# Patient Record
Sex: MALE | Race: WHITE | NOT HISPANIC OR LATINO | Employment: UNEMPLOYED | URBAN - METROPOLITAN AREA
[De-identification: names, ages, dates, MRNs, and addresses within clinical notes are randomized per-mention and may not be internally consistent; named-entity substitution may affect disease eponyms.]

---

## 2020-09-25 ENCOUNTER — HOSPITAL ENCOUNTER (OUTPATIENT)
Dept: RADIOLOGY | Facility: HOSPITAL | Age: 60
Discharge: HOME | End: 2020-09-25
Attending: FAMILY MEDICINE

## 2020-09-25 ENCOUNTER — RECORDS - HEALTHEAST (OUTPATIENT)
Dept: LAB | Facility: CLINIC | Age: 60
End: 2020-09-25

## 2020-09-25 ENCOUNTER — RECORDS - HEALTHEAST (OUTPATIENT)
Dept: ADMINISTRATIVE | Facility: OTHER | Age: 60
End: 2020-09-25

## 2020-09-25 DIAGNOSIS — M54.50 LOW BACK PAIN: ICD-10-CM

## 2020-09-25 LAB — PSA SERPL-MCNC: <0.1 NG/ML (ref 0–3.5)

## 2020-10-09 ENCOUNTER — HOSPITAL ENCOUNTER (OUTPATIENT)
Dept: PHYSICAL MEDICINE AND REHAB | Facility: CLINIC | Age: 60
Discharge: HOME | End: 2020-10-09
Attending: EMERGENCY MEDICINE

## 2020-10-09 DIAGNOSIS — M79.18 MYOFASCIAL PAIN: ICD-10-CM

## 2020-10-09 DIAGNOSIS — G47.9 SLEEP DIFFICULTIES: ICD-10-CM

## 2020-10-09 DIAGNOSIS — M43.16 SPONDYLOLISTHESIS OF LUMBAR REGION: ICD-10-CM

## 2020-10-09 DIAGNOSIS — C61 PROSTATE CANCER (H): ICD-10-CM

## 2020-10-09 DIAGNOSIS — M54.50 LUMBAR SPINE PAIN: ICD-10-CM

## 2020-10-19 ENCOUNTER — HOSPITAL ENCOUNTER (OUTPATIENT)
Dept: MRI IMAGING | Facility: CLINIC | Age: 60
Discharge: HOME | End: 2020-10-19
Attending: PHYSICAL MEDICINE & REHABILITATION

## 2020-10-19 DIAGNOSIS — M43.16 SPONDYLOLISTHESIS OF LUMBAR REGION: ICD-10-CM

## 2020-10-19 DIAGNOSIS — C61 PROSTATE CANCER (H): ICD-10-CM

## 2020-10-19 DIAGNOSIS — M54.50 LUMBAR SPINE PAIN: ICD-10-CM

## 2020-10-20 ENCOUNTER — COMMUNICATION - HEALTHEAST (OUTPATIENT)
Dept: PHYSICAL MEDICINE AND REHAB | Facility: CLINIC | Age: 60
End: 2020-10-20

## 2020-10-26 ENCOUNTER — OFFICE VISIT - HEALTHEAST (OUTPATIENT)
Dept: PHYSICAL THERAPY | Facility: REHABILITATION | Age: 60
End: 2020-10-26

## 2020-10-26 DIAGNOSIS — M54.50 ACUTE MIDLINE LOW BACK PAIN WITHOUT SCIATICA: ICD-10-CM

## 2020-10-29 ENCOUNTER — COMMUNICATION - HEALTHEAST (OUTPATIENT)
Dept: PHYSICAL MEDICINE AND REHAB | Facility: CLINIC | Age: 60
End: 2020-10-29

## 2020-10-29 DIAGNOSIS — M54.50 LUMBAR SPINE PAIN: ICD-10-CM

## 2020-11-03 ASSESSMENT — MIFFLIN-ST. JEOR: SCORE: 1684.89

## 2020-11-04 ENCOUNTER — SURGERY - HEALTHEAST (OUTPATIENT)
Dept: GASTROENTEROLOGY | Facility: CLINIC | Age: 60
End: 2020-11-04

## 2020-11-04 ASSESSMENT — MIFFLIN-ST. JEOR: SCORE: 1678.09

## 2020-11-05 ENCOUNTER — COMMUNICATION - HEALTHEAST (OUTPATIENT)
Dept: SCHEDULING | Facility: CLINIC | Age: 60
End: 2020-11-05

## 2020-11-05 ASSESSMENT — MIFFLIN-ST. JEOR: SCORE: 1592.81

## 2020-11-06 ASSESSMENT — MIFFLIN-ST. JEOR: SCORE: 1595.08

## 2020-11-07 ASSESSMENT — MIFFLIN-ST. JEOR: SCORE: 1595.08

## 2020-11-08 ASSESSMENT — MIFFLIN-ST. JEOR: SCORE: 1592.36

## 2020-11-17 ENCOUNTER — AMBULATORY - HEALTHEAST (OUTPATIENT)
Dept: INTERVENTIONAL RADIOLOGY/VASCULAR | Facility: HOSPITAL | Age: 60
End: 2020-11-17

## 2020-11-17 ENCOUNTER — RECORDS - HEALTHEAST (OUTPATIENT)
Dept: ADMINISTRATIVE | Facility: OTHER | Age: 60
End: 2020-11-17

## 2020-11-17 DIAGNOSIS — Z11.59 ENCOUNTER FOR SCREENING FOR OTHER VIRAL DISEASES: ICD-10-CM

## 2020-11-18 ENCOUNTER — RECORDS - HEALTHEAST (OUTPATIENT)
Dept: ADMINISTRATIVE | Facility: OTHER | Age: 60
End: 2020-11-18

## 2020-11-21 ENCOUNTER — AMBULATORY - HEALTHEAST (OUTPATIENT)
Dept: FAMILY MEDICINE | Facility: CLINIC | Age: 60
End: 2020-11-21

## 2020-11-21 DIAGNOSIS — Z11.59 ENCOUNTER FOR SCREENING FOR OTHER VIRAL DISEASES: ICD-10-CM

## 2020-11-23 ENCOUNTER — COMMUNICATION - HEALTHEAST (OUTPATIENT)
Dept: SCHEDULING | Facility: CLINIC | Age: 60
End: 2020-11-23

## 2020-11-24 ENCOUNTER — HOSPITAL ENCOUNTER (OUTPATIENT)
Dept: INTERVENTIONAL RADIOLOGY/VASCULAR | Facility: HOSPITAL | Age: 60
Discharge: HOME | End: 2020-11-24
Attending: INTERNAL MEDICINE | Admitting: RADIOLOGY

## 2020-11-24 DIAGNOSIS — C19 COLORECTAL CANCER (H): ICD-10-CM

## 2020-11-24 ASSESSMENT — MIFFLIN-ST. JEOR: SCORE: 1591.9

## 2020-11-27 ENCOUNTER — COMMUNICATION - HEALTHEAST (OUTPATIENT)
Dept: INTERVENTIONAL RADIOLOGY/VASCULAR | Facility: HOSPITAL | Age: 60
End: 2020-11-27

## 2020-11-27 ENCOUNTER — HOME INFUSION (PRE-WILLOW HOME INFUSION) (OUTPATIENT)
Dept: PHARMACY | Facility: CLINIC | Age: 60
End: 2020-11-27

## 2020-11-30 ENCOUNTER — HOME INFUSION (PRE-WILLOW HOME INFUSION) (OUTPATIENT)
Dept: PHARMACY | Facility: CLINIC | Age: 60
End: 2020-11-30

## 2020-12-01 NOTE — PROGRESS NOTES
This is a recent snapshot of the patient's [facility] medical record.  For current drug dose and complete information and questions, call [phone] or In [facility] pool, fv home infusion ([id number])  CSN Number:  [id number]     stated

## 2020-12-01 NOTE — PROGRESS NOTES
This is a recent snapshot of the patient's [facility] medical record.  For current drug dose and complete information and questions, call [phone] or In [facility] pool, fv home infusion ([id number])  CSN Number:  [id number]

## 2020-12-06 ENCOUNTER — COMMUNICATION - HEALTHEAST (OUTPATIENT)
Dept: PHYSICAL MEDICINE AND REHAB | Facility: CLINIC | Age: 60
End: 2020-12-06

## 2020-12-06 DIAGNOSIS — M54.50 LUMBAR SPINE PAIN: ICD-10-CM

## 2020-12-21 ENCOUNTER — TRANSFERRED RECORDS (OUTPATIENT)
Dept: HEALTH INFORMATION MANAGEMENT | Facility: CLINIC | Age: 60
End: 2020-12-21

## 2020-12-21 ENCOUNTER — AMBULATORY - HEALTHEAST (OUTPATIENT)
Dept: INFUSION THERAPY | Facility: HOSPITAL | Age: 60
End: 2020-12-21

## 2020-12-21 LAB
ALT SERPL-CCNC: 61 U/L (ref 0–45)
AST SERPL-CCNC: 36 U/L (ref 0–40)
CREAT SERPL-MCNC: 1.59 MG/DL (ref 0.7–1.3)
GFR SERPL CREATININE-BSD FRML MDRD: 45 ML/MIN/1.73M2
GLUCOSE SERPL-MCNC: 108 MG/DL (ref 70–125)
POTASSIUM SERPL-SCNC: 3.8 MMOL/L (ref 3.5–5)

## 2020-12-22 ENCOUNTER — HOSPITAL ENCOUNTER (INPATIENT)
Facility: CLINIC | Age: 60
LOS: 2 days | Discharge: HOME | End: 2020-12-24
Attending: INTERNAL MEDICINE | Admitting: INTERNAL MEDICINE
Payer: COMMERCIAL

## 2020-12-22 ENCOUNTER — APPOINTMENT (OUTPATIENT)
Dept: CARDIOLOGY | Facility: CLINIC | Age: 60
End: 2020-12-22
Attending: INTERNAL MEDICINE
Payer: COMMERCIAL

## 2020-12-22 DIAGNOSIS — I26.99 ACUTE PULMONARY EMBOLISM WITHOUT ACUTE COR PULMONALE, UNSPECIFIED PULMONARY EMBOLISM TYPE (H): Primary | ICD-10-CM

## 2020-12-22 LAB
ABO + RH BLD: NORMAL
ABO + RH BLD: NORMAL
ANION GAP SERPL CALCULATED.3IONS-SCNC: 6 MMOL/L (ref 3–14)
APTT PPP: 232 SEC (ref 22–37)
BLD GP AB SCN SERPL QL: NORMAL
BLD PROD TYP BPU: NORMAL
BLD PROD TYP BPU: NORMAL
BLD UNIT ID BPU: 0
BLOOD BANK CMNT PATIENT-IMP: NORMAL
BLOOD PRODUCT CODE: NORMAL
BPU ID: NORMAL
BUN SERPL-MCNC: 35 MG/DL (ref 7–30)
CALCIUM SERPL-MCNC: 8.3 MG/DL (ref 8.5–10.1)
CHLORIDE SERPL-SCNC: 110 MMOL/L (ref 94–109)
CO2 SERPL-SCNC: 21 MMOL/L (ref 20–32)
CREAT SERPL-MCNC: 1.42 MG/DL (ref 0.66–1.25)
ERYTHROCYTE [DISTWIDTH] IN BLOOD BY AUTOMATED COUNT: 20.6 % (ref 10–15)
FOLATE SERPL-MCNC: >100 NG/ML
GFR SERPL CREATININE-BSD FRML MDRD: 53 ML/MIN/{1.73_M2}
GLUCOSE SERPL-MCNC: 82 MG/DL (ref 70–99)
HCT VFR BLD AUTO: 22.8 % (ref 40–53)
HGB BLD-MCNC: 6.9 G/DL (ref 13.3–17.7)
HGB BLD-MCNC: 7.1 G/DL (ref 13.3–17.7)
MCH RBC QN AUTO: 25.4 PG (ref 26.5–33)
MCHC RBC AUTO-ENTMCNC: 31.1 G/DL (ref 31.5–36.5)
MCV RBC AUTO: 82 FL (ref 78–100)
NUM BPU REQUESTED: 1
PLATELET # BLD AUTO: 282 10E9/L (ref 150–450)
POTASSIUM SERPL-SCNC: 3.6 MMOL/L (ref 3.4–5.3)
RBC # BLD AUTO: 2.79 10E12/L (ref 4.4–5.9)
RETICS # AUTO: 12.6 10E9/L (ref 25–95)
RETICS/RBC NFR AUTO: 0.5 % (ref 0.5–2)
SODIUM SERPL-SCNC: 137 MMOL/L (ref 133–144)
SPECIMEN EXP DATE BLD: NORMAL
TRANSFUSION STATUS PATIENT QL: NORMAL
TRANSFUSION STATUS PATIENT QL: NORMAL
UFH PPP CHRO-ACNC: 0.23 IU/ML
UFH PPP CHRO-ACNC: 0.3 IU/ML
UFH PPP CHRO-ACNC: 0.36 IU/ML
VIT B12 SERPL-MCNC: 1342 PG/ML (ref 193–986)
WBC # BLD AUTO: 5.5 10E9/L (ref 4–11)

## 2020-12-22 PROCEDURE — 85018 HEMOGLOBIN: CPT | Performed by: INTERNAL MEDICINE

## 2020-12-22 PROCEDURE — 82607 VITAMIN B-12: CPT | Performed by: INTERNAL MEDICINE

## 2020-12-22 PROCEDURE — 99223 1ST HOSP IP/OBS HIGH 75: CPT | Mod: AI | Performed by: INTERNAL MEDICINE

## 2020-12-22 PROCEDURE — 250N000013 HC RX MED GY IP 250 OP 250 PS 637: Performed by: INTERNAL MEDICINE

## 2020-12-22 PROCEDURE — 86901 BLOOD TYPING SEROLOGIC RH(D): CPT | Performed by: INTERNAL MEDICINE

## 2020-12-22 PROCEDURE — 86900 BLOOD TYPING SEROLOGIC ABO: CPT | Performed by: INTERNAL MEDICINE

## 2020-12-22 PROCEDURE — 82746 ASSAY OF FOLIC ACID SERUM: CPT | Performed by: INTERNAL MEDICINE

## 2020-12-22 PROCEDURE — 85730 THROMBOPLASTIN TIME PARTIAL: CPT | Performed by: INTERNAL MEDICINE

## 2020-12-22 PROCEDURE — P9016 RBC LEUKOCYTES REDUCED: HCPCS | Performed by: INTERNAL MEDICINE

## 2020-12-22 PROCEDURE — 93306 TTE W/DOPPLER COMPLETE: CPT

## 2020-12-22 PROCEDURE — 80048 BASIC METABOLIC PNL TOTAL CA: CPT | Performed by: INTERNAL MEDICINE

## 2020-12-22 PROCEDURE — 36415 COLL VENOUS BLD VENIPUNCTURE: CPT | Performed by: INTERNAL MEDICINE

## 2020-12-22 PROCEDURE — 93306 TTE W/DOPPLER COMPLETE: CPT | Mod: 26 | Performed by: INTERNAL MEDICINE

## 2020-12-22 PROCEDURE — 85027 COMPLETE CBC AUTOMATED: CPT | Performed by: INTERNAL MEDICINE

## 2020-12-22 PROCEDURE — 250N000011 HC RX IP 250 OP 636: Performed by: INTERNAL MEDICINE

## 2020-12-22 PROCEDURE — 85520 HEPARIN ASSAY: CPT | Performed by: INTERNAL MEDICINE

## 2020-12-22 PROCEDURE — 86923 COMPATIBILITY TEST ELECTRIC: CPT | Performed by: INTERNAL MEDICINE

## 2020-12-22 PROCEDURE — 85045 AUTOMATED RETICULOCYTE COUNT: CPT | Performed by: INTERNAL MEDICINE

## 2020-12-22 PROCEDURE — 258N000003 HC RX IP 258 OP 636: Performed by: INTERNAL MEDICINE

## 2020-12-22 PROCEDURE — 86850 RBC ANTIBODY SCREEN: CPT | Performed by: INTERNAL MEDICINE

## 2020-12-22 PROCEDURE — 120N000001 HC R&B MED SURG/OB

## 2020-12-22 RX ORDER — OXYCODONE HYDROCHLORIDE 5 MG/1
5 TABLET ORAL 3 TIMES DAILY
COMMUNITY
End: 2021-10-28

## 2020-12-22 RX ORDER — GABAPENTIN 100 MG/1
100 CAPSULE ORAL AT BEDTIME
Status: DISCONTINUED | OUTPATIENT
Start: 2020-12-22 | End: 2020-12-22

## 2020-12-22 RX ORDER — CHLORPROMAZINE HYDROCHLORIDE 25 MG/1
25 TABLET, FILM COATED ORAL 3 TIMES DAILY PRN
Status: DISCONTINUED | OUTPATIENT
Start: 2020-12-22 | End: 2020-12-22

## 2020-12-22 RX ORDER — ACETAMINOPHEN 500 MG
500-1000 TABLET ORAL EVERY 6 HOURS PRN
COMMUNITY

## 2020-12-22 RX ORDER — ONDANSETRON 4 MG/1
4 TABLET, ORALLY DISINTEGRATING ORAL EVERY 6 HOURS PRN
Status: DISCONTINUED | OUTPATIENT
Start: 2020-12-22 | End: 2020-12-24 | Stop reason: HOSPADM

## 2020-12-22 RX ORDER — OXYCODONE HYDROCHLORIDE 5 MG/1
5 TABLET ORAL EVERY 4 HOURS PRN
Status: DISCONTINUED | OUTPATIENT
Start: 2020-12-22 | End: 2020-12-24 | Stop reason: HOSPADM

## 2020-12-22 RX ORDER — PROCHLORPERAZINE MALEATE 5 MG
10 TABLET ORAL EVERY 6 HOURS PRN
Status: DISCONTINUED | OUTPATIENT
Start: 2020-12-22 | End: 2020-12-24 | Stop reason: HOSPADM

## 2020-12-22 RX ORDER — METHYLPREDNISOLONE SODIUM SUCCINATE 125 MG/2ML
125 INJECTION, POWDER, LYOPHILIZED, FOR SOLUTION INTRAMUSCULAR; INTRAVENOUS
Status: DISCONTINUED | OUTPATIENT
Start: 2020-12-22 | End: 2020-12-24 | Stop reason: HOSPADM

## 2020-12-22 RX ORDER — TAMSULOSIN HYDROCHLORIDE 0.4 MG/1
0.4 CAPSULE ORAL DAILY
Status: DISCONTINUED | OUTPATIENT
Start: 2020-12-22 | End: 2020-12-22

## 2020-12-22 RX ORDER — NALOXONE HYDROCHLORIDE 0.4 MG/ML
0.4 INJECTION, SOLUTION INTRAMUSCULAR; INTRAVENOUS; SUBCUTANEOUS
Status: DISCONTINUED | OUTPATIENT
Start: 2020-12-22 | End: 2020-12-24 | Stop reason: HOSPADM

## 2020-12-22 RX ORDER — NALOXONE HYDROCHLORIDE 0.4 MG/ML
0.2 INJECTION, SOLUTION INTRAMUSCULAR; INTRAVENOUS; SUBCUTANEOUS
Status: DISCONTINUED | OUTPATIENT
Start: 2020-12-22 | End: 2020-12-24 | Stop reason: HOSPADM

## 2020-12-22 RX ORDER — TAMSULOSIN HYDROCHLORIDE 0.4 MG/1
0.4 CAPSULE ORAL DAILY
Status: DISCONTINUED | OUTPATIENT
Start: 2020-12-22 | End: 2020-12-24 | Stop reason: HOSPADM

## 2020-12-22 RX ORDER — GABAPENTIN 100 MG/1
100 CAPSULE ORAL AT BEDTIME
COMMUNITY
End: 2021-10-28

## 2020-12-22 RX ORDER — LORATADINE 10 MG/1
10 TABLET ORAL DAILY
Status: DISCONTINUED | OUTPATIENT
Start: 2020-12-22 | End: 2020-12-24 | Stop reason: HOSPADM

## 2020-12-22 RX ORDER — ACETAMINOPHEN 325 MG/1
650 TABLET ORAL EVERY 6 HOURS PRN
Status: DISCONTINUED | OUTPATIENT
Start: 2020-12-22 | End: 2020-12-22

## 2020-12-22 RX ORDER — HEPARIN SODIUM (PORCINE) LOCK FLUSH IV SOLN 100 UNIT/ML 100 UNIT/ML
5 SOLUTION INTRAVENOUS
Status: DISCONTINUED | OUTPATIENT
Start: 2020-12-22 | End: 2020-12-24 | Stop reason: HOSPADM

## 2020-12-22 RX ORDER — OXYBUTYNIN CHLORIDE 5 MG/1
5 TABLET ORAL 3 TIMES DAILY
Status: DISCONTINUED | OUTPATIENT
Start: 2020-12-22 | End: 2020-12-24 | Stop reason: HOSPADM

## 2020-12-22 RX ORDER — PROCHLORPERAZINE 25 MG
25 SUPPOSITORY, RECTAL RECTAL EVERY 12 HOURS PRN
Status: DISCONTINUED | OUTPATIENT
Start: 2020-12-22 | End: 2020-12-24 | Stop reason: HOSPADM

## 2020-12-22 RX ORDER — ONDANSETRON 2 MG/ML
4 INJECTION INTRAMUSCULAR; INTRAVENOUS EVERY 6 HOURS PRN
Status: DISCONTINUED | OUTPATIENT
Start: 2020-12-22 | End: 2020-12-24 | Stop reason: HOSPADM

## 2020-12-22 RX ORDER — FENTANYL 25 UG/1
25 PATCH TRANSDERMAL
Status: DISCONTINUED | OUTPATIENT
Start: 2020-12-22 | End: 2020-12-22

## 2020-12-22 RX ORDER — OXYCODONE HYDROCHLORIDE 5 MG/1
5 TABLET ORAL EVERY 4 HOURS PRN
COMMUNITY

## 2020-12-22 RX ORDER — FENTANYL 25 UG/1
25 PATCH TRANSDERMAL
Status: DISCONTINUED | OUTPATIENT
Start: 2020-12-22 | End: 2020-12-24 | Stop reason: HOSPADM

## 2020-12-22 RX ORDER — FENTANYL 25 UG/1
1 PATCH TRANSDERMAL
COMMUNITY

## 2020-12-22 RX ORDER — OXYBUTYNIN CHLORIDE 5 MG/1
5 TABLET ORAL 3 TIMES DAILY
COMMUNITY

## 2020-12-22 RX ORDER — ACETAMINOPHEN 500 MG
500-1000 TABLET ORAL 3 TIMES DAILY
COMMUNITY
End: 2021-10-28

## 2020-12-22 RX ORDER — DIPHENHYDRAMINE HYDROCHLORIDE 50 MG/ML
50 INJECTION INTRAMUSCULAR; INTRAVENOUS
Status: DISCONTINUED | OUTPATIENT
Start: 2020-12-22 | End: 2020-12-24 | Stop reason: HOSPADM

## 2020-12-22 RX ORDER — METOCLOPRAMIDE 10 MG/1
10 TABLET ORAL EVERY 6 HOURS PRN
Status: DISCONTINUED | OUTPATIENT
Start: 2020-12-22 | End: 2020-12-22

## 2020-12-22 RX ORDER — PROCHLORPERAZINE MALEATE 10 MG
10 TABLET ORAL 2 TIMES DAILY
COMMUNITY
End: 2021-10-28

## 2020-12-22 RX ORDER — CHLORPROMAZINE HYDROCHLORIDE 25 MG/1
25 TABLET, FILM COATED ORAL 3 TIMES DAILY PRN
Status: DISCONTINUED | OUTPATIENT
Start: 2020-12-22 | End: 2020-12-24 | Stop reason: HOSPADM

## 2020-12-22 RX ORDER — GABAPENTIN 300 MG/1
300 CAPSULE ORAL 3 TIMES DAILY
Status: DISCONTINUED | OUTPATIENT
Start: 2020-12-22 | End: 2020-12-24 | Stop reason: HOSPADM

## 2020-12-22 RX ORDER — ACETAMINOPHEN 325 MG/1
975 TABLET ORAL EVERY 6 HOURS PRN
Status: DISCONTINUED | OUTPATIENT
Start: 2020-12-22 | End: 2020-12-24 | Stop reason: HOSPADM

## 2020-12-22 RX ORDER — LIDOCAINE 40 MG/G
CREAM TOPICAL
Status: DISCONTINUED | OUTPATIENT
Start: 2020-12-22 | End: 2020-12-24 | Stop reason: HOSPADM

## 2020-12-22 RX ORDER — TAMSULOSIN HYDROCHLORIDE 0.4 MG/1
0.4 CAPSULE ORAL DAILY
COMMUNITY

## 2020-12-22 RX ORDER — HEPARIN SODIUM,PORCINE 10 UNIT/ML
5-10 VIAL (ML) INTRAVENOUS EVERY 24 HOURS
Status: DISCONTINUED | OUTPATIENT
Start: 2020-12-22 | End: 2020-12-24 | Stop reason: HOSPADM

## 2020-12-22 RX ORDER — HEPARIN SODIUM,PORCINE 10 UNIT/ML
5-10 VIAL (ML) INTRAVENOUS
Status: DISCONTINUED | OUTPATIENT
Start: 2020-12-22 | End: 2020-12-24 | Stop reason: HOSPADM

## 2020-12-22 RX ORDER — HEPARIN SODIUM 10000 [USP'U]/100ML
0-5000 INJECTION, SOLUTION INTRAVENOUS CONTINUOUS
Status: DISCONTINUED | OUTPATIENT
Start: 2020-12-22 | End: 2020-12-24 | Stop reason: HOSPADM

## 2020-12-22 RX ADMIN — HEPARIN SODIUM 1400 UNITS/HR: 10000 INJECTION, SOLUTION INTRAVENOUS at 14:46

## 2020-12-22 RX ADMIN — LORATADINE 10 MG: 10 TABLET ORAL at 08:53

## 2020-12-22 RX ADMIN — GABAPENTIN 300 MG: 300 CAPSULE ORAL at 19:42

## 2020-12-22 RX ADMIN — OXYBUTYNIN CHLORIDE 5 MG: 5 TABLET ORAL at 08:53

## 2020-12-22 RX ADMIN — CHLORPROMAZINE HYDROCHLORIDE 25 MG: 25 TABLET, SUGAR COATED ORAL at 19:26

## 2020-12-22 RX ADMIN — TAMSULOSIN HYDROCHLORIDE 0.4 MG: 0.4 CAPSULE ORAL at 08:53

## 2020-12-22 RX ADMIN — ACETAMINOPHEN 975 MG: 325 TABLET, FILM COATED ORAL at 18:47

## 2020-12-22 RX ADMIN — PROCHLORPERAZINE MALEATE 10 MG: 5 TABLET ORAL at 13:54

## 2020-12-22 RX ADMIN — FENTANYL TRANSDERMAL 1 PATCH: 25 PATCH, EXTENDED RELEASE TRANSDERMAL at 11:07

## 2020-12-22 RX ADMIN — OXYCODONE HYDROCHLORIDE 5 MG: 5 TABLET ORAL at 13:54

## 2020-12-22 RX ADMIN — OXYBUTYNIN CHLORIDE 5 MG: 5 TABLET ORAL at 19:41

## 2020-12-22 RX ADMIN — OMEPRAZOLE 40 MG: 20 CAPSULE, DELAYED RELEASE ORAL at 08:53

## 2020-12-22 RX ADMIN — OXYCODONE HYDROCHLORIDE 5 MG: 5 TABLET ORAL at 08:58

## 2020-12-22 RX ADMIN — GABAPENTIN 100 MG: 100 CAPSULE ORAL at 04:18

## 2020-12-22 RX ADMIN — OMEPRAZOLE 40 MG: 20 CAPSULE, DELAYED RELEASE ORAL at 15:23

## 2020-12-22 RX ADMIN — ACETAMINOPHEN 975 MG: 325 TABLET, FILM COATED ORAL at 04:18

## 2020-12-22 RX ADMIN — HEPARIN SODIUM 1250 UNITS/HR: 10000 INJECTION, SOLUTION INTRAVENOUS at 05:13

## 2020-12-22 RX ADMIN — OXYBUTYNIN CHLORIDE 5 MG: 5 TABLET ORAL at 13:54

## 2020-12-22 RX ADMIN — PROCHLORPERAZINE MALEATE 10 MG: 5 TABLET ORAL at 04:18

## 2020-12-22 ASSESSMENT — ACTIVITIES OF DAILY LIVING (ADL)
DIFFICULTY_COMMUNICATING: NO
DRESSING/BATHING_DIFFICULTY: NO
ADLS_ACUITY_SCORE: 19
WEAR_GLASSES_OR_BLIND: YES
ADLS_ACUITY_SCORE: 19
FALL_HISTORY_WITHIN_LAST_SIX_MONTHS: NO
DOING_ERRANDS_INDEPENDENTLY_DIFFICULTY: NO
PATIENT_/_FAMILY_COMMUNICATION_STYLE: SPOKEN LANGUAGE (ENGLISH OR BILINGUAL)
DIFFICULTY_EATING/SWALLOWING: NO
ADLS_ACUITY_SCORE: 19
HEARING_DIFFICULTY_OR_DEAF: NO
CONCENTRATING,_REMEMBERING_OR_MAKING_DECISIONS_DIFFICULTY: NO
WALKING_OR_CLIMBING_STAIRS_DIFFICULTY: NO
TOILETING_ISSUES: NO

## 2020-12-22 ASSESSMENT — MIFFLIN-ST. JEOR: SCORE: 1532.94

## 2020-12-22 NOTE — PLAN OF CARE
PT: Orders received. Per chart, just admitted this date due to DVT/PE and anemia. Will allow for continued medical management this date in order to maximize the patient's ability to participate in a meaningful PT evaluation.

## 2020-12-22 NOTE — H&P
[facility], [place] History and Physical    Rich Wolff MRN# [id number]   Age: 60 year old YOB: 1960     Date of Admission:  12/22/2020    Primary oncologist: [place] [facility] Dr. Kern          Assessment and Plan:   Assessment:   Rich Wolff is a 60-year-old man with a recent diagnosis of colon cancer for which he underwent ileostomy at [place] in early November.  He had gone to his primary oncologist today due to concern for left calf swelling and discomfort.  He was diagnosed with DVT and enoxaparin was prescribed.  When he got to the pharmacy, he was told that this would not be covered by his insurance, for which reason he went to [facility] emergency department.  They found that he had had a pulmonary embolism but that also his hemoglobin was low at 6.5.  The ED provider transfused him a couple of units of blood and started him on a heparin drip.  I have been asked to admit him here because of no room at [facility].    On my evaluation, Mr. Wolff is alert and coherent.  Heart rate 111, blood pressure 101/69, temperature 99.3, respirations 16 and unlabored in room air.    Past medical history is also notable for prostate cancer for which he underwent prostatectomy in 2015.  The patient is not clear on what colon cancer chemotherapy he is on at this time, but he completed his second course this past Wednesday.    Diagnoses:  1.  DVT pulmonary embolism associated with cancer.  2.  Anemia, probably multifactorial.  The patient's hemoglobin on arrival here is 7.1 with a reticulocyte percent of 0.5.  I think this goes along with bone marrow suppression due to chemotherapy.  It will be difficult to figure out whether he has iron deficiency and or vitamin deficiencies as he just was transfused.  3.  Generalized weakness.  Patient describes difficulty ambulating due to the chemotherapy regimen but it is not clear if this is a neuropathy or just generalized weakness.  4.  CKD stage  II-III.      Plan:   1.  Admit to inpatient on telemetry.  2.  We will continue to monitor hemoglobins while the patient is getting anticoagulated for PE DVT/.  3.  The patient stated that the insurance was knocking to cover his Lovenox.  I believe that apixaban also has an indication for VTE associated with malignancy.             Chief Complaint:   Left lower extremity swelling and pain.  Found also to have pulmonary embolism.     History is obtained from the patient and from the electronic medical record.  I spoke with Dr. Palmer at [facility] emergency department where the patient presented but I am unable to see her report in the care everywhere.    Mr. Wolff indicates that he noticed left lower extremity swelling and tenderness in the last day or so.  He really has not had significant fevers, sweats or chills.  He denies significant shortness of breath and chest pain as well.      He indicates that he has been significantly anorexic with chemotherapy and the colon cancer.  He denies significant shortness of breath but notes that he is having a lot of difficulty with ambulating right now to the point where he does not even feel safe walking to the bathroom independently.  He denies nausea and vomiting.  He has an ileostomy and is trying to drink enough to keep up with that but is not certain that he is able to keep up.            Past Medical History:   Prostate cancer  Colon cancer (patient presented with a cecal mass and obstruction)         Past Surgical History:   Diverting ileostomy, November 2020  Robotic radical prostatectomy, 2015.         Social History:   Patient reports he is not currently working.  Lives with his wife and has a couple of adult children close by.         Family History:   Reviewed but considered noncontributory.         Immunizations:   Immunizations are up to date          Allergies:   No Known Allergies          Medications:   Fentanyl patch 25 mcg  Gabapentin 100 mg  nightly  Loratadine 10 mg daily  Omeprazole 40 mg twice daily  Oxybutynin 5 mg 3 times daily  Tamsulosin 0.4 mg daily         Review of Systems:   A comprehensive review of systems was performed and found to be negative except as described in this note           Physical Exam:   Vitals were reviewed  Temp: 99.3  F (37.4  C) Temp src: Temporal BP: 101/69 Pulse: 111   Resp: 16        Constitutional: Awake, alert, cooperative, no apparent distress, and appears stated age.  Eyes: Lids and lashes normal, pupils equal, round and reactive to light, extra ocular muscles intact, sclera clear, conjunctiva normal.  ENT: Normocephalic, without obvious abnormality, atraumatic.  There is a mild amount of blood at the edge of his lips.  He describes some sores in his mouth that apparently are bleeding.  Neck: Supple, symmetrical, trachea midline, no adenopathy, thyroid symmetric, not enlarged and no tenderness, skin normal.  Hematologic / Lymphatic: No cervical lymphadenopathy and no supraclavicular lymphadenopathy.  Back: Symmetric, no curvature, spinous processes are non-tender on palpation, paraspinous muscles are non-tender on palpation, no costal vertebral tenderness.  Lungs: No increased work of breathing, good air exchange, clear to auscultation bilaterally, no crackles or wheezing.  Cardiovascular: Regular rate and rhythm, normal S1 and S2, no S3 or S4, and no murmur noted.  Abdomen: Ileostomy is nontender and functioning normally.  Generally, the abdomen is soft, nontender.  No obvious masses appreciated.  Musculoskeletal: No redness, warmth, or swelling of the joints. Tone is normal.  Left calf is significantly more swollen than the right, warm and edematous without exquisite tenderness or discoloration.  Neurologic: Awake, alert, oriented to name, place and time.  Cranial nerves II-XII are grossly intact.    Neuropsychiatric: Normal affect, mood, orientation, memory and insight.  Skin: No rashes, erythema, pallor,  petechia or purpura.          Data:     Results for orders placed or performed during the hospital encounter of 12/22/20 (from the past 24 hour(s))   Partial thromboplastin time   Result Value Ref Range     (HH) 22 - 37 sec   Basic metabolic panel   Result Value Ref Range    Sodium 137 133 - 144 mmol/L    Potassium 3.6 3.4 - 5.3 mmol/L    Chloride 110 (H) 94 - 109 mmol/L    Carbon Dioxide 21 20 - 32 mmol/L    Anion Gap 6 3 - 14 mmol/L    Glucose 82 70 - 99 mg/dL    Urea Nitrogen 35 (H) 7 - 30 mg/dL    Creatinine 1.42 (H) 0.66 - 1.25 mg/dL    GFR Estimate 53 (L) >60 mL/min/[1.73_m2]    GFR Estimate If Black 62 >60 mL/min/[1.73_m2]    Calcium 8.3 (L) 8.5 - 10.1 mg/dL   CBC with platelets   Result Value Ref Range    WBC 5.5 4.0 - 11.0 10e9/L    RBC Count 2.79 (L) 4.4 - 5.9 10e12/L    Hemoglobin 7.1 (L) 13.3 - 17.7 g/dL    Hematocrit 22.8 (L) 40.0 - 53.0 %    MCV 82 78 - 100 fl    MCH 25.4 (L) 26.5 - 33.0 pg    MCHC 31.1 (L) 31.5 - 36.5 g/dL    RDW 20.6 (H) 10.0 - 15.0 %    Platelet Count 282 150 - 450 10e9/L   Reticulocyte count   Result Value Ref Range    % Retic 0.5 0.5 - 2.0 %    Absolute Retic 12.6 (L) 25 - 95 10e9/L   Heparin Unfractionated Anti Xa Level   Result Value Ref Range    Heparin Unfractionated Anti Xa Level 0.30 IU/mL         Attestation:  I have reviewed today's vital signs, notes, medications, labs and imaging.  Total time: 35 minutes     Norman Parsons MD

## 2020-12-22 NOTE — PLAN OF CARE
Alert and oriented. Tachycardic at times, on remote telemetry. Max temp 100.8, prn Tylenol given. On regular diet, poor appetite. Had one emesis today r/t hiccups, prn Compazine and Thorazine given. Ambulates with SBA, gait belt, and IV pole to bathroom. Nephrostomy and ileostomy patent. Pain managed with Fentanyl patch and prn Oxycodone. Port patent, Heparin gtts infusing. Following Heparin order set. Hgb 6.9 this afternoon, 1 unit PRBC initiated and will recheck Hgb at 2230.

## 2020-12-22 NOTE — PHARMACY-ADMISSION MEDICATION HISTORY
Admission medication history interview status for this patient is complete. See [facility] admission navigator for allergy information, prior to admission medications and immunization status.     Medication history interview done via telephone during Covid-19 pandemic, indicate source(s): Patient  Pharmacy: [id number] ([address] in [place], [place])    Changes made to PTA medication list:  Added: all  Deleted: none  Changed: none    Actions taken by pharmacist (provider contacted, etc): called [facility] to clarify Compazine dose: prescribed as 10mg q6h, per pt: takes BID (pt did not remember the strength).     Additional medication history information: Fent patch is due today.    Medication reconciliation/reorder completed by provider prior to medication history?  yes  (Y/N)     For patients on insulin therapy: no    Prior to Admission medications    Medication Sig Last Dose Taking? Auth Provider   acetaminophen (TYLENOL) 500 MG tablet Take 500-1,000 mg by mouth 3 times daily 12/22/2020 at 0418 Yes Unknown, Entered By History   acetaminophen (TYLENOL) 500 MG tablet Take 500 mg by mouth daily as needed for mild pain  prn Yes Unknown, Entered By History   fentaNYL (DURAGESIC) 25 mcg/hr 72 hr patch Place 1 patch onto the skin every 72 hours remove old patch. 12/19/2020 Yes Unknown, Entered By History   Ferrous Sulfate Dried 45 MG TBCR Take 45 mg by mouth daily Slow iron* 12/21/2020 at Unknown time Yes Unknown, Entered By History   gabapentin (NEURONTIN) 100 MG capsule Take 100 mg by mouth At Bedtime 12/22/2020 at 0418 Yes Unknown, Entered By History   loratadine-pseudoePHEDrine (CLARITIN-D 24-HOUR)  MG 24 hr tablet Take 1 tablet by mouth daily see MAR Yes Unknown, Entered By History   OMEPRAZOLE PO Take 40 mg by mouth 2 times daily (before meals) 12/21/2020 at Unknown time Yes Unknown, Entered By History   oxybutynin (DITROPAN) 5 MG tablet Take 5 mg by mouth 3 times daily 12/22/2020 at 0853 Yes Unknown, Entered  By History   oxyCODONE (ROXICODONE) 5 MG tablet Take 5 mg by mouth 3 times daily 12/22/2020 at 0853 Yes Unknown, Entered By History   oxyCODONE (ROXICODONE) 5 MG tablet Take 5 mg by mouth daily as needed for severe pain prn Yes Unknown, Entered By History   prochlorperazine (COMPAZINE) 10 MG tablet Take 10 mg by mouth 2 times daily 12/22/2020 at 0418 Yes Unknown, Entered By History   tamsulosin (FLOMAX) 0.4 MG capsule Take 0.4 mg by mouth daily 12/22/2020 at 0853 Yes Unknown, Entered By History

## 2020-12-22 NOTE — CONSULTS
CLINICAL NUTRITION SERVICES  -  ASSESSMENT NOTE      Recommendations Ordered by Registered Dietitian (RD): Magic cup once daily with meal, Gelatein twice daily with meals   Future/Additional Recommendations: Monitor supplement tolerance, need for additional suggestions/ideas for pt with poor PO when at home   MALNUTRITION:  % Weight Loss:  > 7.5% in 3 months (severe malnutrition)  % Intake:  </= 75% for >/= 1 month (severe malnutrition)  Subcutaneous Fat Loss:  Orbital region moderate to severe depletion  Muscle Loss:  Temporal region moderate to severe depletion, Clavicle bone region severe depletion, Dorsal hand region moderate depletion and Posterior calf region mild to moderate depletion--calf slightly swollen d/t blood clot, may be more severe than is apparent  Fluid Retention:  None noted    Malnutrition Diagnosis: Severe malnutrition  In Context of:  Chronic illness or disease        REASON FOR ASSESSMENT  Rich Wolff is a 60 year old male seen by Registered Dietitian for Provider Order - consult d/t anorexia r/t cancer and chemo, nutrition risk screen significant for 24-33 lb weight loss and poor PO d/t decreased appetite.  Pt admitted d/t pulmonary embolism, anemia, generalized weakness d\t colon cancer. Pt underwent colon resection and loop ileostomy formation at [place] in early November and received diet education related to ileostomy at that time. PMH significant for CKD stage II-III      NUTRITION HISTORY  - Information obtained from patient  - Patient is on a regular diet at home, pt's wife provides support with meals  - Diet history- pt reports decreased appetite over about 3 months since he started chemotherapy. He reports that he's always had a hard time eating dry foods as they get stuck in his throat. He's also been struggling with the hiccups and phlegm which has made eating even more difficult. His sense of taste is altered--things taste sweeter than they used to  - Supplements- pt takes  "Ensure at home and he doesn't love it, but it's okay    CURRENT NUTRITION ORDERS  Diet Order:     Regular     Current Intake/Tolerance:  25-50% at meals, appetite is generally poor to fair per patient    NUTRITION FOCUSED PHYSICAL ASSESSMENT FOR DIAGNOSING MALNUTRITION)  Yes           Observed:    No nutrition-related physical findings observed, Muscle wasting (refer to documentation in Malnutrition section) and Subcutaneous fat loss (refer to documentation in Malnutrition section)    ANTHROPOMETRICS  Height: 5' 8\"  Weight: 165 lbs 0 oz  Body mass index is 25.09 kg/m .  Weight Status:  Overweight BMI 25-29.9  IBW: 160 lb +/-10%  % IBW: 103%  Weight History:   Wt Readings from Last 10 Encounters:   12/22/20 74.8 kg (165 lb)   11/24/20         80.7 kg (178 lb)  10/06/20         88.5 kg (195 lb)    30 lb (15%) weight loss over past 2.5 months, which is clinically significant (severe)    LABS  Labs reviewed  Sodium (mmol/L)   Date Value   12/23/2020 134   12/22/2020 137     Potassium (mmol/L)   Date Value   12/23/2020 3.4   12/22/2020 3.6     Urea Nitrogen (mg/dL)   Date Value   12/23/2020 25   12/22/2020 35 (H)     Creatinine (mg/dL)   Date Value   12/23/2020 1.33 (H)   12/22/2020 1.42 (H)     WBC (10e9/L)   Date Value   12/23/2020 5.8   12/22/2020 5.5     Glucose (mg/dL)   Date Value   12/23/2020 80   12/22/2020 82     MEDICATIONS    fentaNYL  25 mcg Transdermal Q72H     fentaNYL   Transdermal Q8H     gabapentin  300 mg Oral TID     heparin  5 mL Intracatheter Q28 Days     heparin lock flush  5-10 mL Intracatheter Q24H     iron sucrose (VENOFER) intermittent infusion (200 mg)  200 mg Intravenous Q24H     loratadine  10 mg Oral Daily     omeprazole  40 mg Oral BID AC     oxybutynin  5 mg Oral TID     sodium chloride (PF)  3 mL Intracatheter Q8H     tamsulosin  0.4 mg Oral Daily        heparin 1,400 Units/hr (12/23/20 5079)     - MEDICATION INSTRUCTIONS -            ASSESSED NUTRITION NEEDS PER APPROVED PRACTICE " GUIDELINES:    Dosing Weight 74.8 kg, current weight  Estimated Energy Needs: 9340-8794 kcals (25-30 Kcal/Kg)  Justification: maintenance  Estimated Protein Needs:  grams protein (1.2-1.5 g pro/Kg)  Justification: Repletion  Estimated Fluid Needs: 7861-9314  mL (1 mL/Kcal)  Justification: maintenance    MALNUTRITION:  % Weight Loss:  > 7.5% in 3 months (severe malnutrition)  % Intake:  </= 75% for >/= 1 month (severe malnutrition)  Subcutaneous Fat Loss:  Orbital region moderate to severe depletion  Muscle Loss:  Temporal region moderate to severe depletion, Clavicle bone region severe depletion, Dorsal hand region moderate depletion and Posterior calf region mild to moderate depletion--calf slightly swollen d/t blood clot, may be more severe than is apparent  Fluid Retention:  None noted    Malnutrition Diagnosis: Severe malnutrition  In Context of:  Chronic illness or disease    NUTRITION DIAGNOSIS:  Inadequate oral intake related to anorexia d/t cancer and chemotherapy as evidenced by PO not meeting needs for a few months    NUTRITION INTERVENTIONS  Recommendations / Nutrition Prescription  Trial Gelatein (cherry) supplement twice daily with meals  Trial Magic Cup (wild berry per patient preference) once daily with meals  Order Thera-M d/t ongoing inadequate intake    Implementation  Provided snack and supplement suggestions for patient at home  Supplements and mvit as above    Nutrition Goals  No weight loss below 165 lbs  Pt will meet >75% of estimated needs    MONITORING AND EVALUATION:  Progress towards goals will be monitored and evaluated per protocol and Practice Guidelines    Electronically signed by:  Lay Zazueta RD

## 2020-12-22 NOTE — PLAN OF CARE
Pt direct admit from [facility]. baseline tachycardic- remote tele. Blood tranfusion finished upon arrival. Tylenol for leg pain. port is patent. Heparin protocol. Regular diet, poor intake over past couple weeks-nutrition consult. Up with assist of 1. Colostomy and nephrostomy bag are patent.

## 2020-12-23 ENCOUNTER — APPOINTMENT (OUTPATIENT)
Dept: PHYSICAL THERAPY | Facility: CLINIC | Age: 60
End: 2020-12-23
Attending: INTERNAL MEDICINE
Payer: COMMERCIAL

## 2020-12-23 ENCOUNTER — INFUSION - HEALTHEAST (OUTPATIENT)
Dept: INFUSION THERAPY | Facility: HOSPITAL | Age: 60
End: 2020-12-23
Payer: COMMERCIAL

## 2020-12-23 ENCOUNTER — TELEPHONE (OUTPATIENT)
Facility: CLINIC | Age: 60
End: 2020-12-23

## 2020-12-23 LAB
ANION GAP SERPL CALCULATED.3IONS-SCNC: 5 MMOL/L (ref 3–14)
BASOPHILS # BLD AUTO: 0.1 10E9/L (ref 0–0.2)
BASOPHILS NFR BLD AUTO: 0.9 %
BUN SERPL-MCNC: 25 MG/DL (ref 7–30)
CALCIUM SERPL-MCNC: 8.6 MG/DL (ref 8.5–10.1)
CHLORIDE SERPL-SCNC: 109 MMOL/L (ref 94–109)
CO2 SERPL-SCNC: 20 MMOL/L (ref 20–32)
CREAT SERPL-MCNC: 1.33 MG/DL (ref 0.66–1.25)
DIFFERENTIAL METHOD BLD: ABNORMAL
EOSINOPHIL # BLD AUTO: 0.1 10E9/L (ref 0–0.7)
EOSINOPHIL NFR BLD AUTO: 0.9 %
ERYTHROCYTE [DISTWIDTH] IN BLOOD BY AUTOMATED COUNT: 19.8 % (ref 10–15)
GFR SERPL CREATININE-BSD FRML MDRD: 58 ML/MIN/{1.73_M2}
GLUCOSE SERPL-MCNC: 80 MG/DL (ref 70–99)
HCT VFR BLD AUTO: 24.4 % (ref 40–53)
HGB BLD-MCNC: 7.6 G/DL (ref 13.3–17.7)
HGB BLD-MCNC: 7.8 G/DL (ref 13.3–17.7)
IMM GRANULOCYTES # BLD: 0 10E9/L (ref 0–0.4)
IMM GRANULOCYTES NFR BLD: 0.5 %
LACTATE BLD-SCNC: 1 MMOL/L (ref 0.7–2)
LYMPHOCYTES # BLD AUTO: 0.6 10E9/L (ref 0.8–5.3)
LYMPHOCYTES NFR BLD AUTO: 10.8 %
MCH RBC QN AUTO: 26.4 PG (ref 26.5–33)
MCHC RBC AUTO-ENTMCNC: 32 G/DL (ref 31.5–36.5)
MCV RBC AUTO: 82 FL (ref 78–100)
MONOCYTES # BLD AUTO: 0.6 10E9/L (ref 0–1.3)
MONOCYTES NFR BLD AUTO: 10 %
NEUTROPHILS # BLD AUTO: 4.5 10E9/L (ref 1.6–8.3)
NEUTROPHILS NFR BLD AUTO: 76.9 %
NRBC # BLD AUTO: 0 10*3/UL
NRBC BLD AUTO-RTO: 0 /100
PLATELET # BLD AUTO: 372 10E9/L (ref 150–450)
POTASSIUM SERPL-SCNC: 3.4 MMOL/L (ref 3.4–5.3)
PROCALCITONIN SERPL-MCNC: 0.65 NG/ML
RBC # BLD AUTO: 2.96 10E12/L (ref 4.4–5.9)
SODIUM SERPL-SCNC: 134 MMOL/L (ref 133–144)
UFH PPP CHRO-ACNC: 0.31 IU/ML
WBC # BLD AUTO: 5.8 10E9/L (ref 4–11)

## 2020-12-23 PROCEDURE — 85018 HEMOGLOBIN: CPT | Performed by: INTERNAL MEDICINE

## 2020-12-23 PROCEDURE — 84145 PROCALCITONIN (PCT): CPT | Performed by: INTERNAL MEDICINE

## 2020-12-23 PROCEDURE — 250N000013 HC RX MED GY IP 250 OP 250 PS 637: Performed by: INTERNAL MEDICINE

## 2020-12-23 PROCEDURE — 258N000003 HC RX IP 258 OP 636: Performed by: INTERNAL MEDICINE

## 2020-12-23 PROCEDURE — 85025 COMPLETE CBC W/AUTO DIFF WBC: CPT | Performed by: INTERNAL MEDICINE

## 2020-12-23 PROCEDURE — 97116 GAIT TRAINING THERAPY: CPT | Mod: GP | Performed by: PHYSICAL THERAPIST

## 2020-12-23 PROCEDURE — 250N000011 HC RX IP 250 OP 636: Performed by: INTERNAL MEDICINE

## 2020-12-23 PROCEDURE — 97161 PT EVAL LOW COMPLEX 20 MIN: CPT | Mod: GP | Performed by: PHYSICAL THERAPIST

## 2020-12-23 PROCEDURE — 99233 SBSQ HOSP IP/OBS HIGH 50: CPT | Performed by: INTERNAL MEDICINE

## 2020-12-23 PROCEDURE — 36415 COLL VENOUS BLD VENIPUNCTURE: CPT | Performed by: INTERNAL MEDICINE

## 2020-12-23 PROCEDURE — 120N000001 HC R&B MED SURG/OB

## 2020-12-23 PROCEDURE — 80048 BASIC METABOLIC PNL TOTAL CA: CPT | Performed by: INTERNAL MEDICINE

## 2020-12-23 PROCEDURE — 83605 ASSAY OF LACTIC ACID: CPT | Performed by: INTERNAL MEDICINE

## 2020-12-23 PROCEDURE — 85520 HEPARIN ASSAY: CPT | Performed by: INTERNAL MEDICINE

## 2020-12-23 RX ORDER — MULTIPLE VITAMINS W/ MINERALS TAB 9MG-400MCG
1 TAB ORAL DAILY
Status: DISCONTINUED | OUTPATIENT
Start: 2020-12-23 | End: 2020-12-24 | Stop reason: HOSPADM

## 2020-12-23 RX ADMIN — TAMSULOSIN HYDROCHLORIDE 0.4 MG: 0.4 CAPSULE ORAL at 08:32

## 2020-12-23 RX ADMIN — ACETAMINOPHEN 975 MG: 325 TABLET, FILM COATED ORAL at 19:51

## 2020-12-23 RX ADMIN — OXYBUTYNIN CHLORIDE 5 MG: 5 TABLET ORAL at 14:42

## 2020-12-23 RX ADMIN — ACETAMINOPHEN 975 MG: 325 TABLET, FILM COATED ORAL at 11:00

## 2020-12-23 RX ADMIN — CHLORPROMAZINE HYDROCHLORIDE 25 MG: 25 TABLET, SUGAR COATED ORAL at 14:44

## 2020-12-23 RX ADMIN — CHLORPROMAZINE HYDROCHLORIDE 25 MG: 25 TABLET, SUGAR COATED ORAL at 19:51

## 2020-12-23 RX ADMIN — OXYBUTYNIN CHLORIDE 5 MG: 5 TABLET ORAL at 19:45

## 2020-12-23 RX ADMIN — HEPARIN SODIUM 1400 UNITS/HR: 10000 INJECTION, SOLUTION INTRAVENOUS at 09:58

## 2020-12-23 RX ADMIN — LORATADINE 10 MG: 10 TABLET ORAL at 08:33

## 2020-12-23 RX ADMIN — OXYBUTYNIN CHLORIDE 5 MG: 5 TABLET ORAL at 08:33

## 2020-12-23 RX ADMIN — GABAPENTIN 300 MG: 300 CAPSULE ORAL at 14:42

## 2020-12-23 RX ADMIN — OXYCODONE HYDROCHLORIDE 5 MG: 5 TABLET ORAL at 04:15

## 2020-12-23 RX ADMIN — OMEPRAZOLE 40 MG: 20 CAPSULE, DELAYED RELEASE ORAL at 15:37

## 2020-12-23 RX ADMIN — OMEPRAZOLE 40 MG: 20 CAPSULE, DELAYED RELEASE ORAL at 08:33

## 2020-12-23 RX ADMIN — CHLORPROMAZINE HYDROCHLORIDE 25 MG: 25 TABLET, SUGAR COATED ORAL at 06:43

## 2020-12-23 RX ADMIN — OXYCODONE HYDROCHLORIDE 5 MG: 5 TABLET ORAL at 19:52

## 2020-12-23 RX ADMIN — OXYCODONE HYDROCHLORIDE 5 MG: 5 TABLET ORAL at 11:00

## 2020-12-23 RX ADMIN — GABAPENTIN 300 MG: 300 CAPSULE ORAL at 19:45

## 2020-12-23 RX ADMIN — IRON SUCROSE 200 MG: 20 INJECTION, SOLUTION INTRAVENOUS at 04:06

## 2020-12-23 RX ADMIN — GABAPENTIN 300 MG: 300 CAPSULE ORAL at 08:32

## 2020-12-23 RX ADMIN — MULTIPLE VITAMINS W/ MINERALS TAB 1 TABLET: TAB at 14:42

## 2020-12-23 ASSESSMENT — ACTIVITIES OF DAILY LIVING (ADL)
ADLS_ACUITY_SCORE: 19

## 2020-12-23 NOTE — PROGRESS NOTES
12/23/20 1026   Quick Adds   Type of Visit Initial PT Evaluation   Living Environment   Living Environment Comments Pt lives in a house with wife. 3 DIDI. All needs on main level. Uses wife as support for mobility inside the house the past couple weeks.    Self-Care   Usual Activity Tolerance moderate   Current Activity Tolerance fair   Equipment Currently Used at Home none   Disability/Function   Fall history within last six months no   General Information   Onset of Illness/Injury or Date of Surgery 12/22/20   Referring Physician Hannah Camacho MD   Patient/Family Therapy Goals Statement (PT) To improve mobility status   Pertinent History of Current Problem (include personal factors and/or comorbidities that impact the POC) Pt is a 60-year-old man with a recent diagnosis of colon cancer. Pt diagnosed with DVT pulmonary embolism found to have anemic.    Existing Precautions/Restrictions fall   Weight-Bearing Status - LLE full weight-bearing   Weight-Bearing Status - RLE full weight-bearing   Cognition   Orientation Status (Cognition) oriented x 4   Affect/Mental Status (Cognition) WNL   Follows Commands (Cognition) WNL   Pain Assessment   Patient Currently in Pain Yes, see Vital Sign flowsheet  (3/10 L knee)   Range of Motion (ROM)   ROM Comment B LE WFL   Strength   Strength Comments B LE grossly 4+/5   Bed Mobility   Comment (Bed Mobility) Mod ind supine <> sit   Transfers   Transfer Safety Comments CGA with FWW   Gait/Stairs (Locomotion)   Assistive Device (Gait) walker, front-wheeled   Distance in Feet (Required for LE Total Joints) 12   Pattern (Gait) step-through   Deviations/Abnormal Patterns (Gait) antalgic   Comment (Gait/Stairs) CGA with FWW   Balance   Balance Comments good unsupported sitting; fair standing with FWW   Clinical Impression   Criteria for Skilled Therapeutic Intervention yes, treatment indicated   PT Diagnosis (PT) impaired functional mobility   Influenced by the following impairments  pain, impaired standing balance   Functional limitations due to impairments impaired transfers, ambulation, stairs   Clinical Presentation Evolving/Changing   Clinical Presentation Rationale Pt's functional status, medical history   Clinical Decision Making (Complexity) low complexity   Therapy Frequency (PT) 5x/week   Predicted Duration of Therapy Intervention (days/wks) 3 days   Planned Therapy Interventions (PT) balance training;gait training;bed mobility training;patient/family education;ROM (range of motion);strengthening;transfer training   Anticipated Equipment Needs at Discharge (PT) walker, rolling   Risk & Benefits of therapy have been explained evaluation/treatment results reviewed;care plan/treatment goals reviewed;risks/benefits reviewed;current/potential barriers reviewed;participants voiced agreement with care plan;participants included;patient   PT Discharge Planning    PT Discharge Recommendation (DC Rec) home with assist   PT Rationale for DC Rec Patient below baseline level of mobility, but anticipate that patient will be safe to discharge home with continued skilled PT services and continued medical managment.    PT Brief overview of current status  CGA with FWW   Total Evaluation Time   Total Evaluation Time (Minutes) 6

## 2020-12-23 NOTE — PLAN OF CARE
A&Ox4. VSS ex low grade temp. PRN oxy and tylenol for pain management in LLE and somewhat helpful. Fentanyl path in place. Colosotmy and nephrostomy. IV heparin via port. Possibly home tomorrow.

## 2020-12-23 NOTE — TELEPHONE ENCOUNTER
Prior Authorization Approval    xarelto 20mg tabs  Date Initiated: 12/23/2020  Date Completed: 12/23/2020  Prior Auth Type: Clinical                Status: Approved    Effective Date: 11/23/2020 - 12/23/2021  Copay: 0.00     Filling Pharmacy: [facility] [id number] [facility]    Insurance: [employer] - Phone [phone] Fax [phone]  ID: [id number]  Case Number: [id number]   Submitted Via: Michael Cardoso  [facility] Liaison  Ph: [phone] Page: [phone]

## 2020-12-23 NOTE — PROGRESS NOTES
[facility]    Medicine Progress Note - Hospitalist Service       Date of Admission:  12/22/2020  Assessment & Plan        Rich Wolff is a 60-year-old man with a recent diagnosis of colon cancer for which he underwent ileostomy at [place] in early November.  He had gone to his primary oncologist today due to concern for left calf swelling and discomfort.  He was diagnosed with DVT and enoxaparin was prescribed.  When he got to the pharmacy, he was told that this would not be covered by his insurance, for which reason he went to [facility] emergency department.  They found that he had had a pulmonary embolism but that also his hemoglobin was low at 6.5.  The ED provider transfused him a couple of units of blood and started him on a heparin drip.  I have been asked to admit him here because of no room at [facility].     On my evaluation, Mr. Wolff is alert and coherent.  Heart rate 111, blood pressure 101/69, temperature 99.3, respirations 16 and unlabored in room air.     Past medical history is also notable for prostate cancer for which he underwent prostatectomy in 2015.  The patient is not clear on what colon cancer chemotherapy he is on at this time, but he completed his second course this past Wednesday.     Diagnoses:  1.  LLE DVT and pulmonary embolism associated with cancer.  2.  Anemia, probably multifactorial.  The patient's hemoglobin on arrival here is 7.1 with a reticulocyte percent of 0.5.  I think this goes along with bone marrow suppression due to chemotherapy.  It will be difficult to figure out whether he has iron deficiency and or vitamin deficiencies as he just was transfused.  3.  Generalized weakness.  Patient describes difficulty ambulating due to the chemotherapy regimen but it is not clear if this is a neuropathy or just generalized weakness.  4.  CKD stage II-III.      Plan:    1.  Admit to inpatient on telemetry. TTE 12/22 showed no RV strain.  2.  We will  continue to monitor hemoglobins while the patient is getting anticoagulated for PE DVT.  3.  The patient stated that the insurance was knocking to cover his Lovenox.  I believe that apixaban also has an indication for VTE associated with malignancy. Will get pharmacy liason to look into cost as that is an issue.          Diet: Combination Diet Regular Diet Adult  Snacks/Supplements Adult: Gelatein Plus; With Meals  Snacks/Supplements Adult: Magic Cup; With Meals    DVT Prophylaxis: IV heparin.  Adkins Catheter: not present  Code Status: Full Code           Disposition Plan   Expected discharge: Tomorrow, recommended to prior living arrangement once hemoglobin stable and afebrile.  Entered: Hannah Camacho MD 12/23/2020, 11:25 AM       The patient's care was discussed with the Bedside Nurse and Patient.    Hannah Camacho MD  Hospitalist Service  [facility]  Contact information available via [facility] Paging/Directory    ______________________________________________________________________    Interval History     + fever. No cough/SOB. No N/V. No chest pain.    Data reviewed today: I reviewed all medications, new labs and imaging results over the last 24 hours. I personally reviewed no images or EKG's today.    Physical Exam   Vital Signs: Temp: 99.3  F (37.4  C) Temp src: Temporal BP: 100/63 Pulse: 108   Resp: 16 SpO2: 98 % O2 Device: None (Room air)    Weight: 165 lbs 0 oz    Gen - AAO x 3 in NAD.  Lungs - CTA B.  Heart - tachycardic, S1+S2 nml, no m/g/r.  Abd - soft, NT, ND, + Ileostomy with liquid stool.  Ext - 1+ edema LLE.    Data   Recent Labs   Lab 12/23/20  0635 12/23/20  0000 12/22/20  1340 12/22/20  0430   WBC 5.8  --   --  5.5   HGB 7.8* 7.6* 6.9* 7.1*   MCV 82  --   --  82     --   --  282     --   --  137   POTASSIUM 3.4  --   --  3.6   CHLORIDE 109  --   --  110*   CO2 20  --   --  21   BUN 25  --   --  35*   CR 1.33*  --   --  1.42*   ANIONGAP 5  --   --  6   AARON 8.6   --   --  8.3*   GLC 80  --   --  82     Recent Results (from the past 24 hour(s))   Echocardiogram Complete    Narrative    [id number]  [id number]  [id number]  [id number]^DEVIN^POONAM^K           [facility]  Echocardiography Laboratory  [address] [place] [address]        Name: ARTUR GOULD  MRN: [id number]  : 1960  Study Date: 2020 01:56 PM  Age: 60 yrs  Gender: Male  Patient Location: [facility]  Reason For Study: Pulmonary Embolism  Ordering Physician: POONAM BELTRAN  Referring Physician: POONAM BELTRAN  Performed By: Ant Cuadra RDCS     BSA: 1.9 m2  Height: 68 in  Weight: 165 lb  HR: 96  BP: 116/65 mmHg  _____________________________________________________________________________  __        Procedure  Complete Portable Echo Adult.  _____________________________________________________________________________  __        Interpretation Summary     Dilation of the inferior vena cava is present with abnormal respiratory  variation in diameter.  Right ventricular systolic pressure is elevated, consistent with mild to  moderate pulmonary hypertension.  The right ventricle is normal in structure, function and size.  The visual ejection fraction is estimated at 55-60%.  _____________________________________________________________________________  __        Left Ventricle  The left ventricle is normal in structure, function and size. A sigmoid septum  is present. Left ventricular diastolic function is normal. The visual ejection  fraction is estimated at 55-60%.     Right Ventricle  The right ventricle is normal in structure, function and size.     Atria  Normal left atrial size. Right atrial size is normal.     Mitral Valve  The mitral valve is normal in structure and function. There is trace mitral  regurgitation.        Tricuspid Valve  Normal tricuspid valve. There is trace to mild tricuspid regurgitation. Right  ventricular systolic pressure is elevated, consistent with mild  to moderate  pulmonary hypertension.     Aortic Valve  The aortic valve is normal in structure and function.     Pulmonic Valve  Normal pulmonic valve.     Vessels  Mild aortic root dilatation. Normal size ascending aorta. Dilation of the  inferior vena cava is present with abnormal respiratory variation in diameter.     Pericardium  There is no pericardial effusion.     _____________________________________________________________________________  __  MMode/2D Measurements & Calculations  IVSd: 1.3 cm  LVIDd: 4.1 cm  LVIDs: 3.2 cm  LVPWd: 1.1 cm  FS: 21.9 %  LV mass(C)d: 169.1 grams  LV mass(C)dI: 89.8 grams/m2     Ao root diam: 3.9 cm  LA dimension: 3.9 cm  asc Aorta Diam: 3.3 cm  LA/Ao: 1.0  LA Volume (BP): 56.3 ml  LA Volume Index (BP): 29.9 ml/m2  RWT: 0.51        Doppler Measurements & Calculations  MV E max shreya: 98.0 cm/sec  MV A max shreya: 68.9 cm/sec  MV E/A: 1.4  MV dec time: 0.19 sec     PA acc time: 0.10 sec  TR max shreya: 283.5 cm/sec  TR max P.2 mmHg  E/E' av.1  Lateral E/e': 7.1  Medial E/e': 11.1           _____________________________________________________________________________  __           Report approved by: Maikol Carter 2020 03:23 PM        Medications     heparin 1,400 Units/hr (20 0958)     - MEDICATION INSTRUCTIONS -         fentaNYL  25 mcg Transdermal Q72H     fentaNYL   Transdermal Q8H     gabapentin  300 mg Oral TID     heparin  5 mL Intracatheter Q28 Days     heparin lock flush  5-10 mL Intracatheter Q24H     iron sucrose (VENOFER) intermittent infusion (200 mg)  200 mg Intravenous Q24H     loratadine  10 mg Oral Daily     multivitamin w/minerals  1 tablet Oral Daily     omeprazole  40 mg Oral BID AC     oxybutynin  5 mg Oral TID     sodium chloride (PF)  3 mL Intracatheter Q8H     tamsulosin  0.4 mg Oral Daily

## 2020-12-23 NOTE — PLAN OF CARE
"/66 (BP Location: Left arm)   Pulse 97   Temp 99.6  F (37.6  C) (Temporal)   Resp 16   Ht 1.727 m (5' 8\")   Wt 74.8 kg (165 lb)   SpO2 98%   BMI 25.09 kg/m    Alert and oriented. Port patent. Heparin infusing. No changes done. Iron administered. Hemoglobin monitored. Colostomy and nephrostomy patent.  "

## 2020-12-24 VITALS
SYSTOLIC BLOOD PRESSURE: 117 MMHG | BODY MASS INDEX: 25.01 KG/M2 | RESPIRATION RATE: 18 BRPM | WEIGHT: 165 LBS | TEMPERATURE: 99.9 F | HEIGHT: 68 IN | DIASTOLIC BLOOD PRESSURE: 61 MMHG | HEART RATE: 120 BPM | OXYGEN SATURATION: 99 %

## 2020-12-24 LAB
BASOPHILS # BLD AUTO: 0 10E9/L (ref 0–0.2)
BASOPHILS NFR BLD AUTO: 0.7 %
DIFFERENTIAL METHOD BLD: ABNORMAL
EOSINOPHIL # BLD AUTO: 0.1 10E9/L (ref 0–0.7)
EOSINOPHIL NFR BLD AUTO: 0.9 %
ERYTHROCYTE [DISTWIDTH] IN BLOOD BY AUTOMATED COUNT: 20.2 % (ref 10–15)
HCT VFR BLD AUTO: 24.8 % (ref 40–53)
HGB BLD-MCNC: 7.7 G/DL (ref 13.3–17.7)
IMM GRANULOCYTES # BLD: 0 10E9/L (ref 0–0.4)
IMM GRANULOCYTES NFR BLD: 0.7 %
LYMPHOCYTES # BLD AUTO: 0.5 10E9/L (ref 0.8–5.3)
LYMPHOCYTES NFR BLD AUTO: 8.6 %
MCH RBC QN AUTO: 25.9 PG (ref 26.5–33)
MCHC RBC AUTO-ENTMCNC: 31 G/DL (ref 31.5–36.5)
MCV RBC AUTO: 84 FL (ref 78–100)
MONOCYTES # BLD AUTO: 0.6 10E9/L (ref 0–1.3)
MONOCYTES NFR BLD AUTO: 11.4 %
NEUTROPHILS # BLD AUTO: 4.2 10E9/L (ref 1.6–8.3)
NEUTROPHILS NFR BLD AUTO: 77.7 %
NRBC # BLD AUTO: 0 10*3/UL
NRBC BLD AUTO-RTO: 0 /100
PLATELET # BLD AUTO: 424 10E9/L (ref 150–450)
RBC # BLD AUTO: 2.97 10E12/L (ref 4.4–5.9)
UFH PPP CHRO-ACNC: 0.29 IU/ML
UFH PPP CHRO-ACNC: 0.45 IU/ML
WBC # BLD AUTO: 5.4 10E9/L (ref 4–11)

## 2020-12-24 PROCEDURE — 85520 HEPARIN ASSAY: CPT | Performed by: INTERNAL MEDICINE

## 2020-12-24 PROCEDURE — 258N000003 HC RX IP 258 OP 636: Performed by: INTERNAL MEDICINE

## 2020-12-24 PROCEDURE — 250N000011 HC RX IP 250 OP 636: Performed by: INTERNAL MEDICINE

## 2020-12-24 PROCEDURE — 250N000013 HC RX MED GY IP 250 OP 250 PS 637: Performed by: INTERNAL MEDICINE

## 2020-12-24 PROCEDURE — 999N000197 HC STATISTIC WOC PT EDUCATION, 0-15 MIN

## 2020-12-24 PROCEDURE — 85520 HEPARIN ASSAY: CPT | Performed by: HOSPITALIST

## 2020-12-24 PROCEDURE — 99239 HOSP IP/OBS DSCHRG MGMT >30: CPT | Performed by: HOSPITALIST

## 2020-12-24 PROCEDURE — 85025 COMPLETE CBC W/AUTO DIFF WBC: CPT | Performed by: INTERNAL MEDICINE

## 2020-12-24 RX ORDER — RIVAROXABAN 15 MG-20MG
KIT ORAL
Qty: 51 EACH | Refills: 0 | Status: SHIPPED | OUTPATIENT
Start: 2020-12-24 | End: 2021-10-28

## 2020-12-24 RX ADMIN — MULTIPLE VITAMINS W/ MINERALS TAB 1 TABLET: TAB at 09:29

## 2020-12-24 RX ADMIN — CHLORPROMAZINE HYDROCHLORIDE 25 MG: 25 TABLET, SUGAR COATED ORAL at 06:08

## 2020-12-24 RX ADMIN — OXYBUTYNIN CHLORIDE 5 MG: 5 TABLET ORAL at 09:29

## 2020-12-24 RX ADMIN — ACETAMINOPHEN 975 MG: 325 TABLET, FILM COATED ORAL at 09:28

## 2020-12-24 RX ADMIN — OXYCODONE HYDROCHLORIDE 5 MG: 5 TABLET ORAL at 09:29

## 2020-12-24 RX ADMIN — ACETAMINOPHEN 975 MG: 325 TABLET, FILM COATED ORAL at 14:09

## 2020-12-24 RX ADMIN — GABAPENTIN 300 MG: 300 CAPSULE ORAL at 09:29

## 2020-12-24 RX ADMIN — HEPARIN SODIUM 1550 UNITS/HR: 10000 INJECTION, SOLUTION INTRAVENOUS at 07:08

## 2020-12-24 RX ADMIN — GABAPENTIN 300 MG: 300 CAPSULE ORAL at 14:09

## 2020-12-24 RX ADMIN — IRON SUCROSE 200 MG: 20 INJECTION, SOLUTION INTRAVENOUS at 04:27

## 2020-12-24 RX ADMIN — OXYCODONE HYDROCHLORIDE 5 MG: 5 TABLET ORAL at 14:09

## 2020-12-24 RX ADMIN — OXYCODONE HYDROCHLORIDE 5 MG: 5 TABLET ORAL at 06:08

## 2020-12-24 RX ADMIN — HEPARIN 5 ML: 100 SYRINGE at 14:44

## 2020-12-24 RX ADMIN — OMEPRAZOLE 40 MG: 20 CAPSULE, DELAYED RELEASE ORAL at 09:29

## 2020-12-24 RX ADMIN — OXYBUTYNIN CHLORIDE 5 MG: 5 TABLET ORAL at 14:09

## 2020-12-24 RX ADMIN — HEPARIN SODIUM 1400 UNITS/HR: 10000 INJECTION, SOLUTION INTRAVENOUS at 04:27

## 2020-12-24 RX ADMIN — TAMSULOSIN HYDROCHLORIDE 0.4 MG: 0.4 CAPSULE ORAL at 09:29

## 2020-12-24 RX ADMIN — LORATADINE 10 MG: 10 TABLET ORAL at 09:28

## 2020-12-24 ASSESSMENT — ACTIVITIES OF DAILY LIVING (ADL)
ADLS_ACUITY_SCORE: 20
ADLS_ACUITY_SCORE: 19
ADLS_ACUITY_SCORE: 19
ADLS_ACUITY_SCORE: 20
ADLS_ACUITY_SCORE: 19

## 2020-12-24 NOTE — DISCHARGE SUMMARY
[facility]    Discharge Summary  Hospitalist    Date of Admission:  12/22/2020  Date of Discharge:  12/24/2020  Provider:  Abdulkadir Pereyra MD  Date of Service (when I last saw the patient): 12/24/20    Discharge Diagnoses   1. LLE DVT and PE in the setting of malignancy  2  Mild to moderate pHTN.  3. Anemia, possible multifactorial due to malignancy, chemotherapy etc  4. CKD stage IIIa  5. Colon cancer s/p resection and ileostomy.  6. Hx of prostate cancer.     Other medical issues:  Prostate and colon cancer.     History of Present Illness   Rich Wolff is an 60 year old male who presented with DVT/ PE.  Please see the admission history and physical for full details.    Hospital Course   Rich Wolff is a 60-year-old man with a recent diagnosis of colon cancer for which he underwent ileostomy at [place] in early November.  He had gone to his primary oncologist today due to concern for left calf swelling and discomfort.  He was diagnosed with DVT and enoxaparin was prescribed.  When he got to the pharmacy, he was told that this would not be covered by his insurance, for which reason he went to [facility] emergency department.  They found that he had had a pulmonary embolism but that also his hemoglobin was low at 6.5.  The ED provider transfused him a couple of units of blood and started him on a heparin drip.    On my evaluation, Mr. Wolff is alert and coherent.  Heart rate 111, blood pressure 101/69, temperature 99.3, respirations 16 and unlabored in room air.     Past medical history is also notable for prostate cancer for which he underwent prostatectomy in 2015.  The patient is not clear on what colon cancer chemotherapy he is on at this time, but he completed his second course this past Wednesday.     Diagnoses:  1.  LLE DVT and pulmonary embolism associated with cancer.  2.  Anemia, probably multifactorial.  The patient's hemoglobin on arrival here is 7.1 with a  reticulocyte percent of 0.5.  This goes along with bone marrow suppression due to chemotherapy.  It will be difficult to figure out whether he has iron deficiency and or vitamin deficiencies as he just was transfused. Defer anemia study to his PCP.  3.  Generalized weakness.  Patient describes difficulty ambulating due to the chemotherapy regimen but it is not clear if this is a neuropathy or just generalized weakness.  4.  CKD stage II-III.         # Discharge Pain Plan:    - Patient currently has NO PAIN and is not being prescribed pain medications on discharge.      Significant Results and Procedures   See below     Pending Results      Unresulted Labs Ordered in the Past 30 Days of this Admission     No orders found from 11/22/2020 to 12/23/2020.          Code Status   Full Code       Primary Care Physician   [facility]    GEN:  Alert, oriented x 3, appears comfortable, NAD.  HEENT:  Normocephalic/atraumatic, no scleral icterus, no nasal discharge, mouth moist.  CV:  Regular rate and rhythm, no murmur or JVD.  S1 + S2 noted, no S3 or S4.  LUNGS:  Clear to auscultation bilaterally without rales/rhonchi/wheezing/retractions.  Symmetric chest rise on inhalation noted.  ABD:  Active bowel sounds, soft, non-tender/non-distended.  No rebound/guarding/rigidity.  EXT:  No edema or cyanosis.  No joint synovitis noted.  SKIN:  Dry to touch, no exanthems noted in the visualized areas.     Discharge Disposition   Discharged to home    Consultations This Hospital Stay   PHARMACY IP CONSULT  PHARMACY IP CONSULT  NUTRITION SERVICES ADULT IP CONSULT  PHYSICAL THERAPY ADULT IP CONSULT  PHARMACY LIAISON FOR MEDICATION COVERAGE CONSULT  WOUND OSTOMY CONTINENCE NURSE  IP CONSULT    Time Spent on this Encounter   I, Abdulkadir Pereyra MD, personally saw the patient today and spent greater than 30 minutes discharging this patient.     Discharge Orders   No discharge procedures on file.  Discharge Medications    Current Discharge Medication List      START taking these medications    Details   Rivaroxaban ANTICOAGULANT (XARELTO STARTER PACK ANTICOAGULANT) 15 & 20 MG TBPK Future refills by PCP Dr. Lacho [facility] with phone number [phone].  Qty: 51 each, Refills: 0    Associated Diagnoses: Acute pulmonary embolism without acute cor pulmonale, unspecified pulmonary embolism type (H)         CONTINUE these medications which have NOT CHANGED    Details   !! acetaminophen (TYLENOL) 500 MG tablet Take 500-1,000 mg by mouth 3 times daily      !! acetaminophen (TYLENOL) 500 MG tablet Take 500 mg by mouth daily as needed for mild pain       fentaNYL (DURAGESIC) 25 mcg/hr 72 hr patch Place 1 patch onto the skin every 72 hours remove old patch.      Ferrous Sulfate Dried 45 MG TBCR Take 45 mg by mouth daily Slow iron*      gabapentin (NEURONTIN) 100 MG capsule Take 100 mg by mouth At Bedtime      loratadine-pseudoePHEDrine (CLARITIN-D 24-HOUR)  MG 24 hr tablet Take 1 tablet by mouth daily      OMEPRAZOLE PO Take 40 mg by mouth 2 times daily (before meals)      oxybutynin (DITROPAN) 5 MG tablet Take 5 mg by mouth 3 times daily      !! oxyCODONE (ROXICODONE) 5 MG tablet Take 5 mg by mouth 3 times daily      !! oxyCODONE (ROXICODONE) 5 MG tablet Take 5 mg by mouth daily as needed for severe pain      prochlorperazine (COMPAZINE) 10 MG tablet Take 10 mg by mouth 2 times daily      tamsulosin (FLOMAX) 0.4 MG capsule Take 0.4 mg by mouth daily       !! - Potential duplicate medications found. Please discuss with provider.        Allergies   No Known Allergies  Data   Most Recent 3 CBC's:  Recent Labs   Lab Test 12/24/20  0605 12/23/20  0635 12/23/20  0000 12/22/20  0430 12/22/20  0430   WBC 5.4 5.8  --   --  5.5   HGB 7.7* 7.8* 7.6*   < > 7.1*   MCV 84 82  --   --  82    372  --   --  282    < > = values in this interval not displayed.      Most Recent 3 BMP's:  Recent Labs   Lab Test 12/23/20 0635  20  0430    137   POTASSIUM 3.4 3.6   CHLORIDE 109 110*   CO2 20 21   BUN 25 35*   CR 1.33* 1.42*   ANIONGAP 5 6   AARON 8.6 8.3*   GLC 80 82     Most Recent 2 LFT's:No lab results found.  Most Recent INR's and Anticoagulation Dosing History:  Anticoagulation Dose History     There is no flowsheet data to display.        Most Recent 3 Troponin's:No lab results found.  Most Recent Cholesterol Panel:No lab results found.  Most Recent 6 Bacteria Isolates From Any Culture (See EPIC Reports for Culture Details):No lab results found.  Most Recent TSH, T4 and A1c Labs:No lab results found.  Results for orders placed or performed during the hospital encounter of 20   Echocardiogram Complete    Narrative    [id number]  [id number]  [id number]  [id number]^DEVIN^POONAM^K           [facility]  Echocardiography Laboratory  [address] [place] [address]        Name: ARTUR GOULD  MRN: [id number]  : 1960  Study Date: 2020 01:56 PM  Age: 60 yrs  Gender: Male  Patient Location: [facility]  Reason For Study: Pulmonary Embolism  Ordering Physician: POONAM BELTRAN  Referring Physician: POONAM BELTRAN  Performed By: Ant Cuadra RDCS     BSA: 1.9 m2  Height: 68 in  Weight: 165 lb  HR: 96  BP: 116/65 mmHg  _____________________________________________________________________________  __        Procedure  Complete Portable Echo Adult.  _____________________________________________________________________________  __        Interpretation Summary     Dilation of the inferior vena cava is present with abnormal respiratory  variation in diameter.  Right ventricular systolic pressure is elevated, consistent with mild to  moderate pulmonary hypertension.  The right ventricle is normal in structure, function and size.  The visual ejection fraction is estimated at 55-60%.  _____________________________________________________________________________  __        Left Ventricle  The left ventricle  is normal in structure, function and size. A sigmoid septum  is present. Left ventricular diastolic function is normal. The visual ejection  fraction is estimated at 55-60%.     Right Ventricle  The right ventricle is normal in structure, function and size.     Atria  Normal left atrial size. Right atrial size is normal.     Mitral Valve  The mitral valve is normal in structure and function. There is trace mitral  regurgitation.        Tricuspid Valve  Normal tricuspid valve. There is trace to mild tricuspid regurgitation. Right  ventricular systolic pressure is elevated, consistent with mild to moderate  pulmonary hypertension.     Aortic Valve  The aortic valve is normal in structure and function.     Pulmonic Valve  Normal pulmonic valve.     Vessels  Mild aortic root dilatation. Normal size ascending aorta. Dilation of the  inferior vena cava is present with abnormal respiratory variation in diameter.     Pericardium  There is no pericardial effusion.     _____________________________________________________________________________  __  MMode/2D Measurements & Calculations  IVSd: 1.3 cm  LVIDd: 4.1 cm  LVIDs: 3.2 cm  LVPWd: 1.1 cm  FS: 21.9 %  LV mass(C)d: 169.1 grams  LV mass(C)dI: 89.8 grams/m2     Ao root diam: 3.9 cm  LA dimension: 3.9 cm  asc Aorta Diam: 3.3 cm  LA/Ao: 1.0  LA Volume (BP): 56.3 ml  LA Volume Index (BP): 29.9 ml/m2  RWT: 0.51        Doppler Measurements & Calculations  MV E max shreya: 98.0 cm/sec  MV A max shreya: 68.9 cm/sec  MV E/A: 1.4  MV dec time: 0.19 sec     PA acc time: 0.10 sec  TR max shreya: 283.5 cm/sec  TR max P.2 mmHg  E/E' av.1  Lateral E/e': 7.1  Medial E/e': 11.1           _____________________________________________________________________________  __           Report approved by: Maikol Carter 2020 03:23 PM          Disclaimer: This note consists of symbols derived from keyboarding, dictation and/or voice recognition software. As a result, there may be errors in  the script that have gone undetected. Please consider this when interpreting information found in this chart.

## 2020-12-24 NOTE — PLAN OF CARE
Nephrostomy, colostomy. SBA.prn oxy for pain. Fent patch. LS clear. Hep 14. 0. Hep and hgb recheck in morning. Tele- SR. Tachy at times. Thorazine for hiccups. Discharge home tomorrow pending ability to have anticoagulants.

## 2020-12-24 NOTE — PLAN OF CARE
A/O x 4.  VS: tachycardic, afebrile.  Pain managed with oxycodone/tylenol and fentanyl patch.  Up independent in room.  Colostomy in place, adequate output.  Nephrostomy in place, patent.  Tolerating regular diet.  Heparin gtt infusing at 15.5mL/hr.  Plan is to discharge to home later this evening.  Will continue to monitor.

## 2020-12-24 NOTE — PLAN OF CARE
"/60   Pulse 89   Temp 99.1  F (37.3  C) (Temporal)   Resp 16   Ht 1.727 m (5' 8\")   Wt 74.8 kg (165 lb)   SpO2 97%   BMI 25.09 kg/m       Alert and oriented. VSS. Pain managed with oxycodone. Receiving Thorazine for hiccups. Heparin infusing. Up with Assist of 1 with a walker. Colostomy and Nephrostomy patent.  "

## 2020-12-24 NOTE — DISCHARGE INSTRUCTIONS
Ileostomy-Change twice weekly  Pouch: [place] #[id number] 1/4 inch soft convex  Ring: [place] #[id number]

## 2020-12-24 NOTE — CONSULTS
WO Nurse Inpatient [facility]   WO Nurse Inpatient Adult Ostomy Assessment    Initial Assessment   Assessment of established loop Ileostomy Stoma complication(s) none   Mucocutaneous junction; Pouch recently changed so did not remove  Peristomal complication(s) Patient reports no skin issues  Pouch wear time:less than 24 hours    Objective data:  Patient history according to medical record: Rich Wolff is a 60-year-old man with a recent diagnosis of colon cancer for which he underwent ileostomy at [place] in early November.  He had gone to his primary oncologist today due to concern for left calf swelling and discomfort.  He was diagnosed with DVT and enoxaparin was prescribed.  When he got to the pharmacy, he was told that this would not be covered by his insurance, for which reason he went to [facility] emergency department.  They found that he had had a pulmonary embolism but that also his hemoglobin was low at 6.5.  The ED provider transfused him a couple of units of blood and started him on a heparin drip.  I have been asked to admit him here because of no room at [facility].  Current Diet/Nutrition: Orders Placed This Encounter      Combination Diet Regular Diet Adult     I/O last 3 completed shifts:  In: -   Out: 1250 [Urine:400; Stool:850]  Labs:    Recent Labs   Lab 12/24/20  0605   HGB 7.7*   WBC 5.4        Physical Exam:  Current pouching system:[place]   Reason for pouch change today: pouch not changed today  Stoma appearance: viable and pink  Stoma size: Approximately 1 inch-did not remove pouch  Peristomal skin: not visualized (barrier in place)  Stoma output :liquid   Abdominal  Assessment  soft     Interventions:  Patient's chart evaluated.  Focus of today's visit: verbal instruction .  Patient reports pouch changes are going well and he's not having skin issues.  Has had 2 leaks here related to using a flat pouch without a ring.  Patient reports he cuts his pouch to 1 1/4.   Pre-sized pouch number put in discharge instructions in case he wants to switch.  Participant of teaching session today patient   Orders: Written  Change made with ostomy management today: Yes  Patient/family: independent with pouch change  Supplies:Reviewed    Plan:  Learning needs: discharge instructions  Preparation for discharge: Completed supply list    Discussed plan of care with Patient  Nursing to notify the Provider(s) and re-consult the WOC Nurse if new ostomy concerns or discharge planned before next planned WOC visit.    WOC Nurse will return: Weekly  Face to face time: 10 minutes    Jarett Mendez RN CWOCN

## 2020-12-26 NOTE — PLAN OF CARE
Physical Therapy Discharge Summary    Reason for therapy discharge:    Discharged to home.    Progress towards therapy goal(s). See goals on Care Plan in [facility] electronic health record for goal details.  Goals partially met.  Barriers to achieving goals:   discharge from facility.    Therapy recommendation(s):    No further therapy is recommended.    Note: Pt not seen by documenting PT on this date. Information obtained from chart review.

## 2020-12-30 ENCOUNTER — HOME INFUSION (PRE-WILLOW HOME INFUSION) (OUTPATIENT)
Dept: PHARMACY | Facility: CLINIC | Age: 60
End: 2020-12-30

## 2020-12-31 ENCOUNTER — HOME INFUSION (PRE-WILLOW HOME INFUSION) (OUTPATIENT)
Dept: PHARMACY | Facility: CLINIC | Age: 60
End: 2020-12-31

## 2020-12-31 NOTE — PROGRESS NOTES
This is a recent snapshot of the patient's [facility] medical record.  For current drug dose and complete information and questions, call [phone] or In Basket pool, fv home infusion ([id number])  CSN Number:  [id number]

## 2021-01-11 ENCOUNTER — RECORDS - HEALTHEAST (OUTPATIENT)
Dept: ADMINISTRATIVE | Facility: OTHER | Age: 61
End: 2021-01-11

## 2021-01-11 ENCOUNTER — AMBULATORY - HEALTHEAST (OUTPATIENT)
Dept: MULTI SPECIALTY CLINIC | Facility: CLINIC | Age: 61
End: 2021-01-11

## 2021-01-11 ENCOUNTER — RECORDS - HEALTHEAST (OUTPATIENT)
Dept: SCHEDULING | Facility: CLINIC | Age: 61
End: 2021-01-11

## 2021-01-11 DIAGNOSIS — C18.9 PRIMARY MALIGNANT NEOPLASM OF COLON (H): ICD-10-CM

## 2021-01-11 LAB
ALT SERPL W/O P-5'-P-CCNC: 108 U/L (ref 7–40)
AST SERPL-CCNC: 49 U/L (ref 13–40)
CREAT SERPL-MCNC: 1.35 MG/DL (ref 0.5–1.2)
GFR ESTIMATE EXT - HISTORICAL: 56.6 ML/MIN/1.73M2

## 2021-01-11 NOTE — PROGRESS NOTES
This is a recent snapshot of the patient's [facility] medical record.  For current drug dose and complete information and questions, call [phone] or In Basket pool, fv home infusion ([id number])  CSN Number:  [id number]

## 2021-01-18 ENCOUNTER — HOME INFUSION (PRE-WILLOW HOME INFUSION) (OUTPATIENT)
Dept: PHARMACY | Facility: CLINIC | Age: 61
End: 2021-01-18

## 2021-01-18 ENCOUNTER — AMBULATORY - HEALTHEAST (OUTPATIENT)
Dept: INFUSION THERAPY | Facility: HOSPITAL | Age: 61
End: 2021-01-18

## 2021-01-19 NOTE — PROGRESS NOTES
This is a recent snapshot of the patient's [facility] medical record.  For current drug dose and complete information and questions, call [phone] or In Basket pool, fv home infusion ([id number])  CSN Number:  [id number]

## 2021-01-21 ENCOUNTER — INFUSION - HEALTHEAST (OUTPATIENT)
Dept: INFUSION THERAPY | Facility: HOSPITAL | Age: 61
End: 2021-01-21

## 2021-01-21 DIAGNOSIS — D62 ANEMIA DUE TO BLOOD LOSS, ACUTE: ICD-10-CM

## 2021-01-21 DIAGNOSIS — K63.89 CECUM MASS: ICD-10-CM

## 2021-01-21 LAB
ABO/RH(D): NORMAL
ANTIBODY SCREEN: NEGATIVE

## 2021-01-22 LAB
BLD PROD TYP BPU: NORMAL
BLOOD TYPE: 6200
CODING SYSTEM: NORMAL
COMPONENT (HISTORICAL CONVERSION): NORMAL
CROSSMATCH: NORMAL
ISSUE DATE AND TIME: NORMAL
STATUS (HISTORICAL CONVERSION): NORMAL
UNIT ABO/RH (HISTORICAL CONVERSION): NORMAL
UNIT NUMBER: NORMAL

## 2021-03-23 ENCOUNTER — HOME INFUSION (PRE-WILLOW HOME INFUSION) (OUTPATIENT)
Dept: PHARMACY | Facility: CLINIC | Age: 61
End: 2021-03-23

## 2021-03-26 ENCOUNTER — AMBULATORY - HEALTHEAST (OUTPATIENT)
Dept: NURSING | Facility: CLINIC | Age: 61
End: 2021-03-26

## 2021-03-29 ENCOUNTER — RECORDS - HEALTHEAST (OUTPATIENT)
Dept: ADMINISTRATIVE | Facility: OTHER | Age: 61
End: 2021-03-29

## 2021-04-02 ENCOUNTER — AMBULATORY - HEALTHEAST (OUTPATIENT)
Dept: INTERVENTIONAL RADIOLOGY/VASCULAR | Facility: HOSPITAL | Age: 61
End: 2021-04-02

## 2021-04-02 DIAGNOSIS — Z11.59 ENCOUNTER FOR SCREENING FOR OTHER VIRAL DISEASES: ICD-10-CM

## 2021-04-02 NOTE — PROGRESS NOTES
This is a recent snapshot of the patient's [facility] medical record.  For current drug dose and complete information and questions, call [phone] or In Basket pool, fv home infusion ([id number])  CSN Number:  [id number]

## 2021-04-05 ENCOUNTER — RECORDS - HEALTHEAST (OUTPATIENT)
Dept: ADMINISTRATIVE | Facility: OTHER | Age: 61
End: 2021-04-05

## 2021-04-05 ENCOUNTER — AMBULATORY - HEALTHEAST (OUTPATIENT)
Dept: FAMILY MEDICINE | Facility: CLINIC | Age: 61
End: 2021-04-05

## 2021-04-05 DIAGNOSIS — Z11.59 ENCOUNTER FOR SCREENING FOR OTHER VIRAL DISEASES: ICD-10-CM

## 2021-04-06 LAB
SARS-COV-2 PCR COMMENT: NORMAL
SARS-COV-2 RNA SPEC QL NAA+PROBE: NEGATIVE
SARS-COV-2 VIRUS SPECIMEN SOURCE: NORMAL

## 2021-04-07 ENCOUNTER — COMMUNICATION - HEALTHEAST (OUTPATIENT)
Dept: SCHEDULING | Facility: CLINIC | Age: 61
End: 2021-04-07

## 2021-04-08 ENCOUNTER — RECORDS - HEALTHEAST (OUTPATIENT)
Dept: INTERVENTIONAL RADIOLOGY/VASCULAR | Facility: HOSPITAL | Age: 61
End: 2021-04-08

## 2021-04-08 ENCOUNTER — HOSPITAL ENCOUNTER (OUTPATIENT)
Dept: INTERVENTIONAL RADIOLOGY/VASCULAR | Facility: HOSPITAL | Age: 61
Discharge: HOME | End: 2021-04-08
Attending: NURSE PRACTITIONER | Admitting: RADIOLOGY
Payer: COMMERCIAL

## 2021-04-08 DIAGNOSIS — N13.30 HYDRONEPHROSIS: ICD-10-CM

## 2021-04-08 DIAGNOSIS — N13.30 HYDRONEPHROSIS, UNSPECIFIED HYDRONEPHROSIS TYPE: ICD-10-CM

## 2021-04-08 DIAGNOSIS — C18.9 PRIMARY MALIGNANT NEOPLASM OF COLON (H): ICD-10-CM

## 2021-04-08 ASSESSMENT — MIFFLIN-ST. JEOR: SCORE: 1510.26

## 2021-04-20 ENCOUNTER — COMMUNICATION - HEALTHEAST (OUTPATIENT)
Dept: SCHEDULING | Facility: CLINIC | Age: 61
End: 2021-04-20

## 2021-04-23 ENCOUNTER — AMBULATORY - HEALTHEAST (OUTPATIENT)
Dept: NURSING | Facility: CLINIC | Age: 61
End: 2021-04-23

## 2021-05-27 VITALS
OXYGEN SATURATION: 100 % | DIASTOLIC BLOOD PRESSURE: 82 MMHG | SYSTOLIC BLOOD PRESSURE: 123 MMHG | HEART RATE: 88 BPM | RESPIRATION RATE: 18 BRPM | TEMPERATURE: 98.3 F

## 2021-05-31 ENCOUNTER — RECORDS - HEALTHEAST (OUTPATIENT)
Dept: ADMINISTRATIVE | Facility: CLINIC | Age: 61
End: 2021-05-31

## 2021-06-05 VITALS — WEIGHT: 160 LBS | BODY MASS INDEX: 24.25 KG/M2 | HEIGHT: 68 IN

## 2021-06-05 VITALS — HEIGHT: 68 IN | BODY MASS INDEX: 26.98 KG/M2 | WEIGHT: 178 LBS

## 2021-06-05 VITALS — WEIGHT: 178.1 LBS | BODY MASS INDEX: 26.99 KG/M2 | HEIGHT: 68 IN

## 2021-06-12 NOTE — TELEPHONE ENCOUNTER
Pt returned call. Results and recommendations given. Pt stated understanding. He will contact his PCP for follow. Did also call Dr. Worrell's office as well and gave them this information.

## 2021-06-12 NOTE — TELEPHONE ENCOUNTER
I will provide a trial of diclofenac in place of nabumetone for pain.  He should continue with plan of physical therapy with lumbar strengthening.

## 2021-06-12 NOTE — PATIENT INSTRUCTIONS - HE
1. MRI of your low back    2. Start physical therapy    3. Trial nabumetone as needed for pain after medrol pack completed    4. You can start gabapentin at bedtime to help with sleep and pain at night.  Follow barbra to day 9:    Prescribed Gabapentin today, 100 mg tablets, to be titrated up to 3 tablets at bedtime as tolerated for your nerve pain. Please follow Gabapentin dosing chart below. If your pain is still significant in the mornings, you may then start taking the medication in the morning and then an afternoon dose.    Gabapentin 100mg Dosing Chart    DATE  MORNING AFTERNOON BEDTIME    Day 1 0 0 1    Day 2 0 0 1    Day 3 0 0 1    Day 4 0 0 2    Day 5 0 0 2    Day 6 0 0 2    Day 7 0 0 3    Day 8 0 0 3    Day 9 0 0 3    Day 10 1 0 3    Day 11 1 0 3    Day 12 1 0 3    Day 13 2 0 3    Day 14 2 0 3    Day 15 2 0 3    Day 16 3 0 3    Day 17 3 0 3    Day 18 3 0 3    Day 19 3 1 3    Day 20 3 1 3    Day 21 3 1 3    Day 22 3 2 3    Day 23 3 2 3    Day 24 3 2 3    Day 25 3 3 3    Day 26 3 3 3    Day 27 3 3 3     Continue medication, taking 3 capsules three times daily    Please call if you have any questions regarding how to take your medication  Clinic Phone # [phone]

## 2021-06-12 NOTE — TELEPHONE ENCOUNTER
Phone call to patient to discuss the new prescription. Instructed to stop the Nabumetone and trial the Diclofenac to see if this gives better relief. Continue to use ES Tylenol prn and the Gabapentin 300 mg at HS. Encouraged pt to call nurse navigation line if questions or concerns arise. Stated understanding.     Transferred to scheduling to make follow up appointment for mid November.

## 2021-06-12 NOTE — PROGRESS NOTES
Assessment/Plan:      Diagnoses and all orders for this visit:    Lumbar spine pain  -     gabapentin (NEURONTIN) 100 MG capsule; 1 cap at bedtime x 3 days.  Then may take 2 caps at bedtime x 3 days, then may take 3 caps at bedtime  Dispense: 90 capsule; Refill: 2  -     nabumetone (RELAFEN) 500 MG tablet; Take 1-2 tablets (500-1,000 mg total) by mouth 2 (two) times a day as needed for pain.  Dispense: 60 tablet; Refill: 2  -     MR Lumbar Spine Without Contrast; Future; Expected date: 10/09/2020  -     Ambulatory referral to Physical Therapy    Spondylolisthesis of lumbar region  -     nabumetone (RELAFEN) 500 MG tablet; Take 1-2 tablets (500-1,000 mg total) by mouth 2 (two) times a day as needed for pain.  Dispense: 60 tablet; Refill: 2  -     MR Lumbar Spine Without Contrast; Future; Expected date: 10/09/2020  -     Ambulatory referral to Physical Therapy    Myofascial pain  -     gabapentin (NEURONTIN) 100 MG capsule; 1 cap at bedtime x 3 days.  Then may take 2 caps at bedtime x 3 days, then may take 3 caps at bedtime  Dispense: 90 capsule; Refill: 2  -     nabumetone (RELAFEN) 500 MG tablet; Take 1-2 tablets (500-1,000 mg total) by mouth 2 (two) times a day as needed for pain.  Dispense: 60 tablet; Refill: 2  -     Ambulatory referral to Physical Therapy    Sleep difficulties  -     gabapentin (NEURONTIN) 100 MG capsule; 1 cap at bedtime x 3 days.  Then may take 2 caps at bedtime x 3 days, then may take 3 caps at bedtime  Dispense: 90 capsule; Refill: 2  -     Ambulatory referral to Physical Therapy    Prostate cancer (H)  -     MR Lumbar Spine Without Contrast; Future; Expected date: 10/09/2020        Assessment: Pleasant 59 y.o. male with a history of hyperlipidemia and prostate cancer with:    1.  Chronic intermittent lumbar spine pain with flare over the past 3 months.  Consistent with facet arthropathy/degenerative disc disease and myofascial pain.  He has spondylolisthesis L4-5 and L3-4 that are mild and  appear to be degenerative on plain films.  Has not had any lasting improvement from oral steroids, tramadol, methocarbamol.    2.  Poor sleep related to pain.    3.  History of prostate cancer.      Discussion:    1.  We discussed the diagnosis and treatment options.  We discussed options of further imaging, therapy, injections, medications.    2.  MRI of his lumbar spine to evaluate extent of facet arthropathy and for new disc herniation or other cause for his back pain.  Given his history of prostate cancer and the fact that he has not had any improvement over the past 3 months with conservative care, recommend this MRI be done.    3.  Trial nabumetone as needed for pain once Medrol pack is completed.    4.  Trial gabapentin 100 mg at bedtime increasing to 300mg as directed for myofascial pain and sleep.    5.  Start physical therapy for lumbar core strengthening stabilization.    6.  Follow-up with phone recommendations after MRI.  This may include returning for a visit to review images or continuing therapy through completion after 4-6 visits.      It was our pleasure caring for your patient today, if there any questions or concerns please do not hesitate to contact us.      Subjective:   Patient ID: Rich Wolff is a 59 y.o. male.    History of Present Illness: Patient presents at the request of Dr. Marcellus Sandoval for an evaluation of lumbar spine pain.  Patient has longstanding history of waxing waning low back pain it is always been self-limited.  3 months ago began having pain without any new injury.  It is at the lumbosacral junction.  No radiation to the legs paresthesias or weakness.  It was so painful that it was difficult for him to do any standing walking or activities.  He is typically on his feet 9 hours a day at work.  He went to a walk-in clinic while in the cabin in [place] given some medications without any benefit along with some stretching.  Then had another increase pain went to primary  care provider given steroids without benefit and eventually went to the emergency room which was this week.  Given Medrol Dosepak and methocarbamol.  Their note was reviewed.    Has had some mild benefit with steroid pack but still taking this medication.  Taking methocarbamol twice a day as some tramadol which he takes at bedtime if needed.  The pain is at the lumbosacral junction in the midline.  No radiation down the legs paresthesias or weakness.  Sitting and driving is the worse better with standing.  Has not had physical therapy.  No bowel or bladder incontinence.  No weakness in the legs.     Imaging: Plain films lumbar spine personally reviewed along with report and discussed with the patient.  A plastic model was used in the discussion.  This is from September 25, 2020.  Normal vertebral body heights with no fracture.  Grade 1 spondylolisthesis L4-5 L5-S1.  Appears to be some facet arthropathy moderate L3-4 through L5-S1 and minimal degenerative disc changes most significant L4-5 on my review.         Review of Systems: Complains of cough, insomnia, excessive tiredness as he cannot sleep related to pain.  He has joint pain.  Has sexual dysfunction.  History of prostate cancer with surgical resection.  Denies fever, headache, change in vision, chest pain, shortness of breath, abdominal pain, nausea, vomiting, bowel or bladder incontinence, skin issues. Remainder of 12 point review systems negative unless listed above.    Past Medical History:   Diagnosis Date     Cancer (H)     prostate     Hyperlipidemia        Family History   Problem Relation Age of Onset     Cancer Mother      Coronary artery disease Father          Social History     Socioeconomic History     Marital status:      Spouse name: None     Number of children: None     Years of education: None     Highest education level: None   Occupational History     None   Social Needs     Financial resource strain: None     Food insecurity      Worry: None     Inability: None     Transportation needs     Medical: None     Non-medical: None   Tobacco Use     Smoking status: Never Smoker     Smokeless tobacco: Never Used   Substance and Sexual Activity     Alcohol use: Not Currently     Comment: rarely     Drug use: No     Sexual activity: None   Lifestyle     Physical activity     Days per week: None     Minutes per session: None     Stress: None   Relationships     Social connections     Talks on phone: None     Gets together: None     Attends Voodoo service: None     Active member of club or organization: None     Attends meetings of clubs or organizations: None     Relationship status: None     Intimate partner violence     Fear of current or ex partner: None     Emotionally abused: None     Physically abused: None     Forced sexual activity: None   Other Topics Concern     None   Social History Narrative     None     Social history: .  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  No tobacco or alcohol.  Has 7 grandchildren.    The following portions of the patient's history were reviewed and updated as appropriate: allergies, current medications, past family history, past medical history, past social history, past surgical history and problem list.      WHO 5: 13    EVELYN Score: 30      Objective:   Physical Exam:    Vitals:    10/09/20 1504   BP: 168/90   Pulse: (!) 104     There is no height or weight on file to calculate BMI.      General:  Well-appearing male in no acute distress.  Pleasant, cooperative, and interactive throughout the examination and interview.  CV: No lower extremity edema on inspection or paltation.  Lymphatics: No cervical lymphadenopathy palpated. Eyes: sclera clear. Skin: No rashes or lesions seen over the head/neck, hairline, arms, legs  Respirations unlabored.  MSK: Gait is normal.  Able to heel-toe walk without difficulty.  Negative Romberg.  Spine: normal AP curves of the C, T, and L spine.  Full  range of motion in the Lumbar spine in all planes.  Palpation: Tenderness to palpation over lumbosacral junction midline and lumbar paraspinals and L5-S1 and L4-5.  Extremities: Full range of motion of the elbows, and wrists with no effusions or tenderness to palpation.   Full range of motion of the hips, knees, and ankles with no effusions or tenderness to palpation.    No hypermobility of the upper or lower extremities.  Neurologic exam: Mental status: Patient is alert and oriented with normal affect.  Attention, knowledge, memory, and language are intact.  Normal coordination throughout the examination.  Reflexes are 2+ and symmetric biceps, triceps, brachioradialis, patellar, and Achilles with down-going toes and Negative Supriya's.  Sensation is intact to light touch throughout the upper and lower extremities bilaterally.  Manual muscle testing reveals 5 out of 5 in the hip flexors, knee flexors/extensors, ankle plantar flexors, ankle  dorsiflexors, and EHL.  Upper extremities: Grossly normal strength . Normal muscle bulk and tone in the arms and legs.    Negative seated  straight leg raise bilaterally.

## 2021-06-12 NOTE — TELEPHONE ENCOUNTER
----- Message from Min Holguin DO sent at 10/20/2020  7:46 AM CDT -----  MRI lumbar spine reviewed showing facet arthritis at L4-5 and L3-4 likely causing his low back pain.  There is also finding of an enlarged right kidney and ureter to the bladder.    Recommendation: Recommend he present contact his primary care provider for follow-up regarding the kidney today and please contact the primary care provider's office with the results regarding the kidney.  With regards to the low back pain, I recommend he continue the plan of physical therapy and follow-up with me in a couple of weeks.

## 2021-06-12 NOTE — TELEPHONE ENCOUNTER
Call to pt with provider's results and recommendations. Left message to return call. Will need to confirm who his PCP is.

## 2021-06-12 NOTE — PROGRESS NOTES
[facility]   Lumbo-Pelvic Initial Evaluation    Patient Name: Rich Wolff  Date of evaluation: 10/26/2020  Referral Diagnosis: Lumbar spine pain  Spondylolisthesis of lumbar region [M43.16]       Myofascial pain [M79.18]       Sleep difficulties [G47.9]       Referring provider: Min Holguin DO  Visit Diagnosis:     ICD-10-CM    1. Acute midline low back pain without sciatica  M54.5      Precautions: hx of prostate cancer    Assessment:   Pt is a 59 y.o. year old male with midline low back pain without radicular symptoms that started in July, 2020.  Patient has difficulty with sleeping, driving, sitting >1 hour and lifting at work. Findings are consistent with facet joint irritation. Unable to reproduce symptoms during evaluation today but pain is location at lumbosacral junction.  8/10 pain occurs at night when sleeping, overall pt feels better when he is walking and standing.  Patient appears motivated to participate in Physical Therapy and present with a good Physical Therapy prognosis for resolution of activities limitations.      10/19/20:  Slight anterior listhesis of L3 in relation to L4 and L4 in relation to L5 with prominent facet arthropathy.  No significant canal compromise but there is mild right neural foraminal narrowing at L4-L5 disc space level.   10/9/20: Chronic intermittent lumbar spine pain with flare over the past 3 months.  Consistent with facet arthropathy/degenerative disc disease and myofascial pain.  He has spondylolisthesis L4-5 and L3-4 that are mild and appear to be degenerative on plain films.  Has not had any lasting improvement from oral steroids, tramadol, methocarbamol.  Patient has longstanding history of waxing waning low back pain it is always been self-limited.  3 months ago began having pain without any new injury.  It is at the lumbosacral junction.  No radiation to the legs paresthesias or weakness.  It was so painful that it was difficult for him to  do any standing walking or activities.  He is typically on his feet 9 hours a day at work.  He went to a walk-in clinic while in the cabin in [place] given some medications without any benefit along with some stretching.  Then had another increase pain went to primary care provider given steroids without benefit and eventually went to the emergency room which was this week.  Given Medrol Dosepak and methocarbamol.  Sitting and driving is the worse better with standing.          Pt. is appropriate for skilled PT intervention as outlined in the Plan of Care (POC).  Pt. is a good candidate for skilled PT services to improve pain levels and function.  Plan of care and goals were established in collaboration with patient.     Goals:  Pt. will be independent with home exercise program in : 6 weeks  Pt. will report decreased intensity, frequency of : Pain;Comment  Comment:: in midline low back <3/10 in 10 weeks.  Pt. will have improved quality of sleep: Comment  Comment:: able to sleep 6 hours in bed without pain >2/10 in 10 weeks.    Pt will: return to lifting boxes (up to 30#) at work without pain.  Pt will: sit for >1 hour without pain in 10 weeks.      Patient goals: get rid of back pain    Patient's expectations/goals are realistic.    Barriers to Learning or Achieving Goals:  No Barriers.       Plan / Patient Instructions:        Plan of Care:   Authorization / Certification Start Date: 10/26/20  Authorization / Certification Number of Visits: 8  Patient Related Instruction: Nature of Condition;Treatment plan and rationale;Self Care instruction;Basis of treatment;Posture;Next steps;Expected outcome  Times per Week: 1  Number of Weeks: up to 8  Number of Visits: up to 8 per order  Discharge Planning: met goals  Therapeutic Exercise: ROM;Stretching;Strengthening  Neuromuscular Reeducation: kinesio tape;posture;core  Manual Therapy: soft tissue mobilization;myofascial release;joint mobilization  Equipment:  theraband      Plan for next visit: review HEP, progress stretching and start strengthening - abdominal strengthening.  Demo lifting mechanics (pt lifts up to 30-40# at work).      Subjective:         Social information:   Occupation:   Work Status:Working full time - has not been doing heavier lifting.  Able to stand 4 hours.    Equipment Available: None    History of Present Illness:    Rich is a 59 y.o. male who presents to therapy today with complaints of low back pain. Date of onset/duration of symptoms is July 2020. Onset was sudden but pain was not bad at first, pain increased in September.  Symptoms are intermittent. He denies history of similar symptoms. He describes their previous level of function as not limited    Pain Rating:3  Pain rating at best: 2  Pain rating at worst: 8  Pain description: metal bar across back    Sharp     Functional limitations are described as occurring with:   Stand 4 hours  Sit 1 hour  Walk: 1 hour  driving - seat starts bothering him - pushes on lower back  - has to lean forward and not touch back of seat.   Bending and lifting   Sleeping is the worst: waking up 4-5x a night. Able to fall asleep and generally sleeps for 30 min then wakes up with pain (8/10 pain happens at night)   Moves from bed to recliner to chair to floor.     Patient reports benefit from:  medication, standing and moving     Objective:      Note: Items left blank indicates the item was not performed or not indicated at the time of the evaluation.    Patient Outcome Measures :    Modified Oswestry Low Back Pain Disablity Questionnaire  in %: 34     Scores range from 0-100%, where a score of 0% represents minimal pain and maximal function. The minimal clinically important difference is a score reduction of 12%.    Examination  1. Acute midline low back pain without sciatica       Involved side: midline  Posture Observation:      General sitting posture is  fair.    Lumbar ROM:    Date:  10/26/2020     *Indicate scale AROM AROM AROM   Lumbar Flexion WFL      Lumbar Extension WFL - mild pain       Right Left Right Left Right Left   Lumbar Sidebending WFL  WFL        Lumbar Rotation WFL WFL        Thoracic Flexion      Thoracic Extension      Thoracic Sidebending         Thoracic Rotation           Lower Extremity Strength:     Date: 10/26/2020     LE strength/5 Right Left Right Left Right Left   Hip Flexion (L1-3) 5 5       Hip Extension (L5-S1)         Hip Abduction (L4-5)         Hip Adduction (L2-3)         Hip External Rotation         Hip Internal Rotation         Knee Extension (L3-4) 5 5       Knee Flexion 5 5       Ankle Dorsiflexion (L4-5)         Great Toe Extension (L5) 4 4       Ankle Plantar flexion (S1)         Abdominals        Sensation  Not tested        Reflex Testing  Lumbar Dermatomes Right Left UE Reflexes Right Left   Iliac Crest and Groin (L1)   Biceps (C5-6)     Anterior Medial Thigh (L2)   Brachioradialis (C5-6)     Anterior Thigh, Medial Epicondyle Femur (L3)   Triceps (C7-8)     Lateral Thigh, Anterior Knee, Medial Leg/Malleolus (L4)   Supriya s test (SC compression)   Flick middle finger = thumb flexion     Lateral Leg, Dorsal Foot (L5)   LE Reflexes     Lateral Foot (S1)   Patellar (L3-4)     Posterior Leg (S2)   Achilles (S1-2)     Other:   Babinski Response       Palpation: no pain or tenderness in lower back musculature or joints with palpation     Lumbar Special Tests:     Lumbar Special Tests Right Left SI Tests Right  Left   Quadrant test   SI Compression- side lying      Straight leg raise -  -  SI Distraction - supine     Distraction    POSH Test - thigh thrust      Slump   Sacral Thrust     Sit-up test  Gaenslen's test (hip flex and hip ext - press)      Prone instability test  FADIR     Trunk extensor endurance test  Femoral Nerve Tension (prone)       Pubic shotgun  Resisted Abduction in supine       FADER -  -      Repeated Motion Testing:  Does not  centralize  Does not peripheralize    Passive Mobility - Joint Integrity: Spring Testing  gentle mobs only  - Slight anterior listhesis of L3 in relation to L4 and L4 in relation to L5     LE Screen:  no reproduction of symptoms - WFL hip PROM     Treatment Today     TREATMENT MINUTES COMMENTS   Evaluation 25 -lumbar spine  -educated on POC, diagnosis, relevant anatomy, basis for treatment and HEP   Self-care/ Home management 8  Recommended hot tub before bed, along with inversion table (pt purchased in Sept - pain limiting) and HEP - also perform HEP in the middle of the night if pt is unable to sleep.    Manual therapy     Neuromuscular Re-education     Therapeutic Activity     Therapeutic Exercises 10  -see exercise flow sheet     Gait training     Modality__________________                Total 43    Blank areas are intentional and mean the treatment did not include these items.     PT Evaluation Code: (Please list factors)  Patient History/Comorbidities: see above  Examination: see above  Clinical Presentation: stable  Clinical Decision Making: low     Patient History/  Comorbidities Examination  (body structures and functions, activity limitations, and/or participation restrictions) Clinical Presentation Clinical Decision Making (Complexity)   No documented Comorbidities or personal factors 1-2 Elements Stable and/or uncomplicated Low   1-2 documented comorbidities or personal factor 3 Elements Evolving clinical presentation with changing characteristics Moderate   3-4 documented comorbidities or personal factors 4 or more Unstable and unpredictable High                Francy Garcia, PT, DPT  10/26/2020  7:20 AM  [facility] Discharge Summary  Patient Name: Rich Wolff  Date: 1/5/2021  Referral Diagnosis: Lumbar spine pain  Spondylolisthesis of lumbar region [M43.16]       Myofascial pain [M79.18]       Sleep difficulties [G47.9]           Referring provider: Min Holguin, DO  Visit  Diagnosis:   1. Acute midline low back pain without sciatica         Goals: NOT MET   Pt. will be independent with home exercise program in : 6 weeks  Pt. will report decreased intensity, frequency of : Pain;Comment  Comment:: in midline low back <3/10 in 10 weeks.  Pt. will have improved quality of sleep: Comment  Comment:: able to sleep 6 hours in bed without pain >2/10 in 10 weeks.    Pt will: return to lifting boxes (up to 30#) at work without pain.  Pt will: sit for >1 hour without pain in 10 weeks.      Patient was seen for 1 visit with no follow-up visits.     Therapy will be discontinued at this time.  The patient will need a new referral to resume.    Thank you for your referral.  Francy Garcia  1/5/2021  2:48 PM

## 2021-06-12 NOTE — TELEPHONE ENCOUNTER
"PSP:  Dr. Holguin  Last clinic visit:  10/9/20 consult  Reason for call: Not finding relief with prescribed medications; loss of appetite and stomach upset on them as well  Clinical information:  Reports he is taking Gabapentin 100 mg 2 at HS daily, 2 ES Tylenol every 8 hours and Nabumetone 1000 mg two times a day. He reports he does not tolerate medications; has had trouble with others that have been prescribed in the past. Has noted dark stools since beginning medications from the ER (he believes this was the Tramadol which he does not have now). Denies blood in his stools. Reports \"I have the flu today. Vomiting so haven't taken meds.\" Denies blood in emesis. His pain remains in low back and is described as an achy pain. Reports he has \"some sort of CT with dye and then I see the urologist next week.\"   Advice given to patient: PSP will be updated with status. Patient will be called with any new/further recommendations.   Provider to address: symptoms and pain control    "

## 2021-06-13 NOTE — PRE-PROCEDURE
Procedure Name: right port placement with sedation  Date/Time: 11/24/2020 12:19 PM  Written consent obtained?: Yes  Risks and benefits: Risks, benefits and alternatives were discussed  Consent given by: patient  Expected level of sedation: moderate  ASA Class: Class 3- Severe systemic disease, definite functional limitations  Mallampati: Grade 2- soft palate, base of uvula, tonsillar pillars, and portion of posterior pharyngeal wall visible  Patient states understanding of procedure being performed: Yes  Patient's understanding of procedure matches consent: Yes  Procedure consent matches procedure scheduled: Yes  Appropriately NPO: yes  Lungs: lungs clear with good breath sounds bilaterally  Heart: normal heart sounds and rate  History & Physical reviewed: History and physical reviewed and no updates needed  Statement of review: I have reviewed the lab findings, diagnostic data, medications, and the plan for sedation

## 2021-06-13 NOTE — TELEPHONE ENCOUNTER
Spoke with patient just now.      Rich Wolff is a 59 y.o. 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     After procedure with sharp pain around mid right sided rib, 7/10 in severity worse when standing and walking and taking a deep breath in, but does not feel shortness of breath.  Has been taking oxycodone and tylenol which takes the pain away but then it comes back.  Denies trying to lift anything in the past two days or any injuries.     Reports the right port site looks fine, no redness, no tenderness.  No dizziness.      Does report it feels better today. Has not really limited his activities.      Likely a neuropathic type pain.  Since he feels better, would not do anything now.  If it worsens, he has our number to call with any other further concerns.      Anastasia Jason  11/27/20  9:12 AM

## 2021-06-13 NOTE — TELEPHONE ENCOUNTER
Dr. Holguin on PTO 12/7/20. Diclofenac was D/C'd on 11/8/2020 by Dr. Enriquez from the hospital. Please advise on refill requests below. Thanks!    Pharmacy sent refill request for diclofenac   --Med last Rx 10/29/20 # 30, 2 refill by Dr. Holguin   --Last OV 10/9/20 with Dr. Holguin   --Future appt: none  --Please advise

## 2021-06-14 NOTE — PROGRESS NOTES
Rich Wolff arrived for type/screen and blood transfusion. Lab results were reviewed as required, hgb 7.9 on 01/18/2021. Rich Wolff was educated on his plan of care. Consent has been completed. Each medication/blood product given today was reviewed prior to administration. One unit packed red blood cell transfusion treatment was completed as ordered with no signs of reaction. IV site:Venous port patent throughout treatment with positive blood return obtained before and at completion. IV site with no pain, redness, swelling and drainage. IV site flushed with saline and heparin and deaccessed. Rich Wolff has follow-up appointment scheduled at [facility]. Rich Wolff was discharged to home. He discharged from unit ambulatory per self at 1251. His wife will  Be picking him up. The after visit summary was given.     Charisma Prasad

## 2021-06-16 NOTE — PRE-PROCEDURE
Procedure Name: right PNT exchange  Date/Time: 4/8/2021 1:14 PM  Written consent obtained?: Yes  Risks and benefits: Risks, benefits and alternatives were discussed  Consent given by: patient  Expected level of sedation: moderate  ASA Class: Class 3- Severe systemic disease, definite functional limitations  Mallampati: Grade 2- soft palate, base of uvula, tonsillar pillars, and portion of posterior pharyngeal wall visible  Patient states understanding of procedure being performed: Yes  Patient's understanding of procedure matches consent: Yes  Procedure consent matches procedure scheduled: Yes  Appropriately NPO: yes  Lungs: lungs clear with good breath sounds bilaterally  Heart: normal heart sounds and rate  History & Physical reviewed: History and physical reviewed and no updates needed  Statement of review: I have reviewed the lab findings, diagnostic data, medications, and the plan for sedation

## 2021-06-18 NOTE — PATIENT INSTRUCTIONS - HE
Patient Instructions by Francy Garcia PT at 10/26/2020  2:00 PM     Author: Francy Garcia PT Service: -- Author Type: Physical Therapist    Filed: 10/26/2020  2:36 PM Encounter Date: 10/26/2020 Status: Signed    : Francy Garcia PT (Physical Therapist)        PRONE ON ELBOWS - LASHAWN    Lying face down, slowly press up and prop yourself up on your elbows.    1-2 minutes     PRESS UPS    Lying face down, slowly press up and arch your back using your arms.    x10      SIDELYING TRUNK ROTATION    While lying on your side with your arms out-stretched in front of your body, slowly twist your upper body to the side and rotated your spine. Your arms and head should also be rotating along with the spine as shown. Follow your hand with your eyes.    Hold 20-30 seconds         SINGLE KNEE TO CHEST STRETCH - SKTC    While Lying on your back,  hold your knee and gently pull it up towards your chest.    Hold 20-30 seconds     Alternate:        DOUBLE KNEE TO CHEST STRETCH - DKTC    While Lying on your back,  hold your knees and gently pull them up towards your chest.

## 2021-06-18 NOTE — PATIENT INSTRUCTIONS - HE
Patient Instructions by Suzy Chun RN at 1/21/2021  9:00 AM     Author: Suzy Chun RN Service: -- Author Type: Registered Nurse    Filed: 1/21/2021 12:43 PM Encounter Date: 1/21/2021 Status: Signed    : Suzy Chun RN (Registered Nurse)       Jorge with MDPatient Education     When You Need a Blood Transfusion (Adult)  A blood transfusion may be done when you have lost blood because of an injury or during surgery. It can also be done because of diseases or conditions that affect the blood. Blood is made up of several different parts (blood products). You may receive some or all of these blood products during a transfusion. Blood for transfusion is usually donated from another person (donor). Strict measures are taken to make sure that donated blood is safe before it's given to you. This sheet helps you understand how a blood transfusion is done. Your healthcare provider will discuss your condition with you and answer your questions.    The parts of blood  Blood can be broken down into different parts that perform special roles in the body. These parts include:    Red blood cells, which carry oxygen throughout the body.    Platelets, which help stop bleeding.    Plasma (the liquid part of blood), which carries red blood cells and platelets throughout the body. Plasma also helps platelets in stopping bleeding.  Where does donated blood come from?    Volunteer donors. These are people who donate their blood to help others in need of blood. Blood donation can take place at several places, including a hospital, blood bank, or during a blood drive.    Directed donation. If you need a blood transfusion during a planned surgery, family and friends can have their blood tested for compatibility and donate blood for you before the surgery. This needs to be done at least 7 day(s) in advance. This is because the blood must be tested for safety.    Autologous donation. This is also called self-donation. For  planned surgery, you can donate your own blood starting up to 6 weeks before surgery.  Are blood transfusions safe?  Donated blood is tested and processed to make sure that the blood is safe:    The health and medical history of each donor is carefully screened. If a person is considered high-risk for infection or problems, he or she isn't accepted as a blood donor.    Donated blood is tested for infections such as hepatitis, syphilis, and HIV (the virus that causes AIDS). If the tested blood is found to be unsafe, it's destroyed.    Blood is divided into four types: A, B, AB, and O. Blood also has Rh types: positive (+) and negative (-). You can only receive blood products that are compatible with (match) your blood type. A sample of your blood is tested for compatibility with donated blood. This is done before blood products are prepared for a transfusion.  How is a blood transfusion done?  A blood transfusion takes place in a blood center, infusion center, hospital room, or operating room. Your healthcare provider will discuss the blood transfusion with you before it's done. You'll need to give permission for the blood transfusion by signing a consent form.    Two healthcare providers confirm your identity. They also confirm that they have the correct blood product(s) for you.    An intravenous (IV) line is placed in a vein if you do not already have an IV.    The blood product comes in a plastic bag that is hung on an IV pole. The blood product flows from the bag into your IV line. The IV line may be connected to a pump, which controls the transfusion rate. You may receive more than one kind of blood product through the IV.    Your vital signs (blood pressure, heart rate, respiratory rate, and temperature) are checked throughout the transfusion. This is to make sure you are not having a reaction to the blood product.    The IV line may be removed once the transfusion is complete.  Possible risks and  complications of blood transfusions  Most transfusions are problem free. In some cases, reactions occur. These can happen within seconds to minutes during the transfusion or a week to a few months after the transfusion. Call your doctor or nurse right away if you have any of the signs or symptoms in the table below during or after a transfusion:  Reaction Timing Symptoms   Allergic reaction (mild)   Within seconds to minutes during the transfusion    Up to 24 hours after the transfusion Hives or red welts on the skin, mild itching, rash, localized swelling, flushing (red face), wheezing, shortness of breath, or stridor (high-pitched noise or sound)   Anaphylactic reaction   Within seconds to minutes during the transfusion    Up to 24 hours after the transfusion Shortness of breath, flushing (red face), wheezing, labored (working hard) breathing, low blood pressure, localized swelling, chest tightness, or cramps   Febrile nonhemolytic reaction   Within minutes to hours during the transfusion    Within a few hours to 24 hours after the transfusion Fever (increase of 1  C or higher), chills, flushing (red face), nausea, headache, minor discomfort, or mild shortness of breath   Acute immune hemolytic reaction   Within minutes during the transfusion    Up to 24 hours after the transfusion Fever, red or brown urine, back pain, fast heart rate (tachycardia), abdominal pain, low blood pressure, feeling anxious, chills, chest pain, nausea, or fainting spells   Transfusion-related acute lung injury (TRALI)   Within 1 to 2 hours during the transfusion    Up to 6 hours after the transfusion Shortness of breath, trouble breathing, low blood pressure, fever, pulmonary edema   Transfusion-associated circulatory overload   Near the end of the transfusion    Within 6 hours after the transfusion Shortness of breath, fast heart rate (tachycardia), problems breathing when lying on back, abnormal blood pressure   Post-transfusion  "purpura (PUP)   Within 1 week    Up to 48 days after the transfusion Purple spots on skin; nose bleed; bleeding from the urinary tract, abdomen, colon, or rectum; fever; or chills         \"Delayed\" transfusion-related acute lung injury (TRALI)   Within 72 hours (3 days) after the transfusion \"Sudden\" onset of respiratory distress or trouble breathing   \"Delayed\" hemolytic reaction   Within 3 to 7 days    Up to weeks after the transfusion Low-grade fever, mild jaundice (yellowing of the skin and whites of the eyes), decrease in hematocrit, chills, chest pain, back pain, nausea   Date Last Reviewed: 12/1/2016 2000-2017 [employer]. [address] [place] [address], [place] [id number]. All rights reserved. This information is not intended as a substitute for professional medical care. Always follow your healthcare professional's instructions.          with any concerns, questions, GO to the ER with chest pain, shortness of breath  Suzy Chun       "

## 2021-07-02 NOTE — H&P
H&P by Anastasia Jason Chi, RPA at 2020  1:00 PM     Author: Anastasia Jason Chi, RPA Service: Interventional Radiology Author Type: Physician Assistant    Filed: 2020  1:01 PM Date of Service: 2020  1:00 PM Status: Signed    : Anastasia Jason Chi, RPA (Physician Assistant)    Related Notes: Original Note by Anastasia Jason Chi, RPA (Physician Assistant) filed at 2020 12:18 PM    Cosigner: Estevan Savage at 2020  8:14 AM         Interventional Radiology - Pre-Procedure Note:  2020    Procedure Requested: IR port Placement  Requested by: Juan C Kern MD     History and Physical Reviewed: H&P documented within 30 days (by Wesly Arreola, CNP on 2020).  I have personally reviewed the patient's medical history and have updated the medical record as necessary.    Brief HPI: Rich Wolff is a 59 y.o. old male with history of prostate cancer and robotic radical prostatectomy  and poorly differentiated adenocarcinoma of the colon now presents for port placement for long term IV access for blood draws and chemotherapy which starts Monday.        Denies any prior history of lines in neck, fevers, chest pain, shortness of breath.    IMAGIN2020:  CT chest:  IMPRESSION:   1.  There are a few small technically indeterminate pulmonary nodules in the lungs as above with the largest measuring 4 mm.     2.  Trace amount of pericardial fluid.     3.  Remainder of the chest is unremarkable. Findings in the visualized upper abdomen similar to yesterday's exam. See that report for those details.    NPO:  Breakfast at 5AM  ANTICOAGULANTS: none   ANTIBIOTICS: cefazolin 2 gm IV    ALLERGIES  Patient has no known allergies.    LABS:  INR (no units)   Date Value   2020 1.13 (H)     Hemoglobin (g/dL)   Date Value   2020 8.8 (L)     Platelets (thou/uL)   Date Value   2020 578 (H)     Creatinine (mg/dL)   Date Value   2020 1.30     Potassium  "(mmol/L)   Date Value   11/08/2020 4.3       COVID-19 PCR Results    COVID-19 PCR Results 11/3/20 11/3/20 11/8/20 11/21/20    1619 1619     2019-nCOV Test received-See reflex to IDDL test SARS CoV2 (COVID-19) Virus RT-PCR   Not Detected   SARS-CoV-2 PCR Result  NEGATIVE     COVID-19 Virus by PCR (External Result)   Undetected       Comments are available for some flowsheets but are not being displayed.           EXAM:  /67   Pulse (!) 125   Temp 98.8  F (37.1  C) (Oral)   Resp 16   Ht 5' 8\" (1.727 m)   Wt 178 lb (80.7 kg)   SpO2 95%   BMI 27.06 kg/m    General: Stable. In no acute distress.  Neuro: A&O x 3. Moves all extremities equally.  FROM of neck  Resp: Lungs clear to auscultation bilaterally.  Cardio: S1S2 and tachy, without murmur, clicks or rubs  Skin: Without excoriations, ecchymosis, erythema, lesions or open sores on neck/chest wall.    Pre-Sedation Assessment:  Mallampati Airway Classification: Class 2: upper half of tonsil fossa visible  Previous reaction to anesthesia/sedation: no  Sedation plan based on assessment: Moderate  ASA Classification: ASA 3 - Patient with moderate systemic disease with functional limitations    ASSESSMENT/PLAN: poorly differentiated adenocarcinoma of the colon    Right Port Placement with sedation.      Procedure, risk/benefits, and sedation reviewed with pt. All questions answered. Consent obtained. OK to proceed with above radiology procedure.     Anastasia Jason  Interventional Radiology         "

## 2021-07-02 NOTE — H&P
H&P by Dottie Ribeiro CNP at 4/8/2021  1:30 PM     Author: Quintin, Dottie, CNP Service: Interventional Radiology Author Type: Nurse Practitioner    Filed: 4/8/2021  1:10 PM Date of Service: 4/8/2021  1:30 PM Status: Signed    : Dottie Ribeiro CNP (Nurse Practitioner)         Interventional Radiology - Pre Procedure Note  4/8/2021    Requested Procedure:  Right PNT exchange  Requesting Provider:  Marisa Hodge CNP    HPI:  Rich Wolff is a 60 y.o. male with history of prostate cancer and robotic radical prostatectomy 2015 and poorly differentiated adenocarcinoma of the colon. Patient now with right hydronephrosis due to right ureteral obstruction; managed with a right PNT (initially place at [facility] on 11/11/2020); here for a right PNT exchange. Denies problem with his PNT. Patient pending future surgery at [facility].     IMAGING:    None available     NPO Status:  midnight  Anticoagulation/Antiplatelet/Bleeding tendencies:  xarelto  Antibiotics:  none    PAST MEDICAL HISTORY:  Reviewed    PAST SURGICAL HISTORY:  Reviewed    ALLERGIES  Emend [fosaprepitant]    MEDICATIONS: See MAR    LABS:  INR (no units)   Date Value   12/21/2020 1.25 (H)     Hemoglobin (g/dL)   Date Value   12/21/2020 6.5 (LL)     Platelets (thou/uL)   Date Value   12/21/2020 298     Creatinine (mg/dL)   Date Value   01/11/2021 1.35 (H)     Potassium (mmol/L)   Date Value   12/21/2020 3.8          EXAM:  Vitals:    04/08/21 1233   BP: (!) 157/91   Pulse: 92   Resp: 16   Temp: 98.2  F (36.8  C)   SpO2: 100%        General:  Stable.  In no acute distress.  Neuro:  A&O x 3. Moves all extremities equally.  Resp:  Lungs clear to auscultation bilaterally.  Cardio:  S1S2 and reg, without murmur, clicks or rubs  : right PNT intact to gravity drainage bag; clear, yellow urine noted.     Pre-Sedation Assessment:  Mallampati Airway Classification: Class 2: upper half of tonsil fossa visible  Previous reaction to anesthesia/sedation:  no  Sedation plan based on assessment: Moderate  ASA Classification: ASA 3 - Patient with moderate systemic disease with functional limitations  Code Status: Continue patient's current code status (Full Code) intraprocedure, per discussion with patient.      ASSESSMENT/PLAN:    Right nephrostomy tube exchange with sedation.     The procedure, risks/benefits and moderate sedation were discussed with patient, all questions answered and patient agrees to proceed with the procedure.  Written consent obtained.      Dottie Ribeiro  Interventional Radiology  [phone]

## 2021-08-03 ENCOUNTER — HOSPITAL ENCOUNTER (EMERGENCY)
Facility: CLINIC | Age: 61
Discharge: LEFT BEFORE DISCHARGE | End: 2021-08-03
Payer: COMMERCIAL

## 2021-08-03 VITALS
RESPIRATION RATE: 16 BRPM | HEIGHT: 68 IN | DIASTOLIC BLOOD PRESSURE: 65 MMHG | TEMPERATURE: 98.3 F | HEART RATE: 108 BPM | OXYGEN SATURATION: 99 % | WEIGHT: 150 LBS | BODY MASS INDEX: 22.73 KG/M2 | SYSTOLIC BLOOD PRESSURE: 105 MMHG

## 2021-08-03 ASSESSMENT — MIFFLIN-ST. JEOR: SCORE: 1464.9

## 2021-08-03 NOTE — ED TRIAGE NOTES
The patient presents to the ED with no output from his colostomy that was placed on Friday. The patient has a history of colon cancer. The patient states he was originally having output but states it has stopped over the past day. He was discharged yesterday from [facility]. The patient reports they instructed him to return. He does report abdominal pain and some nausea without vomiting.

## 2021-08-04 ENCOUNTER — APPOINTMENT (OUTPATIENT)
Dept: ULTRASOUND IMAGING | Facility: HOSPITAL | Age: 61
End: 2021-08-04
Payer: COMMERCIAL

## 2021-08-04 ENCOUNTER — HOSPITAL ENCOUNTER (EMERGENCY)
Facility: HOSPITAL | Age: 61
Discharge: OTHER INSTITUTION | End: 2021-08-04
Attending: EMERGENCY MEDICINE | Admitting: EMERGENCY MEDICINE
Payer: COMMERCIAL

## 2021-08-04 ENCOUNTER — APPOINTMENT (OUTPATIENT)
Dept: CT IMAGING | Facility: HOSPITAL | Age: 61
End: 2021-08-04
Payer: COMMERCIAL

## 2021-08-04 VITALS
SYSTOLIC BLOOD PRESSURE: 155 MMHG | HEART RATE: 93 BPM | TEMPERATURE: 98.1 F | DIASTOLIC BLOOD PRESSURE: 87 MMHG | RESPIRATION RATE: 18 BRPM | HEIGHT: 68 IN | BODY MASS INDEX: 22.73 KG/M2 | OXYGEN SATURATION: 96 % | WEIGHT: 150 LBS

## 2021-08-04 DIAGNOSIS — K56.609 SBO (SMALL BOWEL OBSTRUCTION) (H): ICD-10-CM

## 2021-08-04 DIAGNOSIS — C18.9 COLON CANCER (H): ICD-10-CM

## 2021-08-04 DIAGNOSIS — K80.20 CALCULUS OF GALLBLADDER WITHOUT CHOLECYSTITIS WITHOUT OBSTRUCTION: ICD-10-CM

## 2021-08-04 PROBLEM — C18.0 MALIGNANT NEOPLASM OF CECUM (H): Status: ACTIVE | Noted: 2020-11-09

## 2021-08-04 PROBLEM — K92.2 GASTROINTESTINAL HEMORRHAGE: Status: ACTIVE | Noted: 2020-11-03

## 2021-08-04 PROBLEM — D50.9 IRON DEFICIENCY ANEMIA: Status: ACTIVE | Noted: 2021-07-30

## 2021-08-04 PROBLEM — C61 CARCINOMA OF PROSTATE (H): Status: ACTIVE | Noted: 2021-04-19

## 2021-08-04 PROBLEM — Z93.3 COLOSTOMY IN PLACE (H): Status: ACTIVE | Noted: 2021-08-04

## 2021-08-04 PROBLEM — N17.9 ACUTE RENAL FAILURE (H): Status: ACTIVE | Noted: 2020-11-12

## 2021-08-04 PROBLEM — C78.7 MALIGNANT NEOPLASM METASTATIC TO LIVER (H): Status: ACTIVE | Noted: 2021-04-26

## 2021-08-04 PROBLEM — N30.00 ACUTE CYSTITIS WITHOUT HEMATURIA: Status: ACTIVE | Noted: 2021-08-04

## 2021-08-04 PROBLEM — G89.29 CHRONIC PAIN: Status: ACTIVE | Noted: 2021-07-30

## 2021-08-04 PROBLEM — C76.2: Status: ACTIVE | Noted: 2020-11-03

## 2021-08-04 PROBLEM — E78.5 HYPERLIPIDEMIA: Status: ACTIVE | Noted: 2021-04-19

## 2021-08-04 PROBLEM — Z93.3 COLOSTOMY STATUS (H): Status: ACTIVE | Noted: 2021-07-30

## 2021-08-04 PROBLEM — Z93.6 NEPHROSTOMY STATUS (H): Status: ACTIVE | Noted: 2021-08-04

## 2021-08-04 PROBLEM — D62 ANEMIA DUE TO BLOOD LOSS, ACUTE: Status: ACTIVE | Noted: 2021-08-04

## 2021-08-04 PROBLEM — N39.0 URINARY TRACT INFECTION WITHOUT HEMATURIA, SITE UNSPECIFIED: Status: ACTIVE | Noted: 2021-08-04

## 2021-08-04 PROBLEM — N17.9 ACUTE KIDNEY INJURY (H): Status: ACTIVE | Noted: 2021-08-04

## 2021-08-04 PROBLEM — N18.30 STAGE 3 CHRONIC KIDNEY DISEASE (H): Status: ACTIVE | Noted: 2021-07-30

## 2021-08-04 PROBLEM — K63.89 CECUM MASS: Status: ACTIVE | Noted: 2021-08-04

## 2021-08-04 PROBLEM — Z86.711 HISTORY OF PULMONARY EMBOLISM: Status: ACTIVE | Noted: 2021-07-30

## 2021-08-04 PROBLEM — A41.9 SEPSIS, DUE TO UNSPECIFIED ORGANISM, UNSPECIFIED WHETHER ACUTE ORGAN DYSFUNCTION PRESENT (H): Status: ACTIVE | Noted: 2021-08-04

## 2021-08-04 PROBLEM — Z85.038 HISTORY OF MALIGNANT NEOPLASM OF COLON: Status: ACTIVE | Noted: 2020-11-08

## 2021-08-04 PROBLEM — F41.9 ANXIETY: Status: ACTIVE | Noted: 2021-07-30

## 2021-08-04 PROBLEM — N12 PYELONEPHRITIS: Status: ACTIVE | Noted: 2021-04-19

## 2021-08-04 LAB
ALBUMIN SERPL-MCNC: 3.4 G/DL (ref 3.5–5)
ALP SERPL-CCNC: 124 U/L (ref 45–120)
ALT SERPL W P-5'-P-CCNC: 19 U/L (ref 0–45)
ANION GAP SERPL CALCULATED.3IONS-SCNC: 10 MMOL/L (ref 5–18)
AST SERPL W P-5'-P-CCNC: 23 U/L (ref 0–40)
BASOPHILS # BLD AUTO: 0.1 10E3/UL (ref 0–0.2)
BASOPHILS NFR BLD AUTO: 1 %
BILIRUB SERPL-MCNC: 0.4 MG/DL (ref 0–1)
BUN SERPL-MCNC: 30 MG/DL (ref 8–22)
CALCIUM SERPL-MCNC: 10.1 MG/DL (ref 8.5–10.5)
CHLORIDE BLD-SCNC: 103 MMOL/L (ref 98–107)
CO2 SERPL-SCNC: 26 MMOL/L (ref 22–31)
CREAT SERPL-MCNC: 1.74 MG/DL (ref 0.7–1.3)
EOSINOPHIL # BLD AUTO: 0.4 10E3/UL (ref 0–0.7)
EOSINOPHIL NFR BLD AUTO: 4 %
ERYTHROCYTE [DISTWIDTH] IN BLOOD BY AUTOMATED COUNT: 15.6 % (ref 10–15)
GFR SERPL CREATININE-BSD FRML MDRD: 42 ML/MIN/1.73M2
GLUCOSE BLD-MCNC: 116 MG/DL (ref 70–125)
HCT VFR BLD AUTO: 32.8 % (ref 40–53)
HGB BLD-MCNC: 10.5 G/DL (ref 13.3–17.7)
HOLD SPECIMEN: NORMAL
HOLD SPECIMEN: NORMAL
IMM GRANULOCYTES # BLD: 0.1 10E3/UL
IMM GRANULOCYTES NFR BLD: 2 %
INR PPP: 1.33 (ref 0.9–1.15)
LACTATE SERPL-SCNC: 0.6 MMOL/L (ref 0.7–2)
LIPASE SERPL-CCNC: 16 U/L (ref 0–52)
LYMPHOCYTES # BLD AUTO: 0.3 10E3/UL (ref 0.8–5.3)
LYMPHOCYTES NFR BLD AUTO: 3 %
MCH RBC QN AUTO: 32.4 PG (ref 26.5–33)
MCHC RBC AUTO-ENTMCNC: 32 G/DL (ref 31.5–36.5)
MCV RBC AUTO: 101 FL (ref 78–100)
MONOCYTES # BLD AUTO: 0.7 10E3/UL (ref 0–1.3)
MONOCYTES NFR BLD AUTO: 7 %
NEUTROPHILS # BLD AUTO: 8.1 10E3/UL (ref 1.6–8.3)
NEUTROPHILS NFR BLD AUTO: 83 %
NRBC # BLD AUTO: 0 10E3/UL
NRBC BLD AUTO-RTO: 0 /100
PLATELET # BLD AUTO: 324 10E3/UL (ref 150–450)
POTASSIUM BLD-SCNC: 3.9 MMOL/L (ref 3.5–5)
PROT SERPL-MCNC: 7.4 G/DL (ref 6–8)
RBC # BLD AUTO: 3.24 10E6/UL (ref 4.4–5.9)
SARS-COV-2 RNA RESP QL NAA+PROBE: NEGATIVE
SODIUM SERPL-SCNC: 139 MMOL/L (ref 136–145)
WBC # BLD AUTO: 9.5 10E3/UL (ref 4–11)

## 2021-08-04 PROCEDURE — 250N000011 HC RX IP 250 OP 636: Performed by: PHYSICIAN ASSISTANT

## 2021-08-04 PROCEDURE — 83690 ASSAY OF LIPASE: CPT | Performed by: PHYSICIAN ASSISTANT

## 2021-08-04 PROCEDURE — 85025 COMPLETE CBC W/AUTO DIFF WBC: CPT | Performed by: PHYSICIAN ASSISTANT

## 2021-08-04 PROCEDURE — 99285 EMERGENCY DEPT VISIT HI MDM: CPT | Mod: 25

## 2021-08-04 PROCEDURE — 87635 SARS-COV-2 COVID-19 AMP PRB: CPT | Performed by: PHYSICIAN ASSISTANT

## 2021-08-04 PROCEDURE — 82040 ASSAY OF SERUM ALBUMIN: CPT | Performed by: PHYSICIAN ASSISTANT

## 2021-08-04 PROCEDURE — 258N000003 HC RX IP 258 OP 636: Performed by: PHYSICIAN ASSISTANT

## 2021-08-04 PROCEDURE — 96376 TX/PRO/DX INJ SAME DRUG ADON: CPT

## 2021-08-04 PROCEDURE — C9803 HOPD COVID-19 SPEC COLLECT: HCPCS

## 2021-08-04 PROCEDURE — 96374 THER/PROPH/DIAG INJ IV PUSH: CPT | Mod: 59

## 2021-08-04 PROCEDURE — 83605 ASSAY OF LACTIC ACID: CPT | Performed by: PHYSICIAN ASSISTANT

## 2021-08-04 PROCEDURE — 36415 COLL VENOUS BLD VENIPUNCTURE: CPT | Performed by: PHYSICIAN ASSISTANT

## 2021-08-04 PROCEDURE — 36415 COLL VENOUS BLD VENIPUNCTURE: CPT | Performed by: STUDENT IN AN ORGANIZED HEALTH CARE EDUCATION/TRAINING PROGRAM

## 2021-08-04 PROCEDURE — 76705 ECHO EXAM OF ABDOMEN: CPT

## 2021-08-04 PROCEDURE — 96375 TX/PRO/DX INJ NEW DRUG ADDON: CPT

## 2021-08-04 PROCEDURE — 250N000011 HC RX IP 250 OP 636: Performed by: EMERGENCY MEDICINE

## 2021-08-04 PROCEDURE — 74177 CT ABD & PELVIS W/CONTRAST: CPT

## 2021-08-04 PROCEDURE — 85610 PROTHROMBIN TIME: CPT | Performed by: PHYSICIAN ASSISTANT

## 2021-08-04 PROCEDURE — 96361 HYDRATE IV INFUSION ADD-ON: CPT

## 2021-08-04 RX ORDER — SODIUM CHLORIDE 9 MG/ML
INJECTION, SOLUTION INTRAVENOUS CONTINUOUS
Status: DISCONTINUED | OUTPATIENT
Start: 2021-08-04 | End: 2021-08-04 | Stop reason: HOSPADM

## 2021-08-04 RX ORDER — IOPAMIDOL 755 MG/ML
75 INJECTION, SOLUTION INTRAVASCULAR ONCE
Status: COMPLETED | OUTPATIENT
Start: 2021-08-04 | End: 2021-08-04

## 2021-08-04 RX ORDER — ONDANSETRON 2 MG/ML
4 INJECTION INTRAMUSCULAR; INTRAVENOUS EVERY 30 MIN PRN
Status: COMPLETED | OUTPATIENT
Start: 2021-08-04 | End: 2021-08-04

## 2021-08-04 RX ORDER — MORPHINE SULFATE 4 MG/ML
4 INJECTION, SOLUTION INTRAMUSCULAR; INTRAVENOUS
Status: COMPLETED | OUTPATIENT
Start: 2021-08-04 | End: 2021-08-04

## 2021-08-04 RX ADMIN — MORPHINE SULFATE 4 MG: 4 INJECTION, SOLUTION INTRAMUSCULAR; INTRAVENOUS at 11:12

## 2021-08-04 RX ADMIN — ONDANSETRON 4 MG: 2 INJECTION INTRAMUSCULAR; INTRAVENOUS at 07:43

## 2021-08-04 RX ADMIN — SODIUM CHLORIDE 125 ML/HR: 9 INJECTION, SOLUTION INTRAVENOUS at 08:41

## 2021-08-04 RX ADMIN — IOPAMIDOL 75 ML: 755 INJECTION, SOLUTION INTRAVENOUS at 07:31

## 2021-08-04 RX ADMIN — ONDANSETRON 4 MG: 2 INJECTION INTRAMUSCULAR; INTRAVENOUS at 09:05

## 2021-08-04 RX ADMIN — SODIUM CHLORIDE 1000 ML: 9 INJECTION, SOLUTION INTRAVENOUS at 06:54

## 2021-08-04 RX ADMIN — ONDANSETRON 4 MG: 2 INJECTION INTRAMUSCULAR; INTRAVENOUS at 06:54

## 2021-08-04 ASSESSMENT — ENCOUNTER SYMPTOMS
TROUBLE SWALLOWING: 0
BACK PAIN: 0
COUGH: 0
WEAKNESS: 0
NECK PAIN: 0
EYE DISCHARGE: 0
ABDOMINAL DISTENTION: 1
VOMITING: 1
NUMBNESS: 0
SHORTNESS OF BREATH: 0
HEMATURIA: 0
DYSURIA: 0
NAUSEA: 1
SORE THROAT: 0
APPETITE CHANGE: 1
CHILLS: 0
CHEST TIGHTNESS: 0
ABDOMINAL PAIN: 1
FREQUENCY: 0
WOUND: 0
FEVER: 0
HEADACHES: 0

## 2021-08-04 ASSESSMENT — MIFFLIN-ST. JEOR: SCORE: 1464.9

## 2021-08-04 NOTE — ED PROVIDER NOTES
I am seeing this patient along with Tong Naylor, PAC.  I, Bhargav Johnson MD have reviewed the documentation, personally taken the patient's history, performed an exam and agree with the physical findings, diagnosis and management plan. I have taken a history, review of systems, physical exam, and I concur with his documentation of Rich Wolff.    HPI: On Monday (8/2), the patient discovered decreased output from his bowel ostomy bag that has persisted until today. He endorses vomiting once this morning, but states that he has been urinating well.    ROS: A ten-point ROS was performed. All pertinent positives noted in the HPI.  All other systems reviewed and are negative except as noted.    PEx:  General: Awake, alert, in NAD  Respiratory: Lungs CTA bilaterally  Cardiac: RRR no murmur  Abdomen: Soft, non-tender, non-distended.  Ostomy site located right lower abdomen with minimal output, no blood  Integument: Skin color is normal, warm, no rashes, dry  Neuro: GCS of 15 with no focal neurologic deficits appreciated    MDM:  Patient appears nontoxic with stable vital signs, patient is afebrile with no tachycardia or hypoxia.  Overall, benign exam.  Abdomen is benign with no focal tenderness, the patient is endorsing decreased ostomy output.  Concern for obstruction.  We will obtain screening labs, urine studies, Covid testing and CT imaging the abdomen pelvis.        ED Course:  8:10 AM I checked on the patient and discussed imaging.      Personal Protective Equipment: goggles, N-95 mask    Final disposition will be dependent upon the pending studies and the patient's clinical trajectory.       The creation of this record is based on the scribe s observations of the work being performed by Bhargav Johnson MD and the provider s statements to them. It was created on their behalf by Astrid Churchill, a trained medical scribe. This document has been checked and approved by the attending provider.          Bhargav Johnson MD  08/04/21 1120

## 2021-08-04 NOTE — ED NOTES
NG tube placed without difficulty for gastric decompression.  Patient had 500 cc of thick green bile returns.  Also had some emesis of green bile during insertion.  Zofran given. Patient changed and cleaned.  NG to low intermittent suction.  Vomiting has subsided and patient resting quietly.  Now taken to ultrasound.  Plan for tranfers and admission to [facility].  Patient and family are aware of plan of care.

## 2021-08-04 NOTE — ED TRIAGE NOTES
Patient arrives to triage with complaints of nausea, vomiting, and abdominal pain.    Patient endorsed that he had surgery last Friday to remove a tumor and a colostomy bag was placed.  Patient endorsed that he hasn't been having much drainage out of his colostomy bag.  Patient also has nephrostomy bag right side.      Last chemo session 2 months ago.

## 2021-08-04 NOTE — ED PROVIDER NOTES
EMERGENCY DEPARTMENT ENCOUNTER      NAME: Rich Wolff  AGE: 60 year old male  YOB: 1960  MRN: [id number]  EVALUATION DATE & TIME: 8/4/2021  6:08 AM    PCP: Maycol Worrell    ED PROVIDER: Tong Naylor PA-C      Chief Complaint   Patient presents with     Nausea & Vomiting     Abdominal Pain         FINAL IMPRESSION:  1. SBO (small bowel obstruction) (H)    2. Colon cancer (H)    3. Calculus of gallbladder without cholecystitis without obstruction          MEDICAL DECISION MAKING:    Pertinent Labs & Imaging studies reviewed. (See chart for details)  60 year old male presents to the Emergency Department for evaluation of generalized abdominal pain, distention, decreased ileostomy output, nausea and vomiting.    After obtaining history of present illness, reviewing vitals and examining the patient, decided to assess with screening labs as well as CT scan of the abdomen pelvis.  CT scan did return turn showing evidence of a small bowel obstruction basically with a transition point at the site of the ileostomy.  I discussed this finding with his surgeon from the [facility], Dr. Eli.  She agrees with NG tube placement for conservative therapy, but does recommend admission to the hospital and would be most beneficial to have him down at [facility] where the surgery occurred.  If his symptoms do not improve with the NG tube placement there is a possibility of needing repeat surgery.  I discussed this plan of care with the patient and family and they are comfortable with transferring down to [facility].  NG tube will be placed and he will be transferred by S.    To note on the CT scan here today did comment about gallstones and fluid around the gallbladder, we will further assess with right upper quadrant ultrasound.  Seems most likely that his symptoms are all related to the SBO that is noted on the CT scan however based on the CT could not exclude  cholecystitis therefore we will further assess with the ultrasound for closer look.  Patient has been given IV fluids based on his symptoms, his renal function does show some acute renal insufficiency which will likely be improved with the IV hydration.    Right upper quadrant ultrasound returns equivocal for acute cholecystitis.  Again it seems most likely that the patient's symptoms are all related to his small bowel obstruction.  The clinical course will obviously be monitored closely at the [facility] for improvement of symptoms and if things change they may need to consider gallbladder etiology of symptoms.    ED COURSE  6:40AM I met with the patient for the initial interview and physical examination. Discussed plan for treatment and workup in the ED. PPE: Provider wore gloves, goggles, surgical mask, and N95 mask.  7:48 AM Radiology called with results.   7:51 AM I reassessed the patient and updated him on the results.    7:55 AM I staffed the patient with Dr. Johnson.  8:01AM I reassessed the patient and updated him more about the CT read and plan for US. He is agreeable.  8:09AM I discussed the case with Dr. Sydney Eli, colorectal surgeon at [facility].  8:14 AM I reassessed the patient and discussed options based on recommendations from [facility] surgeon. Patient would like to be transferred back to [facility] for admission and further care. I discussed admission with the patient. Patient is agreeable. All questions answered fully.  8:22 AM I discussed the case with bed placement from [facility]. They will call back when they find a bed.    At the conclusion of the encounter I discussed the results of all of the tests and the disposition. The questions were answered. The patient or family acknowledged understanding and was agreeable with the care plan.     MEDICATIONS GIVEN IN THE EMERGENCY:  Medications   0.9% sodium chloride BOLUS (0 mLs Intravenous Stopped 8/4/21 0841)     Followed by   sodium  chloride 0.9% infusion (125 mL/hr Intravenous New Bag 8/4/21 0841)   ondansetron (ZOFRAN) injection 4 mg (4 mg Intravenous Given 8/4/21 0905)   iopamidol (ISOVUE-370) solution 75 mL (75 mLs Intravenous Given 8/4/21 0731)       NEW PRESCRIPTIONS STARTED AT TODAY'S ER VISIT  New Prescriptions    No medications on file            =================================================================    HPI    Patient information was obtained from: patient and wife    Use of Interpretor: N/A         Rich GOGO Wolff is a 60 year old male with a pertinent history of colon cancer s/p bowel resection and colostomy, prostate cancer s/p laparoscopic radical prostatectomy, s/p nephrostomy, GI hemorrhage, chronic pain, iron deficiency anemia, CKD3, hypertension, hyperlipidemia, and anxiety who presents to this ED via private car with wife for evaluation of abdominal pain, nausea, and vomiting.    Per chart review, patient was admitted to [facility] on 7/30/21 (5 days ago) for consultation in setting of colorectal cancer. Noted that patient presented last year with a perforated and obstructing right colon cancer, required an emergency right nephrostomy tube as well as diverting loop ileostomy, has since undergone many many rounds of chemotherapy, just recently completed long course radiation therapy, and has had a good response to treatment. CT showed some improvement in the size of the mass in the RLQ which may involve an additional loop of small intestine, some thickness tracking up towards the 3rd portion of the duodenum which appears improved, and involvement of the right ureter and the enhancement extends proximally along the ureter. Patient underwent duodenal fistula takedown and primary parastomal hernia repair with DLI revision on 7/30/21, recovered well after surgery with no acute hospital issues, and was discharged home in stable condition on 8/2/21 (2 days ago).     Patient has a history of colon cancer, reportedly  had a tumor and colon resection with colostomy on 8/1/21 (3 days ago) at [facility], and was discharged home on 8/2/21 (2 days ago). However, he reports having very little ostomy output, where he is only getting ~1 bag per day instead of 7-8. Reports associated abdominal distention, generalized abdominal pain, nausea with 1 episode of vomiting this morning, and decreased appetite, although attributes the abdominal pain to the recent surgery. Wife spoke with his care team yesterday about his symptoms and they were referred to the ED. Patient is not currently undergoing any radiation or chemo treatment. Denies fever or any other complaints at this time.      REVIEW OF SYSTEMS   Review of Systems   Constitutional: Positive for appetite change (decreased). Negative for chills and fever.   HENT: Negative for congestion, sore throat and trouble swallowing.    Eyes: Negative for discharge and visual disturbance.   Respiratory: Negative for cough, chest tightness and shortness of breath.    Cardiovascular: Negative for chest pain and leg swelling.   Gastrointestinal: Positive for abdominal distention, abdominal pain (generalized), nausea and vomiting (1 episode).        Positive for decreased ostomy output (1 bag per day).   Genitourinary: Negative for dysuria, frequency, hematuria and urgency.   Musculoskeletal: Negative for back pain, gait problem and neck pain.   Skin: Negative for rash and wound.   Neurological: Negative for weakness, numbness and headaches.   Psychiatric/Behavioral: Negative for behavioral problems and suicidal ideas.          PAST MEDICAL HISTORY:  Past Medical History:   Diagnosis Date     Hyperlipidemia      Hypertension      Prostate cancer (H) 09/01/2015    T1c. Cecil's 6 (3+3).  Confined to prostate.  Multifocal bilateral comprising 2% of the gland.PSA:5.8.       PAST SURGICAL HISTORY:  Past Surgical History:   Procedure Laterality Date     BOWEL RESECTION       COLONOSCOPY W/ BIOPSIES  11/04/2020      COLOSTOMY       ESOPHAGOSCOPY, GASTROSCOPY, DUODENOSCOPY (EGD), COMBINED  11/04/2020     IR CHEST PORT PLACEMENT > 5 YRS OF AGE  11/24/2020     IR NEPHROSTOMY TUBE CHANGE RIGHT  4/8/2021     IR NEPHROSTOMY TUBE CHANGE RIGHT  4/8/2021     IR PORT PLACEMENT >5 YEARS  11/24/2020     NEPHROSTOMY       TN ESOPHAGOGASTRODUODENOSCOPY TRANSORAL DIAGNOSTIC N/A 11/4/2020    Procedure: ESOPHAGOGASTRODUODENOSCOPY (EGD);  Surgeon: Paul Masters MD;  Location: [facility];  Service: Gastroenterology     TN LAP,PROSTATECTOMY,RADICAL,W/NERVE SPARE,INCL ROBOTIC Bilateral 09/01/2015    Procedure: DAVINCI RADICAL RETROPUBIC PROSTATECTOMY BILATERAL PELVIC LYMPH NODE DISSECTION;  Surgeon: Juan C Velazco MD;  Location: [facility];  Service: Urology     PROSTATE BIOPSY  06/08/2015    Ultrasound-guided transrectal biopsy.     SURGERY SCROTAL / TESTICULAR      for undescended testes, removed due to atrophy with vasectomy     ZZHC COLONOSCOPY W/WO BRUSH/WASH N/A 11/4/2020    Procedure: COLONOSCOPY WITH BIOPSY;  Surgeon: Paul Masters MD;  Location: [facility];  Service: Gastroenterology         CURRENT MEDICATIONS:      Current Facility-Administered Medications:      [COMPLETED] 0.9% sodium chloride BOLUS, 1,000 mL, Intravenous, Once, Stopped at 08/04/21 0841 **FOLLOWED BY** sodium chloride 0.9% infusion, , Intravenous, Continuous, Tong Naylor PA-C, Last Rate: 125 mL/hr at 08/04/21 0841, 125 mL/hr at 08/04/21 0841    Current Outpatient Medications:      acetaminophen (TYLENOL) 500 MG tablet, Take 500-1,000 mg by mouth 3 times daily, Disp: , Rfl:      acetaminophen (TYLENOL) 500 MG tablet, Take 500 mg by mouth daily as needed for mild pain , Disp: , Rfl:      fentaNYL (DURAGESIC) 25 mcg/hr 72 hr patch, Place 1 patch onto the skin every 72 hours remove old patch., Disp: , Rfl:      Ferrous Sulfate Dried 45 MG TBCR, Take 45 mg by mouth daily Slow iron*, Disp: , Rfl:      gabapentin (NEURONTIN)  100 MG capsule, Take 100 mg by mouth At Bedtime, Disp: , Rfl:      loratadine-pseudoePHEDrine (CLARITIN-D 24-HOUR)  MG 24 hr tablet, Take 1 tablet by mouth daily, Disp: , Rfl:      OMEPRAZOLE PO, Take 40 mg by mouth 2 times daily (before meals), Disp: , Rfl:      oxybutynin (DITROPAN) 5 MG tablet, Take 5 mg by mouth 3 times daily, Disp: , Rfl:      oxyCODONE (ROXICODONE) 5 MG tablet, Take 5 mg by mouth 3 times daily, Disp: , Rfl:      oxyCODONE (ROXICODONE) 5 MG tablet, Take 5 mg by mouth daily as needed for severe pain, Disp: , Rfl:      prochlorperazine (COMPAZINE) 10 MG tablet, Take 10 mg by mouth 2 times daily, Disp: , Rfl:      Rivaroxaban ANTICOAGULANT (XARELTO STARTER PACK ANTICOAGULANT) 15 & 20 MG TBPK, Future refills by PCP Dr. Lacho Chisholm [facility] with phone number [phone]., Disp: 51 each, Rfl: 0     tamsulosin (FLOMAX) 0.4 MG capsule, Take 0.4 mg by mouth daily, Disp: , Rfl:       ALLERGIES:  Allergies   Allergen Reactions     Emend [Fosaprepitant] Itching       FAMILY HISTORY:  Family History   Problem Relation Age of Onset     Breast Cancer Mother      Osteoporosis Mother      Valvular heart disease Father      Prostate Cancer Father 70.00     No Known Problems Sister      No Known Problems Son      No Known Problems Daughter        SOCIAL HISTORY:   Social History     Socioeconomic History     Marital status:      Spouse name: None     Number of children: None     Years of education: None     Highest education level: None   Occupational History     None   Tobacco Use     Smoking status: Never Smoker     Smokeless tobacco: Never Used   Substance and Sexual Activity     Alcohol use: Not Currently     Comment: Alcoholic Drinks/day: rarely     Drug use: No     Sexual activity: Not Currently   Other Topics Concern     None   Social History Narrative     None     Social Determinants of Health     Financial Resource Strain:      Difficulty of Paying Living Expenses:    Food  "Insecurity:      Worried About Running Out of Food in the Last Year:      Ran Out of Food in the Last Year:    Transportation Needs:      Lack of Transportation (Medical):      Lack of Transportation (Non-Medical):    Physical Activity:      Days of Exercise per Week:      Minutes of Exercise per Session:    Stress:      Feeling of Stress :    Social Connections:      Frequency of Communication with Friends and Family:      Frequency of Social Gatherings with Friends and Family:      Attends [employer] Services:      Active Member of Clubs or Organizations:      Attends Club or Organization Meetings:      Marital Status:    Intimate Partner Violence:      Fear of Current or Ex-Partner:      Emotionally Abused:      Physically Abused:      Sexually Abused:        VITALS:  Patient Vitals for the past 24 hrs:   BP Temp Temp src Pulse Resp SpO2 Height Weight   08/04/21 0915 -- -- -- 98 -- 97 % -- --   08/04/21 0900 (!) 173/99 -- -- 109 -- 96 % -- --   08/04/21 0845 -- -- -- 87 -- 98 % -- --   08/04/21 0800 127/73 -- -- 81 -- 97 % -- --   08/04/21 0745 -- -- -- 81 -- 98 % -- --   08/04/21 0715 -- -- -- 81 -- 96 % -- --   08/04/21 0700 114/77 -- -- 83 -- 98 % -- --   08/04/21 0658 -- -- -- 87 -- 97 % -- --   08/04/21 0555 (!) 122/93 98.1  F (36.7  C) Temporal 100 18 97 % 1.727 m (5' 8\") 68 kg (150 lb)       PHYSICAL EXAM    Physical Exam  Vitals and nursing note reviewed.   Constitutional:       General: He is not in acute distress.     Appearance: Normal appearance. He is not ill-appearing or toxic-appearing.   HENT:      Head: Normocephalic and atraumatic.      Right Ear: Tympanic membrane, ear canal and external ear normal.      Left Ear: Tympanic membrane, ear canal and external ear normal.      Nose: Nose normal.      Mouth/Throat:      Mouth: Mucous membranes are moist.      Pharynx: Oropharynx is clear. No oropharyngeal exudate or posterior oropharyngeal erythema.   Eyes:      General: No scleral icterus.        " Right eye: No discharge.         Left eye: No discharge.      Extraocular Movements: Extraocular movements intact.      Conjunctiva/sclera: Conjunctivae normal.   Cardiovascular:      Rate and Rhythm: Normal rate and regular rhythm.      Pulses: Normal pulses.      Heart sounds: Normal heart sounds. No murmur heard.     Pulmonary:      Effort: Pulmonary effort is normal. No respiratory distress.      Breath sounds: Normal breath sounds.   Abdominal:      General: Abdomen is flat. There is no distension.      Palpations: Abdomen is soft.      Comments: Ileostomy bag noted right lower quadrant of the abdomen very minimal output.  Diffuse abdominal tenderness with mild touch, no guarding rebound.  Minimal distention.  Quiet bowel sounds at this point.  No obvious focal abdominal tenderness.   Musculoskeletal:         General: No swelling, tenderness, deformity or signs of injury. Normal range of motion.      Cervical back: Normal range of motion and neck supple. No rigidity or tenderness.   Lymphadenopathy:      Cervical: No cervical adenopathy.   Skin:     General: Skin is warm and dry.      Coloration: Skin is not jaundiced.      Findings: No bruising, erythema or rash.   Neurological:      General: No focal deficit present.      Mental Status: He is alert and oriented to person, place, and time. Mental status is at baseline.      Cranial Nerves: No cranial nerve deficit.      Sensory: No sensory deficit.      Motor: No weakness.      Gait: Gait normal.   Psychiatric:         Mood and Affect: Mood normal.         Behavior: Behavior normal.         Judgment: Judgment normal.          LAB:  All pertinent labs reviewed and interpreted.  Results for orders placed or performed during the hospital encounter of 08/04/21   CT Abdomen Pelvis w Contrast    Impression    IMPRESSION:   1.  Free intraperitoneal air. If the patient has not had a recent intra-abdominal procedure findings are concerning for a ruptured hollow  viscus.  2.  Small bowel obstruction with caliber change at the level of the diverting ileostomy in the right lower quadrant. There is some fluid in the ostomy and it is difficult to tell if this is contained in a loop of bowel are not differential diagnosis   would include obstructing adhesions near the ostomy site versus a nonobstructing peristomal hernia. Findings were called to Tong Naylor at the time of dictation.  3.  Distended gallbladder containing multiple stones with small amount of fluid around the gallbladder I cannot exclude cholecystitis.  4.  No change spiculated cecal mass.  5.  Right nephrostomy tube no hydronephrosis.    NOTE: ABNORMAL REPORT    THE DICTATION ABOVE DESCRIBES AN ABNORMALITY FOR WHICH FOLLOW-UP IS NEEDED.    Abdomen US, limited (RUQ only)    Impression    IMPRESSION:  1.  Cholelithiasis. Findings are equivocal for acute cholecystitis. HIDA scan could be performed if clinical concern for acute cholecystitis.  2.  The pancreas is largely obscured by bowel gas.    NOTE: ABNORMAL REPORT    THE DICTATION ABOVE DESCRIBES AN ABNORMALITY FOR WHICH FOLLOW-UP IS NEEDED.    Extra Green Top (Lithium Heparin) Tube   Result Value Ref Range    Hold Specimen JIC    Extra Purple Top Tube   Result Value Ref Range    Hold Specimen JIC    Result Value Ref Range    INR 1.33 (H) 0.90 - 1.15   Lactic acid whole blood   Result Value Ref Range    Lactic Acid 0.6 (L) 0.7 - 2.0 mmol/L   Result Value Ref Range    Lipase 16 0 - 52 U/L   Comprehensive metabolic panel   Result Value Ref Range    Sodium 139 136 - 145 mmol/L    Potassium 3.9 3.5 - 5.0 mmol/L    Chloride 103 98 - 107 mmol/L    Carbon Dioxide (CO2) 26 22 - 31 mmol/L    Anion Gap 10 5 - 18 mmol/L    Urea Nitrogen 30 (H) 8 - 22 mg/dL    Creatinine 1.74 (H) 0.70 - 1.30 mg/dL    Calcium 10.1 8.5 - 10.5 mg/dL    Glucose 116 70 - 125 mg/dL    Alkaline Phosphatase 124 (H) 45 - 120 U/L    AST 23 0 - 40 U/L    ALT 19 0 - 45 U/L    Protein Total 7.4 6.0 -  8.0 g/dL    Albumin 3.4 (L) 3.5 - 5.0 g/dL    Bilirubin Total 0.4 0.0 - 1.0 mg/dL    GFR Estimate 42 (L) >60 mL/min/1.73m2   CBC with platelets and differential   Result Value Ref Range    WBC Count 9.5 4.0 - 11.0 10e3/uL    RBC Count 3.24 (L) 4.40 - 5.90 10e6/uL    Hemoglobin 10.5 (L) 13.3 - 17.7 g/dL    Hematocrit 32.8 (L) 40.0 - 53.0 %     (H) 78 - 100 fL    MCH 32.4 26.5 - 33.0 pg    MCHC 32.0 31.5 - 36.5 g/dL    RDW 15.6 (H) 10.0 - 15.0 %    Platelet Count 324 150 - 450 10e3/uL    % Neutrophils 83 %    % Lymphocytes 3 %    % Monocytes 7 %    % Eosinophils 4 %    % Basophils 1 %    % Immature Granulocytes 2 %    NRBCs per 100 WBC 0 <1 /100    Absolute Neutrophils 8.1 1.6 - 8.3 10e3/uL    Absolute Lymphocytes 0.3 (L) 0.8 - 5.3 10e3/uL    Absolute Monocytes 0.7 0.0 - 1.3 10e3/uL    Absolute Eosinophils 0.4 0.0 - 0.7 10e3/uL    Absolute Basophils 0.1 0.0 - 0.2 10e3/uL    Absolute Immature Granulocytes 0.1 (H) <=0.0 10e3/uL    Absolute NRBCs 0.0 10e3/uL   SARS-COV2 (COVID-19) Virus RT-PCR    Specimen: Nasopharyngeal; Swab   Result Value Ref Range    SARS CoV2 PCR Negative Negative       RADIOLOGY:  Reviewed all pertinent imaging. Please see official radiology report.  Abdomen US, limited (RUQ only)   Final Result   IMPRESSION:   1.  Cholelithiasis. Findings are equivocal for acute cholecystitis. HIDA scan could be performed if clinical concern for acute cholecystitis.   2.  The pancreas is largely obscured by bowel gas.      NOTE: ABNORMAL REPORT      THE DICTATION ABOVE DESCRIBES AN ABNORMALITY FOR WHICH FOLLOW-UP IS NEEDED.       CT Abdomen Pelvis w Contrast   Final Result   IMPRESSION:    1.  Free intraperitoneal air. If the patient has not had a recent intra-abdominal procedure findings are concerning for a ruptured hollow viscus.   2.  Small bowel obstruction with caliber change at the level of the diverting ileostomy in the right lower quadrant. There is some fluid in the ostomy and it is difficult to  tell if this is contained in a loop of bowel are not differential diagnosis    would include obstructing adhesions near the ostomy site versus a nonobstructing peristomal hernia. Findings were called to Tong Naylor at the time of dictation.   3.  Distended gallbladder containing multiple stones with small amount of fluid around the gallbladder I cannot exclude cholecystitis.   4.  No change spiculated cecal mass.   5.  Right nephrostomy tube no hydronephrosis.      NOTE: ABNORMAL REPORT      THE DICTATION ABOVE DESCRIBES AN ABNORMALITY FOR WHICH FOLLOW-UP IS NEEDED.           I, Nadine Ambrose, am serving as a scribe to document services personally performed by Tong Naylor PA-C based on my observation and the provider's statements to me. I, Tong Naylor PA-C attest that Nadine Ambrose is acting in a scribe capacity, has observed my performance of the services and has documented them in accordance with my direction.    Tong Naylor PA-C  Emergency Medicine  [facility]     Tong Naylor PA-C  08/04/21 0849       Tong Naylor PA-C  08/04/21 0924

## 2021-08-04 NOTE — ED NOTES
Patient back from ultrasound.  He is says his nausea is better.  NG placed back to suction--green returns has diminished. Called and gave report to [facility]. Transport to arrive around 1115--patient and wife aware.

## 2021-08-04 NOTE — ED NOTES
Pt came to the ER complaining of abd incision pain. C/o nausea and vomiting since this morning at 0400. Recent colostomy repair on Friday. Hx of colon ca with colostomy placed last year November 2020. Pt on chemo.

## 2021-08-16 ENCOUNTER — APPOINTMENT (OUTPATIENT)
Dept: INTERVENTIONAL RADIOLOGY/VASCULAR | Facility: HOSPITAL | Age: 61
End: 2021-08-16
Attending: EMERGENCY MEDICINE
Payer: COMMERCIAL

## 2021-08-16 ENCOUNTER — HOSPITAL ENCOUNTER (EMERGENCY)
Facility: HOSPITAL | Age: 61
Discharge: HOME | End: 2021-08-16
Attending: EMERGENCY MEDICINE | Admitting: EMERGENCY MEDICINE
Payer: COMMERCIAL

## 2021-08-16 VITALS
DIASTOLIC BLOOD PRESSURE: 76 MMHG | HEIGHT: 68 IN | OXYGEN SATURATION: 100 % | HEART RATE: 84 BPM | RESPIRATION RATE: 12 BRPM | WEIGHT: 150 LBS | BODY MASS INDEX: 22.73 KG/M2 | TEMPERATURE: 97.6 F | SYSTOLIC BLOOD PRESSURE: 130 MMHG

## 2021-08-16 DIAGNOSIS — T83.098A MALFUNCTION OF NEPHROSTOMY TUBE (H): ICD-10-CM

## 2021-08-16 DIAGNOSIS — Z93.6 NEPHROSTOMY STATUS (H): Primary | ICD-10-CM

## 2021-08-16 PROCEDURE — 50435 EXCHANGE NEPHROSTOMY CATH: CPT

## 2021-08-16 PROCEDURE — C1729 CATH, DRAINAGE: HCPCS

## 2021-08-16 PROCEDURE — C1769 GUIDE WIRE: HCPCS

## 2021-08-16 PROCEDURE — 255N000002 HC RX 255 OP 636: Performed by: RADIOLOGY

## 2021-08-16 PROCEDURE — 250N000011 HC RX IP 250 OP 636

## 2021-08-16 PROCEDURE — 99285 EMERGENCY DEPT VISIT HI MDM: CPT | Mod: 25

## 2021-08-16 RX ORDER — HEPARIN SODIUM (PORCINE) LOCK FLUSH IV SOLN 100 UNIT/ML 100 UNIT/ML
500 SOLUTION INTRAVENOUS ONCE
Status: COMPLETED | OUTPATIENT
Start: 2021-08-16 | End: 2021-08-16

## 2021-08-16 RX ORDER — FENTANYL CITRATE 50 UG/ML
25-50 INJECTION, SOLUTION INTRAMUSCULAR; INTRAVENOUS EVERY 5 MIN PRN
Status: DISCONTINUED | OUTPATIENT
Start: 2021-08-16 | End: 2021-08-16 | Stop reason: HOSPADM

## 2021-08-16 RX ORDER — FLUMAZENIL 0.1 MG/ML
0.2 INJECTION, SOLUTION INTRAVENOUS
Status: DISCONTINUED | OUTPATIENT
Start: 2021-08-16 | End: 2021-08-16 | Stop reason: HOSPADM

## 2021-08-16 RX ORDER — NALOXONE HYDROCHLORIDE 0.4 MG/ML
0.2 INJECTION, SOLUTION INTRAMUSCULAR; INTRAVENOUS; SUBCUTANEOUS
Status: DISCONTINUED | OUTPATIENT
Start: 2021-08-16 | End: 2021-08-16 | Stop reason: HOSPADM

## 2021-08-16 RX ORDER — NALOXONE HYDROCHLORIDE 0.4 MG/ML
0.4 INJECTION, SOLUTION INTRAMUSCULAR; INTRAVENOUS; SUBCUTANEOUS
Status: DISCONTINUED | OUTPATIENT
Start: 2021-08-16 | End: 2021-08-16 | Stop reason: HOSPADM

## 2021-08-16 RX ADMIN — HEPARIN 500 UNITS: 100 SYRINGE at 16:21

## 2021-08-16 RX ADMIN — IOHEXOL 10 ML: 350 INJECTION, SOLUTION INTRAVENOUS at 16:17

## 2021-08-16 ASSESSMENT — ENCOUNTER SYMPTOMS
VOMITING: 0
DIFFICULTY URINATING: 0
BACK PAIN: 0
SHORTNESS OF BREATH: 0
DYSURIA: 0
TROUBLE SWALLOWING: 0
ABDOMINAL PAIN: 0
FEVER: 0
FLANK PAIN: 0

## 2021-08-16 ASSESSMENT — MIFFLIN-ST. JEOR: SCORE: 1464.9

## 2021-08-16 NOTE — ED TRIAGE NOTES
Pt is here as he had a right percutaneious nephrostomy tube placed last November. It stopped draining yesterday. Pt is not having any right flank pain and he is urinating normally.pt had colon surgery for camcerr on July 30th.

## 2021-08-16 NOTE — PRE-PROCEDURE
GENERAL PRE-PROCEDURE:   Procedure:  Right pnt exchange  Date/Time:  8/16/2021 3:35 PM    Written consent obtained?: Yes    Risks and benefits: Risks, benefits and alternatives were discussed    Consent given by:  Patient  Patient states understanding of procedure being performed: Yes    Patient's understanding of procedure matches consent: Yes    Procedure consent matches procedure scheduled: Yes    Expected level of sedation:  Moderate (as needed)  Appropriately NPO:  Yes  Mallampati  :  Grade 2- soft palate, base of uvula, tonsillar pillars, and portion of posterior pharyngeal wall visible  Lungs:  Lungs clear with good breath sounds bilaterally  Heart:  Normal heart sounds and rate  History & Physical reviewed:  History and physical reviewed and no updates needed  Statement of review:  I have reviewed the lab findings, diagnostic data, medications, and the plan for sedation

## 2021-08-16 NOTE — CONSULTS
Interventional Radiology - Consultation Note:  Inpatient - [facility]  8/16/2021    Reason for Consult:  Nephrostomy tube exchange- right  Requesting Provider:  Dr. Patricia Parra    HPI:  Rich Wolff is a 60 year old male with a history of prostate cancer and robotic radical prostatectomy in 2015 and poorly differentiated adenocarcinoma of hte colon.    Presented to ER today 2/2 PNT stopped draining yesterday.  No leaking around insertion site.  No significant pain.  No n/v.     Last routine exchange 4/8/2021.  Is overdue.  Did require sedation with last exchange.  Initially placed in 11/2020.    IMAGING:    LOCATION: [facility]  DATE: 4/8/2021     PROCEDURE: NEPHROSTOMY TUBE EXCHANGE     INTERVENTIONAL RADIOLOGIST: Concepción Callahan MD     INDICATION: Right nephrostomy tube secondary to obstruction. Plan for tube exchange.     CONSENT: The risks, benefits and alternatives of the procedure were discussed with the patient  in detail. All questions were answered. Informed consent was given to proceed with the procedure.     MODERATE SEDATION: Versed 3 mg IV; Fentanyl 50 mcg IV. During the time out, immediately prior to the administration of medications, the patient was reassessed for adequacy to receive conscious sedation.  Under physician supervision, Versed and fentanyl   were administered for moderate sedation. Pulse oximetry, heart rate and blood pressure were continuously monitored by an independent trained observer. The physician spent 15 minutes of face-to-face sedation time with the patient.     CONTRAST: 10 cc Omnipaque  ANTIBIOTICS: None.  ADDITIONAL MEDICATIONS: None.     FLUOROSCOPIC TIME: 0.9 minutes.  RADIATION DOSE: Air Kerma: 11 mGy.     COMPLICATIONS: No immediate complications.     UNIVERSAL PROTOCOL: The operative site was marked and any prior imaging was reviewed. Required items including blood products, implants, devices and special equipment was made  available. Patient identity was confirmed either verbally, with demographic   information, hospital assigned identification or other identification markers. A timeout was performed immediately prior to the procedure.     STERILE BARRIER TECHNIQUE: Maximum sterile barrier technique was used. Cutaneous antisepsis was performed at the operative site with application of 2% chlorhexidine and large sterile drape. Prior to the procedure, the  and assistant performed   hand hygiene and wore hat, mask, sterile gown, and sterile gloves during the entire procedure.     PROCEDURE:    A  image was obtained. A nephrostogram was performed through the previously placed 10 nephrostomy tube. Under direct fluoroscopic visualization, the previous tube was removed over a wire and exchange was made for a new 10 [place] nephrostomy. The   loop was locked within the renal pelvis, and the catheter was sutured to the skin. A post placement nephrostogram was performed.     FINDINGS:    The initial  image shows the previously placed right nephrostomy tube. At the completion of the study, the new nephrostomy loop lies within the renal pelvis and controls the urinary system well.     IMPRESSION:  1. Successful right nephrostomy exchange, as discussed above.     PLAN:  The nephrostomy should be kept to gravity drainage. Preventative maintenance changes should be performed at approximately three month intervals.        NPO Status:  Last fluid- 3 hours ago, last solid- 6 hours  Anticoagulation/Antiplatelets/Bleeding tendencies:  Xarelto- last dose this AM  Antibiotics:  None    REVIEW OF SYSTEMS:  A comprehensive 10-point review of systems was performed. All systems were reviewed and negative with exception to those reported in the HPI.     PAST MEDICAL HISTORY:  Past Medical History:   Diagnosis Date     Hyperlipidemia      Hypertension      Prostate cancer (H) 09/01/2015    T1c. Silvana's 6 (3+3).  Confined to prostate.   Multifocal bilateral comprising 2% of the gland.PSA:5.8.       PAST SURGICAL HISTORY:  Past Surgical History:   Procedure Laterality Date     BOWEL RESECTION       COLONOSCOPY W/ BIOPSIES  11/04/2020     COLOSTOMY       ESOPHAGOSCOPY, GASTROSCOPY, DUODENOSCOPY (EGD), COMBINED  11/04/2020     IR CHEST PORT PLACEMENT > 5 YRS OF AGE  11/24/2020     IR NEPHROSTOMY TUBE CHANGE RIGHT  4/8/2021     IR NEPHROSTOMY TUBE CHANGE RIGHT  4/8/2021     IR PORT PLACEMENT >5 YEARS  11/24/2020     NEPHROSTOMY       MI ESOPHAGOGASTRODUODENOSCOPY TRANSORAL DIAGNOSTIC N/A 11/4/2020    Procedure: ESOPHAGOGASTRODUODENOSCOPY (EGD);  Surgeon: Paul Masters MD;  Location: [facility];  Service: Gastroenterology     MI LAP,PROSTATECTOMY,RADICAL,W/NERVE SPARE,INCL ROBOTIC Bilateral 09/01/2015    Procedure: DAVINCI RADICAL RETROPUBIC PROSTATECTOMY BILATERAL PELVIC LYMPH NODE DISSECTION;  Surgeon: Juan C Velazco MD;  [facility];  Service: Urology     PROSTATE BIOPSY  06/08/2015    Ultrasound-guided transrectal biopsy.     SURGERY SCROTAL / TESTICULAR      for undescended testes, removed due to atrophy with vasectomy     ZZHC COLONOSCOPY W/WO BRUSH/WASH N/A 11/4/2020    Procedure: COLONOSCOPY WITH BIOPSY;  Surgeon: Paul Masters MD;  Location: [facility];  Service: Gastroenterology       ALLERGIES:  Allergies   Allergen Reactions     Emend [Fosaprepitant] Itching        MEDICATIONS:  No current facility-administered medications for this encounter.     Current Outpatient Medications   Medication     acetaminophen (TYLENOL) 500 MG tablet     acetaminophen (TYLENOL) 500 MG tablet     fentaNYL (DURAGESIC) 25 mcg/hr 72 hr patch     Ferrous Sulfate Dried 45 MG TBCR     gabapentin (NEURONTIN) 100 MG capsule     loratadine-pseudoePHEDrine (CLARITIN-D 24-HOUR)  MG 24 hr tablet     OMEPRAZOLE PO     oxybutynin (DITROPAN) 5 MG tablet     oxyCODONE (ROXICODONE) 5 MG tablet     oxyCODONE (ROXICODONE) 5  "MG tablet     prochlorperazine (COMPAZINE) 10 MG tablet     Rivaroxaban ANTICOAGULANT (XARELTO STARTER PACK ANTICOAGULANT) 15 & 20 MG TBPK     tamsulosin (FLOMAX) 0.4 MG capsule        LABS:  INR   Date Value Ref Range Status   08/04/2021 1.33 (H) 0.90 - 1.15 Final     Comment:     Effective 7/11/2021, the reference range for this assay has changed.      Hemoglobin   Date Value Ref Range Status   08/04/2021 10.5 (L) 13.3 - 17.7 g/dL Final   12/24/2020 7.7 (L) 13.3 - 17.7 g/dL Final   ]  Platelet Count   Date Value Ref Range Status   08/04/2021 324 150 - 450 10e3/uL Final   12/24/2020 424 150 - 450 10e9/L Final     Creatinine   Date Value Ref Range Status   08/04/2021 1.74 (H) 0.70 - 1.30 mg/dL Final   12/23/2020 1.33 (H) 0.66 - 1.25 mg/dL Final     Potassium   Date Value Ref Range Status   08/04/2021 3.9 3.5 - 5.0 mmol/L Final   12/23/2020 3.4 3.4 - 5.3 mmol/L Final         EXAM:  /76   Pulse 84   Temp 97.6  F (36.4  C) (Oral)   Resp 12   Ht 1.727 m (5' 8\")   Wt 68 kg (150 lb)   SpO2 100%   BMI 22.81 kg/m    General:  Stable.  In no acute distress.    Neuro:  A&O x 3. Pleasant.  Answers questions appropriately  Resp:  Lungs clear to auscultation bilaterally.  RA. Unlabored  Cardio:  S1S2 and reg, without murmur, clicks or rubs  RIGHT PNT in place.  Dressing c/d/i.  Stay suture in place.  No discharge or drainage. Scant amount of clear, yellow urine in drainage bag.     Pre-Sedation Assessment:  Mallampati Airway Classification:  II - Faucial pillars and soft palate may be seen, but uvula is masked by the base of the tongue  Previous reaction to anesthesia/sedation:  No  Sedation plan based on assessment: Moderate (conscious) sedation as needed  ASA Classification: Class 3 - SEVERE SYSTEMIC DISEASE, DEFINITE FUNCTIONAL LIMITATIONS.   Code Status: Full Code intra procedure, per discussion with patient.     ASSESSMENT:  59 yo M with complex urological history managed with RIGHT sided PNT presents today 2/2 " concern for obstruction, no drainage from tubing    - Port in place, not currently accessed, no IV access  - NPO    PLAN:    Proceed with RIGHT sided nephrostomy tube exchange with sedation as needed    - Please access port in anticipation of sedation for exchange  - Recommend routine exchanges in 3 months, ordered        Recommendations, its risks/benefits, details and alternatives were discussed with patient, all questions answered and he verbalized understanding. OK to proceed with above recommended radiology procedure.     Thank you for kindly for this consultation.     Total time spent on the date of the encounter is 30 minutes, including time spent counseling the patient, performing a medically appropriate evaluation, reviewing prior medical history, ordering medications and tests, documenting clinical information in the medical record, and communication of results.    BREANNE MIRANDA NP  Interventional Radiology  [phone]        E/M codes for reference only:  18640

## 2021-08-16 NOTE — DISCHARGE INSTRUCTIONS
Percutaneous Nephrostomy Tube (PNT) Exchange/Check Discharge Instructions:  You had your nephrostomy tube checked or exchanged today. Please refer to the below instructions following your check/exchange:    Care Instructions:  - If you received sedation for your procedure, do not drive or operate heavy machinery for the rest of the day.  - Rest today if your tube was exchanged. Avoid strenuous activity and heavy lifting for the rest of the day. Return to your normal activities as you tolerate tomorrow.  - Keep the nephrostomy tube site clean and dry.   - You may shower, but do not submerge the nephrostomy tube site in water (avoid tub baths, Jacuzzis, hot tubs and pools).  - If you have a gauze dressing, change the gauze and tape dressing as needed to keep site clean and dry. If you have a securement device, leave in place until your next exchange.  - Clean around nephrostomy tubes exit sites with soap and water, pat and apply new split gauze around the tube at the exit site.  - Urine going into the bag may be red with blood for the first few days.  - Keep the drainage bag lower than your kidney to keep urine from backing up.  - Inspect the tube often for kinks.  - Empty your drainage bag when it is approximately half way fluid. Follow below instructions for emptying bag:  - Clean hands well with soap and water.  - Place a container near the outlet valve of the drainage bag.  - To open the valve:  - If you have a [facility] leg bag: Twist the blue valve at the bottom of the bag while holding the valve over your container to empty bag. Re-twist the valve closed on the drainage bag once complete.  - If you have a [place] leg bag: Turn the lever downward while holding the valve over your container to empty the bag. Turn the valve upward to close once complete.  - Discard drainage into toilet once urine drained.    Follow-Up:  - Routine (every 2-3 months) nephrostomy tube exchange is recommended. Your Urologist may  order and schedule exchanges by calling [facility] at [phone].    Call [facility] ([phone]) if you experience the following:  - Nephrostomy tube(s) stops draining urine.  - Nephrostomy tube(s) site is leaking urine.  - Extreme pain at the nephrostomy tube site.  - Nephrostomy tube appears to be falling out or has fallen out, becomes clogged or breaks.    Seek Medical Evaluation for the following:  - Fever (greater than 101 F (38.3C)).  - Purulent (yellow/green/foul smelling) drainage from nephrostomy tube exit sites.  - Significant bleeding from nephrostomy tube site(s).  - Significant or worsening pain at nephrostomy tube exit site(s) or back.

## 2021-08-16 NOTE — ED PROVIDER NOTES
EMERGENCY DEPARTMENT ENCOUNTER      NAME: Rich Wolff  AGE: 60 year old male  YOB: 1960  MRN: [id number]  EVALUATION DATE & TIME: 2021  2:35 PM    PCP: Maycol Worrell    ED PROVIDER: Patricia Parra M.D.        Chief Complaint   Patient presents with     Other         FINAL IMPRESSION:    1. Malfunction of nephrostomy tube (H)            MEDICAL DECISION MAKIN year old male history of metastatic cancer who presents emergency department with a malfunctioning right nephrostomy tube.  Nephrostomy tube was placed due to compression of his right ureter from metastatic cancer.  This was placed back in April.  He has not had a change since then.  Yesterday it stopped working.  Patient otherwise denies any back pain, abdominal pain, fevers, or other symptoms.  Patient went to IR from the ER and nephrostomy tube was exchanged and appears to be functioning normally.  Patient was discharged to home.        ED COURSE:  2:42 PM I met with the patient to gather history and perform my exam. ED course and treatment discussed.    3:04 PM I spoke with IR.  They would be happy to manage this today.  They are aware that I had not done any labs or imaging prior to this as the patient is otherwise stable without any other complaints.    4:22 PM  Pt is at IR now for neph tube exchange.    4:34 PM  Patient is back from IR.  IR nurse is currently doing his discharge instructions.  Everything went well and patient feels great.  He has no other concerns or questions at this time and feels comfortable with discharge home.  I was present during the discussion about discharge instructions.  I was present when the patient was ready to leave the ER and was given the opportunity to answer any questions.      COVID-19 PPE worn during patient evaluation:  Mask: n95 and homemade masks   Eye Protection: goggles   Gown: none  Hair cover: yes  Face shield: yes   Patient wearing a mask: yes    At the conclusion  .She was evaluated by me and the following was determined:    -  At rest on room air, the oxygen saturation was 84-88%.  Her oxygen saturation dannielle to 91%-93% on 1 liters of oxygen via nasal cannula.   - With activity on room air (required only if the oxygen saturation at rest is >87), the oxygen saturation was 88%.  Her oxygen saturation dannielle to 93% on 1 liters of oxygen. It took 8 minutes to recover on 1 liters of oxygen.   Therefore, the oxygen prescription is for 1 liters at rest, 1 liters with activity, and 1 liters with sleep  She requires oxygen due to the diagnosis of sarcoid inflammation, old lymphoma and new lytic lesion seen 2 [facility]. Alyx Hernandez has tried and failed the following therapies room air oxygen, .   of the encounter I discussed the results of all of the tests and the disposition. Their questions were answered. The patient (and any family present) acknowledged understanding and were agreeable with the care plan.        CONSULTANTS:  AUSTIN Laidr          MEDICATIONS GIVEN IN THE EMERGENCY:  Medications   lidocaine 1 % 1-30 mL (has no administration in time range)   midazolam (VERSED) injection 0.5-2 mg (has no administration in time range)   flumazenil (ROMAZICON) injection 0.2 mg (has no administration in time range)   fentaNYL (PF) (SUBLIMAZE) injection 25-50 mcg (has no administration in time range)   naloxone (NARCAN) injection 0.2 mg (has no administration in time range)     Or   naloxone (NARCAN) injection 0.4 mg (has no administration in time range)     Or   naloxone (NARCAN) injection 0.2 mg (has no administration in time range)     Or   naloxone (NARCAN) injection 0.4 mg (has no administration in time range)   iohexol (OMNIPAQUE) 350 mg/mL solution 50 mL (10 mLs Intravenous Given 8/16/21 1617)   heparin 100 UNIT/ML injection 500 Units (500 Units Intracatheter Given 8/16/21 1621)             NEW PRESCRIPTIONS STARTED AT TODAY'S ER VISIT     Medication List      There are no discharge medications for this visit.             CONDITION:  stable        DISPOSITION:  discharge home         =================================================================  =================================================================    HPI    Patient information was obtained from: Patient    Use of Intrepreter: N/A     Rich BOWENS New is a 60 year old male with history of prostate cancer s/p prostatectomy 2015, colon cancer, who presents to the ER requesting nephrostomy tube exchange.    Patient has right sided colon cancer and had a right nephrostomy tube placed in November 2020 due to cancer compressing his ureter. He had this exchanged in April 2021 with IR and was advised for exchange every few months.  Yesterday his nephrostomy tube stopped draining. He has no pain with this. No fevers. He does produce urine with no change in output noticed. No burning with urination. He called his doctors at [place] who advised presenting to the ER, prompting his visit here today.    States that he last drained the nephrostomy bag yesterday.    REVIEW OF SYSTEMS  Review of Systems   Constitutional: Negative for fever.   HENT: Negative for trouble swallowing.    Respiratory: Negative for shortness of breath.    Cardiovascular: Negative for chest pain.   Gastrointestinal: Negative for abdominal pain and vomiting.   Genitourinary: Negative for decreased urine volume, difficulty urinating, dysuria and flank pain.        Positive for right nephrostomy tube clogged   Musculoskeletal: Negative for back pain.   All other systems reviewed and are negative.        PAST MEDICAL HISTORY:  Past Medical History:   Diagnosis Date     Hyperlipidemia      Hypertension      Prostate cancer (H) 09/01/2015    T1c. [place]'s 6 (3+3).  Confined to prostate.  Multifocal bilateral comprising 2% of the gland.PSA:5.8.         PAST SURGICAL HISTORY:  Past Surgical History:   Procedure Laterality Date     BOWEL RESECTION       COLONOSCOPY W/ BIOPSIES  11/04/2020     COLOSTOMY       ESOPHAGOSCOPY, GASTROSCOPY, DUODENOSCOPY (EGD), COMBINED  11/04/2020     IR CHEST PORT PLACEMENT > 5 YRS OF AGE  11/24/2020     IR NEPHROSTOMY TUBE CHANGE RIGHT  4/8/2021     IR NEPHROSTOMY TUBE CHANGE RIGHT  4/8/2021     IR NEPHROSTOMY TUBE CHANGE RIGHT  8/16/2021     IR PORT PLACEMENT >5 YEARS  11/24/2020     NEPHROSTOMY       ND ESOPHAGOGASTRODUODENOSCOPY TRANSORAL DIAGNOSTIC N/A 11/4/2020    Procedure: ESOPHAGOGASTRODUODENOSCOPY (EGD);  Surgeon: Paul Masters MD;  Location: [facility];  Service: Gastroenterology     ND LAP,PROSTATECTOMY,RADICAL,W/NERVE SPARE,INCL ROBOTIC Bilateral 09/01/2015    Procedure: DAVINCI RADICAL RETROPUBIC PROSTATECTOMY BILATERAL PELVIC  LYMPH NODE DISSECTION;  Surgeon: Juan C Velazco MD;  Location: [facility];  Service: Urology     PROSTATE BIOPSY  06/08/2015    Ultrasound-guided transrectal biopsy.     SURGERY SCROTAL / TESTICULAR      for undescended testes, removed due to atrophy with vasectomy     ZZHC COLONOSCOPY W/WO BRUSH/WASH N/A 11/4/2020    Procedure: COLONOSCOPY WITH BIOPSY;  Surgeon: Paul Masters MD;  Location: [facility];  Service: Gastroenterology         CURRENT MEDICATIONS:    Prior to Admission medications    Medication Sig Start Date End Date Taking? Authorizing Provider   acetaminophen (TYLENOL) 500 MG tablet Take 500-1,000 mg by mouth 3 times daily    Unknown, Entered By History   acetaminophen (TYLENOL) 500 MG tablet Take 500 mg by mouth daily as needed for mild pain     Unknown, Entered By History   fentaNYL (DURAGESIC) 25 mcg/hr 72 hr patch Place 1 patch onto the skin every 72 hours remove old patch.    Unknown, Entered By History   Ferrous Sulfate Dried 45 MG TBCR Take 45 mg by mouth daily Slow iron*    Unknown, Entered By History   gabapentin (NEURONTIN) 100 MG capsule Take 100 mg by mouth At Bedtime    Unknown, Entered By History   loratadine-pseudoePHEDrine (CLARITIN-D 24-HOUR)  MG 24 hr tablet Take 1 tablet by mouth daily    Unknown, Entered By History   OMEPRAZOLE PO Take 40 mg by mouth 2 times daily (before meals)    Unknown, Entered By History   oxybutynin (DITROPAN) 5 MG tablet Take 5 mg by mouth 3 times daily    Unknown, Entered By History   oxyCODONE (ROXICODONE) 5 MG tablet Take 5 mg by mouth 3 times daily    Unknown, Entered By History   oxyCODONE (ROXICODONE) 5 MG tablet Take 5 mg by mouth daily as needed for severe pain    Unknown, Entered By History   prochlorperazine (COMPAZINE) 10 MG tablet Take 10 mg by mouth 2 times daily    Unknown, Entered By History   Rivaroxaban ANTICOAGULANT (XARELTO STARTER PACK ANTICOAGULANT) 15 & 20 MG TBPK Future refills by PCP Dr. Lacho Chisholm N  [facility] with phone number [phone]. 12/24/20   Abdulkadir Pereyra MD   tamsulosin (FLOMAX) 0.4 MG capsule Take 0.4 mg by mouth daily    Unknown, Entered By History         ALLERGIES:  Allergies   Allergen Reactions     Emend [Fosaprepitant] Itching         FAMILY HISTORY:  Family History   Problem Relation Age of Onset     Breast Cancer Mother      Osteoporosis Mother      Valvular heart disease Father      Prostate Cancer Father 70.00     No Known Problems Sister      No Known Problems Son      No Known Problems Daughter          SOCIAL HISTORY:  Social History     Socioeconomic History     Marital status:      Spouse name: Not on file     Number of children: Not on file     Years of education: Not on file     Highest education level: Not on file   Occupational History     Not on file   Tobacco Use     Smoking status: Never Smoker     Smokeless tobacco: Never Used   Substance and Sexual Activity     Alcohol use: Not Currently     Comment: Alcoholic Drinks/day: rarely     Drug use: No     Sexual activity: Not Currently   Other Topics Concern     Not on file   Social History Narrative     Not on file     Social Determinants of Health     Financial Resource Strain:      Difficulty of Paying Living Expenses:    Food Insecurity:      Worried About Running Out of Food in the Last Year:      Ran Out of Food in the Last Year:    Transportation Needs:      Lack of Transportation (Medical):      Lack of Transportation (Non-Medical):    Physical Activity:      Days of Exercise per Week:      Minutes of Exercise per Session:    Stress:      Feeling of Stress :    Social Connections:      Frequency of Communication with Friends and Family:      Frequency of Social Gatherings with Friends and Family:      Attends Buddhist Services:      Active Member of Clubs or Organizations:      Attends Club or Organization Meetings:      Marital Status:    Intimate Partner Violence:      Fear of Current or Ex-Partner:       "Emotionally Abused:      Physically Abused:      Sexually Abused:          VITALS:  Patient Vitals for the past 24 hrs:   BP Temp Temp src Pulse Resp SpO2 Height Weight   08/16/21 1144 130/76 97.6  F (36.4  C) Oral 84 12 100 % 1.727 m (5' 8\") 68 kg (150 lb)       Wt Readings from Last 3 Encounters:   08/16/21 68 kg (150 lb)   08/04/21 68 kg (150 lb)   08/03/21 68 kg (150 lb)         PHYSICAL EXAM    Constitutional:  Well developed, Well nourished, NAD, GCS 15  HENT:  Normocephalic, Atraumatic, Bilateral external ears normal, Nose normal. Neck- Normal range of motion, No tenderness, Supple, No stridor.   Eyes:  PERRL, EOMI, Conjunctiva normal, No discharge.  Respiratory:  No respiratory distress, Speaks full sentences easily. No cough.  Cardiovascular:  Normal heart rate  GI:  No excessive obesity.  Bowel sounds normal, Soft, No tenderness, No masses, No flank tenderness. No rebound or guarding.   : Right-sided nephrostomy tube was evaluated.  The site itself looks good at the skin.  The tubing and bag is completely dry with little bit of sediment inside it.  Musculoskeletal: No cyanosis, No clubbing. Good range of motion in all major joints. No major deformities noted.   Integument:  Warm, Dry, No erythema, No rash.  No petechiae.  Neurologic:  Alert & oriented x 3, No focal deficits noted.   Psychiatric:  Affect normal, Cooperative          LAB:  All pertinent labs reviewed and interpreted.  No results found for this or any previous visit (from the past 24 hour(s)).    Lab Results   Component Value Date    ABORH A POS 01/21/2021           RADIOLOGY:  Reviewed all pertinent imaging. Please see official radiology report.    IR Nephrostomy Tube Change Right   Final Result   IMPRESSION:   Exchange of indwelling right-sided nephrostomy tube for a new 10 [place] nephrostomy tube with its proper position and patency confirmed.                     EKG:  none      PROCEDURES:  IR neph tube change (see their procedure note " for details)      I, Rabia Ingram, am serving as a scribe to document services personally performed by Dr. Patricia Parra based on my observation and the provider's statements to me. I, Dr. Patricia Parra MD attest that Rabia Ingram is acting in a scribe capacity, has observed my performance of the services and has documented them in accordance with my direction.        Patricia Parra M.D. [facility]  Emergency Medicine and Medical Toxicology  [facility] EMERGENCY DEPARTMENT  [address] [place] [id number]  [phone]  Dept: [phone]           Patricia Parra MD  08/16/21 5126

## 2021-09-04 ENCOUNTER — HEALTH MAINTENANCE LETTER (OUTPATIENT)
Age: 61
End: 2021-09-04

## 2021-10-26 ENCOUNTER — TRANSFERRED RECORDS (OUTPATIENT)
Dept: HEALTH INFORMATION MANAGEMENT | Facility: CLINIC | Age: 61
End: 2021-10-26
Payer: COMMERCIAL

## 2021-10-28 ENCOUNTER — APPOINTMENT (OUTPATIENT)
Dept: CT IMAGING | Facility: HOSPITAL | Age: 61
DRG: 872 | End: 2021-10-28
Attending: EMERGENCY MEDICINE
Payer: COMMERCIAL

## 2021-10-28 ENCOUNTER — APPOINTMENT (OUTPATIENT)
Dept: RADIOLOGY | Facility: HOSPITAL | Age: 61
DRG: 872 | End: 2021-10-28
Attending: EMERGENCY MEDICINE
Payer: COMMERCIAL

## 2021-10-28 ENCOUNTER — HOSPITAL ENCOUNTER (INPATIENT)
Facility: HOSPITAL | Age: 61
LOS: 4 days | Discharge: HOME | DRG: 872 | End: 2021-11-01
Attending: EMERGENCY MEDICINE | Admitting: INTERNAL MEDICINE
Payer: COMMERCIAL

## 2021-10-28 DIAGNOSIS — Z93.6 NEPHROSTOMY STATUS (H): ICD-10-CM

## 2021-10-28 DIAGNOSIS — N13.9 URINARY (TRACT) OBSTRUCTION: ICD-10-CM

## 2021-10-28 DIAGNOSIS — N39.0 URINARY TRACT INFECTION WITHOUT HEMATURIA, SITE UNSPECIFIED: ICD-10-CM

## 2021-10-28 DIAGNOSIS — C61 PROSTATE CANCER (H): Primary | ICD-10-CM

## 2021-10-28 DIAGNOSIS — N12 PYELONEPHRITIS: ICD-10-CM

## 2021-10-28 DIAGNOSIS — R50.81 FEBRILE NEUTROPENIA (H): ICD-10-CM

## 2021-10-28 DIAGNOSIS — D70.9 FEBRILE NEUTROPENIA (H): ICD-10-CM

## 2021-10-28 LAB
ALBUMIN SERPL-MCNC: 2.8 G/DL (ref 3.5–5)
ALBUMIN UR-MCNC: 30 MG/DL
ALP SERPL-CCNC: 130 U/L (ref 45–120)
ALT SERPL W P-5'-P-CCNC: 16 U/L (ref 0–45)
ANION GAP SERPL CALCULATED.3IONS-SCNC: 5 MMOL/L (ref 5–18)
ANION GAP SERPL CALCULATED.3IONS-SCNC: 8 MMOL/L (ref 5–18)
APPEARANCE UR: ABNORMAL
AST SERPL W P-5'-P-CCNC: 11 U/L (ref 0–40)
BACTERIA #/AREA URNS HPF: ABNORMAL /HPF
BASOPHILS # BLD AUTO: 0 10E3/UL (ref 0–0.2)
BASOPHILS NFR BLD AUTO: 0 %
BILIRUB DIRECT SERPL-MCNC: <0.1 MG/DL
BILIRUB SERPL-MCNC: 0.2 MG/DL (ref 0–1)
BILIRUB UR QL STRIP: NEGATIVE
BUN SERPL-MCNC: 45 MG/DL (ref 8–22)
BUN SERPL-MCNC: 51 MG/DL (ref 8–22)
CALCIUM SERPL-MCNC: 8 MG/DL (ref 8.5–10.5)
CALCIUM SERPL-MCNC: 8.1 MG/DL (ref 8.5–10.5)
CHLORIDE BLD-SCNC: 114 MMOL/L (ref 98–107)
CHLORIDE BLD-SCNC: 118 MMOL/L (ref 98–107)
CO2 SERPL-SCNC: 13 MMOL/L (ref 22–31)
CO2 SERPL-SCNC: 14 MMOL/L (ref 22–31)
COLOR UR AUTO: YELLOW
CREAT SERPL-MCNC: 2.02 MG/DL (ref 0.7–1.3)
CREAT SERPL-MCNC: 2.33 MG/DL (ref 0.7–1.3)
EOSINOPHIL # BLD AUTO: 0 10E3/UL (ref 0–0.7)
EOSINOPHIL NFR BLD AUTO: 1 %
ERYTHROCYTE [DISTWIDTH] IN BLOOD BY AUTOMATED COUNT: 16.8 % (ref 10–15)
FLUAV RNA SPEC QL NAA+PROBE: NEGATIVE
FLUBV RNA RESP QL NAA+PROBE: NEGATIVE
GFR SERPL CREATININE-BSD FRML MDRD: 29 ML/MIN/1.73M2
GFR SERPL CREATININE-BSD FRML MDRD: 35 ML/MIN/1.73M2
GLUCOSE BLD-MCNC: 129 MG/DL (ref 70–125)
GLUCOSE BLD-MCNC: 91 MG/DL (ref 70–125)
GLUCOSE UR STRIP-MCNC: NEGATIVE MG/DL
HCT VFR BLD AUTO: 24.1 % (ref 40–53)
HGB BLD-MCNC: 7.4 G/DL (ref 13.3–17.7)
HGB UR QL STRIP: ABNORMAL
IMM GRANULOCYTES # BLD: 0 10E3/UL
IMM GRANULOCYTES NFR BLD: 1 %
KETONES UR STRIP-MCNC: NEGATIVE MG/DL
LACTATE SERPL-SCNC: 0.4 MMOL/L (ref 0.7–2)
LACTATE SERPL-SCNC: 1.7 MMOL/L (ref 0.7–2)
LEUKOCYTE ESTERASE UR QL STRIP: ABNORMAL
LYMPHOCYTES # BLD AUTO: 0.1 10E3/UL (ref 0.8–5.3)
LYMPHOCYTES NFR BLD AUTO: 7 %
MCH RBC QN AUTO: 30.5 PG (ref 26.5–33)
MCHC RBC AUTO-ENTMCNC: 30.7 G/DL (ref 31.5–36.5)
MCV RBC AUTO: 99 FL (ref 78–100)
MONOCYTES # BLD AUTO: 0.1 10E3/UL (ref 0–1.3)
MONOCYTES NFR BLD AUTO: 6 %
NEUTROPHILS # BLD AUTO: 1.6 10E3/UL (ref 1.6–8.3)
NEUTROPHILS NFR BLD AUTO: 85 %
NITRATE UR QL: POSITIVE
NRBC # BLD AUTO: 0 10E3/UL
NRBC BLD AUTO-RTO: 0 /100
PH UR STRIP: 5.5 [PH] (ref 5–7)
PLATELET # BLD AUTO: 173 10E3/UL (ref 150–450)
POTASSIUM BLD-SCNC: 3.5 MMOL/L (ref 3.5–5)
POTASSIUM BLD-SCNC: 4 MMOL/L (ref 3.5–5)
PROT SERPL-MCNC: 6 G/DL (ref 6–8)
RBC # BLD AUTO: 2.43 10E6/UL (ref 4.4–5.9)
RBC URINE: 4 /HPF
SARS-COV-2 RNA RESP QL NAA+PROBE: NEGATIVE
SODIUM SERPL-SCNC: 135 MMOL/L (ref 136–145)
SODIUM SERPL-SCNC: 137 MMOL/L (ref 136–145)
SP GR UR STRIP: 1.02 (ref 1–1.03)
UROBILINOGEN UR STRIP-MCNC: <2 MG/DL
WBC # BLD AUTO: 1.9 10E3/UL (ref 4–11)
WBC CLUMPS #/AREA URNS HPF: PRESENT /HPF
WBC URINE: >182 /HPF

## 2021-10-28 PROCEDURE — 96367 TX/PROPH/DG ADDL SEQ IV INF: CPT

## 2021-10-28 PROCEDURE — 82248 BILIRUBIN DIRECT: CPT | Performed by: EMERGENCY MEDICINE

## 2021-10-28 PROCEDURE — 85025 COMPLETE CBC W/AUTO DIFF WBC: CPT | Performed by: EMERGENCY MEDICINE

## 2021-10-28 PROCEDURE — 83605 ASSAY OF LACTIC ACID: CPT | Performed by: EMERGENCY MEDICINE

## 2021-10-28 PROCEDURE — 87636 SARSCOV2 & INF A&B AMP PRB: CPT | Performed by: EMERGENCY MEDICINE

## 2021-10-28 PROCEDURE — 258N000003 HC RX IP 258 OP 636: Performed by: INTERNAL MEDICINE

## 2021-10-28 PROCEDURE — 74177 CT ABD & PELVIS W/CONTRAST: CPT

## 2021-10-28 PROCEDURE — 99285 EMERGENCY DEPT VISIT HI MDM: CPT | Mod: 25

## 2021-10-28 PROCEDURE — 80048 BASIC METABOLIC PNL TOTAL CA: CPT | Performed by: INTERNAL MEDICINE

## 2021-10-28 PROCEDURE — 81003 URINALYSIS AUTO W/O SCOPE: CPT | Performed by: EMERGENCY MEDICINE

## 2021-10-28 PROCEDURE — 71045 X-RAY EXAM CHEST 1 VIEW: CPT

## 2021-10-28 PROCEDURE — 36415 COLL VENOUS BLD VENIPUNCTURE: CPT | Performed by: EMERGENCY MEDICINE

## 2021-10-28 PROCEDURE — 250N000011 HC RX IP 250 OP 636: Performed by: EMERGENCY MEDICINE

## 2021-10-28 PROCEDURE — 87086 URINE CULTURE/COLONY COUNT: CPT | Performed by: EMERGENCY MEDICINE

## 2021-10-28 PROCEDURE — 99223 1ST HOSP IP/OBS HIGH 75: CPT | Performed by: INTERNAL MEDICINE

## 2021-10-28 PROCEDURE — 258N000003 HC RX IP 258 OP 636: Performed by: EMERGENCY MEDICINE

## 2021-10-28 PROCEDURE — 87040 BLOOD CULTURE FOR BACTERIA: CPT | Performed by: EMERGENCY MEDICINE

## 2021-10-28 PROCEDURE — 96361 HYDRATE IV INFUSION ADD-ON: CPT

## 2021-10-28 PROCEDURE — 93005 ELECTROCARDIOGRAM TRACING: CPT | Performed by: EMERGENCY MEDICINE

## 2021-10-28 PROCEDURE — C9803 HOPD COVID-19 SPEC COLLECT: HCPCS

## 2021-10-28 PROCEDURE — 96365 THER/PROPH/DIAG IV INF INIT: CPT

## 2021-10-28 PROCEDURE — 36415 COLL VENOUS BLD VENIPUNCTURE: CPT | Performed by: INTERNAL MEDICINE

## 2021-10-28 PROCEDURE — 250N000013 HC RX MED GY IP 250 OP 250 PS 637: Performed by: EMERGENCY MEDICINE

## 2021-10-28 PROCEDURE — 120N000001 HC R&B MED SURG/OB

## 2021-10-28 RX ORDER — SODIUM CHLORIDE, SODIUM LACTATE, POTASSIUM CHLORIDE, CALCIUM CHLORIDE 600; 310; 30; 20 MG/100ML; MG/100ML; MG/100ML; MG/100ML
INJECTION, SOLUTION INTRAVENOUS CONTINUOUS
Status: DISCONTINUED | OUTPATIENT
Start: 2021-10-28 | End: 2021-10-29

## 2021-10-28 RX ORDER — HEPARIN SODIUM (PORCINE) LOCK FLUSH IV SOLN 100 UNIT/ML 100 UNIT/ML
5-10 SOLUTION INTRAVENOUS
Status: DISCONTINUED | OUTPATIENT
Start: 2021-10-28 | End: 2021-11-01 | Stop reason: HOSPADM

## 2021-10-28 RX ORDER — SODIUM CHLORIDE 9 MG/ML
INJECTION, SOLUTION INTRAVENOUS CONTINUOUS
Status: DISCONTINUED | OUTPATIENT
Start: 2021-10-28 | End: 2021-10-29

## 2021-10-28 RX ORDER — RIVAROXABAN 20 MG/1
20 TABLET, FILM COATED ORAL DAILY
COMMUNITY
Start: 2021-08-26

## 2021-10-28 RX ORDER — HEPARIN SODIUM,PORCINE 10 UNIT/ML
5-10 VIAL (ML) INTRAVENOUS
Status: DISCONTINUED | OUTPATIENT
Start: 2021-10-28 | End: 2021-10-28

## 2021-10-28 RX ORDER — LEVOFLOXACIN 5 MG/ML
750 INJECTION, SOLUTION INTRAVENOUS ONCE
Status: COMPLETED | OUTPATIENT
Start: 2021-10-28 | End: 2021-10-28

## 2021-10-28 RX ORDER — HEPARIN SODIUM,PORCINE 10 UNIT/ML
5-10 VIAL (ML) INTRAVENOUS EVERY 24 HOURS
Status: DISCONTINUED | OUTPATIENT
Start: 2021-10-28 | End: 2021-11-01 | Stop reason: HOSPADM

## 2021-10-28 RX ORDER — ONDANSETRON 4 MG/1
4 TABLET, ORALLY DISINTEGRATING ORAL EVERY 6 HOURS PRN
Status: DISCONTINUED | OUTPATIENT
Start: 2021-10-28 | End: 2021-11-01 | Stop reason: HOSPADM

## 2021-10-28 RX ORDER — ONDANSETRON 2 MG/ML
4 INJECTION INTRAMUSCULAR; INTRAVENOUS EVERY 6 HOURS PRN
Status: DISCONTINUED | OUTPATIENT
Start: 2021-10-28 | End: 2021-11-01 | Stop reason: HOSPADM

## 2021-10-28 RX ORDER — LIDOCAINE 40 MG/G
CREAM TOPICAL
Status: DISCONTINUED | OUTPATIENT
Start: 2021-10-28 | End: 2021-11-01 | Stop reason: HOSPADM

## 2021-10-28 RX ORDER — ACETAMINOPHEN 650 MG/1
650 SUPPOSITORY RECTAL EVERY 6 HOURS PRN
Status: DISCONTINUED | OUTPATIENT
Start: 2021-10-28 | End: 2021-11-01 | Stop reason: HOSPADM

## 2021-10-28 RX ORDER — OXYCODONE HYDROCHLORIDE 5 MG/1
5 TABLET ORAL EVERY 4 HOURS PRN
Status: DISCONTINUED | OUTPATIENT
Start: 2021-10-28 | End: 2021-11-01 | Stop reason: HOSPADM

## 2021-10-28 RX ORDER — FERROUS SULFATE 325(65) MG
325 TABLET ORAL 3 TIMES DAILY
COMMUNITY
Start: 2021-04-22

## 2021-10-28 RX ORDER — IMIPENEM AND CILASTATIN 500; 500 MG/1; MG/1
500 INJECTION, POWDER, FOR SOLUTION INTRAVENOUS EVERY 6 HOURS
Status: DISCONTINUED | OUTPATIENT
Start: 2021-10-28 | End: 2021-10-28

## 2021-10-28 RX ORDER — POLYETHYLENE GLYCOL 3350 17 G/17G
17 POWDER, FOR SOLUTION ORAL DAILY PRN
Status: DISCONTINUED | OUTPATIENT
Start: 2021-10-28 | End: 2021-11-01 | Stop reason: HOSPADM

## 2021-10-28 RX ORDER — CEFAZOLIN SODIUM 1 G/50ML
1250 SOLUTION INTRAVENOUS ONCE
Status: COMPLETED | OUTPATIENT
Start: 2021-10-28 | End: 2021-10-28

## 2021-10-28 RX ORDER — ZOLPIDEM TARTRATE 5 MG/1
5 TABLET ORAL AT BEDTIME
COMMUNITY
Start: 2021-10-26

## 2021-10-28 RX ORDER — IOPAMIDOL 755 MG/ML
75 INJECTION, SOLUTION INTRAVASCULAR ONCE
Status: COMPLETED | OUTPATIENT
Start: 2021-10-28 | End: 2021-10-28

## 2021-10-28 RX ORDER — LIDOCAINE 40 MG/G
CREAM TOPICAL
Status: DISCONTINUED | OUTPATIENT
Start: 2021-10-28 | End: 2021-10-28

## 2021-10-28 RX ORDER — ACETAMINOPHEN 325 MG/1
650 TABLET ORAL EVERY 6 HOURS PRN
Status: DISCONTINUED | OUTPATIENT
Start: 2021-10-28 | End: 2021-11-01 | Stop reason: HOSPADM

## 2021-10-28 RX ORDER — ACETAMINOPHEN 325 MG/1
650 TABLET ORAL ONCE
Status: COMPLETED | OUTPATIENT
Start: 2021-10-28 | End: 2021-10-28

## 2021-10-28 RX ORDER — GABAPENTIN 300 MG/1
300 CAPSULE ORAL 2 TIMES DAILY
COMMUNITY
Start: 2021-10-26

## 2021-10-28 RX ORDER — PIPERACILLIN SODIUM, TAZOBACTAM SODIUM 3; .375 G/15ML; G/15ML
3.38 INJECTION, POWDER, LYOPHILIZED, FOR SOLUTION INTRAVENOUS ONCE
Status: DISCONTINUED | OUTPATIENT
Start: 2021-10-28 | End: 2021-10-28

## 2021-10-28 RX ADMIN — ACETAMINOPHEN 650 MG: 325 TABLET ORAL at 23:09

## 2021-10-28 RX ADMIN — SODIUM CHLORIDE 1000 ML: 9 INJECTION, SOLUTION INTRAVENOUS at 18:36

## 2021-10-28 RX ADMIN — SODIUM CHLORIDE: 9 INJECTION, SOLUTION INTRAVENOUS at 23:00

## 2021-10-28 RX ADMIN — SODIUM CHLORIDE 1000 ML: 9 INJECTION, SOLUTION INTRAVENOUS at 19:23

## 2021-10-28 RX ADMIN — VANCOMYCIN HYDROCHLORIDE 1250 MG: 5 INJECTION, POWDER, LYOPHILIZED, FOR SOLUTION INTRAVENOUS at 19:37

## 2021-10-28 RX ADMIN — CEFEPIME HYDROCHLORIDE 2 G: 2 INJECTION, POWDER, FOR SOLUTION INTRAVENOUS at 19:35

## 2021-10-28 RX ADMIN — SODIUM CHLORIDE, POTASSIUM CHLORIDE, SODIUM LACTATE AND CALCIUM CHLORIDE: 600; 310; 30; 20 INJECTION, SOLUTION INTRAVENOUS at 23:35

## 2021-10-28 RX ADMIN — IOPAMIDOL 75 ML: 755 INJECTION, SOLUTION INTRAVENOUS at 19:07

## 2021-10-28 RX ADMIN — SODIUM CHLORIDE 1000 ML: 9 INJECTION, SOLUTION INTRAVENOUS at 17:40

## 2021-10-28 RX ADMIN — LEVOFLOXACIN 750 MG: 5 INJECTION, SOLUTION INTRAVENOUS at 20:53

## 2021-10-28 ASSESSMENT — ACTIVITIES OF DAILY LIVING (ADL)
ADLS_ACUITY_SCORE: 8
ADLS_ACUITY_SCORE: 8

## 2021-10-28 ASSESSMENT — MIFFLIN-ST. JEOR: SCORE: 1510.26

## 2021-10-28 NOTE — PHARMACY-VANCOMYCIN DOSING SERVICE
Pharmacy Vancomycin Initial Note  Date of Service 2021  Patient's  1960  60 year old, male    Indication: Febrile Neutropenia    Current estimated CrCl = Estimated Creatinine Clearance: 34.6 mL/min (A) (based on SCr of 2.33 mg/dL (H)).    Creatinine for last 3 days  10/28/2021:  5:28 PM Creatinine 2.33 mg/dL    Recent Vancomycin Level(s) for last 3 days  No results found for requested labs within last 72 hours.      Vancomycin IV Administrations (past 72 hours)      No vancomycin orders with administrations in past 72 hours.                Nephrotoxins and other renal medications (From now, onward)    Start     Dose/Rate Route Frequency Ordered Stop    10/28/21 1900  piperacillin-tazobactam (ZOSYN) 3.375 g vial to attach to  mL bag      3.375 g  over 30 Minutes Intravenous ONCE 10/28/21 1831      10/28/21 1900  vancomycin (VANCOCIN) 1,250 mg in sodium chloride 0.9 % 250 mL intermittent infusion      1,250 mg  over 90 Minutes Intravenous ONCE 10/28/21 1843            Contrast Orders - past 72 hours (72h ago, onward)    None            Plan:  1. Start vancomycin  1250 mg IV once in ED.   2. Vancomycin monitoring method: AUC if further vancomycin dosing is needed  3. Vancomycin therapeutic monitoring goal: 400-600 mg*h/L      Dora Mejia [facility]

## 2021-10-28 NOTE — ED PROVIDER NOTES
60 year old male presenting with a fever after having chemotherapy today for colon cancer. He was feeling fine before the chemotherapy 48 hour treatment. He has been vaccinated for COVID and has received his flu shot. Temperature is 101.2 in triage, was as high as 104 today. Took 2 Tylenol around 1600. Also reports associated chills. No chest pain, abdominal pain.    Orders in triage: Blood work, urine, COVID, influenza, EKG, Chest XR    Peggy Nelson MD  10/28/2021     Peggy Nelson MD  10/28/21 5884

## 2021-10-28 NOTE — ED PROVIDER NOTES
"EMERGENCY DEPARTMENT NOTE     Name: Rich Wolff    Age/Sex: 60 year old male   MRN: [id number]   Evaluation Date & Time:  10/28/2021  5:04 PM    PCP:    Maycol Worrell   ED Provider: Dominic Moody D.O.       CHIEF COMPLAINT    Fever after Chemo       DIAGNOSIS & DISPOSITION     1. Urinary tract infection without hematuria, site unspecified    2. Febrile neutropenia (H)      DISPOSITION:     At the conclusion of the encounter I discussed the results of all of the tests and the disposition. The questions were answered. The patient or family acknowledged understanding and was agreeable with the care plan.    TOTAL CRITICAL CARE TIME (EXCLUDING PROCEDURES): Not applicable    PROCEDURES:   None    EMERGENCY DEPARTMENT COURSE/MEDICAL DECISION MAKING   6:09 PM I met with the patient to gather history and to perform my initial exam.  We discussed treatment options and the plan for care while in the Emergency Department.  6:42 PM Spoke with Dr. Gonzalez, hospitalist regarding patient admission.   6:45 PM Spoke with Dr. Spaulding, intensivist regarding patient admission.   6:47 PM Rechecked and updated the patient.   8:45 PM Spoke with Dr. Gonzalez.     Rich Wolff male with a relevant past history of hyperlipidemia, hypertension, prostate cancer, PE, CHAYO, ARF, colon cancer, pyelonephritis, CKD3, s/p nephrostomy, and sepsis, who presents to this ED via private car for evaluation of fever after chemotherapy.  Triage note reviewed:Pt finished 48 hour chemo treatment. Nothing new about his treatment. Pt running fever was 104. Pt took 2 tylenol at 1600. No complaints other than fever. Pt is vaccinated. Colon cancer.   Vital signs:/62   Pulse 100   Temp (!) 102.1  F (38.9  C) (Oral)   Resp 22   Ht 1.727 m (5' 8\")   Wt 72.6 kg (160 lb)   SpO2 98%   BMI 24.33 kg/m    Pertinent physical exam findings:  General alert, normal mental status  Cardiac: Regular rate and rhythm S1-S2 without murmur " rub  Pulmonary: Lungs are clear to ascultation bilaterally with good breath sounds  Abdomen: Soft nontender, positive bowel sounds.  No organomegaly or mass.  Patient has a colostomy draining normally right lower quadrant and right nephrostomy tube currently draining  Diagnostic studies:  Imaging:  CT abdomen and pelvis with IV contrast: Cholelithiasis without CT evidence of cholecystitis. The right nephrostomy tube has been retracted. There is decreased/delayed enhancement of the right kidney with inflammatory change at the right renal pelvis. Underlying infection is possible.  Portable chest x-ray: A right chest wall Port-A-Cath is present. No pleural fluid or pneumothorax. No airspace disease. Normal size of the heart.  Lab: EKG: Normal sinus rhythm with nonspecific ST-T wave changes.  Creatinine 2.3 with GFR 29, potassium 3.5, CO2 13 LFTs normal, WBC 1.9 absolute neutrophil count 1.6, lactic acid 1.7  Interventions: IV fluids, cefepime, vancomycin, Levaquin  Medical decision making: Patient initially borderline hypotensive is responded of fluids.  Patient is remained hemodynamically stable without progressive signs of sepsis.  Multiple sources for fever considered but probable sources urine with right nephrostomy tube.  No acute obstruction but patient and wife report some discharge from the site and will need exchange.  Case was discussed with the hospitalist service.  Current findings and plan for admission discussed with the patient he is in agreement.  No current bed availability at [facility] and patient will be boarded in the emergency department overnight.  Case was signed out at change of shift to Dr. Adams.    ED INTERVENTIONS     Medications   heparin lock flush 10 UNIT/ML injection 5-10 mL (5 mLs Intracatheter Not Given 10/28/21 2018)   heparin 100 UNIT/ML injection 5-10 mL ( Intracatheter Canceled Entry 10/28/21 2019)   0.9% sodium chloride BOLUS (0 mLs Intravenous Stopped 10/28/21 2038)      Followed by   sodium chloride 0.9% infusion (0 mLs Intravenous Stopped 10/28/21 2329)   0.9% sodium chloride BOLUS (0 mLs Intravenous Stopped 10/28/21 2023)     Followed by   sodium chloride 0.9% infusion ( Intravenous Not Given 10/28/21 2242)   ceFEPIme (MAXIPIME) 2 g vial to attach to  ml bag for ADULTS or 50 ml bag for PEDS (has no administration in time range)   lidocaine 1 % 0.1-1 mL (has no administration in time range)   lidocaine (LMX4) cream (has no administration in time range)   sodium chloride (PF) 0.9% PF flush 3 mL (3 mLs Intracatheter Not Given 10/28/21 2335)   sodium chloride (PF) 0.9% PF flush 3 mL (has no administration in time range)   acetaminophen (TYLENOL) tablet 650 mg (has no administration in time range)     Or   acetaminophen (TYLENOL) Suppository 650 mg (has no administration in time range)   melatonin tablet 1 mg (has no administration in time range)   polyethylene glycol (MIRALAX) Packet 17 g (has no administration in time range)   ondansetron (ZOFRAN-ODT) ODT tab 4 mg (has no administration in time range)     Or   ondansetron (ZOFRAN) injection 4 mg (has no administration in time range)   lactated ringers infusion ( Intravenous New Bag 10/28/21 2335)   oxyCODONE (ROXICODONE) tablet 5 mg (has no administration in time range)   0.9% sodium chloride BOLUS (0 mLs Intravenous Stopped 10/28/21 1845)   vancomycin (VANCOCIN) 1,250 mg in sodium chloride 0.9 % 250 mL intermittent infusion (0 mg Intravenous Stopped 10/28/21 2107)   ceFEPIme (MAXIPIME) 2 g vial to attach to  ml bag for ADULTS or 50 ml bag for PEDS (0 g Intravenous Stopped 10/28/21 2005)   iopamidol (ISOVUE-370) solution 75 mL (75 mLs Intravenous Given 10/28/21 1907)   levofloxacin (LEVAQUIN) infusion 750 mg (0 mg Intravenous Stopped 10/28/21 2223)   acetaminophen (TYLENOL) tablet 650 mg (650 mg Oral Given 10/28/21 2309)       DISCHARGE MEDICATIONS        Review of your medicines      UNREVIEWED medicines. Ask your  doctor about these medicines      Dose / Directions   acetaminophen 500 MG tablet  Commonly known as: TYLENOL  Ask about: Which instructions should I use?      Dose: 500-1,000 mg  Take 500-1,000 mg by mouth every 6 hours as needed for mild pain or fever  Refills: 0     fentaNYL 25 mcg/hr 72 hr patch  Commonly known as: DURAGESIC      Dose: 1 patch  Place 1 patch onto the skin every 72 hours remove old patch.  Refills: 0     ferrous sulfate 325 (65 Fe) MG tablet  Commonly known as: FEROSUL      Dose: 325 mg  Take 325 mg by mouth 2 times daily  Refills: 0     gabapentin 300 MG capsule  Commonly known as: NEURONTIN  Ask about: Which instructions should I use?      Dose: 300 mg  Take 300 mg by mouth 2 times daily  Refills: 0     omeprazole 20 MG DR capsule  Commonly known as: priLOSEC  Ask about: Which instructions should I use?      Dose: 20 mg  Take 20 mg by mouth daily as needed  Refills: 0     oxybutynin 5 MG tablet  Commonly known as: DITROPAN      Dose: 5 mg  Take 5 mg by mouth 3 times daily  Refills: 0     oxyCODONE 5 MG tablet  Commonly known as: ROXICODONE  Ask about: Which instructions should I use?      Dose: 5 mg  Take 5 mg by mouth every 4 hours as needed for severe pain  Refills: 0     sertraline 50 MG tablet  Commonly known as: ZOLOFT      Dose: 50 mg  Take 50 mg by mouth At Bedtime  Refills: 0     tamsulosin 0.4 MG capsule  Commonly known as: FLOMAX      Dose: 0.4 mg  Take 0.4 mg by mouth daily  Refills: 0     XARELTO ANTICOAGULANT 20 MG Tabs tablet  Generic drug: rivaroxaban ANTICOAGULANT  Ask about: Which instructions should I use?      Dose: 20 mg  Take 20 mg by mouth daily  Refills: 0     zolpidem 5 MG tablet  Commonly known as: AMBIEN      Dose: 5 mg  Take 5 mg by mouth At Bedtime  Refills: 0              INFORMATION SOURCE AND LIMITATIONS    History/Exam limitations: none  Patient information was obtained from: patient   Use of : N/A    HISTORY OF PRESENT ILLNESS   Rich Wolff  male with a relevant past history of hyperlipidemia, hypertension, prostate cancer, PE, CHAYO, ARF, colon cancer, pyelonephritis, CKD3, s/p nephrostomy, and sepsis, who presents to this ED via private car for evaluation of fever after chemotherapy.    Earlier today, patient had chemotherapy for his colon cancer. Following chemotherapy, patient developed a fever. Per triage note, patient took 2 tylenol at 1600 (~2 hours ago). He is also currently hypotensive, which he states is not normal for him. Patient also has a nephrostomy on his right side, placed around November 2020. Patient denies neck pain, chest pain, shortness of breath, abdominal pain, vomiting, hematuria, dysuria, URI symptoms, sore throat, headaches, or any other additional symptoms at this time.       REVIEW OF SYSTEMS:   Constitutional: Positive for fever.    HENT: Negative for URI symptoms or sore throat.    Cardiac: Negative for  chest pain, palpitations, near syncope or syncope  Respiratory: Negative for cough and shortness of breath.    Gastrointestinal: Negative for abdominal pain, nausea, vomiting, constipation, diarrhea, rectal bleeding or melena.  Genitourinary: Negative for dysuria, flank pain and hematuria.   Musculoskeletal: Negative for back pain or neck pain.  Skin: Negative for rash  Neurological: Negative for dizziness, headache, syncope, speech difficulty, unilateral weakness or imbalance with walking.   Hematological: Negative for adenopathy. Does not bruise/bleed easily.   Psychiatric/Behavioral: Negative for confusion.       PATIENT HISTORY     Past Medical History:   Diagnosis Date     Hyperlipidemia      Hypertension      Prostate cancer (H) 09/01/2015     Patient Active Problem List   Diagnosis     Pulmonary embolism (H)     Acute cystitis without hematuria     Gastrointestinal hemorrhage     Acute kidney injury (H)     Acute renal failure (H)     Anemia due to blood loss, acute     Anxiety     Cancer of abdomen (H)     Carcinoma  of prostate (H)     Cecum mass     Chronic pain     Colostomy in place (H)     Nephrostomy status (H)     Colostomy status (H)     Erectile dysfunction following radical prostatectomy     History of malignant neoplasm of colon     History of malignant neoplasm of prostate     History of pulmonary embolism     Hyperlipidemia     Iron deficiency anemia     Malignant neoplasm metastatic to liver (H)     Malignant neoplasm of cecum (H)     Malignant neoplasm of colon (H)     Postoperative care for cataract     Prostate cancer (H)     Pyelonephritis     Sepsis, due to unspecified organism, unspecified whether acute organ dysfunction present (H)     Stage 3 chronic kidney disease (H)     Urinary tract infection without hematuria, site unspecified     Febrile neutropenia (H)     Past Surgical History:   Procedure Laterality Date     BOWEL RESECTION       COLONOSCOPY W/ BIOPSIES  11/04/2020     COLOSTOMY       ESOPHAGOSCOPY, GASTROSCOPY, DUODENOSCOPY (EGD), COMBINED  11/04/2020     IR CHEST PORT PLACEMENT > 5 YRS OF AGE  11/24/2020     IR NEPHROSTOMY TUBE CHANGE RIGHT  4/8/2021     IR NEPHROSTOMY TUBE CHANGE RIGHT  4/8/2021     IR NEPHROSTOMY TUBE CHANGE RIGHT  8/16/2021     IR PORT PLACEMENT >5 YEARS  11/24/2020     NEPHROSTOMY       HI ESOPHAGOGASTRODUODENOSCOPY TRANSORAL DIAGNOSTIC N/A 11/4/2020    Procedure: ESOPHAGOGASTRODUODENOSCOPY (EGD);  Surgeon: Paul Masters MD;  Location: [facility];  Service: Gastroenterology     HI LAP,PROSTATECTOMY,RADICAL,W/NERVE SPARE,INCL ROBOTIC Bilateral 09/01/2015    Procedure: DAVINCI RADICAL RETROPUBIC PROSTATECTOMY BILATERAL PELVIC LYMPH NODE DISSECTION;  Surgeon: Juan C Velazco MD;  Location: [facility];  Service: Urology     PROSTATE BIOPSY  06/08/2015    Ultrasound-guided transrectal biopsy.     SURGERY SCROTAL / TESTICULAR      for undescended testes, removed due to atrophy with vasectomy     ZZHC COLONOSCOPY W/WO BRUSH/WASH N/A 11/4/2020    Procedure:  COLONOSCOPY WITH BIOPSY;  Surgeon: Paul Masters MD;  Location: [facility];  Service: Gastroenterology     Social Histrory  Smoking:  Alcohol Use:  No Known Allergies      OUTPATIENT MEDICATIONS     New Prescriptions    No medications on file      Vitals:    10/28/21 2200 10/28/21 2230 10/28/21 2300 10/28/21 2330   BP: 130/69 122/65 121/68 128/62   Pulse: 92 99 105 100   Resp: 21  (!) 73 22   Temp:   (!) 102.1  F (38.9  C)    TempSrc:   Oral    SpO2: 100% 100% 100% 98%   Weight:       Height:           Physical Exam   Constitutional: Oriented to person, place, and time. Appears well-developed and well-nourished.   HEENT:    Head: Atraumatic.   Neck: Normal range of motion. Neck supple.   Cardiovascular: Normal rate, regular rhythm and normal heart sounds.    Pulmonary/Chest: Normal effort  and breath sounds normal. Ganesh catheter in right upper chest.   Abdominal: Soft. Bowel sounds are normal. Colostomy in RLQ. Right nephrostomy tube.  Musculoskeletal: Normal range of motion.   Neurological: Alert and oriented to person, place, and time. Normal strength.No sensory deficit. No cranial nerve deficit . Skin: Skin is warm and dry.   Psychiatric: Normal mood and affect. Behavior is normal. Thought content normal.       DIAGNOSTICS    LABORATORY FINDINGS (REVIEWED AND INTERPRETED):  Labs Ordered and Resulted from Time of ED Arrival to Time of ED Departure   BASIC METABOLIC PANEL - Abnormal       Result Value    Sodium 135 (*)     Potassium 3.5      Chloride 114 (*)     Carbon Dioxide (CO2) 13 (*)     Anion Gap 8      Urea Nitrogen 51 (*)     Creatinine 2.33 (*)     Calcium 8.0 (*)     Glucose 129 (*)     GFR Estimate 29 (*)    HEPATIC FUNCTION PANEL - Abnormal    Bilirubin Total 0.2      Bilirubin Direct <0.1      Protein Total 6.0      Albumin 2.8 (*)     Alkaline Phosphatase 130 (*)     AST 11      ALT 16     ROUTINE UA WITH MICROSCOPIC REFLEX TO CULTURE - Abnormal    Color Urine Yellow      Appearance  Urine Cloudy (*)     Glucose Urine Negative      Bilirubin Urine Negative      Ketones Urine Negative      Specific Gravity Urine 1.016      Blood Urine 0.2 mg/dL (*)     pH Urine 5.5      Protein Albumin Urine 30  (*)     Urobilinogen Urine <2.0      Nitrite Urine Positive (*)     Leukocyte Esterase Urine 500 Marlon/uL (*)     Bacteria Urine Moderate (*)     WBC Clumps Urine Present (*)     RBC Urine 4 (*)     WBC Urine >182 (*)    CBC WITH PLATELETS AND DIFFERENTIAL - Abnormal    WBC Count 1.9 (*)     RBC Count 2.43 (*)     Hemoglobin 7.4 (*)     Hematocrit 24.1 (*)     MCV 99      MCH 30.5      MCHC 30.7 (*)     RDW 16.8 (*)     Platelet Count 173      % Neutrophils 85      % Lymphocytes 7      % Monocytes 6      % Eosinophils 1      % Basophils 0      % Immature Granulocytes 1      NRBCs per 100 WBC 0      Absolute Neutrophils 1.6      Absolute Lymphocytes 0.1 (*)     Absolute Monocytes 0.1      Absolute Eosinophils 0.0      Absolute Basophils 0.0      Absolute Immature Granulocytes 0.0      Absolute NRBCs 0.0     LACTIC ACID WHOLE BLOOD - Abnormal    Lactic Acid 0.4 (*)    BASIC METABOLIC PANEL - Abnormal    Sodium 137      Potassium 4.0      Chloride 118 (*)     Carbon Dioxide (CO2) 14 (*)     Anion Gap 5      Urea Nitrogen 45 (*)     Creatinine 2.02 (*)     Calcium 8.1 (*)     Glucose 91      GFR Estimate 35 (*)    LACTIC ACID WHOLE BLOOD - Normal    Lactic Acid 1.7     INFLUENZA A/B & SARS-COV2 PCR MULTIPLEX - Normal    Influenza A target Negative      Influenza B target Negative      SARS CoV2 PCR Negative     BASIC METABOLIC PANEL   HEPATIC FUNCTION PANEL   BLOOD CULTURE   BLOOD CULTURE   URINE CULTURE         IMAGING (REVIEWED AND INTERPRETED):  CT Abdomen Pelvis w Contrast   Final Result   IMPRESSION:    1.  The right nephrostomy tube has been retracted. There is decreased/delayed enhancement of the right kidney with inflammatory change at the right renal pelvis. Underlying infection is possible. Consider  replacing the nephrostomy tube.   2.  Right lower quadrant tethering/mass, without significant change from the prior study.       XR Chest Port 1 View   Final Result   IMPRESSION: A right chest wall Port-A-Cath is present. No pleural fluid or pneumothorax. No airspace disease. Normal size of the heart.       IR Consultation for IR Exam    (Results Pending)           ECG (REVIEWED AND INTERPRETED):   ECG:   Performed at: 17:44  HR:  82 bpm  Rhythm: Sinus rhythm  Axis: 88  QRS duration: 88  QTC: 401  ST changes: No T wave inversion, No Q wave  Interpretation: Sinus rhythm with nonspecific ST abnormality  Compared to most recent ECG from: 12/21/2020, sinus tachycardia replaced with sinus rhythm    I have reviewed the patient's ECG, with comments made as listed above. Please see scanned image for full interpretation.         I, Navjot Chu, am serving as a scribe to document services personally performed by Dominic Moody D.O., based on my observation and the provider s statements to me.    I, Dominic Moody D.O., attest that Navjot Chu is acting in a scribe capacity, has observed my performance of the services and has documented them in accordance with my direction.    Dominic Moody D.O.  EMERGENCY MEDICINE   10/28/21  [facility] EMERGENCY DEPARTMENT  [facility] [facility] [id number] [phone]  Dept: [phone]     Dominic Moody DO  10/29/21 0205

## 2021-10-29 ENCOUNTER — APPOINTMENT (OUTPATIENT)
Dept: INTERVENTIONAL RADIOLOGY/VASCULAR | Facility: HOSPITAL | Age: 61
DRG: 872 | End: 2021-10-29
Attending: INTERNAL MEDICINE
Payer: COMMERCIAL

## 2021-10-29 LAB
ALBUMIN SERPL-MCNC: 2.4 G/DL (ref 3.5–5)
ALP SERPL-CCNC: 120 U/L (ref 45–120)
ALT SERPL W P-5'-P-CCNC: 16 U/L (ref 0–45)
ANION GAP SERPL CALCULATED.3IONS-SCNC: 4 MMOL/L (ref 5–18)
AST SERPL W P-5'-P-CCNC: 11 U/L (ref 0–40)
BASOPHILS # BLD AUTO: 0 10E3/UL (ref 0–0.2)
BASOPHILS NFR BLD AUTO: 1 %
BILIRUB DIRECT SERPL-MCNC: 0.1 MG/DL
BILIRUB SERPL-MCNC: 0.4 MG/DL (ref 0–1)
BUN SERPL-MCNC: 43 MG/DL (ref 8–22)
CALCIUM SERPL-MCNC: 8.2 MG/DL (ref 8.5–10.5)
CHLORIDE BLD-SCNC: 119 MMOL/L (ref 98–107)
CO2 SERPL-SCNC: 15 MMOL/L (ref 22–31)
CREAT SERPL-MCNC: 2.07 MG/DL (ref 0.7–1.3)
EOSINOPHIL # BLD AUTO: 0 10E3/UL (ref 0–0.7)
EOSINOPHIL NFR BLD AUTO: 2 %
ERYTHROCYTE [DISTWIDTH] IN BLOOD BY AUTOMATED COUNT: 16.8 % (ref 10–15)
GFR SERPL CREATININE-BSD FRML MDRD: 34 ML/MIN/1.73M2
GLUCOSE BLD-MCNC: 92 MG/DL (ref 70–125)
HCT VFR BLD AUTO: 23.1 % (ref 40–53)
HGB BLD-MCNC: 7.3 G/DL (ref 13.3–17.7)
IMM GRANULOCYTES # BLD: 0 10E3/UL
IMM GRANULOCYTES NFR BLD: 2 %
LYMPHOCYTES # BLD AUTO: 0.1 10E3/UL (ref 0.8–5.3)
LYMPHOCYTES NFR BLD AUTO: 11 %
MAGNESIUM SERPL-MCNC: 1.6 MG/DL (ref 1.8–2.6)
MAGNESIUM SERPL-MCNC: 1.7 MG/DL (ref 1.8–2.6)
MCH RBC QN AUTO: 31.3 PG (ref 26.5–33)
MCHC RBC AUTO-ENTMCNC: 31.6 G/DL (ref 31.5–36.5)
MCV RBC AUTO: 99 FL (ref 78–100)
MONOCYTES # BLD AUTO: 0.1 10E3/UL (ref 0–1.3)
MONOCYTES NFR BLD AUTO: 7 %
NEUTROPHILS # BLD AUTO: 1 10E3/UL (ref 1.6–8.3)
NEUTROPHILS NFR BLD AUTO: 77 %
NRBC # BLD AUTO: 0 10E3/UL
NRBC BLD AUTO-RTO: 0 /100
PLATELET # BLD AUTO: 173 10E3/UL (ref 150–450)
POTASSIUM BLD-SCNC: 3.2 MMOL/L (ref 3.5–5)
POTASSIUM BLD-SCNC: 3.8 MMOL/L (ref 3.5–5)
PROT SERPL-MCNC: 5.5 G/DL (ref 6–8)
RBC # BLD AUTO: 2.33 10E6/UL (ref 4.4–5.9)
SODIUM SERPL-SCNC: 138 MMOL/L (ref 136–145)
WBC # BLD AUTO: 1.3 10E3/UL (ref 4–11)

## 2021-10-29 PROCEDURE — 255N000002 HC RX 255 OP 636: Performed by: RADIOLOGY

## 2021-10-29 PROCEDURE — C1769 GUIDE WIRE: HCPCS

## 2021-10-29 PROCEDURE — 83735 ASSAY OF MAGNESIUM: CPT | Performed by: NURSE PRACTITIONER

## 2021-10-29 PROCEDURE — 85025 COMPLETE CBC W/AUTO DIFF WBC: CPT | Performed by: INTERNAL MEDICINE

## 2021-10-29 PROCEDURE — 82310 ASSAY OF CALCIUM: CPT | Performed by: INTERNAL MEDICINE

## 2021-10-29 PROCEDURE — 250N000011 HC RX IP 250 OP 636: Performed by: RADIOLOGY

## 2021-10-29 PROCEDURE — 250N000009 HC RX 250: Performed by: RADIOLOGY

## 2021-10-29 PROCEDURE — 272N000119 HC CATH CR4

## 2021-10-29 PROCEDURE — 250N000013 HC RX MED GY IP 250 OP 250 PS 637: Performed by: INTERNAL MEDICINE

## 2021-10-29 PROCEDURE — 84132 ASSAY OF SERUM POTASSIUM: CPT | Performed by: NURSE PRACTITIONER

## 2021-10-29 PROCEDURE — 258N000003 HC RX IP 258 OP 636: Performed by: INTERNAL MEDICINE

## 2021-10-29 PROCEDURE — 99233 SBSQ HOSP IP/OBS HIGH 50: CPT | Performed by: INTERNAL MEDICINE

## 2021-10-29 PROCEDURE — 36415 COLL VENOUS BLD VENIPUNCTURE: CPT | Performed by: INTERNAL MEDICINE

## 2021-10-29 PROCEDURE — 50435 EXCHANGE NEPHROSTOMY CATH: CPT | Mod: XS

## 2021-10-29 PROCEDURE — 250N000011 HC RX IP 250 OP 636: Performed by: INTERNAL MEDICINE

## 2021-10-29 PROCEDURE — 258N000003 HC RX IP 258 OP 636: Performed by: NURSE PRACTITIONER

## 2021-10-29 PROCEDURE — 250N000009 HC RX 250: Performed by: NURSE PRACTITIONER

## 2021-10-29 PROCEDURE — 83605 ASSAY OF LACTIC ACID: CPT | Performed by: INTERNAL MEDICINE

## 2021-10-29 PROCEDURE — 120N000001 HC R&B MED SURG/OB

## 2021-10-29 PROCEDURE — 0T25X0Z CHANGE DRAINAGE DEVICE IN KIDNEY, EXTERNAL APPROACH: ICD-10-PCS | Performed by: RADIOLOGY

## 2021-10-29 PROCEDURE — 36415 COLL VENOUS BLD VENIPUNCTURE: CPT | Performed by: NURSE PRACTITIONER

## 2021-10-29 PROCEDURE — 82040 ASSAY OF SERUM ALBUMIN: CPT | Performed by: INTERNAL MEDICINE

## 2021-10-29 PROCEDURE — C1729 CATH, DRAINAGE: HCPCS

## 2021-10-29 PROCEDURE — 99255 IP/OBS CONSLTJ NEW/EST HI 80: CPT | Performed by: INTERNAL MEDICINE

## 2021-10-29 PROCEDURE — 99254 IP/OBS CNSLTJ NEW/EST MOD 60: CPT | Performed by: INTERNAL MEDICINE

## 2021-10-29 PROCEDURE — 80076 HEPATIC FUNCTION PANEL: CPT | Performed by: INTERNAL MEDICINE

## 2021-10-29 PROCEDURE — 50435 EXCHANGE NEPHROSTOMY CATH: CPT

## 2021-10-29 RX ORDER — FENTANYL CITRATE 50 UG/ML
INJECTION, SOLUTION INTRAMUSCULAR; INTRAVENOUS PRN
Status: COMPLETED | OUTPATIENT
Start: 2021-10-29 | End: 2021-10-29

## 2021-10-29 RX ORDER — NALOXONE HYDROCHLORIDE 0.4 MG/ML
0.2 INJECTION, SOLUTION INTRAMUSCULAR; INTRAVENOUS; SUBCUTANEOUS
Status: DISCONTINUED | OUTPATIENT
Start: 2021-10-29 | End: 2021-11-01 | Stop reason: HOSPADM

## 2021-10-29 RX ORDER — FERROUS SULFATE 325(65) MG
325 TABLET ORAL 2 TIMES DAILY
Status: DISCONTINUED | OUTPATIENT
Start: 2021-10-29 | End: 2021-11-01 | Stop reason: HOSPADM

## 2021-10-29 RX ORDER — ZOLPIDEM TARTRATE 5 MG/1
5 TABLET ORAL AT BEDTIME
Status: DISCONTINUED | OUTPATIENT
Start: 2021-10-29 | End: 2021-11-01 | Stop reason: HOSPADM

## 2021-10-29 RX ORDER — OXYBUTYNIN CHLORIDE 5 MG/1
5 TABLET ORAL 3 TIMES DAILY
Status: DISCONTINUED | OUTPATIENT
Start: 2021-10-29 | End: 2021-11-01 | Stop reason: HOSPADM

## 2021-10-29 RX ORDER — POTASSIUM CHLORIDE 1500 MG/1
40 TABLET, EXTENDED RELEASE ORAL ONCE
Status: COMPLETED | OUTPATIENT
Start: 2021-10-29 | End: 2021-10-30

## 2021-10-29 RX ORDER — FENTANYL 25 UG/1
25 PATCH TRANSDERMAL
Status: DISCONTINUED | OUTPATIENT
Start: 2021-10-29 | End: 2021-11-01 | Stop reason: HOSPADM

## 2021-10-29 RX ORDER — GABAPENTIN 300 MG/1
300 CAPSULE ORAL 2 TIMES DAILY
Status: DISCONTINUED | OUTPATIENT
Start: 2021-10-29 | End: 2021-11-01 | Stop reason: HOSPADM

## 2021-10-29 RX ORDER — PANTOPRAZOLE SODIUM 20 MG/1
40 TABLET, DELAYED RELEASE ORAL
Status: DISCONTINUED | OUTPATIENT
Start: 2021-10-30 | End: 2021-11-01 | Stop reason: HOSPADM

## 2021-10-29 RX ORDER — NALOXONE HYDROCHLORIDE 0.4 MG/ML
0.4 INJECTION, SOLUTION INTRAMUSCULAR; INTRAVENOUS; SUBCUTANEOUS
Status: DISCONTINUED | OUTPATIENT
Start: 2021-10-29 | End: 2021-11-01 | Stop reason: HOSPADM

## 2021-10-29 RX ORDER — TAMSULOSIN HYDROCHLORIDE 0.4 MG/1
0.4 CAPSULE ORAL DAILY
Status: DISCONTINUED | OUTPATIENT
Start: 2021-10-29 | End: 2021-11-01 | Stop reason: HOSPADM

## 2021-10-29 RX ADMIN — IOHEXOL 15 ML: 350 INJECTION, SOLUTION INTRAVENOUS at 21:09

## 2021-10-29 RX ADMIN — RIVAROXABAN 20 MG: 10 TABLET, FILM COATED ORAL at 16:42

## 2021-10-29 RX ADMIN — FENTANYL 1 PATCH: 25 PATCH TRANSDERMAL at 16:40

## 2021-10-29 RX ADMIN — GABAPENTIN 300 MG: 300 CAPSULE ORAL at 21:38

## 2021-10-29 RX ADMIN — FENTANYL CITRATE 50 MCG: 50 INJECTION, SOLUTION INTRAMUSCULAR; INTRAVENOUS at 20:55

## 2021-10-29 RX ADMIN — OXYBUTYNIN CHLORIDE 5 MG: 5 TABLET ORAL at 16:42

## 2021-10-29 RX ADMIN — CEFEPIME HYDROCHLORIDE 2 G: 2 INJECTION, POWDER, FOR SOLUTION INTRAVENOUS at 07:44

## 2021-10-29 RX ADMIN — ZOLPIDEM TARTRATE 5 MG: 5 TABLET ORAL at 21:37

## 2021-10-29 RX ADMIN — SODIUM CHLORIDE, POTASSIUM CHLORIDE, SODIUM LACTATE AND CALCIUM CHLORIDE: 600; 310; 30; 20 INJECTION, SOLUTION INTRAVENOUS at 07:51

## 2021-10-29 RX ADMIN — ACETAMINOPHEN 650 MG: 325 TABLET ORAL at 21:38

## 2021-10-29 RX ADMIN — SODIUM BICARBONATE: 84 INJECTION, SOLUTION INTRAVENOUS at 18:22

## 2021-10-29 RX ADMIN — Medication 1 MG: at 21:38

## 2021-10-29 RX ADMIN — FERROUS SULFATE TAB 325 MG (65 MG ELEMENTAL FE) 325 MG: 325 (65 FE) TAB at 21:37

## 2021-10-29 RX ADMIN — OXYBUTYNIN CHLORIDE 5 MG: 5 TABLET ORAL at 21:37

## 2021-10-29 RX ADMIN — VANCOMYCIN HYDROCHLORIDE 1000 MG: 5 INJECTION, POWDER, LYOPHILIZED, FOR SOLUTION INTRAVENOUS at 11:18

## 2021-10-29 RX ADMIN — IOHEXOL 10 ML: 350 INJECTION, SOLUTION INTRAVENOUS at 10:29

## 2021-10-29 RX ADMIN — FENTANYL CITRATE 50 MCG: 50 INJECTION, SOLUTION INTRAMUSCULAR; INTRAVENOUS at 21:00

## 2021-10-29 RX ADMIN — SODIUM CHLORIDE, POTASSIUM CHLORIDE, SODIUM LACTATE AND CALCIUM CHLORIDE: 600; 310; 30; 20 INJECTION, SOLUTION INTRAVENOUS at 16:07

## 2021-10-29 RX ADMIN — CEFEPIME HYDROCHLORIDE 2 G: 2 INJECTION, POWDER, FOR SOLUTION INTRAVENOUS at 21:36

## 2021-10-29 RX ADMIN — LIDOCAINE HYDROCHLORIDE 15 ML: 10 INJECTION, SOLUTION EPIDURAL; INFILTRATION; INTRACAUDAL; PERINEURAL at 10:19

## 2021-10-29 RX ADMIN — TAMSULOSIN HYDROCHLORIDE 0.4 MG: 0.4 CAPSULE ORAL at 16:41

## 2021-10-29 RX ADMIN — SERTRALINE HYDROCHLORIDE 50 MG: 50 TABLET ORAL at 21:37

## 2021-10-29 RX ADMIN — ACETAMINOPHEN 650 MG: 325 TABLET ORAL at 07:59

## 2021-10-29 ASSESSMENT — ACTIVITIES OF DAILY LIVING (ADL)
ADLS_ACUITY_SCORE: 8
ADLS_ACUITY_SCORE: 12
ADLS_ACUITY_SCORE: 11
ADLS_ACUITY_SCORE: 8
TOILETING_ISSUES: NO
FALL_HISTORY_WITHIN_LAST_SIX_MONTHS: NO
ADLS_ACUITY_SCORE: 8
DIFFICULTY_COMMUNICATING: NO
DEPENDENT_IADLS:: INDEPENDENT
ADLS_ACUITY_SCORE: 8
ADLS_ACUITY_SCORE: 8
PATIENT_/_FAMILY_COMMUNICATION_STYLE: SPOKEN LANGUAGE (ENGLISH OR BILINGUAL)
ADLS_ACUITY_SCORE: 11
ADLS_ACUITY_SCORE: 8
ADLS_ACUITY_SCORE: 10
ADLS_ACUITY_SCORE: 8
DOING_ERRANDS_INDEPENDENTLY_DIFFICULTY: NO
ADLS_ACUITY_SCORE: 11
ADLS_ACUITY_SCORE: 11
VISION_MANAGEMENT: GLASSES
HEARING_DIFFICULTY_OR_DEAF: NO
EQUIPMENT_CURRENTLY_USED_AT_HOME: OTHER (SEE COMMENTS)
ADLS_ACUITY_SCORE: 8
DIFFICULTY_EATING/SWALLOWING: NO
ADLS_ACUITY_SCORE: 8
WEAR_GLASSES_OR_BLIND: YES
ADLS_ACUITY_SCORE: 8
ADLS_ACUITY_SCORE: 11
DRESSING/BATHING_DIFFICULTY: NO
WALKING_OR_CLIMBING_STAIRS_DIFFICULTY: NO
ADLS_ACUITY_SCORE: 8
ADLS_ACUITY_SCORE: 11
CONCENTRATING,_REMEMBERING_OR_MAKING_DECISIONS_DIFFICULTY: NO

## 2021-10-29 ASSESSMENT — MIFFLIN-ST. JEOR: SCORE: 1555.61

## 2021-10-29 NOTE — PLAN OF CARE
Pt admitted to the unit from the ED with fever noted while at chemotherapy.  Pt is alert and oriented x 4 and very pleasant.  Nephrostomy and colostomy intact and pt self manages.  Pt also urinates independently.  IV fluids per orders. Infusing via port a cath which was accessed in the ED.   VSS.  Pt denies pain.  Settled into room and all questions answered.

## 2021-10-29 NOTE — ED NOTES
Patient arrives c/o fever that was discovered at Chemo today. Patient not c/o any symptoms minus becoming dizzy when standing quickly.     Lungs sounds: clear  GI: no issues, no pain  Cardiac: no chest pain. BP borderline hypotensive  Pulmonary: no dyspnea, no distress.

## 2021-10-29 NOTE — ED NOTES
Pt. Readjusted in bed for comfort.  Blood pressure cuff was moved back to the appropriate location on his arm. Pt. Vitals were retaken

## 2021-10-29 NOTE — PROVIDER NOTIFICATION
Patient tolerated procedure. Returned to ER room 1. Patient placed on cardiac monitor. Verbal report given to ER RN.

## 2021-10-29 NOTE — H&P
ADMISSION HISTORY & PHYSICAL      Maycol Worrell, [phone]  ASSESSMENT AND PLAN:  60 year old male with a history of colon cancer and chemotherapy, pyelonephritis, hyperlipidemia, hypertension, prostate cancer, PE, nephrostomy tube who presents with fever following chemotherapy today.      Severe sepsis secondary to pyelonephritis complicated by nephrostomy tube and neutropenia   Patient received bolus of 3 L with improvement in blood pressure   Received levofloxacin, cefepime and vancomycin in the emergency department.  Continue cefepime and vancomycin   Follow-up blood cultures   Urinalysis reviewed and appears to have UTI/pyelonephritis   CT abdomen reviewed-appears to have pyelonephritis   Infectious disease consulted   IR consulted for nephrostomy tube exchange    Metabolic acidosis likely secondary to severe sepsis   Repeat BMP    Acute kidney injury in the setting of CKD stage III   Baseline creatinine appears to be 1.7-1.8.  Creatinine on arrival 2.33    Neutropenia and anemia   Likely related to chemotherapy.  Trend   No complaints of acute bleed.    Colon cancer undergoing chemotherapy   Consult oncology    Other comorbidities include hyperlipidemia, hypertension, prostate cancer    Code Status: Full Code  DVT prophylaxis: Xarelto    Disposition:  -Anticipated Length of Stay in midnights and medical necessity (including a midnight in the Emergency Department after triage if applicable): Multiple days  -Discharge barriers: IV antibiotics      CHIEF COMPLAINT:  Fever    HISTORY OF PRESENTING ILLNESS:  Patient is a 60 year old male with history significant for colon cancer and chemotherapy, pyelonephritis, hyperlipidemia, hypertension, prostate cancer, PE, nephrostomy tube who presents with fever following chemotherapy today.  Patient was undergoing chemotherapy and was noted to have a fever.  He later began to experience chills.  Presented to the emergency department for further evaluation.  Patient  denies other sickness with cough, chest pain, urinary symptoms.  On my evaluation patient is under several blankets and experiencing rigors.    PMH/PSH:  Patient Active Problem List   Diagnosis     Pulmonary embolism (H)     Acute cystitis without hematuria     Gastrointestinal hemorrhage     Acute kidney injury (H)     Acute renal failure (H)     Anemia due to blood loss, acute     Anxiety     Cancer of abdomen (H)     Carcinoma of prostate (H)     Cecum mass     Chronic pain     Colostomy in place (H)     Nephrostomy status (H)     Colostomy status (H)     Erectile dysfunction following radical prostatectomy     History of malignant neoplasm of colon     History of malignant neoplasm of prostate     History of pulmonary embolism     Hyperlipidemia     Iron deficiency anemia     Malignant neoplasm metastatic to liver (H)     Malignant neoplasm of cecum (H)     Malignant neoplasm of colon (H)     Postoperative care for cataract     Prostate cancer (H)     Pyelonephritis     Sepsis, due to unspecified organism, unspecified whether acute organ dysfunction present (H)     Stage 3 chronic kidney disease (H)     Urinary tract infection without hematuria, site unspecified     Febrile neutropenia (H)       ALLERGIES:  No Known Allergies    MEDICATIONS:  Reviewed.  Current Facility-Administered Medications   Medication     acetaminophen (TYLENOL) tablet 650 mg    Or     acetaminophen (TYLENOL) Suppository 650 mg     ceFEPIme (MAXIPIME) 2 g vial to attach to  ml bag for ADULTS or 50 ml bag for PEDS     heparin 100 UNIT/ML injection 5-10 mL     heparin lock flush 10 UNIT/ML injection 5-10 mL     lactated ringers infusion     lidocaine (LMX4) cream     lidocaine 1 % 0.1-1 mL     melatonin tablet 1 mg     ondansetron (ZOFRAN-ODT) ODT tab 4 mg    Or     ondansetron (ZOFRAN) injection 4 mg     oxyCODONE (ROXICODONE) tablet 5 mg     polyethylene glycol (MIRALAX) Packet 17 g     sodium chloride (PF) 0.9% PF flush 3 mL      sodium chloride (PF) 0.9% PF flush 3 mL     sodium chloride 0.9% infusion     sodium chloride 0.9% infusion     Current Outpatient Medications   Medication     acetaminophen (TYLENOL) 500 MG tablet     fentaNYL (DURAGESIC) 25 mcg/hr 72 hr patch     ferrous sulfate (FEROSUL) 325 (65 Fe) MG tablet     gabapentin (NEURONTIN) 300 MG capsule     omeprazole (PRILOSEC) 20 MG DR capsule     oxybutynin (DITROPAN) 5 MG tablet     oxyCODONE (ROXICODONE) 5 MG tablet     sertraline (ZOLOFT) 50 MG tablet     tamsulosin (FLOMAX) 0.4 MG capsule     XARELTO ANTICOAGULANT 20 MG TABS tablet     zolpidem (AMBIEN) 5 MG tablet       SOCIAL HISTORY:  Social History     Socioeconomic History     Marital status:      Spouse name: Not on file     Number of children: Not on file     Years of education: Not on file     Highest education level: Not on file   Occupational History     Not on file   Tobacco Use     Smoking status: Never Smoker     Smokeless tobacco: Never Used   Substance and Sexual Activity     Alcohol use: Not Currently     Comment: Alcoholic Drinks/day: rarely     Drug use: No     Sexual activity: Not Currently   Other Topics Concern     Not on file   Social History Narrative     Not on file     Social Determinants of Health     Financial Resource Strain:      Difficulty of Paying Living Expenses:    Food Insecurity:      Worried About Running Out of Food in the Last Year:      Ran Out of Food in the Last Year:    Transportation Needs:      Lack of Transportation (Medical):      Lack of Transportation (Non-Medical):    Physical Activity:      Days of Exercise per Week:      Minutes of Exercise per Session:    Stress:      Feeling of Stress :    Social Connections:      Frequency of Communication with Friends and Family:      Frequency of Social Gatherings with Friends and Family:      Attends [employer] Services:      Active Member of Clubs or Organizations:      Attends Club or Organization Meetings:      Marital Status:  "   Intimate Partner Violence:      Fear of Current or Ex-Partner:      Emotionally Abused:      Physically Abused:      Sexually Abused:        FAMILY HISTORY:  Family History   Problem Relation Age of Onset     Breast Cancer Mother      Osteoporosis Mother      Valvular heart disease Father      Prostate Cancer Father 70.00     No Known Problems Sister      No Known Problems Son      No Known Problems Daughter        ROS:  12 point review of systems is reviewed and is negative except for what has already been mention in the history of presenting illness.    PHYSICAL EXAM:  /69   Pulse 92   Temp (!) 101.2  F (38.4  C)   Resp 21   Ht 1.727 m (5' 8\")   Wt 72.6 kg (160 lb)   SpO2 100%   BMI 24.33 kg/m    No intake/output data recorded.  I/O this shift:  In: 1000 [IV Piggyback:1000]  Out: -   GENRL: Alert and answering questions appropriately. Rigors. Lying in bed   HEENT: no lymphadenopathy or thyromegaly  CHEST: Clear to auscultation bilaterally. No wheezes, rhonchi or crackles. Breathing easily   HEART: Regular rate and rhythm, S1S2 auscultated. No murmurs  ABDMN: Soft. Non-tender, non-distended. No organomegaly. No guarding or rigidity. Bowel sounds present   EXTRM: No pedal edema, DP pulses 2+. No discoloration   NEURO: Following commands and moving all extremities.   PSYCH: Normal affect and mood.   INTGM: No skin rash, no cyanosis or clubbing    DIAGNOSTIC DATA:  Recent Results (from the past 24 hour(s))   Symptomatic Influenza A/B & SARS-CoV2 (COVID-19) Virus PCR Multiplex Nasopharyngeal    Collection Time: 10/28/21  5:01 PM    Specimen: Nasopharyngeal; Swab   Result Value Ref Range    Influenza A target Negative Negative    Influenza B target Negative Negative    SARS CoV2 PCR Negative Negative   Basic metabolic panel    Collection Time: 10/28/21  5:28 PM   Result Value Ref Range    Sodium 135 (L) 136 - 145 mmol/L    Potassium 3.5 3.5 - 5.0 mmol/L    Chloride 114 (H) 98 - 107 mmol/L    Carbon " Dioxide (CO2) 13 (L) 22 - 31 mmol/L    Anion Gap 8 5 - 18 mmol/L    Urea Nitrogen 51 (H) 8 - 22 mg/dL    Creatinine 2.33 (H) 0.70 - 1.30 mg/dL    Calcium 8.0 (L) 8.5 - 10.5 mg/dL    Glucose 129 (H) 70 - 125 mg/dL    GFR Estimate 29 (L) >60 mL/min/1.73m2   Lactic acid whole blood    Collection Time: 10/28/21  5:28 PM   Result Value Ref Range    Lactic Acid 1.7 0.7 - 2.0 mmol/L   Hepatic function panel    Collection Time: 10/28/21  5:28 PM   Result Value Ref Range    Bilirubin Total 0.2 0.0 - 1.0 mg/dL    Bilirubin Direct <0.1 <=0.5 mg/dL    Protein Total 6.0 6.0 - 8.0 g/dL    Albumin 2.8 (L) 3.5 - 5.0 g/dL    Alkaline Phosphatase 130 (H) 45 - 120 U/L    AST 11 0 - 40 U/L    ALT 16 0 - 45 U/L   CBC with platelets and differential    Collection Time: 10/28/21  5:28 PM   Result Value Ref Range    WBC Count 1.9 (L) 4.0 - 11.0 10e3/uL    RBC Count 2.43 (L) 4.40 - 5.90 10e6/uL    Hemoglobin 7.4 (L) 13.3 - 17.7 g/dL    Hematocrit 24.1 (L) 40.0 - 53.0 %    MCV 99 78 - 100 fL    MCH 30.5 26.5 - 33.0 pg    MCHC 30.7 (L) 31.5 - 36.5 g/dL    RDW 16.8 (H) 10.0 - 15.0 %    Platelet Count 173 150 - 450 10e3/uL    % Neutrophils 85 %    % Lymphocytes 7 %    % Monocytes 6 %    % Eosinophils 1 %    % Basophils 0 %    % Immature Granulocytes 1 %    NRBCs per 100 WBC 0 <1 /100    Absolute Neutrophils 1.6 1.6 - 8.3 10e3/uL    Absolute Lymphocytes 0.1 (L) 0.8 - 5.3 10e3/uL    Absolute Monocytes 0.1 0.0 - 1.3 10e3/uL    Absolute Eosinophils 0.0 0.0 - 0.7 10e3/uL    Absolute Basophils 0.0 0.0 - 0.2 10e3/uL    Absolute Immature Granulocytes 0.0 <=0.0 10e3/uL    Absolute NRBCs 0.0 10e3/uL   UA with Microscopic reflex to Culture    Collection Time: 10/28/21  5:54 PM    Specimen: Urine, Clean Catch   Result Value Ref Range    Color Urine Yellow Colorless, Straw, Light Yellow, Yellow    Appearance Urine Cloudy (A) Clear    Glucose Urine Negative Negative mg/dL    Bilirubin Urine Negative Negative    Ketones Urine Negative Negative mg/dL     Specific Gravity Urine 1.016 1.001 - 1.030    Blood Urine 0.2 mg/dL (A) Negative    pH Urine 5.5 5.0 - 7.0    Protein Albumin Urine 30  (A) Negative mg/dL    Urobilinogen Urine <2.0 <2.0 mg/dL    Nitrite Urine Positive (A) Negative    Leukocyte Esterase Urine 500 Marlon/uL (A) Negative    Bacteria Urine Moderate (A) None Seen /HPF    WBC Clumps Urine Present (A) None Seen /HPF    RBC Urine 4 (H) <=2 /HPF    WBC Urine >182 (H) <=5 /HPF   Lactic acid whole blood    Collection Time: 10/28/21  8:46 PM   Result Value Ref Range    Lactic Acid 0.4 (L) 0.7 - 2.0 mmol/L     All lab studies reviewed personally  Radiology report reviewed.      Patricia Gonzalez DO  [facility]  [phone]

## 2021-10-29 NOTE — CONSULTS
RENAL CONSULT NOTE    REQUESTING PHYSICIAN: MD FARSHAD    REASON FOR CONSULT: CHAYO on CKD     ASSESSMENT/PLAN:   Acute Kidney Injury on CKD 3-4: creat peaked 2.33---> 2.0 with IVF's, and antibx for pyelo/sepsis, and replacement of R PCNT; did receive IV contrast for CT 10/28.     Baseline creat with some variability, though trends mostly I nteh 13-1.5 range, until mid April 2021, now in the 1.7-2.0 range  GFR 40  PLAN:  Tx metabolic acidosis, IV bicarb  Taking pos, reduced IVF 75cc/hr and trend   Monitor BMP daily  May need KCL replacement with MA tx , start protocol , ck mag  Trend I/o closely   Enc po intake food/fluid, liberal diet    Lytes/Minerals: Pseudohypocalcemia,  Corrects normal for low Alb, no sig derangement aside from MA, see below     Metabolic acidosis: in the setting of CHAYO and sepsis, convert to IVF with bicarb, 75cc/hr and can likely discontinue or convert to oral tomorrow  PLAN,    IVF with bicarb, 75cc/hr  monitor K daily, may need replacement per protocol    Chronic R nephrostomy tube: d/t chronic obstruction from colon CA, failed debulking surg, PCNT exchanged today 10/29.  Would rec q 2 2.5 mos exchanges, and change bag every 1-2 weeks     Neutropenic feversOn chemo, WBC 1's, ID following and on Cefipime, Vanco, BCx NGTD, and urine Cx pending    Sepsis:  Presumably from R pyelo, has chronic PCNT, exchanged 10/29.  On IV antibx, ID folowing    Anemia:  Multifac, chemo, iron def, infection, hgb in the 7's  On oral iron    HTN: on single Agent Flomax, pt denies h/o HTN at baseline, BP well controlled currently     Colon Cancer: undergoing chemotherapy.     H/o prostate cancer:  S/p prostatectomy, unclear why he is on Flomax or Oxybutynin as both pt and wife deny issues with urine retention, spasms.  Started at [place] in Dec 2021?     Depression: on selective serotonin reuptake inhibitor     Chronic pain:  On Meaghan and fentanyl patch curently    HPI: Rich Wolff is a 60 year old male for whom  we have been asked to see  For CHAYO on CKD.  Pert h/o  colonc cancer and on chemo, last dosed 10/28, noted chills and fevers.  Has chronic PCNT admitted with sepsis/presumed pyelo by imaging, and his PCNT was replaced in IR today.  He's had nephrostomy since 12/2021, placed a may from ureteral obstruction from tumor bulk, reportedly failed de-bulding attempt.  Tries to exchange his PCNT q 3-4 months, when  He remembers.  HE and wife said they were not made aware that the tube required routine maintenance exchanges until mid 2021.  They also keep the same bag in place, and I advised that this should ideally be changes q 1-2 weeks.   He has known CKD for almost 2 years.  Baseline creat in the 1.4-1.5 range until mid 4/2021, now trending more in the 1.7-2.0 range.  Creat on admission was 2.3--->2.0 with IVF, antibx and tube exchange.    Denies NSAID use.  Did received IV contrast for CT 10/28.     Denies issues with urination, no hesitancy, spasms etc.  H/o prostatectomy.  Unclear why he is on Flomax and Ditropan and neither is he.  No n/v/d.     REVIEW OF SYSTEMS:  See HPI, no MSK or neuro complaints.       Past Medical History:   Diagnosis Date     Hyperlipidemia      Hypertension      Prostate cancer (H) 09/01/2015    T1c. Silvana's 6 (3+3).  Confined to prostate.  Multifocal bilateral comprising 2% of the gland.PSA:5.8.       I have reviewed this patient's family history and updated it with pertinent information if needed.  Family History   Problem Relation Age of Onset     Breast Cancer Mother      Osteoporosis Mother      Valvular heart disease Father      Prostate Cancer Father 70.00     No Known Problems Sister      No Known Problems Son      No Known Problems Daughter          Social History     Tobacco Use     Smoking status: Never Smoker     Smokeless tobacco: Never Used   Substance Use Topics     Alcohol use: Not Currently     Comment: Alcoholic Drinks/day: rarely     Drug use: No          No current  "facility-administered medications on file prior to encounter.  acetaminophen (TYLENOL) 500 MG tablet, Take 500-1,000 mg by mouth every 6 hours as needed for mild pain or fever   fentaNYL (DURAGESIC) 25 mcg/hr 72 hr patch, Place 1 patch onto the skin every 72 hours remove old patch.  ferrous sulfate (FEROSUL) 325 (65 Fe) MG tablet, Take 325 mg by mouth 2 times daily  gabapentin (NEURONTIN) 300 MG capsule, Take 300 mg by mouth 2 times daily  omeprazole (PRILOSEC) 20 MG DR capsule, Take 20 mg by mouth daily as needed  oxybutynin (DITROPAN) 5 MG tablet, Take 5 mg by mouth 3 times daily  oxyCODONE (ROXICODONE) 5 MG tablet, Take 5 mg by mouth every 4 hours as needed for severe pain   sertraline (ZOLOFT) 50 MG tablet, Take 50 mg by mouth At Bedtime   tamsulosin (FLOMAX) 0.4 MG capsule, Take 0.4 mg by mouth daily  XARELTO ANTICOAGULANT 20 MG TABS tablet, Take 20 mg by mouth daily  zolpidem (AMBIEN) 5 MG tablet, Take 5 mg by mouth At Bedtime          ALLERGIES/SENSITIVITIES:  No Known Allergies       PHYSICAL EXAM:    /65 (BP Location: Left arm)   Pulse 92   Temp 99.1  F (37.3  C) (Oral)   Resp 20   Ht 1.727 m (5' 8\")   Wt 77.1 kg (170 lb)   SpO2 100%   BMI 25.85 kg/m    Temp (24hrs), Av.5  F (37.5  C), Min:98.4  F (36.9  C), Max:102.1  F (38.9  C)      Patient Vitals for the past 72 hrs:   Weight   10/29/21 1405 77.1 kg (170 lb)   10/28/21 1700 72.6 kg (160 lb)     Body mass index is 25.85 kg/m .    Intake/Output Summary (Last 24 hours) at 10/29/2021 1611  Last data filed at 10/29/2021 1430  Gross per 24 hour   Intake 1240 ml   Output 1275 ml   Net -35 ml         General - A & O x 3, NAD, supine in bed, shaking/chills, wrapped in blankets   HEENT - PERRLA, no scleral icteru  Respiratory - sats 10% RA  Cardiovascular - BP controlled   Abdomen - soft, BS present, no bruits, no fluid wave  Extremities - well perfused, no edema  Integumentary - intact, good turgor, no rash/lesions  Neurologic - grossly " intact  Psych:  Judgement intact, affect WNL  :  R PCNT with pink tinged urine, good UO from PCNT and voiding.     Laboratory:  Recent Labs   Lab Test 10/29/21  0509 10/28/21  1728 08/04/21  0700 08/04/21  0634 12/21/20 2032 12/21/20 2032   WBC 1.3* 1.9*  --    < >   < >  --    HGB 7.3* 7.4*  --    < >   < >  --    MCV 99 99  --    < >   < >  --     173  --    < >   < >  --    INR  --   --  1.33*  --   --  1.25*    < > = values in this interval not displayed.      Recent Labs   Lab Test 10/29/21  0509 10/28/21  2258   POTASSIUM 3.8 4.0   CHLORIDE 119* 118*   BUN 43* 45*      Recent Labs   Lab Test 10/29/21  0509 10/28/21  1754 10/28/21  1728 12/21/20  0000 11/04/20  1736   ALBUMIN 2.4*  --  2.8*   < >  --    BILITOTAL 0.4  --  0.2   < >  --    ALT 16  --  16   < >  --    AST 11  --  11   < >  --    PROTEIN  --  30 *  --   --  Negative    < > = values in this interval not displayed.       Personally reviewed today's laboratory studies      Thank you for involving us in the care of this patient. We will continue to follow along with you.      Patricia Ferreira, [facility]  [facility]  [phone]    Addendum:  I independently have seen and examined the patient. I agree wit above assessment and plan by RENETTA Ferreira.    Britni Cloud MD  [facility]  [address] [place] [address], [address]  [place] [address]  Phone# [phone]  Fax# [phone]

## 2021-10-29 NOTE — PROGRESS NOTES
Hospitalist Progress Note  ADMIT DATE: 10/28/2021     FACILITY: [facility]    PCP: Maycol Worrell, [phone]    Assessment/Plan    Rich Wolff is a 60 year old male with a history of colon cancer on chemotherapy, h/o pyelonephritis, hyperlipidemia, hypertension, prostate cancer, PE on Xarelto, s/p nephrostomy tube who presents with fever following chemotherapy on 10/28.       Severe sepsis secondary to pyelonephritis complicated by nephrostomy tube and neutropenia  -Patient received bolus of 3 L with improvement in blood pressure  -Received levofloxacin, cefepime and vancomycin in the emergency department.  Continue cefepime and vancomycin  -ID consultation appreciated  -Follow-up blood cultures  - Urinalysis reviewed and appears to have UTI/pyelonephritis  - CT abdomen reviewed-appears to have pyelonephritis  -IR consulted for nephrostomy tube exchange -done today 10/29     Metabolic acidosis likely secondary to severe sepsis  Acute kidney injury in the setting of CKD stage III  -Baseline creatinine appears to be 1.7-1.8.  Creatinine on arrival 2.33  -no obvious improvement  -nephrology consultation     Neutropenia and anemia  -Likely related to chemotherapy.  Trend  -No complaints of acute bleed.     Colon cancer undergoing chemotherapy  -Consult oncology, pending    H/o Pulmonary embolism   -on Xarelto  -no signs of bleeding     Other comorbidities include hyperlipidemia, hypertension, prostate cancer     Code Status: Full Code  DVT prophylaxis: on Xarelto     Disposition:  -Anticipated Length of Stay in midnights and medical necessity (including a midnight in the Emergency Department after triage if applicable): Multiple days  -Discharge barriers: IV antibiotics    Subjective  Patient feels better, denies fever or chills, pain in control. Underwent exchange for right nephrostomy tube.     Objective    Vital signs in last 24 hours  Temp:  [98.8  F (37.1  C)-102.1  F (38.9   C)] 99.6  F (37.6  C)  Pulse:  [] 79  Resp:  [11-73] 15  BP: ()/(51-73) 96/53  SpO2:  [96 %-100 %] 99 % [unfilled] O2 Device: None (Room air)    Weight:   [unfilled] Weight change:     Intake/Output last 3 shifts  I/O last 3 completed shifts:  In: 1000 [IV Piggyback:1000]  Out: 225 [Urine:225]  Body mass index is 24.33 kg/m .    Physical Exam    Physical Exam  Vitals and nursing note reviewed.   Constitutional:       General: He is not in acute distress.     Appearance: He is normal weight. He is ill-appearing. He is not toxic-appearing.   HENT:      Head: Normocephalic and atraumatic.      Right Ear: External ear normal.      Left Ear: External ear normal.      Nose: Nose normal.      Mouth/Throat:      Mouth: Mucous membranes are moist.   Eyes:      General:         Right eye: No discharge.         Left eye: No discharge.   Cardiovascular:      Rate and Rhythm: Normal rate and regular rhythm.      Pulses: Normal pulses.      Heart sounds: No murmur heard.     Pulmonary:      Effort: Pulmonary effort is normal. No respiratory distress.   Abdominal:      General: Abdomen is flat. There is no distension.      Tenderness: There is no abdominal tenderness.      Comments: Right nephrostomy tube in place   Musculoskeletal:         General: Normal range of motion.      Cervical back: Normal range of motion and neck supple. No rigidity.      Right lower leg: No edema.      Left lower leg: No edema.   Skin:     General: Skin is warm and dry.      Coloration: Skin is not jaundiced.   Neurological:      General: No focal deficit present.      Mental Status: He is alert. Mental status is at baseline.      Cranial Nerves: No cranial nerve deficit.   Psychiatric:         Mood and Affect: Mood normal.             Pertinent Labs   Lab Results: personally reviewed.     Results for ARTUR GOULD (MRN [id number]) as of 10/29/2021 07:19   Ref. Range 10/29/2021 05:09   Sodium Latest Ref Range: 136 - 145 mmol/L 138    Potassium Latest Ref Range: 3.5 - 5.0 mmol/L 3.8   Chloride Latest Ref Range: 98 - 107 mmol/L 119 (H)   Carbon Dioxide Latest Ref Range: 22 - 31 mmol/L 15 (L)   Urea Nitrogen Latest Ref Range: 8 - 22 mg/dL 43 (H)   Creatinine Latest Ref Range: 0.70 - 1.30 mg/dL 2.07 (H)   GFR Estimate Latest Ref Range: >60 mL/min/1.73m2 34 (L)   Calcium Latest Ref Range: 8.5 - 10.5 mg/dL 8.2 (L)   Anion Gap Latest Ref Range: 5 - 18 mmol/L 4 (L)   Albumin Latest Ref Range: 3.5 - 5.0 g/dL 2.4 (L)   Protein Total Latest Ref Range: 6.0 - 8.0 g/dL 5.5 (L)   Bilirubin Total Latest Ref Range: 0.0 - 1.0 mg/dL 0.4   Alkaline Phosphatase Latest Ref Range: 45 - 120 U/L 120   ALT Latest Ref Range: 0 - 45 U/L 16   AST Latest Ref Range: 0 - 40 U/L 11   Bilirubin Direct Latest Ref Range: <=0.5 mg/dL 0.1   Glucose Latest Ref Range: 70 - 125 mg/dL 92   WBC Latest Ref Range: 4.0 - 11.0 10e3/uL 1.3 (L)   Hemoglobin Latest Ref Range: 13.3 - 17.7 g/dL 7.3 (L)   Hematocrit Latest Ref Range: 40.0 - 53.0 % 23.1 (L)   Platelet Count Latest Ref Range: 150 - 450 10e3/uL 173   RBC Count Latest Ref Range: 4.40 - 5.90 10e6/uL 2.33 (L)   MCV Latest Ref Range: 78 - 100 fL 99   MCH Latest Ref Range: 26.5 - 33.0 pg 31.3   MCHC Latest Ref Range: 31.5 - 36.5 g/dL 31.6   RDW Latest Ref Range: 10.0 - 15.0 % 16.8 (H)   % Neutrophils Latest Units: % 77   % Lymphocytes Latest Units: % 11   % Monocytes Latest Units: % 7   % Eosinophils Latest Units: % 2   % Basophils Latest Units: % 1   Absolute Basophils Latest Ref Range: 0.0 - 0.2 10e3/uL 0.0   Absolute Eosinophils Latest Ref Range: 0.0 - 0.7 10e3/uL 0.0   Absolute Immature Granulocytes Latest Ref Range: <=0.0 10e3/uL 0.0   Absolute Lymphocytes Latest Ref Range: 0.8 - 5.3 10e3/uL 0.1 (L)   Absolute Monocytes Latest Ref Range: 0.0 - 1.3 10e3/uL 0.1   % Immature Granulocytes Latest Units: % 2   Absolute Neutrophils Latest Ref Range: 1.6 - 8.3 10e3/uL 1.0 (L)   Absolute NRBCs Latest Units: 10e3/uL 0.0   NRBCs per 100 WBC  Latest Ref Range: <1 /100 0     Medications  Scheduled Meds:    ceFEPIme (MAXIPIME) IV  2 g Intravenous Q12H     heparin  5-10 mL Intracatheter Q28 Days     heparin lock flush  5-10 mL Intracatheter Q24H     sodium chloride (PF)  3 mL Intracatheter Q8H     vancomycin  1,000 mg Intravenous Q24H     Continuous Infusions:    lactated ringers 125 mL/hr at 10/29/21 0439     sodium chloride Stopped (10/28/21 5669)     sodium chloride       PRN Meds:.acetaminophen **OR** acetaminophen, lidocaine 4%, lidocaine (buffered or not buffered), melatonin, ondansetron **OR** ondansetron, oxyCODONE, polyethylene glycol, sodium chloride (PF)    Pertinent Radiology   Radiology Results personally reviewed    [facility]  LOCATION: [facility]  DATE: 10/29/2021     PROCEDURE: NEPHROSTOMY TUBE EXCHANGE     ATTENDING: Tong Leahy MD.                                                                   IMPRESSION:    Successful right nephrostomy exchange, as discussed above.  EKG Results: personally reviewed    Advanced Care Planning:  Discharge planning discussed with patient         [facility]  Lainey Conrad

## 2021-10-29 NOTE — DISCHARGE INSTRUCTIONS
Percutaneous Nephrostomy Tube (PNT) Exchange/Check Discharge Instructions:  You had your nephrostomy tube checked or exchanged today. Please refer to the below instructions following your check/exchange:    Care Instructions:  - If you received sedation for your procedure, do not drive or operate heavy machinery for the rest of the day.  - Rest today if your tube was exchanged. Avoid strenuous activity and heavy lifting for the rest of the day. Return to your normal activities as you tolerate tomorrow.  - Keep the nephrostomy tube site clean and dry.   - You may shower, but do not submerge the nephrostomy tube site in water (avoid tub baths, Jacuzzis, hot tubs and pools).  - If you have a gauze dressing, change the gauze and tape dressing as needed to keep site clean and dry. If you have a securement device, leave in place until your next exchange.  - Clean around nephrostomy tubes exit sites with soap and water, pat and apply new split gauze around the tube at the exit site.  - Urine going into the bag may be red with blood for the first few days.  - Keep the drainage bag lower than your kidney to keep urine from backing up.  - Inspect the tube often for kinks.  - Empty your drainage bag when it is approximately half way fluid. Follow below instructions for emptying bag:  - Clean hands well with soap and water.  - Place a container near the outlet valve of the drainage bag.  - To open the valve:  - If you have a [facility] leg bag: Twist the blue valve at the bottom of the bag while holding the valve over your container to empty bag. Re-twist the valve closed on the drainage bag once complete.  - If you have a [facility] leg bag: Turn the lever downward while holding the valve over your container to empty the bag. Turn the valve upward to close once complete.  - Discard drainage into toilet once urine drained.    Follow-Up:  - Routine (every 2-3 months) nephrostomy tube exchange is recommended. Your Urologist may  order and schedule exchanges by calling [facility] at [phone].    Call [facility] ([phone]) if you experience the following:  - Nephrostomy tube(s) stops draining urine.  - Nephrostomy tube(s) site is leaking urine.  - Extreme pain at the nephrostomy tube site.  - Nephrostomy tube appears to be falling out or has fallen out, becomes clogged or breaks.    Seek Medical Evaluation for the following:  - Fever (greater than 101 F (38.3C)).  - Purulent (yellow/green/foul smelling) drainage from nephrostomy tube exit sites.  - Significant bleeding from nephrostomy tube site(s).  - Significant or worsening pain at nephrostomy tube exit site(s) or back.

## 2021-10-29 NOTE — PHARMACY-ADMISSION MEDICATION HISTORY
Pharmacy Note - Admission Medication History    Pertinent Provider Information:      ______________________________________________________________________    Prior To Admission (PTA) med list completed and updated in EMR.       PTA Med List   Medication Sig Last Dose     acetaminophen (TYLENOL) 500 MG tablet Take 500-1,000 mg by mouth every 6 hours as needed for mild pain or fever  10/28/2021 at Unknown time     fentaNYL (DURAGESIC) 25 mcg/hr 72 hr patch Place 1 patch onto the skin every 72 hours remove old patch. 10/26/2021     ferrous sulfate (FEROSUL) 325 (65 Fe) MG tablet Take 325 mg by mouth 2 times daily 10/28/2021 at x1     gabapentin (NEURONTIN) 300 MG capsule Take 300 mg by mouth 2 times daily 10/28/2021 at x1     omeprazole (PRILOSEC) 20 MG DR capsule Take 20 mg by mouth daily as needed prn     oxybutynin (DITROPAN) 5 MG tablet Take 5 mg by mouth 3 times daily 10/28/2021 at x1     oxyCODONE (ROXICODONE) 5 MG tablet Take 5 mg by mouth every 4 hours as needed for severe pain  prn     sertraline (ZOLOFT) 50 MG tablet Take 50 mg by mouth At Bedtime  10/27/2021 at Unknown time     tamsulosin (FLOMAX) 0.4 MG capsule Take 0.4 mg by mouth daily 10/28/2021 at Unknown time     XARELTO ANTICOAGULANT 20 MG TABS tablet Take 20 mg by mouth daily 10/28/2021 at Unknown time     zolpidem (AMBIEN) 5 MG tablet Take 5 mg by mouth At Bedtime 10/27/2021 at Unknown time       Information source(s): [facility] Family member and [facility]Everywhere[facility]  Method of interview communication: in-person    Summary of Changes to PTA Med List  New: sertraline, zolpidem  Discontinued: acetaminophen (duplicate), Claritin-D, prochloperazine  Changed: iron, gabapentin, omeprazole, oxycodone, Xarelto    Patient was asked about OTC/herbal products specifically.  PTA med list reflects this.    In the past week, patient estimated taking medication this percent of the time:  greater than 90%.    Allergies were reviewed, assessed, and updated  with the patient.      Patient does not use any multi-dose medications prior to admission.    The information provided in this note is only as accurate as the sources available at the time of the update(s).    Thank you for the opportunity to participate in the care of this patient.    Dora Mejia [facility]  10/28/2021 7:54 PM

## 2021-10-29 NOTE — CONSULTS
"Care Management Initial Consult    General Information  Assessment completed with: Patient, Spouse or significant other, Howell and wife Giselle  Type of CM/SW Visit: Initial Assessment    Primary Care Provider verified and updated as needed: Yes   Readmission within the last 30 days: no previous admission in last 30 days      Reason for Consult: discharge planning  Advance Care Planning: Advance Care Planning Reviewed: other (comment) (\"I don't have one\")          Communication Assessment  Patient's communication style: spoken language (English or Bilingual)    Hearing Difficulty or Deaf: no   Wear Glasses or Blind: yes    Cognitive  Cognitive/Neuro/Behavioral: WDL                      Living Environment:   People in home: spouse  Giselle  Current living Arrangements: house      Able to return to prior arrangements: yes       Family/Social Support:  Care provided by: self  Provides care for: no one  Marital Status:   Wife  Giselle       Description of Support System: Supportive, Involved    Support Assessment: Adequate family and caregiver support, Adequate social supports, Patient communicates needs well met    Current Resources:   Patient receiving home care services: No     Community Resources: None  Equipment currently used at home: other (see comments) (nephrostomy tube)  Supplies currently used at home: Other (\"glasses\")    Employment/Financial:  Employment Status: retired     Employment/ Comments: \"no  history\"  Financial Concerns:     Referral to Financial Counselor: No       Lifestyle & Psychosocial Needs:  Social Determinants of Health     Tobacco Use: Low Risk      Smoking Tobacco Use: Never Smoker     Smokeless Tobacco Use: Never Used   Alcohol Use:      Frequency of Alcohol Consumption:      Average Number of Drinks:      Frequency of Binge Drinking:    Financial Resource Strain:      Difficulty of Paying Living Expenses:    Food Insecurity:      Worried About Running Out of Food in the " Last Year:      Ran Out of Food in the Last Year:    Transportation Needs:      Lack of Transportation (Medical):      Lack of Transportation (Non-Medical):    Physical Activity:      Days of Exercise per Week:      Minutes of Exercise per Session:    Stress:      Feeling of Stress :    Social Connections:      Frequency of Communication with Friends and Family:      Frequency of Social Gatherings with Friends and Family:      Attends Yazdanism Services:      Active Member of Clubs or Organizations:      Attends Club or Organization Meetings:      Marital Status:    Intimate Partner Violence:      Fear of Current or Ex-Partner:      Emotionally Abused:      Physically Abused:      Sexually Abused:    Depression:      PHQ-2 Score:    Housing Stability:      Unable to Pay for Housing in the Last Year:      Number of Places Lived in the Last Year:      Unstable Housing in the Last Year:        Functional Status:  Prior to admission patient needed assistance:   Dependent ADLs:: Independent  Dependent IADLs:: Independent       Mental Health Status:          Chemical Dependency Status:                Values/Beliefs:  Spiritual, Cultural Beliefs, Yazdanism Practices, Values that affect care:                 Additional Information:  Rich lives in a house with his wife. He is independent with ADLs at baseline. He still drives.    Unknown discharge needs at this time.    Wife to transport at discharge.    Christin Desai RN

## 2021-10-29 NOTE — PROGRESS NOTES
[facility]    Patient readmitted for fever on 10/29/21.    Patient is currently on service with [facility] for chemotherapy ([place] q 2 weeks).    Pt's VAD: [place].    Nursing provided by: N/A. Chemotherapy provided by [facility] pharmacy, but hook-ups/disconnects are done in the oncology clinic.    [facility] Liaison will follow along.    Thank you,     Glenny Enriquez RN, BSN  [facility]  [phone] (Monday-Fri 8:00 am-5:00 pm)  [phone] Office

## 2021-10-29 NOTE — PROGRESS NOTES
Pharmacy Vancomycin Initial Note  Date of Service 2021  Patient's  1960  60 year old, male    Indication: Pyelonephritis    Current estimated CrCl = Estimated Creatinine Clearance: 39 mL/min (A) (based on SCr of 2.07 mg/dL (H)).    Creatinine for last 3 days  10/28/2021:  5:28 PM Creatinine 2.33 mg/dL; 10:58 PM Creatinine 2.02 mg/dL  10/29/2021:  5:09 AM Creatinine 2.07 mg/dL    Recent Vancomycin Level(s) for last 3 days  No results found for requested labs within last 72 hours.      Vancomycin IV Administrations (past 72 hours)                   vancomycin (VANCOCIN) 1,250 mg in sodium chloride 0.9 % 250 mL intermittent infusion (mg) 1,250 mg New Bag 10/28/21 1937                Nephrotoxins and other renal medications (From now, onward)    None          Contrast Orders - past 72 hours (72h ago, onward)    Start     Dose/Rate Route Frequency Ordered Stop    10/28/21 1930  iopamidol (ISOVUE-370) solution 75 mL      75 mL Intravenous ONCE 10/28/21 1905 10/28/21 1907        Loading dose: 1250 mg at 19:37 10/28/2021.  Regimen: 1000 mg IV every 24 hours.  Start time: 10:00 on 10/29/2021  Exposure target: AUC24 (range)400-600 mg/L.hr   AUC24,ss: 531 mg/L.hr  Probability of AUC24 > 400: 80 %  Ctrough,ss: 17.1 mg/L  Probability of Ctrough,ss > 20: 35 %  Probability of nephrotoxicity (Lodise ROCIO ): 13 %        Plan:  1. Continue with vancomycin  1000 mg IV q24h.   2. Vancomycin monitoring method: AUC  3. Vancomycin therapeutic monitoring goal: 400-600 mg*h/L  4. Pharmacy will check vancomycin levels as appropriate in 1-3 Days.    5. Serum creatinine levels will be ordered a minimum of twice weekly.      Beronica Caceres [facility]

## 2021-10-29 NOTE — PROGRESS NOTES
"  Interventional Radiology - Pre-Procedure Note:  10/29/2021    Procedure Requested: R PNT  Requested by: Patricia Gonzalez DO    Brief HPI: Rich Wolff is a 60 year old male with a history of prostate cancer and robotic radical prostatectomy in 2015 and poorly differentiated adenocarcinoma of the colon with right ureteral obstruction s/p R PNT (initially placed at [facility] 11/11/2020). Who presented to ED yesterday with severe sepsis secondary to pyelonephritis. In need of R PNT exchange.    R PNT (10F) last exchanged 8/16/21.      NPO: midnight.  ANTICOAGULANTS: none  ANTIBIOTICS: cefepime. Vanco.    ALLERGIES  No Known Allergies      LABS:  INR   Date Value Ref Range Status   08/04/2021 1.33 (H) 0.90 - 1.15 Final     Comment:     Effective 7/11/2021, the reference range for this assay has changed.      Hemoglobin   Date Value Ref Range Status   10/29/2021 7.3 (L) 13.3 - 17.7 g/dL Final   12/24/2020 7.7 (L) 13.3 - 17.7 g/dL Final   ]  Platelet Count   Date Value Ref Range Status   10/29/2021 173 150 - 450 10e3/uL Final   12/24/2020 424 150 - 450 10e9/L Final     Creatinine   Date Value Ref Range Status   10/29/2021 2.07 (H) 0.70 - 1.30 mg/dL Final   12/23/2020 1.33 (H) 0.66 - 1.25 mg/dL Final     Potassium   Date Value Ref Range Status   10/29/2021 3.8 3.5 - 5.0 mmol/L Final   12/23/2020 3.4 3.4 - 5.3 mmol/L Final         EXAM:  /59   Pulse 84   Temp 100.3  F (37.9  C)   Resp 30   Ht 1.727 m (5' 8\")   Wt 72.6 kg (160 lb)   SpO2 100%   BMI 24.33 kg/m      ASSESSMENT/PLAN:   60 year old male with a history of prostate cancer and robotic radical prostatectomy in 2015 and poorly differentiated adenocarcinoma of the colon with right ureteral obstruction s/p R PNT (initially placed at [facility] 11/11/2020). Who presented to ED yesterday with severe sepsis secondary to pyelonephritis. In need of R PNT exchange.      R PNT exchange with sedation as needed.    Radiologist to review procedure with " patient.    Aaliyah Nye PA-C  Interventional Radiology

## 2021-10-29 NOTE — CONSULTS
Consultation - INFECTIOUS DISEASE CONSULTATION  Rich Wolff ,  1960, MRN [id number]      Febrile neutropenia (H) [D70.9, R50.81]    PCP: Maycol Worrell, [phone]   Code status:  Full Code               Assessment:  1. Neutropenic fever: presented with worsening fever after chemotherapy. WBC 1.3. CT imaging with inflammatory changes R kidney, now s/p nephrostomy tube exchange. All cultures are pending. On vancomycin and cefepime.   2. Comorbid conditions affecting immune system: colon cancer, R nephrostomy tube, CHAYO on CKD.     Active Problems:    Urinary tract infection without hematuria, site unspecified    Febrile neutropenia (H)        Recommendations:   - continue cefepime IV, renally dosed per pharmacy  - continue vancomycin IV for now  - repeat BC from port  - follow BC, UC  - further recommendations to follow clinical course  - ID will follow, thanks    Rebeca Raymond MD  [facility]  [phone]       HPI:    Rich Wolff is a 60 year old old male. History is provided by patient and chart.  He was finishing chemotherapy yesterday and during routine vitals, he was noted to be febrile. No symptoms. Chemotherapy was discontinued and went home, continued to have fevers (asymptomatic) but then also got some chills. Usually gets mouth ulcers with chemo but none presently. Has dry skin with excoriations but nothing looks infected and aside from the chills, he is feeling ok. CT imaging with pyelonephritis and s/p stent exchange this morning. Last exchange in August. Has some pain at tube exit site when he leans on it, but otherwise ok.     Chief complaint: Active Problems:    Urinary tract infection without hematuria, site unspecified    Febrile neutropenia (H)      Medical History  Active Ambulatory Problems     Diagnosis Date Noted     Pulmonary embolism (H) 2020     Acute cystitis without hematuria 2021     Gastrointestinal hemorrhage  11/03/2020     Acute kidney injury (H) 08/04/2021     Acute renal failure (H) 11/12/2020     Anemia due to blood loss, acute 08/04/2021     Anxiety 07/30/2021     Cancer of abdomen (H) 11/03/2020     Carcinoma of prostate (H) 04/19/2021     Cecum mass 08/04/2021     Chronic pain 07/30/2021     Colostomy in place (H) 08/04/2021     Nephrostomy status (H) 08/04/2021     Colostomy status (H) 07/30/2021     Erectile dysfunction following radical prostatectomy 11/03/2015     History of malignant neoplasm of colon 11/08/2020     History of malignant neoplasm of prostate 12/14/2015     History of pulmonary embolism 07/30/2021     Hyperlipidemia 04/19/2021     Iron deficiency anemia 07/30/2021     Malignant neoplasm metastatic to liver (H) 04/26/2021     Malignant neoplasm of cecum (H) 11/09/2020     Malignant neoplasm of colon (H) 03/10/2021     Postoperative care for cataract 09/14/2015     Prostate cancer (H) 09/01/2015     Pyelonephritis 04/19/2021     Sepsis, due to unspecified organism, unspecified whether acute organ dysfunction present (H) 08/04/2021     Stage 3 chronic kidney disease (H) 07/30/2021     Urinary tract infection without hematuria, site unspecified 08/04/2021     Resolved Ambulatory Problems     Diagnosis Date Noted     No Resolved Ambulatory Problems     Past Medical History:   Diagnosis Date     Hypertension          Surgical History  He  has a past surgical history that includes IR Chest Port Placement > 5 Yrs of Age (11/24/2020); IR Nephrostomy Tube Change Right (4/8/2021); Surgery Scrotal / Testicular; Pr Lap,Prostatectomy,Radical,W/Nerve Spare,Incl Robotic (Bilateral, 09/01/2015); Prostate Biopsy (06/08/2015); Esophagoscopy, gastroscopy, duodenoscopy (EGD), combined (11/04/2020); Colonoscopy W/ Biopsies (11/04/2020); Colonoscopy w/wo Brush **Performed** (N/A, 11/4/2020); Pr Esophagogastroduodenoscopy Transoral Diagnostic (N/A, 11/4/2020); Bowel Resection; Colostomy; Nephrostomy; Ir Port Placement  >5 Years (11/24/2020); Ir Nephrostomy Tube Change Right (4/8/2021); IR Nephrostomy Tube Change Right (8/16/2021); and IR Nephrostomy Tube Change Right (10/29/2021).       Social History  Reviewed, and he  reports that he has never smoked. He has never used smokeless tobacco. He reports previous alcohol use. He reports that he does not use drugs.        Family History  Reviewed and noncontributory to present problem, and family history includes Breast Cancer in his mother; No Known Problems in his daughter, sister, and son; Osteoporosis in his mother; Prostate Cancer (age of onset: 70.00) in his father; Valvular heart disease in his father.   No FH frequent infections.  Psychosocial Needs  Social History     Social History Narrative     Not on file     Additional psychosocial needs reviewed per nursing assessment.       No Known Allergies   (Not in a hospital admission)       Review of Systems:  A 12 point comprehensive review of systems was negative except as noted. Physical Exam:  Temp:  [98.4  F (36.9  C)-102.1  F (38.9  C)] 98.4  F (36.9  C)  Pulse:  [] 79  Resp:  [11-73] 25  BP: ()/(51-73) 109/58  SpO2:  [96 %-100 %] 96 %    GEN: alert and oriented x3, NAD  HEAD: atraumatic   ENT: moist membranes, no thrush, anicteric sclera, no thrush poor dentition  NECK: supple, no nuchal rigidity  CARDIOVASCULAR: regular rate and rhythm, no murmurs, rubs, or gallops. R chest port c/d/i.   PULMONARY: lungs clear to ausculation bilaterally  ABDOMEN: soft, nontender, nondistended. Normal bowel sounds  SKIN: no rashes or lesions. No stigma of endocarditis. Scratches bilateral LE.   PSYCH: grossly intact  MUSCULOSKELETAL: no synovitis               Pertinent Labs  personally reviewed.   CBC RESULTS:   Recent Labs   Lab Test 10/29/21  0509   WBC 1.3*   RBC 2.33*   HGB 7.3*   HCT 23.1*   MCV 99   MCH 31.3   MCHC 31.6   RDW 16.8*           Last Comprehensive Metabolic Panel:  Sodium   Date Value Ref Range Status    10/29/2021 138 136 - 145 mmol/L Final   12/23/2020 134 133 - 144 mmol/L Final     Potassium   Date Value Ref Range Status   10/29/2021 3.8 3.5 - 5.0 mmol/L Final   12/23/2020 3.4 3.4 - 5.3 mmol/L Final     Chloride   Date Value Ref Range Status   10/29/2021 119 (H) 98 - 107 mmol/L Final   12/23/2020 109 94 - 109 mmol/L Final     Carbon Dioxide   Date Value Ref Range Status   12/23/2020 20 20 - 32 mmol/L Final     Carbon Dioxide (CO2)   Date Value Ref Range Status   10/29/2021 15 (L) 22 - 31 mmol/L Final     Anion Gap   Date Value Ref Range Status   10/29/2021 4 (L) 5 - 18 mmol/L Final   12/23/2020 5 3 - 14 mmol/L Final     Glucose   Date Value Ref Range Status   10/29/2021 92 70 - 125 mg/dL Final   12/23/2020 80 70 - 99 mg/dL Final     Urea Nitrogen   Date Value Ref Range Status   10/29/2021 43 (H) 8 - 22 mg/dL Final   12/23/2020 25 7 - 30 mg/dL Final     Creatinine   Date Value Ref Range Status   10/29/2021 2.07 (H) 0.70 - 1.30 mg/dL Final   12/23/2020 1.33 (H) 0.66 - 1.25 mg/dL Final     GFR Estimate   Date Value Ref Range Status   10/29/2021 34 (L) >60 mL/min/1.73m2 Final     Comment:     As of July 11, 2021, eGFR is calculated by the CKD-EPI creatinine equation, without race adjustment. eGFR can be influenced by muscle mass, exercise, and diet. The reported eGFR is an estimation only and is only applicable if the renal function is stable.   04/21/2021 39 (L) >60 mL/min/1.73m2 Final   12/23/2020 58 (L) >60 mL/min/[1.73_m2] Final     Comment:     Non  GFR Calc  Starting 12/18/2018, serum creatinine based estimated GFR (eGFR) will be   calculated using the Chronic Kidney Disease Epidemiology Collaboration   (CKD-EPI) equation.       Calcium   Date Value Ref Range Status   10/29/2021 8.2 (L) 8.5 - 10.5 mg/dL Final   12/23/2020 8.6 8.5 - 10.1 mg/dL Final       The following microbiology studies were personally reviewed:  No results found for: CULT    Urine Studies    Recent Labs   Lab Test  10/28/21  1754 11/04/20  1736   LEUKEST 500 Marlon/uL* Negative   WBCU >182*  --        Vancomycin Levels  No lab results found.    Invalid input(s): VANCO    MICROBIOLOGY DATA:  10/28 BC pending  10/28 UC pending    Pertinent Radiology  personally reviewed.   Recent Results (from the past 24 hour(s))   XR Chest Port 1 View    Narrative    EXAM: XR CHEST PORT 1 VIEW  LOCATION: [facility]  DATE/TIME: 10/28/2021 5:27 PM    INDICATION: fever; on chemotherapy  COMPARISON: 04/19/2021       Impression    IMPRESSION: A right chest wall Port-A-Cath is present. No pleural fluid or pneumothorax. No airspace disease. Normal size of the heart.    CT Abdomen Pelvis w Contrast    Narrative    EXAM: CT ABDOMEN PELVIS W CONTRAST  LOCATION: [facility]  DATE/TIME: 10/28/2021 6:57 PM    INDICATION: sepsis/evaluate for nephrostomy tube obstruction  COMPARISON: CT 08/04/2021   TECHNIQUE: CT scan of the abdomen and pelvis was performed following injection of IV contrast. Multiplanar reformats were obtained. Dose reduction techniques were used.  CONTRAST: [id number] 75ml    FINDINGS:   LOWER CHEST: Normal.    HEPATOBILIARY: Cholelithiasis.     PANCREAS: Normal.    SPLEEN: Normal.    ADRENAL GLANDS: Normal.    KIDNEYS/BLADDER: The right kidney as nephrostomy tube has been retracted and is coiled in the region of the renal cortex. There is mild right hydronephrosis with enhancement and/or thickening of the renal pelvis wall and adjacent stranding. There is   decrease/delayed enhancement and delayed excretion of the right kidney relative to the left kidney. No left hydronephrosis or hydroureter. Normal urinary bladder.     BOWEL: Normal stomach. Normal caliber of the small bowel. There is a diverting ostomy in the right lower quadrant. There is tethering of bowel loops in the right lower quadrant with ill-defined soft tissue in this region. Otherwise normal appearance of   the colon.  A duodenal  suture line is again seen.    LYMPH NODES: Haziness of the mesentery is nonspecific.     VASCULATURE: Unremarkable.    PELVIC ORGANS: Prostatectomy.     MUSCULOSKELETAL: Surgical changes along the ventral abdominal wall.       Impression    IMPRESSION:   1.  The right nephrostomy tube has been retracted. There is decreased/delayed enhancement of the right kidney with inflammatory change at the right renal pelvis. Underlying infection is possible. Consider replacing the nephrostomy tube.  2.  Right lower quadrant tethering/mass, without significant change from the prior study.    IR Nephrostomy Tube Change Right    Narrative    [facility]  LOCATION: [facility]  DATE: 10/29/2021    PROCEDURE: NEPHROSTOMY TUBE EXCHANGE    ATTENDING: Tong Leahy MD.    INDICATION: 60-year-old male with sepsis. Exchange of the nephrostomy is requested.    CONSENT: The risks, benefits, and alternatives of nephrostomy tube exchange were discussed with the patient in detail. All questions were answered. Informed consent was given to proceed with the procedure.    MODERATE SEDATION: None.    ADDITIONAL MEDICATIONS: None.    FLUOROSCOPIC TIME: 1.0 minutes.    AIR KERMA:  33 mGy.    CONTRAST: 10 mL Omnipaque.    UNIVERSAL PRECAUTIONS: The procedure was performed utilizing maximum sterile barrier technique. Prior to the start of the procedure, a standard pause for patient safety was performed with site marking as indicated.    COMPLICATIONS: No immediate complications.    PROCEDURE:    A  image was obtained. A nephrostogram was performed through the previously placed right nephrostomy tube. Under direct fluoroscopic visualization, the previous tube was removed over a wire and exchange was made for a new 10 Arabic nephrostomy. The   loop was locked within the renal pelvis, and the catheter was sutured to the skin. A post placement nephrostogram was performed.    FINDINGS:    The initial  image shows the  previously placed right nephrostomy tube. The tube has pulled back such that it is in a lower pole peripheral calyx. At the completion of the study, the new nephrostomy loop lies within the renal pelvis and controls the   urinary system well.      Impression    IMPRESSION:    Successful right nephrostomy exchange, as discussed above.

## 2021-10-30 LAB
ANION GAP SERPL CALCULATED.3IONS-SCNC: 6 MMOL/L (ref 5–18)
BASO STIPL BLD QL SMEAR: PRESENT
BASOPHILS # BLD MANUAL: 0 10E3/UL (ref 0–0.2)
BASOPHILS NFR BLD MANUAL: 2 %
BUN SERPL-MCNC: 34 MG/DL (ref 8–22)
CALCIUM SERPL-MCNC: 8.3 MG/DL (ref 8.5–10.5)
CHLORIDE BLD-SCNC: 114 MMOL/L (ref 98–107)
CO2 SERPL-SCNC: 18 MMOL/L (ref 22–31)
CREAT SERPL-MCNC: 2.15 MG/DL (ref 0.7–1.3)
ELLIPTOCYTES BLD QL SMEAR: SLIGHT
EOSINOPHIL # BLD MANUAL: 0 10E3/UL (ref 0–0.7)
EOSINOPHIL NFR BLD MANUAL: 2 %
ERYTHROCYTE [DISTWIDTH] IN BLOOD BY AUTOMATED COUNT: 16.5 % (ref 10–15)
FRAGMENTS BLD QL SMEAR: SLIGHT
GFR SERPL CREATININE-BSD FRML MDRD: 32 ML/MIN/1.73M2
GLUCOSE BLD-MCNC: 110 MG/DL (ref 70–125)
HCT VFR BLD AUTO: 22.3 % (ref 40–53)
HGB BLD-MCNC: 7.1 G/DL (ref 13.3–17.7)
LACTATE SERPL-SCNC: 1.3 MMOL/L (ref 0.7–2)
LYMPHOCYTES # BLD MANUAL: 0.2 10E3/UL (ref 0.8–5.3)
LYMPHOCYTES NFR BLD MANUAL: 16 %
MAGNESIUM SERPL-MCNC: 1.8 MG/DL (ref 1.8–2.6)
MCH RBC QN AUTO: 30.9 PG (ref 26.5–33)
MCHC RBC AUTO-ENTMCNC: 31.8 G/DL (ref 31.5–36.5)
MCV RBC AUTO: 97 FL (ref 78–100)
MONOCYTES # BLD MANUAL: 0 10E3/UL (ref 0–1.3)
MONOCYTES NFR BLD MANUAL: 2 %
NEUTROPHILS # BLD MANUAL: 0.8 10E3/UL (ref 1.6–8.3)
NEUTROPHILS NFR BLD MANUAL: 78 %
PLAT MORPH BLD: ABNORMAL
PLATELET # BLD AUTO: 158 10E3/UL (ref 150–450)
POTASSIUM BLD-SCNC: 3.3 MMOL/L (ref 3.5–5)
POTASSIUM BLD-SCNC: 3.4 MMOL/L (ref 3.5–5)
POTASSIUM BLD-SCNC: 3.4 MMOL/L (ref 3.5–5)
POTASSIUM BLD-SCNC: 3.8 MMOL/L (ref 3.5–5)
RBC # BLD AUTO: 2.3 10E6/UL (ref 4.4–5.9)
RBC MORPH BLD: ABNORMAL
SODIUM SERPL-SCNC: 138 MMOL/L (ref 136–145)
WBC # BLD AUTO: 1 10E3/UL (ref 4–11)

## 2021-10-30 PROCEDURE — 250N000013 HC RX MED GY IP 250 OP 250 PS 637: Performed by: INTERNAL MEDICINE

## 2021-10-30 PROCEDURE — 80048 BASIC METABOLIC PNL TOTAL CA: CPT | Performed by: INTERNAL MEDICINE

## 2021-10-30 PROCEDURE — 250N000011 HC RX IP 250 OP 636: Performed by: INTERNAL MEDICINE

## 2021-10-30 PROCEDURE — 36415 COLL VENOUS BLD VENIPUNCTURE: CPT | Performed by: INTERNAL MEDICINE

## 2021-10-30 PROCEDURE — 99233 SBSQ HOSP IP/OBS HIGH 50: CPT | Performed by: INTERNAL MEDICINE

## 2021-10-30 PROCEDURE — 99233 SBSQ HOSP IP/OBS HIGH 50: CPT | Mod: GC | Performed by: INTERNAL MEDICINE

## 2021-10-30 PROCEDURE — 84132 ASSAY OF SERUM POTASSIUM: CPT | Performed by: INTERNAL MEDICINE

## 2021-10-30 PROCEDURE — 120N000001 HC R&B MED SURG/OB

## 2021-10-30 PROCEDURE — 85027 COMPLETE CBC AUTOMATED: CPT | Performed by: INTERNAL MEDICINE

## 2021-10-30 PROCEDURE — 83735 ASSAY OF MAGNESIUM: CPT | Performed by: NURSE PRACTITIONER

## 2021-10-30 PROCEDURE — 258N000003 HC RX IP 258 OP 636: Performed by: INTERNAL MEDICINE

## 2021-10-30 RX ORDER — POTASSIUM CHLORIDE 1500 MG/1
20 TABLET, EXTENDED RELEASE ORAL ONCE
Status: COMPLETED | OUTPATIENT
Start: 2021-10-30 | End: 2021-10-30

## 2021-10-30 RX ORDER — SODIUM BICARBONATE 650 MG/1
1300 TABLET ORAL 2 TIMES DAILY
Status: DISCONTINUED | OUTPATIENT
Start: 2021-10-30 | End: 2021-11-01 | Stop reason: HOSPADM

## 2021-10-30 RX ADMIN — FERROUS SULFATE TAB 325 MG (65 MG ELEMENTAL FE) 325 MG: 325 (65 FE) TAB at 08:01

## 2021-10-30 RX ADMIN — RIVAROXABAN 20 MG: 10 TABLET, FILM COATED ORAL at 11:31

## 2021-10-30 RX ADMIN — GABAPENTIN 300 MG: 300 CAPSULE ORAL at 08:02

## 2021-10-30 RX ADMIN — PANTOPRAZOLE SODIUM 40 MG: 20 TABLET, DELAYED RELEASE ORAL at 05:35

## 2021-10-30 RX ADMIN — CEFEPIME HYDROCHLORIDE 2 G: 2 INJECTION, POWDER, FOR SOLUTION INTRAVENOUS at 07:53

## 2021-10-30 RX ADMIN — GABAPENTIN 300 MG: 300 CAPSULE ORAL at 20:42

## 2021-10-30 RX ADMIN — SODIUM BICARBONATE 1300 MG: 648 TABLET ORAL at 20:43

## 2021-10-30 RX ADMIN — CEFEPIME HYDROCHLORIDE 2 G: 2 INJECTION, POWDER, FOR SOLUTION INTRAVENOUS at 20:43

## 2021-10-30 RX ADMIN — POTASSIUM CHLORIDE 20 MEQ: 20 TABLET, EXTENDED RELEASE ORAL at 05:35

## 2021-10-30 RX ADMIN — FILGRASTIM-SNDZ 480 MCG: 480 INJECTION, SOLUTION INTRAVENOUS; SUBCUTANEOUS at 14:02

## 2021-10-30 RX ADMIN — SERTRALINE HYDROCHLORIDE 50 MG: 50 TABLET ORAL at 20:42

## 2021-10-30 RX ADMIN — OXYBUTYNIN CHLORIDE 5 MG: 5 TABLET ORAL at 08:01

## 2021-10-30 RX ADMIN — POTASSIUM CHLORIDE 40 MEQ: 20 TABLET, EXTENDED RELEASE ORAL at 00:19

## 2021-10-30 RX ADMIN — OXYBUTYNIN CHLORIDE 5 MG: 5 TABLET ORAL at 20:42

## 2021-10-30 RX ADMIN — POTASSIUM CHLORIDE 20 MEQ: 20 TABLET, EXTENDED RELEASE ORAL at 11:31

## 2021-10-30 RX ADMIN — ZOLPIDEM TARTRATE 5 MG: 5 TABLET ORAL at 20:43

## 2021-10-30 RX ADMIN — VANCOMYCIN HYDROCHLORIDE 1000 MG: 5 INJECTION, POWDER, LYOPHILIZED, FOR SOLUTION INTRAVENOUS at 10:28

## 2021-10-30 RX ADMIN — OXYBUTYNIN CHLORIDE 5 MG: 5 TABLET ORAL at 14:02

## 2021-10-30 RX ADMIN — FERROUS SULFATE TAB 325 MG (65 MG ELEMENTAL FE) 325 MG: 325 (65 FE) TAB at 20:42

## 2021-10-30 RX ADMIN — TAMSULOSIN HYDROCHLORIDE 0.4 MG: 0.4 CAPSULE ORAL at 08:01

## 2021-10-30 RX ADMIN — SODIUM BICARBONATE 1300 MG: 648 TABLET ORAL at 11:53

## 2021-10-30 ASSESSMENT — ACTIVITIES OF DAILY LIVING (ADL)
ADLS_ACUITY_SCORE: 11
ADLS_ACUITY_SCORE: 9
ADLS_ACUITY_SCORE: 11
ADLS_ACUITY_SCORE: 9
ADLS_ACUITY_SCORE: 11
ADLS_ACUITY_SCORE: 11
ADLS_ACUITY_SCORE: 9
ADLS_ACUITY_SCORE: 11
ADLS_ACUITY_SCORE: 9
ADLS_ACUITY_SCORE: 11
ADLS_ACUITY_SCORE: 11
ADLS_ACUITY_SCORE: 9
ADLS_ACUITY_SCORE: 11
ADLS_ACUITY_SCORE: 11
ADLS_ACUITY_SCORE: 9
ADLS_ACUITY_SCORE: 9
ADLS_ACUITY_SCORE: 11

## 2021-10-30 NOTE — PROGRESS NOTES
Hospitalist Progress Note  ADMIT DATE: 10/28/2021     FACILITY: [facility]    PCP: Maycol Worrell, [phone]    Assessment/Plan    Rich Wolff is a 60 year old male with a history of colon cancer on chemotherapy, h/o pyelonephritis, hyperlipidemia, hypertension, prostate cancer, PE on Xarelto, s/p nephrostomy tube who presents with fever following chemotherapy on 10/28.         Severe sepsis secondary to pyelonephritis complicated by nephrostomy tube and neutropenia  -improved with IVF and antibiotics  -Continue cefepime and vancomycin  -ID consultation appreciated  -Follow-up blood cultures  - Urinalysis reviewed and appears to have UTI/pyelonephritis  - CT abdomen reviewed-appears to have pyelonephritis  -IR consulted for nephrostomy tube exchange -done 10/29     Metabolic acidosis likely secondary to severe sepsis  Acute kidney injury in the setting of CKD stage III  -Baseline creatinine appears to be 1.7-1.8.  Creatinine on arrival 2.33, now 2.15  -Bicarb ivf as per nephrology  -nephrology consultation appreciated    Neutropenic fever  Neutropenia and anemia  -Likely related to chemotherapy.  -Oncology consultation appreciated  -start G-CSF until ANC >1000 for 3 consecutive days as per oncology     Colon cancer undergoing chemotherapy  -Consult oncology      Hypokalemia  -replace as per protocol     H/o Pulmonary embolism   -on Xarelto  -no signs of bleeding     Other comorbidities include hyperlipidemia, hypertension, prostate cancer     Code Status: Full Code  DVT prophylaxis: on Xarelto     Disposition:  -Anticipated Length of Stay in midnights and medical necessity (including a midnight in the Emergency Department after triage if applicable): Multiple days  -Discharge barriers: IV antibiotics, neutropenia on G-CSF, CHAYO with metabolic acidosis    Subjective  Feels better, no fever or chills, no pain    Objective    Vital signs in last 24 hours  Temp:  [98.4  F (36.9   C)-99.3  F (37.4  C)] 98.4  F (36.9  C)  Pulse:  [63-92] 63  Resp:  [16-23] 18  BP: ()/(50-66) 96/63  SpO2:  [96 %-100 %] 97 % [unfilled] O2 Device: None (Room air)    Weight:   [unfilled] Weight change: 4.536 kg (10 lb)    Intake/Output last 3 shifts  I/O last 3 completed shifts:  In: 3680 [P.O.:1380; I.V.:2300]  Out: 2825 [Urine:1325; Stool:1500]  Body mass index is 25.85 kg/m .    Physical Exam    Physical Exam  General appearance: not in acute distress  HEENT: PERRL, EOMI  Lungs: Clear breath sounds in bilateral lung fields  Cardiovascular: Regular rate and rhythm, normal S1-S2  Abdomen: Soft, non tender, no distension, normal bowel sound  Musculoskeletal: No joint swelling  Skin: No rash and no edema  Neurology: AAO ×3.  Cranial nerves II - XII normal.  Normal muscle strength in all four extremities.      Pertinent Labs   Lab Results: personally reviewed.   Results for ARTUR GOULD (MRN [id number]) as of 10/30/2021 08:37   Ref. Range 10/30/2021 04:05   Sodium Latest Ref Range: 136 - 145 mmol/L 138   Potassium Latest Ref Range: 3.5 - 5.0 mmol/L 3.4 (L)   Chloride Latest Ref Range: 98 - 107 mmol/L 114 (H)   Carbon Dioxide Latest Ref Range: 22 - 31 mmol/L 18 (L)   Urea Nitrogen Latest Ref Range: 8 - 22 mg/dL 34 (H)   Creatinine Latest Ref Range: 0.70 - 1.30 mg/dL 2.15 (H)   GFR Estimate Latest Ref Range: >60 mL/min/1.73m2 32 (L)   Calcium Latest Ref Range: 8.5 - 10.5 mg/dL 8.3 (L)   Anion Gap Latest Ref Range: 5 - 18 mmol/L 6   Magnesium Latest Ref Range: 1.8 - 2.6 mg/dL 1.8   Glucose Latest Ref Range: 70 - 125 mg/dL 110   WBC Latest Ref Range: 4.0 - 11.0 10e3/uL 1.0 (L)   Hemoglobin Latest Ref Range: 13.3 - 17.7 g/dL 7.1 (L)   Hematocrit Latest Ref Range: 40.0 - 53.0 % 22.3 (L)   Platelet Count Latest Ref Range: 150 - 450 10e3/uL 158   RBC Count Latest Ref Range: 4.40 - 5.90 10e6/uL 2.30 (L)   MCV Latest Ref Range: 78 - 100 fL 97   MCH Latest Ref Range: 26.5 - 33.0 pg 30.9   MCHC Latest Ref  Range: 31.5 - 36.5 g/dL 31.8   RDW Latest Ref Range: 10.0 - 15.0 % 16.5 (H)   % Neutrophils Latest Units: % 78   % Lymphocytes Latest Units: % 16   % Monocytes Latest Units: % 2   % Eosinophils Latest Units: % 2   % Basophils Latest Units: % 2   Absolute Basophils Latest Ref Range: 0.0 - 0.2 10e3/uL 0.0   Absolute Neutrophil Latest Ref Range: 1.6 - 8.3 10e3/uL 0.8 (L)   Absolute Lymphocytes Latest Ref Range: 0.8 - 5.3 10e3/uL 0.2 (L)   Absolute Monocytes Latest Ref Range: 0.0 - 1.3 10e3/uL 0.0   Absolute Eosinophils Latest Ref Range: 0.0 - 0.7 10e3/uL 0.0       Medications  Scheduled Meds:    ceFEPIme (MAXIPIME) IV  2 g Intravenous Q12H     fentaNYL  25 mcg Transdermal Q72H     fentaNYL   Transdermal Q8H     ferrous sulfate  325 mg Oral BID     gabapentin  300 mg Oral BID     heparin  5-10 mL Intracatheter Q28 Days     heparin lock flush  5-10 mL Intracatheter Q24H     oxybutynin  5 mg Oral TID     pantoprazole  40 mg Oral QAM AC     rivaroxaban ANTICOAGULANT  20 mg Oral Daily     sertraline  50 mg Oral At Bedtime     sodium chloride (PF)  3 mL Intracatheter Q8H     tamsulosin  0.4 mg Oral Daily     vancomycin  1,000 mg Intravenous Q24H     zolpidem  5 mg Oral At Bedtime     Continuous Infusions:    sodium bicabonate in 5% dextrose for infusion 75 mL/hr at 10/30/21 0018     PRN Meds:.acetaminophen **OR** acetaminophen, lidocaine 4%, lidocaine (buffered or not buffered), melatonin, naloxone **OR** naloxone **OR** naloxone **OR** naloxone, ondansetron **OR** ondansetron, oxyCODONE, polyethylene glycol, sodium chloride (PF)    Advanced Care Planning:  Discharge planning discussed with patient         [facility] Medicine Service  Lainey Conrad

## 2021-10-30 NOTE — PROGRESS NOTES
[facility] ONCOLOGY PROGRESS NOTE    Patient: Rich Wolff  MRN: [id number]  Date of Service: Oct 28, 2021     REASON FOR ADMISSION:  Neutropenic fever     Assessment    1.  A very pleasant 60 years old gentleman with metastatic colorectal cancer.  Status post FOLFOXIRI chemotherapy followed by surgical attempt followed by more chemotherapy.    2.  Neutropenic fever due to chemotherapy on 10/16/2021    3.  Pyelonephritis associated with R neprostomy tube, replaced    4.  Renal insufficiency, followed bu nephrology    5.  Normocytic normochromic anemia due to sepsis    6.  Mildly elevated alkaline phosphatase.    Plan    1.  I will start G-CSF until ANC >1000 for 3 consecutive days    2.  Agree with right nephrostomy tube replacement and every 2 months ongoing (emphasized to patient).    3.  DVT prophylaxis.    4.  IV antibiotics as directed.    5. No need for transfusions at this point.    6. Dr. Kern will follow.     Staging History  Cancer Staging  No matching staging information was found for the patient.      History    Mr. Rich Wolff is a 60 year old gentleman, pt of Dr. Kern, with a history of metastatic colorectal cancer.  Diagnosed in November 2020 presenting with some bowel obstruction and upon evaluation was found to have a large cecal mass involving the adjacent mesentery, sigmoid colon, right ureter and dome of the bladder.  Biopsy consistent with invasive moderately differentiated adenocarcinoma.  No evidence of any microsatellite instability.  He underwent laparoscopic ileostomy and then got few doses of FOLFoxFIRI chemotherapy.  Had some response.    Surgical exenteration was attempted but was aborted since it was not possible.  Now resumed chemotherapy with FOLFIRI few days ago at [facility].  He is a patient of Dr. Yen.    Came in the hospital with fever after chemotherapy.  Found to be neutropenic.  Also noted to have right hydronephrosis and some perhaps pyonephritis  type of changes.  He is nephrostomy tube has been exchanged.  He is on IV antibiotic.  Feels better.    Review of systems.  Positive for tiredness, fatigue, fever, decreased appetite, decrease in urine output etc.  Better after hospitalization and IV fluids.      Past History    Past Medical History:   Diagnosis Date     Hyperlipidemia      Hypertension      Prostate cancer (H) 09/01/2015    T1c. Silvana's 6 (3+3).  Confined to prostate.  Multifocal bilateral comprising 2% of the gland.PSA:5.8.     Past Surgical History:   Procedure Laterality Date     BOWEL RESECTION       COLONOSCOPY W/ BIOPSIES  11/04/2020     COLOSTOMY       ESOPHAGOSCOPY, GASTROSCOPY, DUODENOSCOPY (EGD), COMBINED  11/04/2020     IR CHEST PORT PLACEMENT > 5 YRS OF AGE  11/24/2020     IR NEPHROSTOMY TUBE CHANGE RIGHT  4/8/2021     IR NEPHROSTOMY TUBE CHANGE RIGHT  4/8/2021     IR NEPHROSTOMY TUBE CHANGE RIGHT  8/16/2021     IR NEPHROSTOMY TUBE CHANGE RIGHT  10/29/2021     IR PORT PLACEMENT >5 YEARS  11/24/2020     NEPHROSTOMY       OK ESOPHAGOGASTRODUODENOSCOPY TRANSORAL DIAGNOSTIC N/A 11/4/2020    Procedure: ESOPHAGOGASTRODUODENOSCOPY (EGD);  Surgeon: Paul Masters MD;  Location: [facility];  Service: Gastroenterology     OK LAP,PROSTATECTOMY,RADICAL,W/NERVE SPARE,INCL ROBOTIC Bilateral 09/01/2015    Procedure: DAVINCI RADICAL RETROPUBIC PROSTATECTOMY BILATERAL PELVIC LYMPH NODE DISSECTION;  Surgeon: Juan C Velazco MD;  Location: [facility];  Service: Urology     PROSTATE BIOPSY  06/08/2015    Ultrasound-guided transrectal biopsy.     SURGERY SCROTAL / TESTICULAR      for undescended testes, removed due to atrophy with vasectomy     [facility] COLONOSCOPY W/WO BRUSH/WASH N/A 11/4/2020    Procedure: COLONOSCOPY WITH BIOPSY;  Surgeon: Paul Masters MD;  Location: [facility];  Service: Gastroenterology     Family History   Problem Relation Age of Onset     Breast Cancer Mother      Osteoporosis Mother       "Valvular heart disease Father      Prostate Cancer Father 70.00     No Known Problems Sister      No Known Problems Son      No Known Problems Daughter      Social History     Socioeconomic History     Marital status:      Spouse name: Not on file     Number of children: Not on file     Years of education: Not on file     Highest education level: Not on file   Occupational History     Not on file   Tobacco Use     Smoking status: Never Smoker     Smokeless tobacco: Never Used   Substance and Sexual Activity     Alcohol use: Not Currently     Comment: Alcoholic Drinks/day: rarely     Drug use: No     Sexual activity: Not Currently   Other Topics Concern     Not on file   Social History Narrative     Not on file     Social Determinants of Health     Financial Resource Strain:      Difficulty of Paying Living Expenses:    Food Insecurity:      Worried About Running Out of Food in the Last Year:      Ran Out of Food in the Last Year:    Transportation Needs:      Lack of Transportation (Medical):      Lack of Transportation (Non-Medical):    Physical Activity:      Days of Exercise per Week:      Minutes of Exercise per Session:    Stress:      Feeling of Stress :    Social Connections:      Frequency of Communication with Friends and Family:      Frequency of Social Gatherings with Friends and Family:      Attends Tenriism Services:      Active Member of Clubs or Organizations:      Attends Club or Organization Meetings:      Marital Status:    Intimate Partner Violence:      Fear of Current or Ex-Partner:      Emotionally Abused:      Physically Abused:      Sexually Abused:          Allergies    No Known Allergies        Physical Exam    BP 96/63 (BP Location: Right arm)   Pulse 63   Temp 98.4  F (36.9  C) (Oral)   Resp 18   Ht 1.727 m (5' 8\")   Wt 77.1 kg (170 lb)   SpO2 97%   BMI 25.85 kg/m      GENERAL: no acute distress. Cooperative in conversation.   HEENT: pupils are equal, round and reactive. " Oral mucosa is moist and intact.  RESP:Chest symmetric. Regular respiratory rate. No stridor.  ABD: Nondistended, soft.  Nephrostomy tube in place.  EXTREMITIES: No lower extremity edema.   NEURO: non focal. Alert and oriented x3.   PSYCH: within normal limits. No depression or anxiety.  SKIN: warm dry intact       Lab Results    Recent Results (from the past 168 hour(s))   Symptomatic Influenza A/B & SARS-CoV2 (COVID-19) Virus PCR Multiplex Nasopharyngeal    Specimen: Nasopharyngeal; Swab   Result Value Ref Range    Influenza A target Negative Negative    Influenza B target Negative Negative    SARS CoV2 PCR Negative Negative   Basic metabolic panel   Result Value Ref Range    Sodium 135 (L) 136 - 145 mmol/L    Potassium 3.5 3.5 - 5.0 mmol/L    Chloride 114 (H) 98 - 107 mmol/L    Carbon Dioxide (CO2) 13 (L) 22 - 31 mmol/L    Anion Gap 8 5 - 18 mmol/L    Urea Nitrogen 51 (H) 8 - 22 mg/dL    Creatinine 2.33 (H) 0.70 - 1.30 mg/dL    Calcium 8.0 (L) 8.5 - 10.5 mg/dL    Glucose 129 (H) 70 - 125 mg/dL    GFR Estimate 29 (L) >60 mL/min/1.73m2   Lactic acid whole blood   Result Value Ref Range    Lactic Acid 1.7 0.7 - 2.0 mmol/L   Blood Culture Peripheral Blood    Specimen: Peripheral Blood   Result Value Ref Range    Culture No growth after 1 day    Hepatic function panel   Result Value Ref Range    Bilirubin Total 0.2 0.0 - 1.0 mg/dL    Bilirubin Direct <0.1 <=0.5 mg/dL    Protein Total 6.0 6.0 - 8.0 g/dL    Albumin 2.8 (L) 3.5 - 5.0 g/dL    Alkaline Phosphatase 130 (H) 45 - 120 U/L    AST 11 0 - 40 U/L    ALT 16 0 - 45 U/L   CBC with platelets and differential   Result Value Ref Range    WBC Count 1.9 (L) 4.0 - 11.0 10e3/uL    RBC Count 2.43 (L) 4.40 - 5.90 10e6/uL    Hemoglobin 7.4 (L) 13.3 - 17.7 g/dL    Hematocrit 24.1 (L) 40.0 - 53.0 %    MCV 99 78 - 100 fL    MCH 30.5 26.5 - 33.0 pg    MCHC 30.7 (L) 31.5 - 36.5 g/dL    RDW 16.8 (H) 10.0 - 15.0 %    Platelet Count 173 150 - 450 10e3/uL    % Neutrophils 85 %    %  Lymphocytes 7 %    % Monocytes 6 %    % Eosinophils 1 %    % Basophils 0 %    % Immature Granulocytes 1 %    NRBCs per 100 WBC 0 <1 /100    Absolute Neutrophils 1.6 1.6 - 8.3 10e3/uL    Absolute Lymphocytes 0.1 (L) 0.8 - 5.3 10e3/uL    Absolute Monocytes 0.1 0.0 - 1.3 10e3/uL    Absolute Eosinophils 0.0 0.0 - 0.7 10e3/uL    Absolute Basophils 0.0 0.0 - 0.2 10e3/uL    Absolute Immature Granulocytes 0.0 <=0.0 10e3/uL    Absolute NRBCs 0.0 10e3/uL   Blood Culture Peripheral Blood    Specimen: Peripheral Blood   Result Value Ref Range    Culture No growth after 1 day    UA with Microscopic reflex to Culture    Specimen: Urine, Clean Catch   Result Value Ref Range    Color Urine Yellow Colorless, Straw, Light Yellow, Yellow    Appearance Urine Cloudy (A) Clear    Glucose Urine Negative Negative mg/dL    Bilirubin Urine Negative Negative    Ketones Urine Negative Negative mg/dL    Specific Gravity Urine 1.016 1.001 - 1.030    Blood Urine 0.2 mg/dL (A) Negative    pH Urine 5.5 5.0 - 7.0    Protein Albumin Urine 30  (A) Negative mg/dL    Urobilinogen Urine <2.0 <2.0 mg/dL    Nitrite Urine Positive (A) Negative    Leukocyte Esterase Urine 500 Marlon/uL (A) Negative    Bacteria Urine Moderate (A) None Seen /HPF    WBC Clumps Urine Present (A) None Seen /HPF    RBC Urine 4 (H) <=2 /HPF    WBC Urine >182 (H) <=5 /HPF   Urine Culture    Specimen: Urine, Midstream   Result Value Ref Range    Culture >100,000 CFU/mL Gram negative bacilli (A)    Lactic acid whole blood   Result Value Ref Range    Lactic Acid 0.4 (L) 0.7 - 2.0 mmol/L   Basic metabolic panel   Result Value Ref Range    Sodium 137 136 - 145 mmol/L    Potassium 4.0 3.5 - 5.0 mmol/L    Chloride 118 (H) 98 - 107 mmol/L    Carbon Dioxide (CO2) 14 (L) 22 - 31 mmol/L    Anion Gap 5 5 - 18 mmol/L    Urea Nitrogen 45 (H) 8 - 22 mg/dL    Creatinine 2.02 (H) 0.70 - 1.30 mg/dL    Calcium 8.1 (L) 8.5 - 10.5 mg/dL    Glucose 91 70 - 125 mg/dL    GFR Estimate 35 (L) >60 mL/min/1.73m2    Basic metabolic panel   Result Value Ref Range    Sodium 138 136 - 145 mmol/L    Potassium 3.8 3.5 - 5.0 mmol/L    Chloride 119 (H) 98 - 107 mmol/L    Carbon Dioxide (CO2) 15 (L) 22 - 31 mmol/L    Anion Gap 4 (L) 5 - 18 mmol/L    Urea Nitrogen 43 (H) 8 - 22 mg/dL    Creatinine 2.07 (H) 0.70 - 1.30 mg/dL    Calcium 8.2 (L) 8.5 - 10.5 mg/dL    Glucose 92 70 - 125 mg/dL    GFR Estimate 34 (L) >60 mL/min/1.73m2   Hepatic panel   Result Value Ref Range    Bilirubin Total 0.4 0.0 - 1.0 mg/dL    Bilirubin Direct 0.1 <=0.5 mg/dL    Protein Total 5.5 (L) 6.0 - 8.0 g/dL    Albumin 2.4 (L) 3.5 - 5.0 g/dL    Alkaline Phosphatase 120 45 - 120 U/L    AST 11 0 - 40 U/L    ALT 16 0 - 45 U/L   CBC with platelets and differential   Result Value Ref Range    WBC Count 1.3 (L) 4.0 - 11.0 10e3/uL    RBC Count 2.33 (L) 4.40 - 5.90 10e6/uL    Hemoglobin 7.3 (L) 13.3 - 17.7 g/dL    Hematocrit 23.1 (L) 40.0 - 53.0 %    MCV 99 78 - 100 fL    MCH 31.3 26.5 - 33.0 pg    MCHC 31.6 31.5 - 36.5 g/dL    RDW 16.8 (H) 10.0 - 15.0 %    Platelet Count 173 150 - 450 10e3/uL    % Neutrophils 77 %    % Lymphocytes 11 %    % Monocytes 7 %    % Eosinophils 2 %    % Basophils 1 %    % Immature Granulocytes 2 %    NRBCs per 100 WBC 0 <1 /100    Absolute Neutrophils 1.0 (L) 1.6 - 8.3 10e3/uL    Absolute Lymphocytes 0.1 (L) 0.8 - 5.3 10e3/uL    Absolute Monocytes 0.1 0.0 - 1.3 10e3/uL    Absolute Eosinophils 0.0 0.0 - 0.7 10e3/uL    Absolute Basophils 0.0 0.0 - 0.2 10e3/uL    Absolute Immature Granulocytes 0.0 <=0.0 10e3/uL    Absolute NRBCs 0.0 10e3/uL   Magnesium   Result Value Ref Range    Magnesium 1.6 (L) 1.8 - 2.6 mg/dL   Potassium   Result Value Ref Range    Potassium 3.2 (L) 3.5 - 5.0 mmol/L   Magnesium   Result Value Ref Range    Magnesium 1.7 (L) 1.8 - 2.6 mg/dL   Lactic Acid STAT   Result Value Ref Range    Lactic Acid 1.3 0.7 - 2.0 mmol/L   Potassium   Result Value Ref Range    Potassium 3.4 (L) 3.5 - 5.0 mmol/L   Basic metabolic panel   Result  Value Ref Range    Sodium 138 136 - 145 mmol/L    Potassium 3.4 (L) 3.5 - 5.0 mmol/L    Chloride 114 (H) 98 - 107 mmol/L    Carbon Dioxide (CO2) 18 (L) 22 - 31 mmol/L    Anion Gap 6 5 - 18 mmol/L    Urea Nitrogen 34 (H) 8 - 22 mg/dL    Creatinine 2.15 (H) 0.70 - 1.30 mg/dL    Calcium 8.3 (L) 8.5 - 10.5 mg/dL    Glucose 110 70 - 125 mg/dL    GFR Estimate 32 (L) >60 mL/min/1.73m2   Magnesium   Result Value Ref Range    Magnesium 1.8 1.8 - 2.6 mg/dL   CBC with platelets and differential   Result Value Ref Range    WBC Count 1.0 (L) 4.0 - 11.0 10e3/uL    RBC Count 2.30 (L) 4.40 - 5.90 10e6/uL    Hemoglobin 7.1 (L) 13.3 - 17.7 g/dL    Hematocrit 22.3 (L) 40.0 - 53.0 %    MCV 97 78 - 100 fL    MCH 30.9 26.5 - 33.0 pg    MCHC 31.8 31.5 - 36.5 g/dL    RDW 16.5 (H) 10.0 - 15.0 %    Platelet Count 158 150 - 450 10e3/uL   Manual Differential   Result Value Ref Range    % Neutrophils 78 %    % Lymphocytes 16 %    % Monocytes 2 %    % Eosinophils 2 %    % Basophils 2 %    Absolute Neutrophils 0.8 (L) 1.6 - 8.3 10e3/uL    Absolute Lymphocytes 0.2 (L) 0.8 - 5.3 10e3/uL    Absolute Monocytes 0.0 0.0 - 1.3 10e3/uL    Absolute Eosinophils 0.0 0.0 - 0.7 10e3/uL    Absolute Basophils 0.0 0.0 - 0.2 10e3/uL    RBC Morphology Confirmed RBC Indices     Platelet Assessment (A) Automated Count Confirmed. Platelet morphology is normal.     Automated Count Confirmed. Giant platelets are present.    Basophilic Stippling Present (A) None Seen    Elliptocytes Slight (A) None Seen    RBC Fragments Slight (A) None Seen   Potassium   Result Value Ref Range    Potassium 3.3 (L) 3.5 - 5.0 mmol/L       Imaging Results    XR Chest Port 1 View    Result Date: 10/28/2021  EXAM: XR CHEST PORT 1 VIEW LOCATION: [facility] DATE/TIME: 10/28/2021 5:27 PM INDICATION: fever; on chemotherapy COMPARISON: 04/19/2021     IMPRESSION: A right chest wall Port-A-Cath is present. No pleural fluid or pneumothorax. No airspace disease. Normal size  of the heart.     IR Nephrostomy Tube Change Right    Result Date: 10/29/2021  [place] [facility] LOCATION: [facility] DATE: 10/29/2021 PROCEDURE: NEPHROSTOMY TUBE EXCHANGE ATTENDING: Tong Leahy MD. INDICATION: 60-year-old male with sepsis. Exchange of the nephrostomy is requested. CONSENT: The risks, benefits, and alternatives of nephrostomy tube exchange were discussed with the patient in detail. All questions were answered. Informed consent was given to proceed with the procedure. MODERATE SEDATION: None. ADDITIONAL MEDICATIONS: None. FLUOROSCOPIC TIME: 1.0 minutes. AIR KERMA:  33 mGy. CONTRAST: 10 mL Omnipaque. UNIVERSAL PRECAUTIONS: The procedure was performed utilizing maximum sterile barrier technique. Prior to the start of the procedure, a standard pause for patient safety was performed with site marking as indicated. COMPLICATIONS: No immediate complications. PROCEDURE:  A  image was obtained. A nephrostogram was performed through the previously placed right nephrostomy tube. Under direct fluoroscopic visualization, the previous tube was removed over a wire and exchange was made for a new 10 [place] nephrostomy. The loop was locked within the renal pelvis, and the catheter was sutured to the skin. A post placement nephrostogram was performed. FINDINGS:  The initial  image shows the previously placed right nephrostomy tube. The tube has pulled back such that it is in a lower pole peripheral calyx. At the completion of the study, the new nephrostomy loop lies within the renal pelvis and controls the urinary system well.     IMPRESSION:  Successful right nephrostomy exchange, as discussed above.     CT Abdomen Pelvis w Contrast    Result Date: 10/28/2021  EXAM: CT ABDOMEN PELVIS W CONTRAST LOCATION: [facility] DATE/TIME: 10/28/2021 6:57 PM INDICATION: sepsis/evaluate for nephrostomy tube obstruction COMPARISON: CT 08/04/2021 TECHNIQUE: CT scan of the  abdomen and pelvis was performed following injection of IV contrast. Multiplanar reformats were obtained. Dose reduction techniques were used. CONTRAST: [id number] 75ml FINDINGS: LOWER CHEST: Normal. HEPATOBILIARY: Cholelithiasis. PANCREAS: Normal. SPLEEN: Normal. ADRENAL GLANDS: Normal. KIDNEYS/BLADDER: The right kidney as nephrostomy tube has been retracted and is coiled in the region of the renal cortex. There is mild right hydronephrosis with enhancement and/or thickening of the renal pelvis wall and adjacent stranding. There is decrease/delayed enhancement and delayed excretion of the right kidney relative to the left kidney. No left hydronephrosis or hydroureter. Normal urinary bladder. BOWEL: Normal stomach. Normal caliber of the small bowel. There is a diverting ostomy in the right lower quadrant. There is tethering of bowel loops in the right lower quadrant with ill-defined soft tissue in this region. Otherwise normal appearance of the colon.  A duodenal suture line is again seen. LYMPH NODES: Haziness of the mesentery is nonspecific. VASCULATURE: Unremarkable. PELVIC ORGANS: Prostatectomy. MUSCULOSKELETAL: Surgical changes along the ventral abdominal wall.     IMPRESSION: 1.  The right nephrostomy tube has been retracted. There is decreased/delayed enhancement of the right kidney with inflammatory change at the right renal pelvis. Underlying infection is possible. Consider replacing the nephrostomy tube. 2.  Right lower quadrant tethering/mass, without significant change from the prior study.           This note has been dictated using voice recognition software. Any grammatical or context distortions are unintentional and inherent to the software      Signed by: Mike Ahumada MD, MD

## 2021-10-30 NOTE — PLAN OF CARE
Problem: Adult Inpatient Plan of Care  Goal: Plan of Care Review  Outcome: Improving  Goal: Patient-Specific Goal (Individualized)  Outcome: Improving  Goal: Absence of Hospital-Acquired Illness or Injury  Outcome: Improving  Intervention: Identify and Manage Fall Risk  Recent Flowsheet Documentation  Taken 10/30/2021 0018 by Coretta Hawkins, RN  Safety Promotion/Fall Prevention: lighting adjusted  Intervention: Prevent Skin Injury  Recent Flowsheet Documentation  Taken 10/30/2021 0018 by Coretta Hawkins, RN  Body Position: position changed independently  Goal: Optimal Comfort and Wellbeing  Outcome: Improving  Goal: Readiness for Transition of Care  Outcome: Improving     Problem: Infection  Goal: Absence of Infection Signs and Symptoms  Outcome: Improving   Pt a/o without c/o slept well. Magnesium and potassium protocols run. Nephrostomy tube having light red drainage. Will continue to monitor.

## 2021-10-30 NOTE — PLAN OF CARE
Problem: Adult Inpatient Plan of Care  Goal: Optimal Comfort and Wellbeing  Outcome: Improving     Problem: Infection  Goal: Absence of Infection Signs and Symptoms  Outcome: Improving     Pt is pleasant and cooperative.  Independent in the room and ambulated in the hallway.  Denies pain.  Continues on IV antibiotics.  Nephrostomy and colostomy intact and patent.

## 2021-10-30 NOTE — PROCEDURES
IR Procedure Note    Physician: To Diamond MD    Procedure:  Nephrostomy tube exchange    Findings/Plan:  Successful exchange of nephrostomy tube.  Tube is secured to the skins with suture.

## 2021-10-30 NOTE — PROGRESS NOTES
"INFECTIOUS DISEASE FOLLOW UP NOTE    Date: 10/30/2021   CHIEF COMPLAINT:   Chief Complaint   Patient presents with     Fever after Chemo        ASSESSMENT:  1. Neutropenic fever: presented with worsening fever after chemotherapy. WBC 1.3. CT imaging with inflammatory changes R kidney, now s/p nephrostomy tube exchange. All cultures are pending. On vancomycin and cefepime. UC with GNR. Oncology following  2. Comorbid conditions affecting immune system: colon cancer, R nephrostomy tube, CHAYO on CKD.     PLAN:  - continue cefepime, renally dosed per pharmacy  - vancomycin IV for now-if BC NGTD tomorrow then will stop  - follow BC, UC  - ID will follow, thanks    Rebeca Raymond MD  [facility]   On-Call: [phone]     ______________________________________________________________________    SUBJECTIVE / INTERVAL HISTORY: feeling better. Tolerating antibiotics. Discussed micro.     ROS: All other systems negative except as listed above.    SH/FH/Habits/PMH reviewed and unchanged.    OBJECTIVE:  BP 96/63 (BP Location: Right arm)   Pulse 63   Temp 98.4  F (36.9  C) (Oral)   Resp 18   Ht 1.727 m (5' 8\")   Wt 77.1 kg (170 lb)   SpO2 97%   BMI 25.85 kg/m       Resp: 18      Vital Signs  Temp: 98.4  F (36.9  C)  Temp src: Oral  Resp: 18  Pulse: 63  Pulse Rate Source: Monitor  BP: 96/63  BP Location: Right arm    Temp (24hrs), Av.9  F (37.2  C), Min:98.4  F (36.9  C), Max:99.3  F (37.4  C)      GEN: No acute distress.    RESPIRATORY:  Normal breathing pattern.   CARDIOVASCULAR:  regular  ABDOMEN:  Soft, normal bowel sounds, non-tender,   EXTREMITIES: No edema.  SKIN/HAIR/NAILS:  No rashes  IV: R chest port        Antibiotics:  Cefepime IV  Vancomycin IV    Pertinent labs:  No results found for: CRP   CBC RESULTS:   Recent Labs   Lab Test 10/30/21  0405   WBC 1.0*   RBC 2.30*   HGB 7.1*   HCT 22.3*   MCV 97   MCH 30.9   MCHC 31.8   RDW 16.5*         Last Comprehensive Metabolic " Panel:  Sodium   Date Value Ref Range Status   10/30/2021 138 136 - 145 mmol/L Final   12/23/2020 134 133 - 144 mmol/L Final     Potassium   Date Value Ref Range Status   10/30/2021 3.3 (L) 3.5 - 5.0 mmol/L Final   12/23/2020 3.4 3.4 - 5.3 mmol/L Final     Chloride   Date Value Ref Range Status   10/30/2021 114 (H) 98 - 107 mmol/L Final   12/23/2020 109 94 - 109 mmol/L Final     Carbon Dioxide   Date Value Ref Range Status   12/23/2020 20 20 - 32 mmol/L Final     Carbon Dioxide (CO2)   Date Value Ref Range Status   10/30/2021 18 (L) 22 - 31 mmol/L Final     Anion Gap   Date Value Ref Range Status   10/30/2021 6 5 - 18 mmol/L Final   12/23/2020 5 3 - 14 mmol/L Final     Glucose   Date Value Ref Range Status   10/30/2021 110 70 - 125 mg/dL Final   12/23/2020 80 70 - 99 mg/dL Final     Urea Nitrogen   Date Value Ref Range Status   10/30/2021 34 (H) 8 - 22 mg/dL Final   12/23/2020 25 7 - 30 mg/dL Final     Creatinine   Date Value Ref Range Status   10/30/2021 2.15 (H) 0.70 - 1.30 mg/dL Final   12/23/2020 1.33 (H) 0.66 - 1.25 mg/dL Final     GFR Estimate   Date Value Ref Range Status   10/30/2021 32 (L) >60 mL/min/1.73m2 Final     Comment:     As of July 11, 2021, eGFR is calculated by the CKD-EPI creatinine equation, without race adjustment. eGFR can be influenced by muscle mass, exercise, and diet. The reported eGFR is an estimation only and is only applicable if the renal function is stable.   04/21/2021 39 (L) >60 mL/min/1.73m2 Final   12/23/2020 58 (L) >60 mL/min/[1.73_m2] Final     Comment:     Non  GFR Calc  Starting 12/18/2018, serum creatinine based estimated GFR (eGFR) will be   calculated using the Chronic Kidney Disease Epidemiology Collaboration   (CKD-EPI) equation.       Calcium   Date Value Ref Range Status   10/30/2021 8.3 (L) 8.5 - 10.5 mg/dL Final   12/23/2020 8.6 8.5 - 10.1 mg/dL Final        MICROBIOLOGY DATA:  Personally reviewed.  GARY GNR    RADIOLOGY:  Personally Reviewed.  No  results found for this or any previous visit (from the past 24 hour(s)).    Principal Problem:    Febrile neutropenia (H)  Active Problems:    Urinary tract infection without hematuria, site unspecified

## 2021-10-30 NOTE — PROGRESS NOTES
Pt accidentally pulled out nephrostomy tube on the way to the bathroom. IR MD called, he is in the middle of a procedure. Will call back shortly.

## 2021-10-30 NOTE — PROGRESS NOTES
RENAL PROGRESS NOTE    CC:  CHAYO on CKD    ASSESSMENT & PLAN:   This is a 60 year old male with PMHx significant for CKD, prostate cancer, metastatic colorectal cancer  on chemo, he has chronic right PCNT . Patient admitted with sepsis/presumed pyelo by imaging, and his PCNT was replaced in IR.  Nephrology was consulted for CHAYO.    Acute Kidney Injury superimposed on CKD 3-4-likely secondary to sepsis.  Creat peaked 2.33 then improved to 2.0 with IVF's, and antibx for pyelo/sepsis, and replacement of R PCNT; did receive IV contrast for CT 10/28.  Today serum creatinine is trended down to 2.15 mg/dL which could be secondary to contrast-induced nephropathy.  CT abdomen pelvis on October 2018 prior to right percutaneous nephrostomy tube replacement showed mild right hydronephrosis with enhancement and/or thickening of the renal pelvis wall and adjacent stranding.  Baseline creat with some variability, though trends mostly I nteh 13-1.5 range, until mid April 2021, now in the 1.7-2.0 range  GFR 40's.  Patient reports normal urinary output.  No indication for renal replacement therapy.  We will consider to obtain a renal ultrasound to reassess right-sided hydronephrosis if serum creatinine continues trending up.  Discontinued few fluids given normal oral intake.  Monitor BMP daily  Trend I/o closely   Avoid nephrotoxins if possible  Renally dose medications    Hypokalemia-mild.  Serum potassium 3.3 on most recent check.  Received replacement as per nursing protocol.       Metabolic acidosis: in the setting of CHAYO and sepsis.  Improved with IV  bicarb.  HCO3 is trending up to 18 this morning.  -Discontinue IV bicarbonate switch to oral sodium bicarbonate 1300 mg twice daily.       Chronic R nephrostomy tube: d/t chronic obstruction from colon CA, failed debulking surg, PCNT exchanged 10/29.  Would rec q 2 2.5 mos exchanges, and change bag every 1-2 weeks      Neutropenic fevers-On chemo, WBC 1's.at a temp of 99.3  Fahrenheit last night.  BCx NGTD, and urine Cx grew greater than 100,000 colonies of gram-negative bacilli. ID following, on Cefipime, Vanco.     Sepsis:  Presumably from R pyelo, has chronic PCNT, exchanged 10/29.  On IV antibx, ID folowing     Anemia:  Multifac, chemo, iron def, infection, hgb in the 7's  On oral iron     HTN: on single Agent Flomax, pt denies h/o HTN at baseline. BP was soft this morning.  -Recommend no changes.  -Her blood pressure closely, avoid hypotension.     Colon Cancer: undergoing chemotherapy.  Oncology service is following.     H/o prostate cancer:  S/p prostatectomy, unclear why he is on Flomax or Oxybutynin as both pt and wife deny issues with urine retention, spasms.  Started at [facility] in Dec 2021?      Depression: on selective serotonin reuptake inhibitor.     Chronic pain:  On Meaghan and fentanyl patch currently.    We will follow    Britni Cloud MD  [facility]  [address] [place] [address], [address]  [place] [address]  Phone# [phone]  Fax# [phone]         SUBJECTIVE:    PCNT got dislodged last night and was replaced by IR.  No other acute issues.  This morning patient stated he is feeling fine no new complaints.  Patient reports normal urinary output.  Patient reports good appetite and normal fluid intake.  Patient denies: fever, chills,  dizziness, cough, shortness of breath , chest pain, palpitations, orthopnea, nausea, vomiting, abdominal pain, changes in bowel habits, dysuria, urinary frequency, urgency.        OBJECTIVE:  Physical Exam   Temp: 98.4  F (36.9  C) Temp src: Oral BP: 96/63 Pulse: 63   Resp: 18 SpO2: 97 % O2 Device: None (Room air)    Vitals:    10/28/21 1700 10/29/21 1405   Weight: 72.6 kg (160 lb) 77.1 kg (170 lb)     Vital Signs with Ranges  Temp:  [98.4  F (36.9  C)-99.3  F (37.4  C)] 98.4  F (36.9  C)  Pulse:  [63-92] 63  Resp:  [17-23] 18  BP: ()/(50-66) 96/63  SpO2:  [97 %-100 %] 97 %  I/O last 3 completed shifts:  In:  3680 [P.O.:1380; I.V.:2300]  Out: 2825 [Urine:1325; Stool:1500]    @TMAXR(24)@    Patient Vitals for the past 72 hrs:   Weight   10/29/21 1405 77.1 kg (170 lb)   10/28/21 1700 72.6 kg (160 lb)   [unfilled]    PHYSICAL EXAM:  General - Alert and oriented x3, appears comfortable, NAD  Cardiovascular - Regular rate and rhythm, no rub  Respiratory - Clear to auscultation bilaterally, no crackles or wheezes  Abd: Ostomy bag in place BS present, no guarding or pain with palpation, no ascites  Extremities - No lower extremity edema bilaterally  : PCNT in the right flank putting out raven-colored urine.  No Adkins catheter.  Skin: dry, intact, no rash, good turgor  Neuro:  Grossly intact, no focal deficits  MSK:  Grossly intact  Psych:  Normal affect    LABORATORY STUDIES:     Recent Labs   Lab 10/30/21  0405 10/29/21  0509 10/28/21  1728   WBC 1.0* 1.3* 1.9*   RBC 2.30* 2.33* 2.43*   HGB 7.1* 7.3* 7.4*   HCT 22.3* 23.1* 24.1*    173 173       Basic Metabolic Panel:  Recent Labs   Lab 10/30/21  0923 10/30/21  0405 10/29/21  2228 10/29/21  0509 10/28/21  2258 10/28/21  1728   NA  --  138  --  138 137 135*   POTASSIUM 3.3* 3.4*  3.4* 3.2* 3.8 4.0 3.5   CHLORIDE  --  114*  --  119* 118* 114*   CO2  --  18*  --  15* 14* 13*   BUN  --  34*  --  43* 45* 51*   CR  --  2.15*  --  2.07* 2.02* 2.33*   GLC  --  110  --  92 91 129*   AARON  --  8.3*  --  8.2* 8.1* 8.0*       INRNo lab results found in last 7 days.     Recent Labs   Lab Test 10/30/21  0405 10/29/21  0509 10/28/21  1728 08/04/21  0700 12/21/20 2032 12/21/20 2032   INR  --   --   --  1.33*  --  1.25*   WBC 1.0* 1.3*   < >  --    < >  --    HGB 7.1* 7.3*   < >  --    < >  --     173   < >  --    < >  --     < > = values in this interval not displayed.       Personally reviewed current labs     ~ 35 minutes spent in exam, POC, education regarding renal disease and management.  >90% time spent in education and counseling and/or discussion with patient's care  team    Britni Cloud MD  [facility]  [address] [facility], [address]  [place] [address]  Phone# [phone]  Fax# [phone]

## 2021-10-31 LAB
ANION GAP SERPL CALCULATED.3IONS-SCNC: 8 MMOL/L (ref 5–18)
BACTERIA UR CULT: ABNORMAL
BASOPHILS # BLD MANUAL: 0 10E3/UL (ref 0–0.2)
BASOPHILS NFR BLD MANUAL: 0 %
BUN SERPL-MCNC: 32 MG/DL (ref 8–22)
CALCIUM SERPL-MCNC: 8.8 MG/DL (ref 8.5–10.5)
CHLORIDE BLD-SCNC: 113 MMOL/L (ref 98–107)
CO2 SERPL-SCNC: 18 MMOL/L (ref 22–31)
CREAT SERPL-MCNC: 2.2 MG/DL (ref 0.7–1.3)
ELLIPTOCYTES BLD QL SMEAR: SLIGHT
EOSINOPHIL # BLD MANUAL: 0.1 10E3/UL (ref 0–0.7)
EOSINOPHIL NFR BLD MANUAL: 3 %
ERYTHROCYTE [DISTWIDTH] IN BLOOD BY AUTOMATED COUNT: 16.5 % (ref 10–15)
FRAGMENTS BLD QL SMEAR: SLIGHT
GFR SERPL CREATININE-BSD FRML MDRD: 31 ML/MIN/1.73M2
GLUCOSE BLD-MCNC: 122 MG/DL (ref 70–125)
HCT VFR BLD AUTO: 24.4 % (ref 40–53)
HGB BLD-MCNC: 7.5 G/DL (ref 13.3–17.7)
LYMPHOCYTES # BLD MANUAL: 0.2 10E3/UL (ref 0.8–5.3)
LYMPHOCYTES NFR BLD MANUAL: 8 %
MAGNESIUM SERPL-MCNC: 1.8 MG/DL (ref 1.8–2.6)
MCH RBC QN AUTO: 30.5 PG (ref 26.5–33)
MCHC RBC AUTO-ENTMCNC: 30.7 G/DL (ref 31.5–36.5)
MCV RBC AUTO: 99 FL (ref 78–100)
MONOCYTES # BLD MANUAL: 0.1 10E3/UL (ref 0–1.3)
MONOCYTES NFR BLD MANUAL: 2 %
NEUTROPHILS # BLD MANUAL: 2.6 10E3/UL (ref 1.6–8.3)
NEUTROPHILS NFR BLD MANUAL: 87 %
NRBC # BLD AUTO: 0 10E3/UL
NRBC BLD AUTO-RTO: 0 /100
PLAT MORPH BLD: ABNORMAL
PLATELET # BLD AUTO: 187 10E3/UL (ref 150–450)
PLATELET # BLD AUTO: 187 10E3/UL (ref 150–450)
POTASSIUM BLD-SCNC: 3.7 MMOL/L (ref 3.5–5)
POTASSIUM BLD-SCNC: 3.7 MMOL/L (ref 3.5–5)
RBC # BLD AUTO: 2.46 10E6/UL (ref 4.4–5.9)
RBC MORPH BLD: ABNORMAL
SODIUM SERPL-SCNC: 139 MMOL/L (ref 136–145)
WBC # BLD AUTO: 3 10E3/UL (ref 4–11)

## 2021-10-31 PROCEDURE — 250N000011 HC RX IP 250 OP 636: Performed by: INTERNAL MEDICINE

## 2021-10-31 PROCEDURE — 250N000013 HC RX MED GY IP 250 OP 250 PS 637: Performed by: INTERNAL MEDICINE

## 2021-10-31 PROCEDURE — 36415 COLL VENOUS BLD VENIPUNCTURE: CPT | Performed by: INTERNAL MEDICINE

## 2021-10-31 PROCEDURE — 99233 SBSQ HOSP IP/OBS HIGH 50: CPT | Performed by: INTERNAL MEDICINE

## 2021-10-31 PROCEDURE — 80048 BASIC METABOLIC PNL TOTAL CA: CPT | Performed by: INTERNAL MEDICINE

## 2021-10-31 PROCEDURE — 85027 COMPLETE CBC AUTOMATED: CPT | Performed by: INTERNAL MEDICINE

## 2021-10-31 PROCEDURE — 83735 ASSAY OF MAGNESIUM: CPT | Performed by: INTERNAL MEDICINE

## 2021-10-31 PROCEDURE — 120N000001 HC R&B MED SURG/OB

## 2021-10-31 PROCEDURE — 85049 AUTOMATED PLATELET COUNT: CPT | Performed by: INTERNAL MEDICINE

## 2021-10-31 PROCEDURE — 258N000003 HC RX IP 258 OP 636: Performed by: INTERNAL MEDICINE

## 2021-10-31 PROCEDURE — 99233 SBSQ HOSP IP/OBS HIGH 50: CPT | Mod: GC | Performed by: INTERNAL MEDICINE

## 2021-10-31 RX ORDER — LEVOFLOXACIN 250 MG/1
250 TABLET, FILM COATED ORAL DAILY
Status: DISCONTINUED | OUTPATIENT
Start: 2021-11-01 | End: 2021-11-01 | Stop reason: HOSPADM

## 2021-10-31 RX ORDER — LEVOFLOXACIN 500 MG/1
500 TABLET, FILM COATED ORAL ONCE
Status: COMPLETED | OUTPATIENT
Start: 2021-10-31 | End: 2021-10-31

## 2021-10-31 RX ADMIN — OXYBUTYNIN CHLORIDE 5 MG: 5 TABLET ORAL at 08:35

## 2021-10-31 RX ADMIN — RIVAROXABAN 20 MG: 10 TABLET, FILM COATED ORAL at 08:34

## 2021-10-31 RX ADMIN — LEVOFLOXACIN 500 MG: 500 TABLET, FILM COATED ORAL at 14:27

## 2021-10-31 RX ADMIN — OXYBUTYNIN CHLORIDE 5 MG: 5 TABLET ORAL at 14:24

## 2021-10-31 RX ADMIN — VANCOMYCIN HYDROCHLORIDE 1000 MG: 5 INJECTION, POWDER, LYOPHILIZED, FOR SOLUTION INTRAVENOUS at 10:45

## 2021-10-31 RX ADMIN — FERROUS SULFATE TAB 325 MG (65 MG ELEMENTAL FE) 325 MG: 325 (65 FE) TAB at 08:34

## 2021-10-31 RX ADMIN — SODIUM BICARBONATE 1300 MG: 648 TABLET ORAL at 08:34

## 2021-10-31 RX ADMIN — SERTRALINE HYDROCHLORIDE 50 MG: 50 TABLET ORAL at 21:15

## 2021-10-31 RX ADMIN — FERROUS SULFATE TAB 325 MG (65 MG ELEMENTAL FE) 325 MG: 325 (65 FE) TAB at 21:15

## 2021-10-31 RX ADMIN — GABAPENTIN 300 MG: 300 CAPSULE ORAL at 08:34

## 2021-10-31 RX ADMIN — CEFEPIME HYDROCHLORIDE 2 G: 2 INJECTION, POWDER, FOR SOLUTION INTRAVENOUS at 07:48

## 2021-10-31 RX ADMIN — FILGRASTIM-SNDZ 480 MCG: 480 INJECTION, SOLUTION INTRAVENOUS; SUBCUTANEOUS at 10:45

## 2021-10-31 RX ADMIN — OXYBUTYNIN CHLORIDE 5 MG: 5 TABLET ORAL at 21:15

## 2021-10-31 RX ADMIN — GABAPENTIN 300 MG: 300 CAPSULE ORAL at 21:15

## 2021-10-31 RX ADMIN — PANTOPRAZOLE SODIUM 40 MG: 20 TABLET, DELAYED RELEASE ORAL at 06:43

## 2021-10-31 RX ADMIN — TAMSULOSIN HYDROCHLORIDE 0.4 MG: 0.4 CAPSULE ORAL at 08:34

## 2021-10-31 RX ADMIN — SODIUM BICARBONATE 1300 MG: 648 TABLET ORAL at 21:15

## 2021-10-31 RX ADMIN — ZOLPIDEM TARTRATE 5 MG: 5 TABLET ORAL at 21:15

## 2021-10-31 ASSESSMENT — ACTIVITIES OF DAILY LIVING (ADL)
ADLS_ACUITY_SCORE: 9

## 2021-10-31 NOTE — PLAN OF CARE
Uneventful shift. Denies pain. No fevers.  Pain patch on. Ambulating in hallways independently. Low risk for pressure ulcers and falls. Wife here this afternoon- supportive. Casie Pablo RN    Problem: Adult Inpatient Plan of Care  Goal: Absence of Hospital-Acquired Illness or Injury  Outcome: Improving  Intervention: Prevent Skin Injury  Recent Flowsheet Documentation  Taken 10/31/2021 1200 by Casie Pablo RN  Body Position: position changed independently  Taken 10/31/2021 0800 by Casie Pablo RN  Body Position: position changed independently     Problem: Adult Inpatient Plan of Care  Goal: Readiness for Transition of Care  Outcome: Improving

## 2021-10-31 NOTE — PROGRESS NOTES
RENAL PROGRESS NOTE    CC:  CHAYO on CKD    ASSESSMENT & PLAN:   This is a 60 year old male with PMHx significant for CKD, prostate cancer, metastatic colorectal cancer  on chemo, he has chronic right PCNT . Patient admitted with sepsis/presumed pyelo by imaging, and his PCNT was replaced in IR.  Nephrology was consulted for CHAYO.    Acute Kidney Injury superimposed on CKD 3-4-likely secondary to sepsis.  Creat peaked 2.33 then improved to 2.0 with IVF's, and antibx for pyelo/sepsis, and replacement of R PCNT; did receive IV contrast for CT 10/28.    CT abdomen pelvis on October 2018 prior to right percutaneous nephrostomy tube replacement showed mild right hydronephrosis with enhancement and/or thickening of the renal pelvis wall and adjacent stranding.  Baseline creat with some variability, though trends in 1.3-1.5 range, until mid April 2021, now in the 1.7-2.0 range  GFR 40's.  Serum creatinine plateau at 2.2 mg/dL for last 2 days which could be secondary to contrast-induced nephropathy.  Patient reports normal urinary output.  No indication for renal replacement therapy.  We will consider to obtain a renal ultrasound to reassess right-sided hydronephrosis if serum creatinine continues trending up.  Discontinued few fluids given normal oral intake.  Monitor BMP daily  Trend I/o closely   Avoid nephrotoxins if possible  Renally dose medications    Hypokalemia-mild.  Resolved with replacement as per nursing protocol.       Metabolic acidosis: in the setting of CHAYO and sepsis.  Improved with IV  bicarb.  HCO3 is stable at 18 this morning.  -Continue oral sodium bicarbonate 1300 mg twice daily.  -encourage the patient to eat more fruit and vegetables       Chronic R nephrostomy tube: d/t chronic obstruction from colon CA, failed debulking surg, PCNT exchanged 10/29.  Would rec q 2 2.5 mos exchanges, and change bag every 1-2 weeks      Neutropenic fevers/Sepsis:  Presumably from R pyelo, has chronic PCNT, exchanged  10/29.On chemo.  BCx NGTD, and urine Cx grew greater than 100,000 colonies of Klebsiella. Fever curve improved. ID input appreciated, switched  From IV antbx to oral Levaquin for 14 days.     Neutropenia-started on G-CSF antil ANC >1000 for 3 days as per oncology recommendations.    Anemia:  Multifac, chemo, iron def, infection. Hgb satble in the 7's  On oral iron     HTN: on single Agent Flomax, pt denies h/o HTN at baseline. BP was soft this morning.  -Recommend no changes.  -Her blood pressure closely, avoid hypotension.     Colon Cancer: undergoing chemotherapy.  Oncology service is following.     H/o prostate cancer:  S/p prostatectomy, unclear why he is on Flomax or Oxybutynin as both pt and wife deny issues with urine retention, spasms.  Started at [facility] in Dec 2021?      Depression: on selective serotonin reuptake inhibitor.     Chronic pain:  On Meaghan and fentanyl patch currently.    Discussed with     We will follow    Britni Cloud MD  [facility]  [address] [place] [address], [address]  [place] [address]  Phone# [phone]  Fax# [phone]         SUBJECTIVE:    No other acute issues.    Patient said that he is feeling fine, no new complaints.  Patient reports good appetite and normal fluid intake.  Patient said that he urinated urinates normal amounts of urine.  Patient denies: fever, chills,  dizziness, cough, shortness of breath , chest pain, palpitations, orthopnea, nausea, vomiting, abdominal pain, changes in bowel habits, dysuria, urinary frequency, urgency.        OBJECTIVE:  Physical Exam   Temp: 98.8  F (37.1  C) Temp src: Oral BP: 101/63 Pulse: 70   Resp: 20 SpO2: 97 % O2 Device: None (Room air)    Vitals:    10/28/21 1700 10/29/21 1405   Weight: 72.6 kg (160 lb) 77.1 kg (170 lb)     Vital Signs with Ranges  Temp:  [98.1  F (36.7  C)-99.1  F (37.3  C)] 98.8  F (37.1  C)  Pulse:  [70-84] 70  Resp:  [18-20] 20  BP: (100-113)/(60-66) 101/63  SpO2:  [97 %-99 %] 97  %  I/O last 3 completed shifts:  In: 3039 [P.O.:1640; I.V.:1399]  Out: 2425 [Urine:825; Stool:1600]    @TMAXR(24)@    Patient Vitals for the past 72 hrs:   Weight   10/29/21 1405 77.1 kg (170 lb)   10/28/21 1700 72.6 kg (160 lb)   [unfilled]    PHYSICAL EXAM:  General - Alert and oriented x3, appears comfortable, NAD  Cardiovascular - Regular rate and rhythm, no rub  Respiratory - Clear to auscultation bilaterally, no crackles or wheezes  Abd: Ostomy bag in place BS present, no guarding or pain with palpation, no ascites  Extremities - No lower extremity edema bilaterally  : PCNT in the right flank putting out raven-colored urine.  No Adkins catheter.  Skin: dry, intact, no rash, good turgor  Neuro:  Grossly intact, no focal deficits  MSK:  Grossly intact  Psych:  Normal affect    LABORATORY STUDIES:     Recent Labs   Lab 10/31/21  0556 10/30/21  0405 10/29/21  0509 10/28/21  1728   WBC 3.0* 1.0* 1.3* 1.9*   RBC 2.46* 2.30* 2.33* 2.43*   HGB 7.5* 7.1* 7.3* 7.4*   HCT 24.4* 22.3* 23.1* 24.1*     187 158 173 173       Basic Metabolic Panel:  Recent Labs   Lab 10/31/21  0556 10/30/21  1554 10/30/21  0923 10/30/21  0405 10/29/21  2228 10/29/21  0509 10/28/21  2258 10/28/21  2258 10/28/21  1728 10/28/21  1728     --   --  138  --  138  --  137  --  135*   POTASSIUM 3.7  3.7 3.8 3.3* 3.4*  3.4* 3.2* 3.8   < > 4.0   < > 3.5   CHLORIDE 113*  --   --  114*  --  119*  --  118*  --  114*   CO2 18*  --   --  18*  --  15*  --  14*  --  13*   BUN 32*  --   --  34*  --  43*  --  45*  --  51*   CR 2.20*  --   --  2.15*  --  2.07*  --  2.02*  --  2.33*     --   --  110  --  92  --  91  --  129*   AARON 8.8  --   --  8.3*  --  8.2*  --  8.1*  --  8.0*    < > = values in this interval not displayed.       INRNo lab results found in last 7 days.     Recent Labs   Lab Test 10/31/21  0556 10/30/21  0405 10/28/21  1728 08/04/21  0700 12/21/20 2032 12/21/20 2032   INR  --   --   --  1.33*  --  1.25*   WBC 3.0* 1.0*    < >  --    < >  --    HGB 7.5* 7.1*   < >  --    < >  --      187 158   < >  --    < >  --     < > = values in this interval not displayed.       Personally reviewed current labs     ~ 35 minutes spent in exam, POC, education regarding renal disease and management.  >90% time spent in education and counseling and/or discussion with patient's care team    Britni Cloud MD  [facility]  [address] [place] [address], [address]  [place] [address]  Phone# [phone]  Fax# [phone]

## 2021-10-31 NOTE — PLAN OF CARE
Problem: Adult Inpatient Plan of Care  Goal: Plan of Care Review  Outcome: Improving  Goal: Patient-Specific Goal (Individualized)  Outcome: Improving  Goal: Absence of Hospital-Acquired Illness or Injury  Outcome: Improving  Goal: Optimal Comfort and Wellbeing  Outcome: Improving  Goal: Readiness for Transition of Care  Outcome: Improving     Problem: Infection  Goal: Absence of Infection Signs and Symptoms  Outcome: Improving     Problem: Patient Goal: Social Work Focus  Goal: 1. Patient Goal: Care Coordination / Social Work  Outcome: Improving  Goal: 2. Patient Goal: Care Coordination / Social Work Focus  Outcome: Improving  Goal: 3. Patient Goal: Care Coordination / Social Work Focus  Outcome: Improving     Problem: Discharge Planning  Goal: Discharge Planning (Adult, OB, Behavioral, Peds)  Outcome: Improving     Pt a/o with VSS , sleeping well. Pt denies pain, has fentanyl patch in place. No c/o . Will continue to monitor.

## 2021-10-31 NOTE — PROGRESS NOTES
"INFECTIOUS DISEASE FOLLOW UP NOTE    Date: 10/31/2021   CHIEF COMPLAINT:   Chief Complaint   Patient presents with     Fever after Chemo        ASSESSMENT:  1. Neutropenic fever: presented with worsening fever after chemotherapy. WBC 1.3. CT imaging with inflammatory changes R kidney, now s/p nephrostomy tube exchange. All cultures are pending. On vancomycin and cefepime. UC with klebsiella. Oncology following.  2. Comorbid conditions affecting immune system: colon cancer, R nephrostomy tube, CHAYO on CKD.     PLAN:  - renally dosed levofloxacin for 14 days from exchange  - stressed importance of nephrostomy tube exchanges  - discussed with patient and wife  - ID will sign off. Please call with additional questions or change in clinical status.     Rebeca Raymond MD  [facility]   On-Call: [phone]     ______________________________________________________________________    SUBJECTIVE / INTERVAL HISTORY: feeling better. Tolerating antibiotics.     ROS: All other systems negative except as listed above.    SH/FH/Habits/PMH reviewed and unchanged.    OBJECTIVE:  /63 (BP Location: Left arm)   Pulse 70   Temp 98.8  F (37.1  C) (Oral)   Resp 20   Ht 1.727 m (5' 8\")   Wt 77.1 kg (170 lb)   SpO2 97%   BMI 25.85 kg/m       Resp: 20      Vital Signs  Temp: 98.4  F (36.9  C)  Temp src: Oral  Resp: 18  Pulse: 63  Pulse Rate Source: Monitor  BP: 96/63  BP Location: Right arm    Temp (24hrs), Av.9  F (37.2  C), Min:98.4  F (36.9  C), Max:99.3  F (37.4  C)      GEN: No acute distress.    RESPIRATORY:  Normal breathing pattern.   CARDIOVASCULAR:  regular  ABDOMEN:  Nephrostomy tube  EXTREMITIES: No edema.  SKIN/HAIR/NAILS:  No rashes  IV: R chest port        Antibiotics:  Cefepime IV  Vancomycin IV    Pertinent labs:  No results found for: CRP   CBC RESULTS:   Recent Labs   Lab Test 10/30/21  0405   WBC 1.0*   RBC 2.30*   HGB 7.1*   HCT 22.3*   MCV 97   MCH 30.9   MCHC 31.8   RDW " 16.5*         Last Comprehensive Metabolic Panel:  Sodium   Date Value Ref Range Status   10/31/2021 139 136 - 145 mmol/L Final   12/23/2020 134 133 - 144 mmol/L Final     Potassium   Date Value Ref Range Status   10/31/2021 3.7 3.5 - 5.0 mmol/L Final   10/31/2021 3.7 3.5 - 5.0 mmol/L Final   12/23/2020 3.4 3.4 - 5.3 mmol/L Final     Chloride   Date Value Ref Range Status   10/31/2021 113 (H) 98 - 107 mmol/L Final   12/23/2020 109 94 - 109 mmol/L Final     Carbon Dioxide   Date Value Ref Range Status   12/23/2020 20 20 - 32 mmol/L Final     Carbon Dioxide (CO2)   Date Value Ref Range Status   10/31/2021 18 (L) 22 - 31 mmol/L Final     Anion Gap   Date Value Ref Range Status   10/31/2021 8 5 - 18 mmol/L Final   12/23/2020 5 3 - 14 mmol/L Final     Glucose   Date Value Ref Range Status   10/31/2021 122 70 - 125 mg/dL Final   12/23/2020 80 70 - 99 mg/dL Final     Urea Nitrogen   Date Value Ref Range Status   10/31/2021 32 (H) 8 - 22 mg/dL Final   12/23/2020 25 7 - 30 mg/dL Final     Creatinine   Date Value Ref Range Status   10/31/2021 2.20 (H) 0.70 - 1.30 mg/dL Final   12/23/2020 1.33 (H) 0.66 - 1.25 mg/dL Final     GFR Estimate   Date Value Ref Range Status   10/31/2021 31 (L) >60 mL/min/1.73m2 Final     Comment:     As of July 11, 2021, eGFR is calculated by the CKD-EPI creatinine equation, without race adjustment. eGFR can be influenced by muscle mass, exercise, and diet. The reported eGFR is an estimation only and is only applicable if the renal function is stable.   04/21/2021 39 (L) >60 mL/min/1.73m2 Final   12/23/2020 58 (L) >60 mL/min/[1.73_m2] Final     Comment:     Non  GFR Calc  Starting 12/18/2018, serum creatinine based estimated GFR (eGFR) will be   calculated using the Chronic Kidney Disease Epidemiology Collaboration   (CKD-EPI) equation.       Calcium   Date Value Ref Range Status   10/31/2021 8.8 8.5 - 10.5 mg/dL Final   12/23/2020 8.6 8.5 - 10.1 mg/dL Final        MICROBIOLOGY  DATA:  Personally reviewed.   GNR  Klebsiella pneumoniae       CRISTHIAN     Ampicillin  Resistant 1     Ampicillin/ Sulbactam 4.0 ug/mL Susceptible     Cefazolin <=4.0 ug/mL Susceptible 2     Cefepime <=1.0 ug/mL Susceptible     Cefoxitin <=4.0 ug/mL Susceptible     Ceftazidime <=1.0 ug/mL Susceptible     Ceftriaxone <=1.0 ug/mL Susceptible     Ciprofloxacin <=0.25 ug/mL Susceptible     Gentamicin <=1.0 ug/mL Susceptible     Levofloxacin <=0.12 ug/mL Susceptible     Nitrofurantoin 64.0 ug/mL Intermediate     Piperacillin/Tazobactam <=4.0 ug/mL Susceptible     Tobramycin <=1.0 ug/mL Susceptible     Trimethoprim/Sulfamethoxazole <=1/19 ug/mL Susceptible          RADIOLOGY:  Personally Reviewed.  No results found for this or any previous visit (from the past 24 hour(s)).    Principal Problem:    Febrile neutropenia (H)  Active Problems:    Urinary tract infection without hematuria, site unspecified

## 2021-10-31 NOTE — PLAN OF CARE
Problem: Infection  Goal: Absence of Infection Signs and Symptoms  Outcome: No Change   Patient pain and SOB. Continue to have low grade temps.      10/30/21 1532 10/30/21 2041   Vital Signs   Temp 99.1  F (37.3  C) 99  F (37.2  C)   Temp src Oral Oral   Resp 20 20   Pulse 84 77   Pulse Rate Source Monitor Monitor   /66 107/63   BP Location Left arm Right arm

## 2021-10-31 NOTE — PROGRESS NOTES
[facility] ONCOLOGY PROGRESS NOTE    Patient: Rich Wolff  MRN: [id number]  Date of Service: Oct 28, 2021     REASON FOR ADMISSION:  Neutropenic fever     Assessment    1.  A very pleasant 60 years old gentleman with metastatic colorectal cancer.  Status post FOLFOXIRI chemotherapy followed by surgical attempt followed by more chemotherapy.    2.  Neutropenic fever due to chemotherapy on 10/16/2021, improved on G-CSF subcutaneous injections    3.  Pyelonephritis associated with R neprostomy tube, replaced but no improvement of creatinine    4.  Renal insufficiency, followed bu nephrology, no improvement    5.  Normocytic normochromic anemia due to sepsis, stable and improved      Plan    1.  I will continue G-CSF until ANC >1000 for 3 consecutive days    2.  Agree with right nephrostomy tube replacement and every 2 months ongoing (emphasized to patient).    3.  DVT prophylaxis.    4.  IV antibiotics as directed.    5. No improvement of creatinine is concerning    6. Dr. Kern will follow.     7. Home per admitting team when ready    Staging History  Cancer Staging  No matching staging information was found for the patient.      History    Mr. Rich Wolff is a 60 year old gentleman, pt of Dr. Kern, with a history of metastatic colorectal cancer.  Diagnosed in November 2020 presenting with some bowel obstruction and upon evaluation was found to have a large cecal mass involving the adjacent mesentery, sigmoid colon, right ureter and dome of the bladder.  Biopsy consistent with invasive moderately differentiated adenocarcinoma.  No evidence of any microsatellite instability.  He underwent laparoscopic ileostomy and then got few doses of FOLFoxFIRI chemotherapy.  Had some response.    Surgical exenteration was attempted but was aborted since it was not possible.  Now resumed chemotherapy with FOLFIRI few days ago at [facility].  He is a patient of Dr. Yen.    Came in the hospital with fever  after chemotherapy.  Found to be neutropenic.  Also noted to have right hydronephrosis and some perhaps pyonephritis type of changes.  He is nephrostomy tube has been exchanged.  He is on IV antibiotic.  Feels better.    Review of systems.  Positive for tiredness, fatigue, fever, decreased appetite, decrease in urine output etc.  Better after hospitalization and IV fluids.      Past History    Past Medical History:   Diagnosis Date     Hyperlipidemia      Hypertension      Prostate cancer (H) 09/01/2015    T1c. Silvana's 6 (3+3).  Confined to prostate.  Multifocal bilateral comprising 2% of the gland.PSA:5.8.     Past Surgical History:   Procedure Laterality Date     BOWEL RESECTION       COLONOSCOPY W/ BIOPSIES  11/04/2020     COLOSTOMY       ESOPHAGOSCOPY, GASTROSCOPY, DUODENOSCOPY (EGD), COMBINED  11/04/2020     IR CHEST PORT PLACEMENT > 5 YRS OF AGE  11/24/2020     IR NEPHROSTOMY TUBE CHANGE RIGHT  4/8/2021     IR NEPHROSTOMY TUBE CHANGE RIGHT  4/8/2021     IR NEPHROSTOMY TUBE CHANGE RIGHT  8/16/2021     IR NEPHROSTOMY TUBE CHANGE RIGHT  10/29/2021     IR PORT PLACEMENT >5 YEARS  11/24/2020     NEPHROSTOMY       ID ESOPHAGOGASTRODUODENOSCOPY TRANSORAL DIAGNOSTIC N/A 11/4/2020    Procedure: ESOPHAGOGASTRODUODENOSCOPY (EGD);  Surgeon: Paul Masters MD;  Location: [facility];  Service: Gastroenterology     ID LAP,PROSTATECTOMY,RADICAL,W/NERVE SPARE,INCL ROBOTIC Bilateral 09/01/2015    Procedure: DAVINCI RADICAL RETROPUBIC PROSTATECTOMY BILATERAL PELVIC LYMPH NODE DISSECTION;  Surgeon: Juan C Velazco MD;  Location: [facility];  Service: Urology     PROSTATE BIOPSY  06/08/2015    Ultrasound-guided transrectal biopsy.     SURGERY SCROTAL / TESTICULAR      for undescended testes, removed due to atrophy with vasectomy     ZZ COLONOSCOPY W/WO BRUSH/WASH N/A 11/4/2020    Procedure: COLONOSCOPY WITH BIOPSY;  Surgeon: Paul Masters MD;  Location: [facility];  Service:  "Gastroenterology     Family History   Problem Relation Age of Onset     Breast Cancer Mother      Osteoporosis Mother      Valvular heart disease Father      Prostate Cancer Father 70.00     No Known Problems Sister      No Known Problems Son      No Known Problems Daughter      Social History     Socioeconomic History     Marital status:      Spouse name: Not on file     Number of children: Not on file     Years of education: Not on file     Highest education level: Not on file   Occupational History     Not on file   Tobacco Use     Smoking status: Never Smoker     Smokeless tobacco: Never Used   Substance and Sexual Activity     Alcohol use: Not Currently     Comment: Alcoholic Drinks/day: rarely     Drug use: No     Sexual activity: Not Currently   Other Topics Concern     Not on file   Social History Narrative     Not on file     Social Determinants of Health     Financial Resource Strain:      Difficulty of Paying Living Expenses:    Food Insecurity:      Worried About Running Out of Food in the Last Year:      Ran Out of Food in the Last Year:    Transportation Needs:      Lack of Transportation (Medical):      Lack of Transportation (Non-Medical):    Physical Activity:      Days of Exercise per Week:      Minutes of Exercise per Session:    Stress:      Feeling of Stress :    Social Connections:      Frequency of Communication with Friends and Family:      Frequency of Social Gatherings with Friends and Family:      Attends Zoroastrian Services:      Active Member of Clubs or Organizations:      Attends Club or Organization Meetings:      Marital Status:    Intimate Partner Violence:      Fear of Current or Ex-Partner:      Emotionally Abused:      Physically Abused:      Sexually Abused:          Allergies    No Known Allergies        Physical Exam    /63 (BP Location: Left arm)   Pulse 70   Temp 98.8  F (37.1  C) (Oral)   Resp 20   Ht 1.727 m (5' 8\")   Wt 77.1 kg (170 lb)   SpO2 97%   BMI " 25.85 kg/m      GENERAL: no acute distress. Cooperative in conversation.   HEENT: pupils are equal, round and reactive. Oral mucosa is moist and intact.  RESP:Chest symmetric. Regular respiratory rate. No stridor.  ABD: Nondistended, soft.  Nephrostomy tube in place.  EXTREMITIES: No lower extremity edema.   NEURO: non focal. Alert and oriented x3.   PSYCH: within normal limits. No depression or anxiety.  SKIN: warm dry intact       Lab Results    Recent Results (from the past 168 hour(s))   Symptomatic Influenza A/B & SARS-CoV2 (COVID-19) Virus PCR Multiplex Nasopharyngeal    Specimen: Nasopharyngeal; Swab   Result Value Ref Range    Influenza A target Negative Negative    Influenza B target Negative Negative    SARS CoV2 PCR Negative Negative   Basic metabolic panel   Result Value Ref Range    Sodium 135 (L) 136 - 145 mmol/L    Potassium 3.5 3.5 - 5.0 mmol/L    Chloride 114 (H) 98 - 107 mmol/L    Carbon Dioxide (CO2) 13 (L) 22 - 31 mmol/L    Anion Gap 8 5 - 18 mmol/L    Urea Nitrogen 51 (H) 8 - 22 mg/dL    Creatinine 2.33 (H) 0.70 - 1.30 mg/dL    Calcium 8.0 (L) 8.5 - 10.5 mg/dL    Glucose 129 (H) 70 - 125 mg/dL    GFR Estimate 29 (L) >60 mL/min/1.73m2   Lactic acid whole blood   Result Value Ref Range    Lactic Acid 1.7 0.7 - 2.0 mmol/L   Blood Culture Peripheral Blood    Specimen: Peripheral Blood   Result Value Ref Range    Culture No growth after 2 days    Hepatic function panel   Result Value Ref Range    Bilirubin Total 0.2 0.0 - 1.0 mg/dL    Bilirubin Direct <0.1 <=0.5 mg/dL    Protein Total 6.0 6.0 - 8.0 g/dL    Albumin 2.8 (L) 3.5 - 5.0 g/dL    Alkaline Phosphatase 130 (H) 45 - 120 U/L    AST 11 0 - 40 U/L    ALT 16 0 - 45 U/L   CBC with platelets and differential   Result Value Ref Range    WBC Count 1.9 (L) 4.0 - 11.0 10e3/uL    RBC Count 2.43 (L) 4.40 - 5.90 10e6/uL    Hemoglobin 7.4 (L) 13.3 - 17.7 g/dL    Hematocrit 24.1 (L) 40.0 - 53.0 %    MCV 99 78 - 100 fL    MCH 30.5 26.5 - 33.0 pg    MCHC 30.7  (L) 31.5 - 36.5 g/dL    RDW 16.8 (H) 10.0 - 15.0 %    Platelet Count 173 150 - 450 10e3/uL    % Neutrophils 85 %    % Lymphocytes 7 %    % Monocytes 6 %    % Eosinophils 1 %    % Basophils 0 %    % Immature Granulocytes 1 %    NRBCs per 100 WBC 0 <1 /100    Absolute Neutrophils 1.6 1.6 - 8.3 10e3/uL    Absolute Lymphocytes 0.1 (L) 0.8 - 5.3 10e3/uL    Absolute Monocytes 0.1 0.0 - 1.3 10e3/uL    Absolute Eosinophils 0.0 0.0 - 0.7 10e3/uL    Absolute Basophils 0.0 0.0 - 0.2 10e3/uL    Absolute Immature Granulocytes 0.0 <=0.0 10e3/uL    Absolute NRBCs 0.0 10e3/uL   Blood Culture Peripheral Blood    Specimen: Peripheral Blood   Result Value Ref Range    Culture No growth after 2 days    UA with Microscopic reflex to Culture    Specimen: Urine, Clean Catch   Result Value Ref Range    Color Urine Yellow Colorless, Straw, Light Yellow, Yellow    Appearance Urine Cloudy (A) Clear    Glucose Urine Negative Negative mg/dL    Bilirubin Urine Negative Negative    Ketones Urine Negative Negative mg/dL    Specific Gravity Urine 1.016 1.001 - 1.030    Blood Urine 0.2 mg/dL (A) Negative    pH Urine 5.5 5.0 - 7.0    Protein Albumin Urine 30  (A) Negative mg/dL    Urobilinogen Urine <2.0 <2.0 mg/dL    Nitrite Urine Positive (A) Negative    Leukocyte Esterase Urine 500 Marlon/uL (A) Negative    Bacteria Urine Moderate (A) None Seen /HPF    WBC Clumps Urine Present (A) None Seen /HPF    RBC Urine 4 (H) <=2 /HPF    WBC Urine >182 (H) <=5 /HPF   Urine Culture    Specimen: Urine, Midstream   Result Value Ref Range    Culture >100,000 CFU/mL Klebsiella pneumoniae (A)        Susceptibility    Klebsiella pneumoniae - CRISTHIAN     Ampicillin*  Resistant       * Intrinsically Resistant     Ampicillin/ Sulbactam 4.0 Susceptible ug/mL     Piperacillin/Tazobactam <=4.0 Susceptible ug/mL     Cefazolin* <=4.0 Susceptible ug/mL      * Cefazolin CRISTHIAN breakpoints are for the treatment of uncomplicated urinary tract infections. For the treatment of systemic  infections, please contact the laboratory for additional testing.     Cefoxitin <=4.0 Susceptible ug/mL     Ceftazidime <=1.0 Susceptible ug/mL     Ceftriaxone <=1.0 Susceptible ug/mL     Cefepime <=1.0 Susceptible ug/mL     Gentamicin <=1.0 Susceptible ug/mL     Tobramycin <=1.0 Susceptible ug/mL     Ciprofloxacin <=0.25 Susceptible ug/mL     Levofloxacin <=0.12 Susceptible ug/mL     Nitrofurantoin 64.0 Intermediate ug/mL     Trimethoprim/Sulfamethoxazole <=1/19 Susceptible ug/mL   Lactic acid whole blood   Result Value Ref Range    Lactic Acid 0.4 (L) 0.7 - 2.0 mmol/L   Basic metabolic panel   Result Value Ref Range    Sodium 137 136 - 145 mmol/L    Potassium 4.0 3.5 - 5.0 mmol/L    Chloride 118 (H) 98 - 107 mmol/L    Carbon Dioxide (CO2) 14 (L) 22 - 31 mmol/L    Anion Gap 5 5 - 18 mmol/L    Urea Nitrogen 45 (H) 8 - 22 mg/dL    Creatinine 2.02 (H) 0.70 - 1.30 mg/dL    Calcium 8.1 (L) 8.5 - 10.5 mg/dL    Glucose 91 70 - 125 mg/dL    GFR Estimate 35 (L) >60 mL/min/1.73m2   Basic metabolic panel   Result Value Ref Range    Sodium 138 136 - 145 mmol/L    Potassium 3.8 3.5 - 5.0 mmol/L    Chloride 119 (H) 98 - 107 mmol/L    Carbon Dioxide (CO2) 15 (L) 22 - 31 mmol/L    Anion Gap 4 (L) 5 - 18 mmol/L    Urea Nitrogen 43 (H) 8 - 22 mg/dL    Creatinine 2.07 (H) 0.70 - 1.30 mg/dL    Calcium 8.2 (L) 8.5 - 10.5 mg/dL    Glucose 92 70 - 125 mg/dL    GFR Estimate 34 (L) >60 mL/min/1.73m2   Hepatic panel   Result Value Ref Range    Bilirubin Total 0.4 0.0 - 1.0 mg/dL    Bilirubin Direct 0.1 <=0.5 mg/dL    Protein Total 5.5 (L) 6.0 - 8.0 g/dL    Albumin 2.4 (L) 3.5 - 5.0 g/dL    Alkaline Phosphatase 120 45 - 120 U/L    AST 11 0 - 40 U/L    ALT 16 0 - 45 U/L   CBC with platelets and differential   Result Value Ref Range    WBC Count 1.3 (L) 4.0 - 11.0 10e3/uL    RBC Count 2.33 (L) 4.40 - 5.90 10e6/uL    Hemoglobin 7.3 (L) 13.3 - 17.7 g/dL    Hematocrit 23.1 (L) 40.0 - 53.0 %    MCV 99 78 - 100 fL    MCH 31.3 26.5 - 33.0 pg    MCHC  31.6 31.5 - 36.5 g/dL    RDW 16.8 (H) 10.0 - 15.0 %    Platelet Count 173 150 - 450 10e3/uL    % Neutrophils 77 %    % Lymphocytes 11 %    % Monocytes 7 %    % Eosinophils 2 %    % Basophils 1 %    % Immature Granulocytes 2 %    NRBCs per 100 WBC 0 <1 /100    Absolute Neutrophils 1.0 (L) 1.6 - 8.3 10e3/uL    Absolute Lymphocytes 0.1 (L) 0.8 - 5.3 10e3/uL    Absolute Monocytes 0.1 0.0 - 1.3 10e3/uL    Absolute Eosinophils 0.0 0.0 - 0.7 10e3/uL    Absolute Basophils 0.0 0.0 - 0.2 10e3/uL    Absolute Immature Granulocytes 0.0 <=0.0 10e3/uL    Absolute NRBCs 0.0 10e3/uL   Magnesium   Result Value Ref Range    Magnesium 1.6 (L) 1.8 - 2.6 mg/dL   Potassium   Result Value Ref Range    Potassium 3.2 (L) 3.5 - 5.0 mmol/L   Magnesium   Result Value Ref Range    Magnesium 1.7 (L) 1.8 - 2.6 mg/dL   Lactic Acid STAT   Result Value Ref Range    Lactic Acid 1.3 0.7 - 2.0 mmol/L   Potassium   Result Value Ref Range    Potassium 3.4 (L) 3.5 - 5.0 mmol/L   Basic metabolic panel   Result Value Ref Range    Sodium 138 136 - 145 mmol/L    Potassium 3.4 (L) 3.5 - 5.0 mmol/L    Chloride 114 (H) 98 - 107 mmol/L    Carbon Dioxide (CO2) 18 (L) 22 - 31 mmol/L    Anion Gap 6 5 - 18 mmol/L    Urea Nitrogen 34 (H) 8 - 22 mg/dL    Creatinine 2.15 (H) 0.70 - 1.30 mg/dL    Calcium 8.3 (L) 8.5 - 10.5 mg/dL    Glucose 110 70 - 125 mg/dL    GFR Estimate 32 (L) >60 mL/min/1.73m2   Magnesium   Result Value Ref Range    Magnesium 1.8 1.8 - 2.6 mg/dL   CBC with platelets and differential   Result Value Ref Range    WBC Count 1.0 (L) 4.0 - 11.0 10e3/uL    RBC Count 2.30 (L) 4.40 - 5.90 10e6/uL    Hemoglobin 7.1 (L) 13.3 - 17.7 g/dL    Hematocrit 22.3 (L) 40.0 - 53.0 %    MCV 97 78 - 100 fL    MCH 30.9 26.5 - 33.0 pg    MCHC 31.8 31.5 - 36.5 g/dL    RDW 16.5 (H) 10.0 - 15.0 %    Platelet Count 158 150 - 450 10e3/uL   Manual Differential   Result Value Ref Range    % Neutrophils 78 %    % Lymphocytes 16 %    % Monocytes 2 %    % Eosinophils 2 %    %  Basophils 2 %    Absolute Neutrophils 0.8 (L) 1.6 - 8.3 10e3/uL    Absolute Lymphocytes 0.2 (L) 0.8 - 5.3 10e3/uL    Absolute Monocytes 0.0 0.0 - 1.3 10e3/uL    Absolute Eosinophils 0.0 0.0 - 0.7 10e3/uL    Absolute Basophils 0.0 0.0 - 0.2 10e3/uL    RBC Morphology Confirmed RBC Indices     Platelet Assessment (A) Automated Count Confirmed. Platelet morphology is normal.     Automated Count Confirmed. Giant platelets are present.    Basophilic Stippling Present (A) None Seen    Elliptocytes Slight (A) None Seen    RBC Fragments Slight (A) None Seen   Potassium   Result Value Ref Range    Potassium 3.3 (L) 3.5 - 5.0 mmol/L   Potassium   Result Value Ref Range    Potassium 3.8 3.5 - 5.0 mmol/L   Platelet count   Result Value Ref Range    Platelet Count 187 150 - 450 10e3/uL   Magnesium   Result Value Ref Range    Magnesium 1.8 1.8 - 2.6 mg/dL   Potassium   Result Value Ref Range    Potassium 3.7 3.5 - 5.0 mmol/L   CBC with platelets and differential   Result Value Ref Range    WBC Count 3.0 (L) 4.0 - 11.0 10e3/uL    RBC Count 2.46 (L) 4.40 - 5.90 10e6/uL    Hemoglobin 7.5 (L) 13.3 - 17.7 g/dL    Hematocrit 24.4 (L) 40.0 - 53.0 %    MCV 99 78 - 100 fL    MCH 30.5 26.5 - 33.0 pg    MCHC 30.7 (L) 31.5 - 36.5 g/dL    RDW 16.5 (H) 10.0 - 15.0 %    Platelet Count 187 150 - 450 10e3/uL    NRBCs per 100 WBC 0 <1 /100    Absolute NRBCs 0.0 10e3/uL   Manual Differential   Result Value Ref Range    % Neutrophils 87 %    % Lymphocytes 8 %    % Monocytes 2 %    % Eosinophils 3 %    % Basophils 0 %    Absolute Neutrophils 2.6 1.6 - 8.3 10e3/uL    Absolute Lymphocytes 0.2 (L) 0.8 - 5.3 10e3/uL    Absolute Monocytes 0.1 0.0 - 1.3 10e3/uL    Absolute Eosinophils 0.1 0.0 - 0.7 10e3/uL    Absolute Basophils 0.0 0.0 - 0.2 10e3/uL    RBC Morphology Confirmed RBC Indices     Platelet Assessment  Automated Count Confirmed. Platelet morphology is normal.     Automated Count Confirmed. Platelet morphology is normal.    Elliptocytes Slight (A)  None Seen    RBC Fragments Slight (A) None Seen   Basic metabolic panel   Result Value Ref Range    Sodium 139 136 - 145 mmol/L    Potassium 3.7 3.5 - 5.0 mmol/L    Chloride 113 (H) 98 - 107 mmol/L    Carbon Dioxide (CO2) 18 (L) 22 - 31 mmol/L    Anion Gap 8 5 - 18 mmol/L    Urea Nitrogen 32 (H) 8 - 22 mg/dL    Creatinine 2.20 (H) 0.70 - 1.30 mg/dL    Calcium 8.8 8.5 - 10.5 mg/dL    Glucose 122 70 - 125 mg/dL    GFR Estimate 31 (L) >60 mL/min/1.73m2       Imaging Results    XR Chest Port 1 View    Result Date: 10/28/2021  EXAM: XR CHEST PORT 1 VIEW LOCATION: [facility] DATE/TIME: 10/28/2021 5:27 PM INDICATION: fever; on chemotherapy COMPARISON: 04/19/2021     IMPRESSION: A right chest wall Port-A-Cath is present. No pleural fluid or pneumothorax. No airspace disease. Normal size of the heart.     IR Nephrostomy Tube Change Right    Result Date: 10/29/2021  [facility] LOCATION: [facility] DATE: 10/29/2021 PROCEDURE: NEPHROSTOMY TUBE EXCHANGE ATTENDING: Tong Leahy MD. INDICATION: 60-year-old male with sepsis. Exchange of the nephrostomy is requested. CONSENT: The risks, benefits, and alternatives of nephrostomy tube exchange were discussed with the patient in detail. All questions were answered. Informed consent was given to proceed with the procedure. MODERATE SEDATION: None. ADDITIONAL MEDICATIONS: None. FLUOROSCOPIC TIME: 1.0 minutes. AIR KERMA:  33 mGy. CONTRAST: 10 mL Omnipaque. UNIVERSAL PRECAUTIONS: The procedure was performed utilizing maximum sterile barrier technique. Prior to the start of the procedure, a standard pause for patient safety was performed with site marking as indicated. COMPLICATIONS: No immediate complications. PROCEDURE:  A  image was obtained. A nephrostogram was performed through the previously placed right nephrostomy tube. Under direct fluoroscopic visualization, the previous tube was removed over a wire and exchange was  made for a new 10 [place] nephrostomy. The loop was locked within the renal pelvis, and the catheter was sutured to the skin. A post placement nephrostogram was performed. FINDINGS:  The initial  image shows the previously placed right nephrostomy tube. The tube has pulled back such that it is in a lower pole peripheral calyx. At the completion of the study, the new nephrostomy loop lies within the renal pelvis and controls the urinary system well.     IMPRESSION:  Successful right nephrostomy exchange, as discussed above.     CT Abdomen Pelvis w Contrast    Result Date: 10/28/2021  EXAM: CT ABDOMEN PELVIS W CONTRAST LOCATION: [facility] DATE/TIME: 10/28/2021 6:57 PM INDICATION: sepsis/evaluate for nephrostomy tube obstruction COMPARISON: CT 08/04/2021 TECHNIQUE: CT scan of the abdomen and pelvis was performed following injection of IV contrast. Multiplanar reformats were obtained. Dose reduction techniques were used. CONTRAST: [id number] 75ml FINDINGS: LOWER CHEST: Normal. HEPATOBILIARY: Cholelithiasis. PANCREAS: Normal. SPLEEN: Normal. ADRENAL GLANDS: Normal. KIDNEYS/BLADDER: The right kidney as nephrostomy tube has been retracted and is coiled in the region of the renal cortex. There is mild right hydronephrosis with enhancement and/or thickening of the renal pelvis wall and adjacent stranding. There is decrease/delayed enhancement and delayed excretion of the right kidney relative to the left kidney. No left hydronephrosis or hydroureter. Normal urinary bladder. BOWEL: Normal stomach. Normal caliber of the small bowel. There is a diverting ostomy in the right lower quadrant. There is tethering of bowel loops in the right lower quadrant with ill-defined soft tissue in this region. Otherwise normal appearance of the colon.  A duodenal suture line is again seen. LYMPH NODES: Haziness of the mesentery is nonspecific. VASCULATURE: Unremarkable. PELVIC ORGANS: Prostatectomy.  MUSCULOSKELETAL: Surgical changes along the ventral abdominal wall.     IMPRESSION: 1.  The right nephrostomy tube has been retracted. There is decreased/delayed enhancement of the right kidney with inflammatory change at the right renal pelvis. Underlying infection is possible. Consider replacing the nephrostomy tube. 2.  Right lower quadrant tethering/mass, without significant change from the prior study.           This note has been dictated using voice recognition software. Any grammatical or context distortions are unintentional and inherent to the software      Signed by: Mike Ahumada MD, MD

## 2021-10-31 NOTE — PROGRESS NOTES
Hospitalist Progress Note  ADMIT DATE: 10/28/2021     FACILITY: [facility]    PCP: Maycol Worrell, [phone]    Assessment/Plan    Rich Wolff is a 60 year old male with a history of colon cancer on chemotherapy, h/o pyelonephritis, hyperlipidemia, hypertension, prostate cancer, PE on Xarelto, s/p nephrostomy tube who presents with fever following chemotherapy on 10/28.       Neutropenic fever  Neutropenia and anemia  -Likely related to chemotherapy.  -Oncology consultation appreciated  -start G-CSF until ANC >1000 for 3 consecutive days as per oncology  -improving     Severe sepsis secondary to pyelonephritis complicated by nephrostomy tube and neutropenia  -improved with IVF and antibiotics  -Continue cefepime and vancomycin  -ID consultation appreciated  -Follow-up blood cultures  - Urinalysis reviewed and appears to have UTI/pyelonephritis  - CT abdomen reviewed-appears to have pyelonephritis  -IR consulted for nephrostomy tube exchange -done 10/29     Metabolic acidosis likely secondary to severe sepsis  Acute kidney injury in the setting of CKD stage III  -Baseline creatinine appears to be 1.7-1.8.  Creatinine on arrival 2.33  -Bicarb ivf as per nephrology  -nephrology consultation appreciated      Colon cancer undergoing chemotherapy  -Consult oncology      Hypokalemia  -replace as per protocol     H/o Pulmonary embolism   -on Xarelto  -no signs of bleeding     Other comorbidities include hyperlipidemia, hypertension, prostate cancer     Code Status: Full Code  DVT prophylaxis: on Xarelto     Disposition:  -Anticipated Length of Stay in midnights and medical necessity (including a midnight in the Emergency Department after triage if applicable): Multiple days  -Discharge barriers: IV antibiotics, neutropenia on G-CSF, CHAYO with metabolic acidosis              Barriers to Discharge:  IV antibiotics, CHAYO and metabolic acidosis; defer to oncologist for discharge  plan    Anticipated discharge date/Disposition: 1-2 days    Subjective  Feels better, no fever/chills, no pain, no new complaints; wondering when to go home    Objective    Vital signs in last 24 hours  Temp:  [98.1  F (36.7  C)-99.1  F (37.3  C)] 98.8  F (37.1  C)  Pulse:  [70-84] 70  Resp:  [18-20] 20  BP: (100-113)/(60-66) 101/63  SpO2:  [97 %-99 %] 97 % [unfilled] O2 Device: None (Room air)    Weight:   [unfilled] Weight change:     Intake/Output last 3 shifts  I/O last 3 completed shifts:  In: 3039 [P.O.:1640; I.V.:1399]  Out: 2425 [Urine:825; Stool:1600]  Body mass index is 25.85 kg/m .    Physical Exam    Physical Exam  General appearance: not in acute distress  HEENT: PERRL, EOMI  Lungs: Clear breath sounds in bilateral lung fields  Cardiovascular: Regular rate and rhythm, normal S1-S2  Abdomen: Soft, non tender, no distension, normal bowel sound  Nephrostomy tube in place  Musculoskeletal: No joint swelling  Skin: No rash and no edema  Neurology: AAO ×3.  Cranial nerves II - XII normal.  Normal muscle strength in all four extremities.      Pertinent Labs   Lab Results: personally reviewed.     Results for ARTUR GOULD (MRN [id number]) as of 10/31/2021 07:43   Ref. Range 10/31/2021 05:56   Potassium Latest Ref Range: 3.5 - 5.0 mmol/L 3.7   Magnesium Latest Ref Range: 1.8 - 2.6 mg/dL 1.8   Platelet Count Latest Ref Range: 150 - 450 10e3/uL 187   Results for ARTUR GOULD (MRN [id number]) as of 10/31/2021 09:43   Ref. Range 10/31/2021 05:56   WBC Latest Ref Range: 4.0 - 11.0 10e3/uL 3.0 (L)   Hemoglobin Latest Ref Range: 13.3 - 17.7 g/dL 7.5 (L)   Hematocrit Latest Ref Range: 40.0 - 53.0 % 24.4 (L)   Platelet Count Latest Ref Range: 150 - 450 10e3/uL 187   RBC Count Latest Ref Range: 4.40 - 5.90 10e6/uL 2.46 (L)   MCV Latest Ref Range: 78 - 100 fL 99   MCH Latest Ref Range: 26.5 - 33.0 pg 30.5   MCHC Latest Ref Range: 31.5 - 36.5 g/dL 30.7 (L)   RDW Latest Ref Range: 10.0 - 15.0 % 16.5 (H)   %  Neutrophils Latest Units: % 87   % Lymphocytes Latest Units: % 8   % Monocytes Latest Units: % 2   % Eosinophils Latest Units: % 3   % Basophils Latest Units: % 0   Absolute Basophils Latest Ref Range: 0.0 - 0.2 10e3/uL 0.0   Absolute Neutrophil Latest Ref Range: 1.6 - 8.3 10e3/uL 2.6   Absolute Lymphocytes Latest Ref Range: 0.8 - 5.3 10e3/uL 0.2 (L)   Absolute Monocytes Latest Ref Range: 0.0 - 1.3 10e3/uL 0.1   Absolute Eosinophils Latest Ref Range: 0.0 - 0.7 10e3/uL 0.1   Absolute NRBCs Latest Units: 10e3/uL 0.0   NRBCs per 100 WBC Latest Ref Range: <1 /100 0     Medications  Scheduled Meds:    ceFEPIme (MAXIPIME) IV  2 g Intravenous Q12H     fentaNYL  25 mcg Transdermal Q72H     fentaNYL   Transdermal Q8H     ferrous sulfate  325 mg Oral BID     filgrastim-sndz (ZARXIO) subcutaneous  480 mcg Subcutaneous Daily before lunch     gabapentin  300 mg Oral BID     heparin  5-10 mL Intracatheter Q28 Days     heparin lock flush  5-10 mL Intracatheter Q24H     oxybutynin  5 mg Oral TID     pantoprazole  40 mg Oral QAM AC     rivaroxaban ANTICOAGULANT  20 mg Oral Daily     sertraline  50 mg Oral At Bedtime     sodium bicarbonate  1,300 mg Oral BID     sodium chloride (PF)  3 mL Intracatheter Q8H     tamsulosin  0.4 mg Oral Daily     vancomycin  1,000 mg Intravenous Q24H     zolpidem  5 mg Oral At Bedtime     Continuous Infusions:  PRN Meds:.acetaminophen **OR** acetaminophen, lidocaine 4%, lidocaine (buffered or not buffered), melatonin, naloxone **OR** naloxone **OR** naloxone **OR** naloxone, ondansetron **OR** ondansetron, oxyCODONE, polyethylene glycol, sodium chloride (PF)    Advanced Care Planning:  Discharge planning discussed with patient         [facility] Medicine [facility]  Lainey Conrad

## 2021-11-01 ENCOUNTER — HOME INFUSION (PRE-WILLOW HOME INFUSION) (OUTPATIENT)
Dept: PHARMACY | Facility: CLINIC | Age: 61
End: 2021-11-01
Payer: COMMERCIAL

## 2021-11-01 VITALS
TEMPERATURE: 98.3 F | HEIGHT: 68 IN | OXYGEN SATURATION: 98 % | BODY MASS INDEX: 25.76 KG/M2 | SYSTOLIC BLOOD PRESSURE: 109 MMHG | HEART RATE: 77 BPM | DIASTOLIC BLOOD PRESSURE: 65 MMHG | WEIGHT: 170 LBS | RESPIRATION RATE: 18 BRPM

## 2021-11-01 LAB
ANION GAP SERPL CALCULATED.3IONS-SCNC: 5 MMOL/L (ref 5–18)
BASOPHILS # BLD MANUAL: 0.1 10E3/UL (ref 0–0.2)
BASOPHILS NFR BLD MANUAL: 1 %
BUN SERPL-MCNC: 32 MG/DL (ref 8–22)
CALCIUM SERPL-MCNC: 9.1 MG/DL (ref 8.5–10.5)
CHLORIDE BLD-SCNC: 115 MMOL/L (ref 98–107)
CO2 SERPL-SCNC: 20 MMOL/L (ref 22–31)
CREAT SERPL-MCNC: 2.2 MG/DL (ref 0.7–1.3)
DACRYOCYTES BLD QL SMEAR: SLIGHT
ELLIPTOCYTES BLD QL SMEAR: SLIGHT
EOSINOPHIL # BLD MANUAL: 0 10E3/UL (ref 0–0.7)
EOSINOPHIL NFR BLD MANUAL: 0 %
ERYTHROCYTE [DISTWIDTH] IN BLOOD BY AUTOMATED COUNT: 16.2 % (ref 10–15)
GFR SERPL CREATININE-BSD FRML MDRD: 31 ML/MIN/1.73M2
GLUCOSE BLD-MCNC: 98 MG/DL (ref 70–125)
HCT VFR BLD AUTO: 25.2 % (ref 40–53)
HGB BLD-MCNC: 7.8 G/DL (ref 13.3–17.7)
LYMPHOCYTES # BLD MANUAL: 0.2 10E3/UL (ref 0.8–5.3)
LYMPHOCYTES NFR BLD MANUAL: 3 %
MAGNESIUM SERPL-MCNC: 1.9 MG/DL (ref 1.8–2.6)
MCH RBC QN AUTO: 30.2 PG (ref 26.5–33)
MCHC RBC AUTO-ENTMCNC: 31 G/DL (ref 31.5–36.5)
MCV RBC AUTO: 98 FL (ref 78–100)
MONOCYTES # BLD MANUAL: 0.2 10E3/UL (ref 0–1.3)
MONOCYTES NFR BLD MANUAL: 4 %
NEUTROPHILS # BLD MANUAL: 5.3 10E3/UL (ref 1.6–8.3)
NEUTROPHILS NFR BLD MANUAL: 92 %
NRBC # BLD AUTO: 0 10E3/UL
NRBC BLD AUTO-RTO: 0 /100
PLAT MORPH BLD: ABNORMAL
PLATELET # BLD AUTO: 172 10E3/UL (ref 150–450)
POTASSIUM BLD-SCNC: 4.7 MMOL/L (ref 3.5–5)
RBC # BLD AUTO: 2.58 10E6/UL (ref 4.4–5.9)
RBC MORPH BLD: ABNORMAL
SODIUM SERPL-SCNC: 140 MMOL/L (ref 136–145)
WBC # BLD AUTO: 5.8 10E3/UL (ref 4–11)

## 2021-11-01 PROCEDURE — 250N000013 HC RX MED GY IP 250 OP 250 PS 637: Performed by: INTERNAL MEDICINE

## 2021-11-01 PROCEDURE — 85027 COMPLETE CBC AUTOMATED: CPT | Performed by: INTERNAL MEDICINE

## 2021-11-01 PROCEDURE — 250N000011 HC RX IP 250 OP 636: Performed by: INTERNAL MEDICINE

## 2021-11-01 PROCEDURE — 80048 BASIC METABOLIC PNL TOTAL CA: CPT | Performed by: INTERNAL MEDICINE

## 2021-11-01 PROCEDURE — 36415 COLL VENOUS BLD VENIPUNCTURE: CPT | Performed by: INTERNAL MEDICINE

## 2021-11-01 PROCEDURE — 99239 HOSP IP/OBS DSCHRG MGMT >30: CPT | Performed by: INTERNAL MEDICINE

## 2021-11-01 PROCEDURE — 83735 ASSAY OF MAGNESIUM: CPT | Performed by: INTERNAL MEDICINE

## 2021-11-01 RX ORDER — LEVOFLOXACIN 250 MG/1
250 TABLET, FILM COATED ORAL DAILY
Qty: 11 TABLET | Refills: 0 | Status: SHIPPED | OUTPATIENT
Start: 2021-11-01 | End: 2021-12-27

## 2021-11-01 RX ADMIN — PANTOPRAZOLE SODIUM 40 MG: 20 TABLET, DELAYED RELEASE ORAL at 06:17

## 2021-11-01 RX ADMIN — OXYBUTYNIN CHLORIDE 5 MG: 5 TABLET ORAL at 08:35

## 2021-11-01 RX ADMIN — RIVAROXABAN 20 MG: 10 TABLET, FILM COATED ORAL at 08:35

## 2021-11-01 RX ADMIN — GABAPENTIN 300 MG: 300 CAPSULE ORAL at 08:35

## 2021-11-01 RX ADMIN — TAMSULOSIN HYDROCHLORIDE 0.4 MG: 0.4 CAPSULE ORAL at 08:35

## 2021-11-01 RX ADMIN — SODIUM BICARBONATE 1300 MG: 648 TABLET ORAL at 08:35

## 2021-11-01 RX ADMIN — FILGRASTIM-SNDZ 480 MCG: 480 INJECTION, SOLUTION INTRAVENOUS; SUBCUTANEOUS at 09:27

## 2021-11-01 RX ADMIN — FERROUS SULFATE TAB 325 MG (65 MG ELEMENTAL FE) 325 MG: 325 (65 FE) TAB at 08:35

## 2021-11-01 ASSESSMENT — ACTIVITIES OF DAILY LIVING (ADL)
ADLS_ACUITY_SCORE: 9

## 2021-11-01 NOTE — DISCHARGE SUMMARY
[facility] MEDICINE  DISCHARGE SUMMARY     Primary Care Physician: Maycol Worrell              Admission Date: 10/28/2021   Discharge Provider: Lainey Conrad Discharge Date: 11/1/2021   Diet:   Active Diet and Nourishment Order   Procedures     Combination Diet Regular Diet Adult     Diet         Activity: DCACTIVITY: Activity as tolerated  As per instruction    Code Status: Full Code         Condition at Discharge: improving      REASON FOR PRESENTATION(See Admission Note for Details)   fever    PRINCIPAL & ACTIVE DISCHARGE DIAGNOSES     Principal Problem:    Febrile neutropenia (H)  Active Problems:    Urinary tract infection without hematuria, site unspecified      SIGNIFICANT FINDINGS (Imaging, labs):   Collected:  10/28/2021  5:54 PM Status:  Final result   Visible to patient:  Yes (MyChart)  Specimen Information: Urine, Midstream         0 Result Notes  Culture >100,000 CFU/mL Klebsiella pneumoniaeAbnormal             Resulting Agency: [facility]       Susceptibility     Klebsiella pneumoniae     CRISTHIAN     Ampicillin  Resistant 1     Ampicillin/ Sulbactam 4.0 ug/mL Susceptible     Cefazolin <=4.0 ug/mL Susceptible 2     Cefepime <=1.0 ug/mL Susceptible     Cefoxitin <=4.0 ug/mL Susceptible     Ceftazidime <=1.0 ug/mL Susceptible     Ceftriaxone <=1.0 ug/mL Susceptible     Ciprofloxacin <=0.25 ug/mL Susceptible     Gentamicin <=1.0 ug/mL Susceptible     Levofloxacin <=0.12 ug/mL Susceptible     Nitrofurantoin 64.0 ug/mL Intermediate     Piperacillin/Tazobactam <=4.0 ug/mL Susceptible     Tobramycin <=1.0 ug/mL Susceptible     Trimethoprim/Sulfamethoxazole <=1/19 ug/mL Susceptible           1 Intrinsically Resistant   2 Cefazolin CRISTHIAN breakpoints are for the treatment of uncomplicated urinary tract infections. For the treatment of systemic infections, please contact the laboratory for additional testing.            Specimen Collected: 10/28/21  5:54 PM           EXAM: CT ABDOMEN PELVIS W CONTRAST  LOCATION: [facility]  DATE/TIME: 10/28/2021 6:57 PM     INDICATION: sepsis/evaluate for nephrostomy tube obstruction  COMPARISON: CT 08/04/2021                                                               IMPRESSION:   1.  The right nephrostomy tube has been retracted. There is decreased/delayed enhancement of the right kidney with inflammatory change at the right renal pelvis. Underlying infection is possible. Consider replacing the nephrostomy tube.  2.  Right lower quadrant tethering/mass, without significant change from the prior study.     [facility]  LOCATION: [facility]  DATE: 10/29/2021     PROCEDURE: NEPHROSTOMY TUBE EXCHANGE     FINDINGS:    The initial  image shows the previously placed right nephrostomy tube. The tube has pulled back such that it is in a lower pole peripheral calyx. At the completion of the study, the new nephrostomy loop lies within the renal pelvis and controls the   urinary system well.                                                                      IMPRESSION:    Successful right nephrostomy exchange, as discussed above.    PENDING LABS     CBC, BMP defer to PCP/oncology    PROCEDURES ( this hospitalization only)      right nephrostomy exchange    RECOMMENDATION FOR F/U VISIT     Follow up with pcp within 1 week  Follow up with oncology as instructed  Follow up with nephrology defer to PCP    DISPOSITION     Home    SUMMARY OF HOSPITAL COURSE:      Rich Wolff is a 60 year old male with a history of colon cancer on chemotherapy, h/o pyelonephritis, hyperlipidemia, hypertension, prostate cancer, PE on Xarelto, s/p nephrostomy tube who presents with fever following chemotherapy on 10/28.       Neutropenic fever  Neutropenia and anemia  -Likely related to chemotherapy.  -Oncology consultation appreciated  -start G-CSF until ANC >1000 for 3 consecutive days as per oncology  -As per oncologist, patient can be discharged home  after receiving the last dose of G-CSF 11/1     Severe sepsis secondary to pyelonephritis complicated by nephrostomy tube and neutropenia  -improved with IVF and antibiotics  -Was started on cefepime and vancomycin initially, ID changed to po Levaquin for 14 days (after nephrostomy tube)  -ID consultation appreciated  -  UTI/pyelonephritis  - CT abdomen reviewed-appears to have pyelonephritis  -IR consulted for nephrostomy tube exchange -done 10/29     Metabolic acidosis likely secondary to severe sepsis  Acute kidney injury in the setting of CKD stage III  -Baseline creatinine appears to be 1.7-1.8.  Creatinine on arrival 2.33, 2.2 on 11/1  -Bicarb ivf as per nephrology  -nephrology consultation appreciated -most likely due to iv contrast  -recheck BMP within 1 week defer to PCP      Colon cancer undergoing chemotherapy  -Consult oncology      Hypokalemia  -replace as per protocol     H/o Pulmonary embolism   -on Xarelto  -no signs of bleeding     Other comorbidities include hyperlipidemia, hypertension, prostate cancer     Code Status: Full Code  DVT prophylaxis: on Xarelto    Patient's other chronic medical conditions are stable and home medications were continued.    Discharge Medications with Med changes:       Current Discharge Medication List      START taking these medications    Details   levofloxacin (LEVAQUIN) 250 MG tablet Take 1 tablet (250 mg) by mouth daily  Qty: 11 tablet, Refills: 0    Associated Diagnoses: Pyelonephritis         CONTINUE these medications which have NOT CHANGED    Details   acetaminophen (TYLENOL) 500 MG tablet Take 500-1,000 mg by mouth every 6 hours as needed for mild pain or fever       fentaNYL (DURAGESIC) 25 mcg/hr 72 hr patch Place 1 patch onto the skin every 72 hours remove old patch.      ferrous sulfate (FEROSUL) 325 (65 Fe) MG tablet Take 325 mg by mouth 2 times daily      gabapentin (NEURONTIN) 300 MG capsule Take 300 mg by mouth 2 times daily      omeprazole (PRILOSEC)  20 MG DR capsule Take 20 mg by mouth daily as needed      oxybutynin (DITROPAN) 5 MG tablet Take 5 mg by mouth 3 times daily      oxyCODONE (ROXICODONE) 5 MG tablet Take 5 mg by mouth every 4 hours as needed for severe pain       sertraline (ZOLOFT) 50 MG tablet Take 50 mg by mouth At Bedtime       tamsulosin (FLOMAX) 0.4 MG capsule Take 0.4 mg by mouth daily      XARELTO ANTICOAGULANT 20 MG TABS tablet Take 20 mg by mouth daily      zolpidem (AMBIEN) 5 MG tablet Take 5 mg by mouth At Bedtime               Rationale for medication changes:      UTI, neutropenic fever    Patient's long term medication were not changed during this hospitalization.    Consults   Oncology  Nephrology  ID    Discharge Procedure Orders   IR Nephrostomy Tube Change Right   Standing Status: Future Standing Exp. Date: 10/29/22   Order Comments: R PNT exchange in ~3 months.     Order Specific Question Answer Comments   PHE Acuity elective    Is the patient on aspirin, Plavix or blood thinners? NO - order as requested      Reason for your hospital stay   Order Comments: fever     Follow-up and recommended labs and tests    Order Comments: Follow up with primary care provider, Maycol Worrell, within 7 days for hospital follow- up and renal function.  The following labs/tests are recommended: CBC and BMP.    Follow up with Dr. Kern , at [facility], within 1 week  to evaluate treatment change, for hospital follow- up, and to follow up on results. The following labs/tests are recommended: CBC.  Follow up with urology for nephrostomy tube within 6-8 weeks     Activity   Order Comments: Your activity upon discharge: activity as tolerated; as urology instruction     Order Specific Question Answer Comments   Is discharge order? Yes      When to contact your care team   Order Comments: Call your primary doctor if you have any of the following: temperature greater than 100.5F or less than 96 F or increased pain, or decreased urine output      Diet   Order Comments: Follow this diet upon discharge: Orders Placed This Encounter      Combination Diet Regular Diet Adult     Order Specific Question Answer Comments   Is discharge order? Yes      Examination     Vital Signs in last 24 hours:   vss    General appearance: Not in acute distress  HEENT: PERRL, EOMI  Neck: Supple, no JVD  Lungs: Clear breath sounds in bilateral lung fields  Cardiovascular: Regular rate and rhythm, normal S1-S2  Abdomen: Soft, non tender, no distension  Musculoskeletal: No joint swelling  Skin: No rash and no edema  Neurology: AAO ×3.  Cranial nerves II - XII normal.  Normal muscle strength in all four extremities.        Please see EMR for more detailed significant labs, imaging, consultant notes etc.    Total time spent on discharge: 50 minutes    Lainey (Ras) MD [place]  [facility] Service: Ph:[phone]    CC:Maycol Worrell    Results for ARTUR GOULD (MRN [id number]) as of 11/1/2021 09:39   Ref. Range 11/1/2021 05:39   Sodium Latest Ref Range: 136 - 145 mmol/L 140   Potassium Latest Ref Range: 3.5 - 5.0 mmol/L 4.7   Chloride Latest Ref Range: 98 - 107 mmol/L 115 (H)   Carbon Dioxide Latest Ref Range: 22 - 31 mmol/L 20 (L)   Urea Nitrogen Latest Ref Range: 8 - 22 mg/dL 32 (H)   Creatinine Latest Ref Range: 0.70 - 1.30 mg/dL 2.20 (H)   GFR Estimate Latest Ref Range: >60 mL/min/1.73m2 31 (L)   Calcium Latest Ref Range: 8.5 - 10.5 mg/dL 9.1   Anion Gap Latest Ref Range: 5 - 18 mmol/L 5   Magnesium Latest Ref Range: 1.8 - 2.6 mg/dL 1.9   Glucose Latest Ref Range: 70 - 125 mg/dL 98   WBC Latest Ref Range: 4.0 - 11.0 10e3/uL 5.8   Hemoglobin Latest Ref Range: 13.3 - 17.7 g/dL 7.8 (L)   Hematocrit Latest Ref Range: 40.0 - 53.0 % 25.2 (L)   Platelet Count Latest Ref Range: 150 - 450 10e3/uL 172   RBC Count Latest Ref Range: 4.40 - 5.90 10e6/uL 2.58 (L)   MCV Latest Ref Range: 78 - 100 fL 98   MCH Latest Ref Range: 26.5 - 33.0 pg 30.2   MCHC Latest Ref Range:  31.5 - 36.5 g/dL 31.0 (L)   RDW Latest Ref Range: 10.0 - 15.0 % 16.2 (H)

## 2021-11-01 NOTE — PLAN OF CARE
Problem: Discharge Planning  Goal: Discharge Planning (Adult, OB, Behavioral, Peds)  Outcome: Adequate for Discharge     Patient independent with cares to his nephrostomy tube and colostomy. MD spoke with oncology, plan is to have him take his Zarxio early (given this AM) then discharge. Patient plans to see his oncologist in a week.

## 2021-11-01 NOTE — PLAN OF CARE
Problem: Adult Inpatient Plan of Care  Goal: Absence of Hospital-Acquired Illness or Injury  Intervention: Identify and Manage Fall Risk  Recent Flowsheet Documentation  Taken 11/1/2021 0008 by Eden Cardenas, RN  Safety Promotion/Fall Prevention: nonskid shoes/slippers when out of bed   Pt alert and oriented ,independent in his room,slept most of the night,no c/o pain or discomfort.

## 2021-11-01 NOTE — PLAN OF CARE
Problem: Infection  Goal: Absence of Infection Signs and Symptoms  Outcome: Improving   Pt transitioned to PO abx and tolerating well. Pt hopeful to discharge in the morning.     Pt has colostomy and right nephrostomy tube that he has been self managing. Ambulating in room and halls independently.     NANCIE NICOLE RN

## 2021-11-02 ENCOUNTER — PATIENT OUTREACH (OUTPATIENT)
Dept: CARE COORDINATION | Facility: CLINIC | Age: 61
End: 2021-11-02

## 2021-11-02 DIAGNOSIS — Z71.89 OTHER SPECIFIED COUNSELING: ICD-10-CM

## 2021-11-02 LAB
BACTERIA BLD CULT: NO GROWTH
BACTERIA BLD CULT: NO GROWTH

## 2021-11-02 NOTE — PROGRESS NOTES
Clinic Care Coordination Contact  Care Team Conversations    Patient identified for care management outreach, however [facility] RN staff has already followed up with patient to ensure they are following up with PCP and have needs and resources met. Clinic RN will refer back to HAZEL SALGADO if needed/appropriate.    Avril Sims [facility]  Clinic Care Coordinator  [facility]   [phone]

## 2021-11-08 ENCOUNTER — HOME INFUSION (PRE-WILLOW HOME INFUSION) (OUTPATIENT)
Dept: PHARMACY | Facility: CLINIC | Age: 61
End: 2021-11-08
Payer: COMMERCIAL

## 2021-11-09 ENCOUNTER — TRANSFERRED RECORDS (OUTPATIENT)
Dept: HEALTH INFORMATION MANAGEMENT | Facility: CLINIC | Age: 61
End: 2021-11-09
Payer: COMMERCIAL

## 2021-11-09 ENCOUNTER — MEDICAL CORRESPONDENCE (OUTPATIENT)
Dept: HEALTH INFORMATION MANAGEMENT | Facility: CLINIC | Age: 61
End: 2021-11-09
Payer: COMMERCIAL

## 2021-11-09 DIAGNOSIS — D62 ANEMIA DUE TO BLOOD LOSS, ACUTE: Primary | ICD-10-CM

## 2021-11-09 RX ORDER — HEPARIN SODIUM,PORCINE 10 UNIT/ML
5 VIAL (ML) INTRAVENOUS
Status: CANCELLED | OUTPATIENT
Start: 2021-11-09

## 2021-11-09 RX ORDER — HEPARIN SODIUM (PORCINE) LOCK FLUSH IV SOLN 100 UNIT/ML 100 UNIT/ML
5 SOLUTION INTRAVENOUS
Status: CANCELLED | OUTPATIENT
Start: 2021-11-09

## 2021-11-09 NOTE — PROGRESS NOTES
This is a recent snapshot of the patient's [facility] medical record.  For current drug dose and complete information and questions, call [phone] or In Basket pool, fv home infusion ([id number])  CSN Number:  [id number]

## 2021-11-12 ENCOUNTER — INFUSION THERAPY VISIT (OUTPATIENT)
Dept: INFUSION THERAPY | Facility: CLINIC | Age: 61
End: 2021-11-12
Payer: COMMERCIAL

## 2021-11-12 VITALS
SYSTOLIC BLOOD PRESSURE: 105 MMHG | HEART RATE: 75 BPM | DIASTOLIC BLOOD PRESSURE: 67 MMHG | TEMPERATURE: 98.1 F | RESPIRATION RATE: 16 BRPM | OXYGEN SATURATION: 98 %

## 2021-11-12 DIAGNOSIS — D62 ANEMIA DUE TO BLOOD LOSS, ACUTE: Primary | ICD-10-CM

## 2021-11-12 LAB
ABO/RH(D): NORMAL
ANTIBODY SCREEN: NEGATIVE
BLD PROD TYP BPU: NORMAL
BLOOD COMPONENT TYPE: NORMAL
CODING SYSTEM: NORMAL
CROSSMATCH: NORMAL
HOLD SPECIMEN: NORMAL
ISSUE DATE AND TIME: NORMAL
SPECIMEN EXPIRATION DATE: NORMAL
UNIT ABO/RH: NORMAL
UNIT NUMBER: NORMAL
UNIT STATUS: NORMAL
UNIT TYPE ISBT: 6200

## 2021-11-12 PROCEDURE — 36430 TRANSFUSION BLD/BLD COMPNT: CPT

## 2021-11-12 PROCEDURE — 36591 DRAW BLOOD OFF VENOUS DEVICE: CPT

## 2021-11-12 PROCEDURE — P9016 RBC LEUKOCYTES REDUCED: HCPCS | Performed by: NURSE PRACTITIONER

## 2021-11-12 PROCEDURE — 250N000011 HC RX IP 250 OP 636: Performed by: NURSE PRACTITIONER

## 2021-11-12 PROCEDURE — 86900 BLOOD TYPING SEROLOGIC ABO: CPT

## 2021-11-12 PROCEDURE — 258N000003 HC RX IP 258 OP 636: Performed by: NURSE PRACTITIONER

## 2021-11-12 PROCEDURE — 86923 COMPATIBILITY TEST ELECTRIC: CPT | Performed by: NURSE PRACTITIONER

## 2021-11-12 PROCEDURE — 96361 HYDRATE IV INFUSION ADD-ON: CPT

## 2021-11-12 PROCEDURE — 96360 HYDRATION IV INFUSION INIT: CPT

## 2021-11-12 RX ORDER — HEPARIN SODIUM (PORCINE) LOCK FLUSH IV SOLN 100 UNIT/ML 100 UNIT/ML
5 SOLUTION INTRAVENOUS
Status: CANCELLED | OUTPATIENT
Start: 2021-11-12

## 2021-11-12 RX ORDER — HEPARIN SODIUM,PORCINE 10 UNIT/ML
5 VIAL (ML) INTRAVENOUS
Status: CANCELLED | OUTPATIENT
Start: 2021-11-12

## 2021-11-12 RX ORDER — HEPARIN SODIUM (PORCINE) LOCK FLUSH IV SOLN 100 UNIT/ML 100 UNIT/ML
5 SOLUTION INTRAVENOUS
Status: DISCONTINUED | OUTPATIENT
Start: 2021-11-12 | End: 2021-11-12 | Stop reason: HOSPADM

## 2021-11-12 RX ADMIN — HEPARIN SODIUM (PORCINE) LOCK FLUSH IV SOLN 100 UNIT/ML 5 ML: 100 SOLUTION at 12:13

## 2021-11-12 RX ADMIN — SODIUM CHLORIDE 250 ML: 9 INJECTION, SOLUTION INTRAVENOUS at 10:05

## 2021-11-12 ASSESSMENT — PAIN SCALES - GENERAL: PAINLEVEL: MODERATE PAIN (5)

## 2021-11-12 NOTE — PROGRESS NOTES
Infusion Nursing Note:  Rich Wolff presents today for Blood Transfusion of 1unit PRBC.      Patient seen by provider today: No   present during visit today: Not Applicable.    Note: Pt arrives ambulatory with standby assist; pt reported dizziness. Pt reports fall due to dizziness on Wednesday; pt denies LOC or hitting his head. Pt reports hitting the right side of his ribcage and notes some bruising; pt denies dyspnea. Initial BP 82/57 today.     Intravenous Access:  Implanted Port with good blood return.     Treatment Conditions:  Hgb 7.8 on 11/9/21 reported by [facility].  Blood transfusion consent signed 11/9/21.    Post Infusion Assessment:  Patient tolerated transfusion without incident. 250ml NS also infused due to hypotension. Port flushed, heparinized, de-accessed and covered. Final /67.    Discharge Plan:   Patient discharged in stable condition accompanied by: self.  Departure Mode: Ambulatory with steady slow steady gate; pt denies dizziness.      Cornelia Hoyt RN

## 2021-11-15 NOTE — PROGRESS NOTES
This is a recent snapshot of the patient's [facility] medical record.  For current drug dose and complete information and questions, call [phone] or In Basket pool, fv home infusion ([id number])  CSN Number:  [id number]

## 2021-12-08 DIAGNOSIS — Z11.59 ENCOUNTER FOR SCREENING FOR OTHER VIRAL DISEASES: Primary | ICD-10-CM

## 2021-12-21 NOTE — IR NOTE
IR Procedure Pre-call    Spoke with: patient  Arrival and procedure time: 12/27/21 1030   Patient has : Yes  Patient has someone to stay overnight:No If angiogram must have responsible adult for 24 hours.  Patient is taking blood thinners:Yes xarelto, no hold   Patient has H&P within 30 days:No   Patient has covid test:Yes   Patient NPO 8 hours prior: No no sedation with procedure.     Pre-call completed.   December 21, 2021 2:19 PM  Alcides Hanna RN

## 2021-12-23 ENCOUNTER — LAB (OUTPATIENT)
Dept: FAMILY MEDICINE | Facility: CLINIC | Age: 61
End: 2021-12-23
Attending: PHYSICIAN ASSISTANT
Payer: COMMERCIAL

## 2021-12-23 DIAGNOSIS — Z11.59 ENCOUNTER FOR SCREENING FOR OTHER VIRAL DISEASES: ICD-10-CM

## 2021-12-23 LAB — SARS-COV-2 RNA RESP QL NAA+PROBE: NEGATIVE

## 2021-12-23 PROCEDURE — U0003 INFECTIOUS AGENT DETECTION BY NUCLEIC ACID (DNA OR RNA); SEVERE ACUTE RESPIRATORY SYNDROME CORONAVIRUS 2 (SARS-COV-2) (CORONAVIRUS DISEASE [COVID-19]), AMPLIFIED PROBE TECHNIQUE, MAKING USE OF HIGH THROUGHPUT TECHNOLOGIES AS DESCRIBED BY CMS-2020-01-R: HCPCS

## 2021-12-23 PROCEDURE — U0005 INFEC AGEN DETEC AMPLI PROBE: HCPCS

## 2021-12-27 ENCOUNTER — HOSPITAL ENCOUNTER (OUTPATIENT)
Dept: INTERVENTIONAL RADIOLOGY/VASCULAR | Facility: HOSPITAL | Age: 61
Discharge: HOME | End: 2021-12-27
Attending: PHYSICIAN ASSISTANT | Admitting: RADIOLOGY
Payer: COMMERCIAL

## 2021-12-27 VITALS
RESPIRATION RATE: 18 BRPM | TEMPERATURE: 97.6 F | HEART RATE: 72 BPM | SYSTOLIC BLOOD PRESSURE: 134 MMHG | DIASTOLIC BLOOD PRESSURE: 65 MMHG

## 2021-12-27 DIAGNOSIS — N13.9 URINARY (TRACT) OBSTRUCTION: ICD-10-CM

## 2021-12-27 DIAGNOSIS — C61 PROSTATE CANCER (H): ICD-10-CM

## 2021-12-27 DIAGNOSIS — N12 PYELONEPHRITIS: Primary | ICD-10-CM

## 2021-12-27 DIAGNOSIS — Z93.6 NEPHROSTOMY STATUS (H): ICD-10-CM

## 2021-12-27 PROCEDURE — 250N000009 HC RX 250: Performed by: RADIOLOGY

## 2021-12-27 PROCEDURE — C1729 CATH, DRAINAGE: HCPCS

## 2021-12-27 PROCEDURE — 50435 EXCHANGE NEPHROSTOMY CATH: CPT | Mod: RT

## 2021-12-27 PROCEDURE — 255N000002 HC RX 255 OP 636: Performed by: RADIOLOGY

## 2021-12-27 PROCEDURE — C1769 GUIDE WIRE: HCPCS

## 2021-12-27 RX ADMIN — IOHEXOL 10 ML: 350 INJECTION, SOLUTION INTRAVENOUS at 11:10

## 2021-12-27 RX ADMIN — LIDOCAINE HYDROCHLORIDE 15 ML: 10 INJECTION, SOLUTION EPIDURAL; INFILTRATION; INTRACAUDAL; PERINEURAL at 11:04

## 2021-12-27 NOTE — DISCHARGE INSTRUCTIONS
Capped Percutaneous Nephrostomy tube (PNT) Discharge Instructions:  You are going home with your nephrostomy tube (PNT) capped (not connected to a drainage bag) for trial. This trial will help determine if it is safe to remove your nephrostomy tube at a future appointment.    Care Instructions:  - If you received sedation for your procedure, do not drive or operate heavy machinery for the rest of the day.  - Rest after your PNT was capped. Avoid strenuous activity and heavy lifting for the next 2 days. Return to your normal activities as you tolerate after the 2 day restriction.  - Keep the nephrostomy tube site clean and dry.   - You may shower, but do not submerge the nephrostomy tube site in water (avoid tub baths, Jacuzzis, hot tubs and pools). Cover your PNT exit site with plastic wrap while showering.  - If you have a gauze dressing, change the gauze and tape dressing as needed to keep site clean and dry. If you have a securement device, leave in place until your next exchange.  - Clean around nephrostomy tubes exit sites with soap and water, pat and apply new split gauze around the tube at the exit site.    Follow-Up:  - [facility] will contact you to arrange for follow up nephrostogram and likely PNT removal for next week. ***    Attach your PNT to the gravity bag provided to you if you experience any of the following:  - Nephrostomy tube(s) sites are leaking urine.  - Extreme pain at the nephrostomy tube(s) sites or extreme flank pain.    Contact [facility] at [phone]:  - If you continue to experience any of the above symptoms despite attaching your gravity bag.  - If your nephrostomy tube(s) appears to be falling out or has fallen out or breaks.    Seek Medical Evaluation for the following:  - Fever (greater than 101 F (38.3C)).  - Purulent (yellow/green/foul smelling) drainage from nephrostomy tube exit sites.  - Significant bleeding from nephrostomy tube site(s).  - Significant or  worsening pain at nephrostomy tube exit site(s) or back.

## 2021-12-27 NOTE — PROGRESS NOTES
Interventional Radiology - Pre-Procedure Note:  12/27/2021    Procedure Requested: RIGHT PNT Exchange  Requested by: Aaliyah Nye PA-C    Brief HPI: Rich Wolff is a 61 year old male with a history of prostate cancer and robotic radical prostatectomy in 2015 and poorly differentiated adenocarcinoma of the colon with right ureteral obstruction s/p R PNT (initially placed at [place] 11/11/2020). Who presented to ED yesterday with severe sepsis secondary to pyelonephritis.     Presents to IR for RIGHT PNT exchange. Last exchanged 10/29/2021     IMAGING:  Narrative & Impression   [facility]  LOCATION: [facility]  DATE: 10/29/2021     PROCEDURE: RIGHT NEPHROSTOMY TUBE EXCHANGE     INTERVENTIONAL RADIOLOGIST: To Diamond MD.     INDICATION: Nephrostomy tube was accidentally pulled out.     CONSENT: The risks, benefits and alternatives of right nephrostomy tube exchange were discussed with the patient  in detail. All questions were answered. Informed consent was given to proceed with the procedure.        CONTRAST: 10 cc of Omni     FLUOROSCOPIC TIME: 1.3 minutes.  RADIATION DOSE: Air Kerma: 9 mGy.     COMPLICATIONS: No immediate complications.     STERILE BARRIER TECHNIQUE: Maximum sterile barrier technique was used. Cutaneous antisepsis was performed at the operative site with application of 2% chlorhexidine and large sterile drape. Prior to the procedure, the  and assistant performed   hand hygiene and wore hat, mask, sterile gown, and sterile gloves during the entire procedure.     PROCEDURE:    Using a KMP catheter and Bentson wire the wire was manipulated through the existing tract and into the right renal collecting system. Contrast was injected to confirm good position. Over the wire a right 10 [place] nephrostomy tube was placed into the   right renal collecting system.                                                                         IMPRESSION:     Successful replacement of right 10 [place] nephrostomy tube.         NPO: 0700  ANTICOAGULANTS: None  ANTIBIOTICS: Not Indicated    ALLERGIES  No Known Allergies      LABS:  INR   Date Value Ref Range Status   08/04/2021 1.33 (H) 0.90 - 1.15 Final     Comment:     Effective 7/11/2021, the reference range for this assay has changed.      Hemoglobin   Date Value Ref Range Status   11/01/2021 7.8 (L) 13.3 - 17.7 g/dL Final   12/24/2020 7.7 (L) 13.3 - 17.7 g/dL Final   ]  Platelet Count   Date Value Ref Range Status   11/01/2021 172 150 - 450 10e3/uL Final   12/24/2020 424 150 - 450 10e9/L Final     Creatinine   Date Value Ref Range Status   11/01/2021 2.20 (H) 0.70 - 1.30 mg/dL Final   12/23/2020 1.33 (H) 0.66 - 1.25 mg/dL Final     Potassium   Date Value Ref Range Status   11/01/2021 4.7 3.5 - 5.0 mmol/L Final   12/23/2020 3.4 3.4 - 5.3 mmol/L Final         EXAM:  /65 (BP Location: Left arm)   Pulse 72   Temp 97.6  F (36.4  C) (Oral)   Resp 18   General:  Stable.  In no acute distress.    Neuro:  A&O x 3. Moves all extremities equally.  Resp:  Non-labored breathing.   Cardio:  RRR  Abdomen:  Soft, non-distended, non-tender, positive bowel sounds.  RIGHT PNT draining well. Urine is clear pale and yellow. Denies recent fevers, pain or drainage extruding from insertion site.       ASSESSMENT/PLAN:   R PNT exchange with no sedation.    Procedure, risks/benefits, details, alternatives, reviewed with patient/family and he verbalized understanding. All questions answered. OK to proceed with above radiology procedure.     Madhavi Blankenship, CNP  Interventional Radiology

## 2021-12-27 NOTE — IP AVS SNAPSHOT
[facility]  [address] [place] [id number]  Phone: [phone]  Fax: [phone]                                  After Visit Summary   12/27/2021    Rich Wolff   MRN: [id number]           After Visit Summary Signature Page    I have received my discharge instructions, and my questions have been answered. I have discussed any challenges I see with this plan with the nurse or doctor.    ..........................................................................................................................................  Patient/Patient Representative Signature      ..........................................................................................................................................  Patient Representative Print Name and Relationship to Patient    ..................................................               ................................................  Date                                   Time    ..........................................................................................................................................  Reviewed by Signature/Title    ...................................................              ..............................................  Date                                               Time          22EPIC Rev 08/18

## 2021-12-28 ENCOUNTER — TELEPHONE (OUTPATIENT)
Dept: INTERVENTIONAL RADIOLOGY/VASCULAR | Facility: HOSPITAL | Age: 61
End: 2021-12-28
Payer: COMMERCIAL

## 2022-01-11 ENCOUNTER — TRANSFERRED RECORDS (OUTPATIENT)
Dept: HEALTH INFORMATION MANAGEMENT | Facility: CLINIC | Age: 62
End: 2022-01-11
Payer: COMMERCIAL

## 2022-01-25 ENCOUNTER — DOCUMENTATION ONLY (OUTPATIENT)
Dept: ONCOLOGY | Facility: CLINIC | Age: 62
End: 2022-01-25
Payer: COMMERCIAL

## 2022-01-25 ENCOUNTER — TRANSFERRED RECORDS (OUTPATIENT)
Dept: HEALTH INFORMATION MANAGEMENT | Facility: CLINIC | Age: 62
End: 2022-01-25
Payer: COMMERCIAL

## 2022-01-25 ENCOUNTER — PATIENT OUTREACH (OUTPATIENT)
Dept: ONCOLOGY | Facility: CLINIC | Age: 62
End: 2022-01-25
Payer: COMMERCIAL

## 2022-01-25 DIAGNOSIS — C18.0 ADENOCARCINOMA OF CECUM (H): Primary | ICD-10-CM

## 2022-01-25 NOTE — PROGRESS NOTES
Review of referral to Oncology for Dr Beatty's TIL CRISPR trial from Dr Yen/Kimo for pt with cecal cancer w liver mets.    [facility] is viewable; pt also sees Dr Kern at [facility] for local cancer care. Will request more recent notes (last one dated 10/2021).    CRISPR team will review for eligibility and notify pt directly.     Addendum 2/8: Per CRISPR team (Darya SILVA) pt is not a candidate for trial. Referral canceled.

## 2022-01-25 NOTE — PROGRESS NOTES
Action January 25, 2022 1:01 PM ABT   Action Taken Records request sent to [facility].     Action January 26, 2022 9:16 AM ABT   Action Taken Records from [facility] received and sent to [facility] for upload     Action January 28, 2022 4:24PM ABT   Action Taken Called and spoke to Suha at [facility] and requested images to be pushed to  PACS.     Action January 31, 2022 8:47 AM ABT   Action Taken Images from [facility] still not pushed to PACS. Sent over 2nd request for images to be pushed to  PACS     Action February 10, 2022 3:59 PM ABT   Action Taken Some images received from [facility] and resolved to PACS. Called and spoke to [facility]. Images still missing. Called and spoke to [facility] and requested for images to be pushed.    01/18/22: PET CT Skull  07/30/21: DX Chest  02/09/21: MR Abd     Action February 11, 2022 3:55 PM ABT   Action Taken Images from [facility] received and resolved to PACS

## 2022-02-07 ENCOUNTER — HOSPITAL ENCOUNTER (OUTPATIENT)
Dept: ULTRASOUND IMAGING | Facility: HOSPITAL | Age: 62
End: 2022-02-07
Attending: INTERNAL MEDICINE
Payer: COMMERCIAL

## 2022-02-07 DIAGNOSIS — N18.9 CKD (CHRONIC KIDNEY DISEASE): ICD-10-CM

## 2022-02-07 PROCEDURE — 76770 US EXAM ABDO BACK WALL COMP: CPT

## 2022-02-07 NOTE — CONSULTS
"  .     REASON FOR FOLLOW UP:   Management of anticoagulation    HISTORY OF PRESENT ILLNESS:  The patient is a 81 y.o. year old female the above-mentioned history here today for lab review. She continues on Xarelto 20 mg daily. She reports that she is tolerating this well and denies any bleeding issues.     Past Medical History:   Diagnosis Date   • Anemia     multifactorial   • Cystitis    • Cystocele     moderate   • Drug therapy    • Dyslipidemia    • Esophageal reflux    • Fatigue    • History of transfusion     no reaction   • Hypercalcemia    • Hypertension    • Major depression, chronic    • Menopause    • Non Hodgkin's lymphoma (HCC)    • Osteoarthritis    • Osteoporosis    • Palpitations    • Paroxysmal atrial fibrillation (HCC)    • Post menopausal problems    • RLS (restless legs syndrome)    • Seizure disorder (HCC)    • Sinus bradycardia    • Subjective tinnitus    • Vaginal delivery     x2  \"SONYA\"      \"JASON\"   • Vitamin D deficiency        MEDICATIONS    Current Outpatient Medications:   •  amLODIPine (NORVASC) 10 MG tablet, TAKE 1 TABLET BY MOUTH DAILY, Disp: 90 tablet, Rfl: 0  •  Ascorbic Acid (Vitamin C) 500 MG capsule, Take  by mouth., Disp: , Rfl:   •  Benadryl Itch Stopping 1-0.1 % cream, APPLY 1 APPLICAITON ON SKIN FOUR TIMES DAILY, Disp: , Rfl:   •  benazepril (LOTENSIN) 40 MG tablet, Take 1 tablet by mouth Daily., Disp: 90 tablet, Rfl: 3  •  cetirizine (zyrTEC) 10 MG tablet, Take 1 tablet by mouth Daily As Needed for Allergies., Disp: 30 tablet, Rfl: 1  •  Cholecalciferol (VITAMIN D3) 125 MCG (5000 UT) capsule capsule, Take 5,000 Units by mouth Daily., Disp: , Rfl:   •  escitalopram (LEXAPRO) 20 MG tablet, TAKE 1 TABLET BY MOUTH DAILY, Disp: 30 tablet, Rfl: 5  •  folic acid (FOLVITE) 400 MCG tablet, Take 400 mcg by mouth daily., Disp: , Rfl:   •  gabapentin (NEURONTIN) 300 MG capsule, Take 2 capsules by mouth every night at bedtime., Disp: 60 capsule, Rfl: 0  •  hydrALAZINE (APRESOLINE) 100 " [facility] Consult Note    Patient: Rich Wolff  MRN: [id number]  Date of Service: Oct 28, 2021     Reason for consult    Metastatic colon Cancer  Neutropenic fever    Assessment    1.  A very pleasant 60 years old gentleman with metastatic colorectal cancer.  Status post FOLFOXIRI chemotherapy followed by surgical attempt followed by more chemotherapy.  2.  Neutropenic fever.  3.  Pyelonephritis.  He does have right-sided hydro and a stent in place.  4.  Renal insufficiency.  5.  Normocytic normochromic anemia  6.  Mildly elevated alkaline phosphatase.    Plan    1.  Agree with IV antibiotics.  2.  Make sure that there is no urinary flow obstruction.  3.  DVT prophylaxis.  4.  IV antibiotics as directed.  5.  Neutropenic precautions.  6.  If need be he may need G-CSF.  7.  At the end of my visit with him he told me that he is actually being treated by [facility].  We would request Dr. Kern or his associates to come and see him.    Staging History  Cancer Staging  No matching staging information was found for the patient.      History    Mr. Rich Wolff is a 60 year old gentleman with a history of metastatic colorectal cancer.  Diagnosed in November 2020 presenting with some bowel obstruction and upon evaluation was found to have a large cecal mass involving the adjacent mesentery, sigmoid colon, right ureter and dome of the bladder.  Biopsy consistent with invasive moderately differentiated adenocarcinoma.  No evidence of any microsatellite instability.  He underwent laparoscopic ileostomy and then got few doses of FOLFoxFIRI chemotherapy.  Had some response.    Surgical exenteration was attempted but was aborted since it was not possible.  Now resumed chemotherapy with FOLFIRI few days ago at [facility].  He is a patient of Dr. Yen.    Came in the hospital with fever after chemotherapy.  Found to be neutropenic.  Also noted to have right  "MG tablet, TAKE 1 TABLET BY MOUTH THREE TIMES DAILY, Disp: 270 tablet, Rfl: 0  •  lidocaine-prilocaine (EMLA) 2.5-2.5 % cream, Apply 30 min prior to port access, Disp: 5 g, Rfl: 2  •  melatonin 5 MG tablet tablet, Take 5 mg by mouth Every Night., Disp: , Rfl:   •  Multiple Vitamins-Minerals (ZINC PO), Take  by mouth., Disp: , Rfl:   •  pantoprazole (PROTONIX) 40 MG EC tablet, TAKE 1 TABLET BY MOUTH EVERY DAY, Disp: 90 tablet, Rfl: 1  •  phenytoin (DILANTIN) 100 MG ER capsule, Take 3 capsules by mouth every night at bedtime., Disp: 270 capsule, Rfl: 3  •  spironolactone (ALDACTONE) 25 MG tablet, Take 1 tablet by mouth Daily., Disp: 90 tablet, Rfl: 3  •  triamcinolone (KENALOG) 0.1 % cream, Apply  topically to the appropriate area as directed See Admin Instructions. Apply topically to the affected area twice daily, Disp: , Rfl:   •  vitamin B-12 (CYANOCOBALAMIN) 100 MCG tablet, Take 1,000 mcg by mouth Daily., Disp: , Rfl:   •  vitamin B-6 (PYRIDOXINE) 100 MG tablet, Take 100 mg by mouth Daily., Disp: , Rfl:   •  vitamin E 100 UNIT capsule, Take 180 Units by mouth Daily., Disp: , Rfl:   •  Xarelto 20 MG tablet, Take 1 tablet by mouth Daily., Disp: 28 tablet, Rfl: 0  •  Zinc Sulfate (ZINC 15 PO), Take 400 mg by mouth., Disp: , Rfl:   No current facility-administered medications for this visit.    ALLERGIES:     Allergies   Allergen Reactions   • Crab (Diagnostic) Itching and Rash   • Pseudoephedrine Dizziness            Vitals:    02/07/22 1409   BP: 149/68   Pulse: 63   Resp: 16   Temp: 97.3 °F (36.3 °C)   TempSrc: Temporal   SpO2: 98%   Weight: 68.1 kg (150 lb 1.6 oz)   Height: 160 cm (62.99\")   PainSc: 0-No pain     Current Status 2/7/2022   ECOG score 0        Physical Exam  Constitutional:       General: She is not in acute distress.     Appearance: She is well-developed.   Pulmonary:      Effort: Pulmonary effort is normal. No respiratory distress.   Skin:     General: Skin is warm and dry.   Neurological:      " hydronephrosis and some perhaps pyonephritis type of changes.  He is nephrostomy tube has been exchanged.  He is on IV antibiotic.  Feels better.    Review of systems.  Positive for tiredness, fatigue, fever, decreased appetite, decrease in urine output etc.  Better after hospitalization and IV fluids.      Past History    Past Medical History:   Diagnosis Date     Hyperlipidemia      Hypertension      Prostate cancer (H) 09/01/2015    T1c. Silvana's 6 (3+3).  Confined to prostate.  Multifocal bilateral comprising 2% of the gland.PSA:5.8.     Past Surgical History:   Procedure Laterality Date     BOWEL RESECTION       COLONOSCOPY W/ BIOPSIES  11/04/2020     COLOSTOMY       ESOPHAGOSCOPY, GASTROSCOPY, DUODENOSCOPY (EGD), COMBINED  11/04/2020     IR CHEST PORT PLACEMENT > 5 YRS OF AGE  11/24/2020     IR NEPHROSTOMY TUBE CHANGE RIGHT  4/8/2021     IR NEPHROSTOMY TUBE CHANGE RIGHT  4/8/2021     IR NEPHROSTOMY TUBE CHANGE RIGHT  8/16/2021     IR NEPHROSTOMY TUBE CHANGE RIGHT  10/29/2021     IR PORT PLACEMENT >5 YEARS  11/24/2020     NEPHROSTOMY       CA ESOPHAGOGASTRODUODENOSCOPY TRANSORAL DIAGNOSTIC N/A 11/4/2020    Procedure: ESOPHAGOGASTRODUODENOSCOPY (EGD);  Surgeon: Paul Masters MD;  Location: [facility];  Service: Gastroenterology     CA LAP,PROSTATECTOMY,RADICAL,W/NERVE SPARE,INCL ROBOTIC Bilateral 09/01/2015    Procedure: DAVINCI RADICAL RETROPUBIC PROSTATECTOMY BILATERAL PELVIC LYMPH NODE DISSECTION;  Surgeon: Juan C Velazco MD;  Location: [facility] [place], [place];  Service: Urology     PROSTATE BIOPSY  06/08/2015    Ultrasound-guided transrectal biopsy.     SURGERY SCROTAL / TESTICULAR      for undescended testes, removed due to atrophy with vasectomy     ZZHC COLONOSCOPY W/WO BRUSH/WASH N/A 11/4/2020    Procedure: COLONOSCOPY WITH BIOPSY;  Surgeon: Paul Masters MD;  Location: [facility];  Service: Gastroenterology     Family History   Problem Relation Age of Onset     Breast  "Cancer Mother      Osteoporosis Mother      Valvular heart disease Father      Prostate Cancer Father 70.00     No Known Problems Sister      No Known Problems Son      No Known Problems Daughter      Social History     Socioeconomic History     Marital status:      Spouse name: Not on file     Number of children: Not on file     Years of education: Not on file     Highest education level: Not on file   Occupational History     Not on file   Tobacco Use     Smoking status: Never Smoker     Smokeless tobacco: Never Used   Substance and Sexual Activity     Alcohol use: Not Currently     Comment: Alcoholic Drinks/day: rarely     Drug use: No     Sexual activity: Not Currently   Other Topics Concern     Not on file   Social History Narrative     Not on file     Social Determinants of Health     Financial Resource Strain:      Difficulty of Paying Living Expenses:    Food Insecurity:      Worried About Running Out of Food in the Last Year:      Ran Out of Food in the Last Year:    Transportation Needs:      Lack of Transportation (Medical):      Lack of Transportation (Non-Medical):    Physical Activity:      Days of Exercise per Week:      Minutes of Exercise per Session:    Stress:      Feeling of Stress :    Social Connections:      Frequency of Communication with Friends and Family:      Frequency of Social Gatherings with Friends and Family:      Attends [employer] Services:      Active Member of Clubs or Organizations:      Attends Club or Organization Meetings:      Marital Status:    Intimate Partner Violence:      Fear of Current or Ex-Partner:      Emotionally Abused:      Physically Abused:      Sexually Abused:          Allergies    No Known Allergies        Physical Exam    /65 (BP Location: Left arm)   Pulse 92   Temp 99.1  F (37.3  C) (Oral)   Resp 20   Ht 1.727 m (5' 8\")   Wt 77.1 kg (170 lb)   SpO2 100%   BMI 25.85 kg/m      GENERAL: no acute distress. Cooperative in conversation. " Mental Status: She is alert and oriented to person, place, and time.         RECENT LABS:  Lab Results   Component Value Date    WBC 5.30 02/07/2022    HGB 11.5 (L) 02/07/2022    HCT 35.1 02/07/2022    MCV 95.4 02/07/2022     (L) 02/07/2022         Assessment/Plan   *Chronic atrial fibrillation anticoagulated with Xarelto 20 mg daily.  Patient also has thrombocytopenia we will continue the Xarelto as long as her platelet count is greater than 50,000.      *Thrombocytopenia.  She did have a CBC last month, we are checking this every 3 months, and her platelet count was 119,000.    · 1/10/2022 platelet count is 107,000. We will continue to monitor.  · 2/7/2022 platelet count 118,000.    *Anemia.  Patient's hemoglobin has been declining the past couple of months, at 11.2 today.  She denies any recent bleeding or consistently dark stools.  She does have a history of iron deficiency however iron studies were just checked in September and were sufficient.  She had history of anemia related to chronic disease as well.  We will go ahead and check a CBC again next month and if this is declining further we will repeat iron studies.  · 11/10/2022 hemoglobin 11.8, which is stable.  · 2/7/2022 hemoglobin 11.5.    PLAN:  1. I provided the patient with a 1 month supply of Xarelto 20 mg tablets, which she will take 1 daily, #28. We have reviewed the appropriate instructions for medication administration as well as dosing. Patient is aware of potential side effects of the medication including increased risk of bleeding. She will call with any signs of symptoms of bleeding.  2. Return in 1 month for CBC with RN review.  3. Follow-up 4/29/2020 with Dr. Iraheta.        Linda Floyd, APRN  02/07/2022       HEENT: pupils are equal, round and reactive. Oral mucosa is moist and intact.  RESP:Chest symmetric. Regular respiratory rate. No stridor.  ABD: Nondistended, soft.  Nephrostomy tube in place.  EXTREMITIES: No lower extremity edema.   NEURO: non focal. Alert and oriented x3.   PSYCH: within normal limits. No depression or anxiety.  SKIN: warm dry intact       Lab Results    Recent Results (from the past 168 hour(s))   Symptomatic Influenza A/B & SARS-CoV2 (COVID-19) Virus PCR Multiplex Nasopharyngeal    Specimen: Nasopharyngeal; Swab   Result Value Ref Range    Influenza A target Negative Negative    Influenza B target Negative Negative    SARS CoV2 PCR Negative Negative   Basic metabolic panel   Result Value Ref Range    Sodium 135 (L) 136 - 145 mmol/L    Potassium 3.5 3.5 - 5.0 mmol/L    Chloride 114 (H) 98 - 107 mmol/L    Carbon Dioxide (CO2) 13 (L) 22 - 31 mmol/L    Anion Gap 8 5 - 18 mmol/L    Urea Nitrogen 51 (H) 8 - 22 mg/dL    Creatinine 2.33 (H) 0.70 - 1.30 mg/dL    Calcium 8.0 (L) 8.5 - 10.5 mg/dL    Glucose 129 (H) 70 - 125 mg/dL    GFR Estimate 29 (L) >60 mL/min/1.73m2   Lactic acid whole blood   Result Value Ref Range    Lactic Acid 1.7 0.7 - 2.0 mmol/L   Blood Culture Peripheral Blood    Specimen: Peripheral Blood   Result Value Ref Range    Culture No growth after 12 hours    Hepatic function panel   Result Value Ref Range    Bilirubin Total 0.2 0.0 - 1.0 mg/dL    Bilirubin Direct <0.1 <=0.5 mg/dL    Protein Total 6.0 6.0 - 8.0 g/dL    Albumin 2.8 (L) 3.5 - 5.0 g/dL    Alkaline Phosphatase 130 (H) 45 - 120 U/L    AST 11 0 - 40 U/L    ALT 16 0 - 45 U/L   CBC with platelets and differential   Result Value Ref Range    WBC Count 1.9 (L) 4.0 - 11.0 10e3/uL    RBC Count 2.43 (L) 4.40 - 5.90 10e6/uL    Hemoglobin 7.4 (L) 13.3 - 17.7 g/dL    Hematocrit 24.1 (L) 40.0 - 53.0 %    MCV 99 78 - 100 fL    MCH 30.5 26.5 - 33.0 pg    MCHC 30.7 (L) 31.5 - 36.5 g/dL    RDW 16.8 (H) 10.0 - 15.0 %    Platelet Count  173 150 - 450 10e3/uL    % Neutrophils 85 %    % Lymphocytes 7 %    % Monocytes 6 %    % Eosinophils 1 %    % Basophils 0 %    % Immature Granulocytes 1 %    NRBCs per 100 WBC 0 <1 /100    Absolute Neutrophils 1.6 1.6 - 8.3 10e3/uL    Absolute Lymphocytes 0.1 (L) 0.8 - 5.3 10e3/uL    Absolute Monocytes 0.1 0.0 - 1.3 10e3/uL    Absolute Eosinophils 0.0 0.0 - 0.7 10e3/uL    Absolute Basophils 0.0 0.0 - 0.2 10e3/uL    Absolute Immature Granulocytes 0.0 <=0.0 10e3/uL    Absolute NRBCs 0.0 10e3/uL   Blood Culture Peripheral Blood    Specimen: Peripheral Blood   Result Value Ref Range    Culture No growth after 12 hours    UA with Microscopic reflex to Culture    Specimen: Urine, Clean Catch   Result Value Ref Range    Color Urine Yellow Colorless, Straw, Light Yellow, Yellow    Appearance Urine Cloudy (A) Clear    Glucose Urine Negative Negative mg/dL    Bilirubin Urine Negative Negative    Ketones Urine Negative Negative mg/dL    Specific Gravity Urine 1.016 1.001 - 1.030    Blood Urine 0.2 mg/dL (A) Negative    pH Urine 5.5 5.0 - 7.0    Protein Albumin Urine 30  (A) Negative mg/dL    Urobilinogen Urine <2.0 <2.0 mg/dL    Nitrite Urine Positive (A) Negative    Leukocyte Esterase Urine 500 Marlon/uL (A) Negative    Bacteria Urine Moderate (A) None Seen /HPF    WBC Clumps Urine Present (A) None Seen /HPF    RBC Urine 4 (H) <=2 /HPF    WBC Urine >182 (H) <=5 /HPF   Lactic acid whole blood   Result Value Ref Range    Lactic Acid 0.4 (L) 0.7 - 2.0 mmol/L   Basic metabolic panel   Result Value Ref Range    Sodium 137 136 - 145 mmol/L    Potassium 4.0 3.5 - 5.0 mmol/L    Chloride 118 (H) 98 - 107 mmol/L    Carbon Dioxide (CO2) 14 (L) 22 - 31 mmol/L    Anion Gap 5 5 - 18 mmol/L    Urea Nitrogen 45 (H) 8 - 22 mg/dL    Creatinine 2.02 (H) 0.70 - 1.30 mg/dL    Calcium 8.1 (L) 8.5 - 10.5 mg/dL    Glucose 91 70 - 125 mg/dL    GFR Estimate 35 (L) >60 mL/min/1.73m2   Basic metabolic panel   Result Value Ref Range    Sodium 138 136 - 145  mmol/L    Potassium 3.8 3.5 - 5.0 mmol/L    Chloride 119 (H) 98 - 107 mmol/L    Carbon Dioxide (CO2) 15 (L) 22 - 31 mmol/L    Anion Gap 4 (L) 5 - 18 mmol/L    Urea Nitrogen 43 (H) 8 - 22 mg/dL    Creatinine 2.07 (H) 0.70 - 1.30 mg/dL    Calcium 8.2 (L) 8.5 - 10.5 mg/dL    Glucose 92 70 - 125 mg/dL    GFR Estimate 34 (L) >60 mL/min/1.73m2   Hepatic panel   Result Value Ref Range    Bilirubin Total 0.4 0.0 - 1.0 mg/dL    Bilirubin Direct 0.1 <=0.5 mg/dL    Protein Total 5.5 (L) 6.0 - 8.0 g/dL    Albumin 2.4 (L) 3.5 - 5.0 g/dL    Alkaline Phosphatase 120 45 - 120 U/L    AST 11 0 - 40 U/L    ALT 16 0 - 45 U/L   CBC with platelets and differential   Result Value Ref Range    WBC Count 1.3 (L) 4.0 - 11.0 10e3/uL    RBC Count 2.33 (L) 4.40 - 5.90 10e6/uL    Hemoglobin 7.3 (L) 13.3 - 17.7 g/dL    Hematocrit 23.1 (L) 40.0 - 53.0 %    MCV 99 78 - 100 fL    MCH 31.3 26.5 - 33.0 pg    MCHC 31.6 31.5 - 36.5 g/dL    RDW 16.8 (H) 10.0 - 15.0 %    Platelet Count 173 150 - 450 10e3/uL    % Neutrophils 77 %    % Lymphocytes 11 %    % Monocytes 7 %    % Eosinophils 2 %    % Basophils 1 %    % Immature Granulocytes 2 %    NRBCs per 100 WBC 0 <1 /100    Absolute Neutrophils 1.0 (L) 1.6 - 8.3 10e3/uL    Absolute Lymphocytes 0.1 (L) 0.8 - 5.3 10e3/uL    Absolute Monocytes 0.1 0.0 - 1.3 10e3/uL    Absolute Eosinophils 0.0 0.0 - 0.7 10e3/uL    Absolute Basophils 0.0 0.0 - 0.2 10e3/uL    Absolute Immature Granulocytes 0.0 <=0.0 10e3/uL    Absolute NRBCs 0.0 10e3/uL       Imaging Results    XR Chest Port 1 View    Result Date: 10/28/2021  EXAM: XR CHEST PORT 1 VIEW LOCATION: [facility] DATE/TIME: 10/28/2021 5:27 PM INDICATION: fever; on chemotherapy COMPARISON: 04/19/2021     IMPRESSION: A right chest wall Port-A-Cath is present. No pleural fluid or pneumothorax. No airspace disease. Normal size of the heart.     IR Nephrostomy Tube Change Right    Result Date: 10/29/2021  [facility] DATE: 10/29/2021 PROCEDURE: NEPHROSTOMY TUBE EXCHANGE ATTENDING: Tong Leahy MD. INDICATION: 60-year-old male with sepsis. Exchange of the nephrostomy is requested. CONSENT: The risks, benefits, and alternatives of nephrostomy tube exchange were discussed with the patient in detail. All questions were answered. Informed consent was given to proceed with the procedure. MODERATE SEDATION: None. ADDITIONAL MEDICATIONS: None. FLUOROSCOPIC TIME: 1.0 minutes. AIR KERMA:  33 mGy. CONTRAST: 10 mL Omnipaque. UNIVERSAL PRECAUTIONS: The procedure was performed utilizing maximum sterile barrier technique. Prior to the start of the procedure, a standard pause for patient safety was performed with site marking as indicated. COMPLICATIONS: No immediate complications. PROCEDURE:  A  image was obtained. A nephrostogram was performed through the previously placed right nephrostomy tube. Under direct fluoroscopic visualization, the previous tube was removed over a wire and exchange was made for a new 10 Greek nephrostomy. The loop was locked within the renal pelvis, and the catheter was sutured to the skin. A post placement nephrostogram was performed. FINDINGS:  The initial  image shows the previously placed right nephrostomy tube. The tube has pulled back such that it is in a lower pole peripheral calyx. At the completion of the study, the new nephrostomy loop lies within the renal pelvis and controls the urinary system well.     IMPRESSION:  Successful right nephrostomy exchange, as discussed above.     CT Abdomen Pelvis w Contrast    Result Date: 10/28/2021  EXAM: CT ABDOMEN PELVIS W CONTRAST LOCATION: [facility] DATE/TIME: 10/28/2021 6:57 PM INDICATION: sepsis/evaluate for nephrostomy tube obstruction COMPARISON: CT 08/04/2021 TECHNIQUE: CT scan of the abdomen and pelvis was performed following injection of IV contrast. Multiplanar reformats were obtained. Dose reduction  techniques were used. CONTRAST: [id number] 75ml FINDINGS: LOWER CHEST: Normal. HEPATOBILIARY: Cholelithiasis. PANCREAS: Normal. SPLEEN: Normal. ADRENAL GLANDS: Normal. KIDNEYS/BLADDER: The right kidney as nephrostomy tube has been retracted and is coiled in the region of the renal cortex. There is mild right hydronephrosis with enhancement and/or thickening of the renal pelvis wall and adjacent stranding. There is decrease/delayed enhancement and delayed excretion of the right kidney relative to the left kidney. No left hydronephrosis or hydroureter. Normal urinary bladder. BOWEL: Normal stomach. Normal caliber of the small bowel. There is a diverting ostomy in the right lower quadrant. There is tethering of bowel loops in the right lower quadrant with ill-defined soft tissue in this region. Otherwise normal appearance of the colon.  A duodenal suture line is again seen. LYMPH NODES: Haziness of the mesentery is nonspecific. VASCULATURE: Unremarkable. PELVIC ORGANS: Prostatectomy. MUSCULOSKELETAL: Surgical changes along the ventral abdominal wall.     IMPRESSION: 1.  The right nephrostomy tube has been retracted. There is decreased/delayed enhancement of the right kidney with inflammatory change at the right renal pelvis. Underlying infection is possible. Consider replacing the nephrostomy tube. 2.  Right lower quadrant tethering/mass, without significant change from the prior study.           This note has been dictated using voice recognition software. Any grammatical or context distortions are unintentional and inherent to the software      Signed by: Rohan Campuzano MD, MD

## 2022-02-15 DIAGNOSIS — Z11.59 ENCOUNTER FOR SCREENING FOR OTHER VIRAL DISEASES: Primary | ICD-10-CM

## 2022-02-24 ENCOUNTER — LAB (OUTPATIENT)
Dept: LAB | Facility: CLINIC | Age: 62
End: 2022-02-24
Payer: COMMERCIAL

## 2022-02-24 DIAGNOSIS — Z11.59 ENCOUNTER FOR SCREENING FOR OTHER VIRAL DISEASES: ICD-10-CM

## 2022-02-24 PROCEDURE — U0003 INFECTIOUS AGENT DETECTION BY NUCLEIC ACID (DNA OR RNA); SEVERE ACUTE RESPIRATORY SYNDROME CORONAVIRUS 2 (SARS-COV-2) (CORONAVIRUS DISEASE [COVID-19]), AMPLIFIED PROBE TECHNIQUE, MAKING USE OF HIGH THROUGHPUT TECHNOLOGIES AS DESCRIBED BY CMS-2020-01-R: HCPCS

## 2022-02-24 PROCEDURE — U0005 INFEC AGEN DETEC AMPLI PROBE: HCPCS

## 2022-02-25 LAB — SARS-COV-2 RNA RESP QL NAA+PROBE: NEGATIVE

## 2022-02-28 ENCOUNTER — HOSPITAL ENCOUNTER (OUTPATIENT)
Dept: INTERVENTIONAL RADIOLOGY/VASCULAR | Facility: HOSPITAL | Age: 62
Discharge: HOME | End: 2022-02-28
Admitting: RADIOLOGY
Payer: COMMERCIAL

## 2022-02-28 VITALS
TEMPERATURE: 97.7 F | RESPIRATION RATE: 16 BRPM | HEIGHT: 68 IN | WEIGHT: 170 LBS | DIASTOLIC BLOOD PRESSURE: 86 MMHG | HEART RATE: 87 BPM | BODY MASS INDEX: 25.76 KG/M2 | OXYGEN SATURATION: 100 % | SYSTOLIC BLOOD PRESSURE: 159 MMHG

## 2022-02-28 DIAGNOSIS — Z93.6 NEPHROSTOMY STATUS (H): Primary | ICD-10-CM

## 2022-02-28 DIAGNOSIS — N12 PYELONEPHRITIS: ICD-10-CM

## 2022-02-28 DIAGNOSIS — Z95.828 PORT-A-CATH IN PLACE: Primary | ICD-10-CM

## 2022-02-28 PROCEDURE — 50435 EXCHANGE NEPHROSTOMY CATH: CPT

## 2022-02-28 PROCEDURE — C1769 GUIDE WIRE: HCPCS

## 2022-02-28 PROCEDURE — 255N000002 HC RX 255 OP 636: Performed by: RADIOLOGY

## 2022-02-28 PROCEDURE — C1729 CATH, DRAINAGE: HCPCS

## 2022-02-28 PROCEDURE — 250N000009 HC RX 250: Performed by: NURSE PRACTITIONER

## 2022-02-28 RX ORDER — LIDOCAINE 40 MG/G
CREAM TOPICAL
Status: DISCONTINUED | OUTPATIENT
Start: 2022-02-28 | End: 2022-03-01 | Stop reason: HOSPADM

## 2022-02-28 RX ADMIN — IOHEXOL 10 ML: 350 INJECTION, SOLUTION INTRAVENOUS at 09:33

## 2022-02-28 RX ADMIN — LIDOCAINE HYDROCHLORIDE 20 ML: 10 INJECTION, SOLUTION INFILTRATION; PERINEURAL at 09:09

## 2022-02-28 NOTE — IP AVS SNAPSHOT
[facility] Interventional Radiology  [address] [place] [id number]  Phone: [phone]  Fax: [phone]                                    After Visit Summary   2/28/2022    Rich Wolff   MRN: [id number]           After Visit Summary Signature Page    I have received my discharge instructions, and my questions have been answered. I have discussed any challenges I see with this plan with the nurse or doctor.    ..........................................................................................................................................  Patient/Patient Representative Signature      ..........................................................................................................................................  Patient Representative Print Name and Relationship to Patient    ..................................................               ................................................  Date                                   Time    ..........................................................................................................................................  Reviewed by Signature/Title    ...................................................              ..............................................  Date                                               Time          22EPIC Rev 08/18

## 2022-02-28 NOTE — H&P
Interventional Radiology - History and Physical  2/28/2022    Procedure Requested: RIGHT nephrostomy tube change  Requesting Provider: Madhavi Blankenship CNP    HPI: Rich Wolff is a 61 year old male PMH prostate cancer s/p robotic assisted radical prostatectomy in 2015 and poorly differentiated adenocarcinoma of the colon with RIGHT ureteral obstruction s/p RIGHT PNT (initially placed Artemus 11/11/2020).      Presents today for routine PNT exchange.  Last exchange 12/27/2021, per below.  Does not require sedation for routine exchanges.     No pressing concerns today.  No fever, no chills, no changes to UOP.     Does report that he believes that he has a retained suture superior to his port.  Wondering if we can address this?    Imaging:   [facility]  LOCATION: [facility]  DATE: 12/27/2021     PROCEDURE: RIGHT NEPHROSTOMY TUBE EXCHANGE     INTERVENTIONAL RADIOLOGIST: To Diamond MD.     INDICATION: Right nephrostomy tube exchange.     CONSENT: The risks, benefits and alternatives of right nephrostomy tube exchange were discussed with the patient  in detail. All questions were answered. Informed consent was given to proceed with the procedure.        CONTRAST: 10 cc of Omni 350  ANTIBIOTICS: None.  ADDITIONAL MEDICATIONS: None.     FLUOROSCOPIC TIME: 0.7  minutes.  RADIATION DOSE: Air Kerma: 31 mGy.     COMPLICATIONS: No immediate complications.     STERILE BARRIER TECHNIQUE: Maximum sterile barrier technique was used. Cutaneous antisepsis was performed at the operative site with application of 2% chlorhexidine and large sterile drape. Prior to the procedure, the  and assistant performed   hand hygiene and wore hat, mask, sterile gown, and sterile gloves during the entire procedure.     PROCEDURE:    Contrast was injected through the existing right nephrostomy tube demonstrating the tube to be in good position. Over a Bentson wire the existing nephrostomy tube  was removed and a new 10 Jamaican right nephrostomy tube was placed into the right renal   pelvis. Contrast injection through the newly replaced right nephrostomy tube demonstrates good position within the right renal collecting                                                                         IMPRESSION:    Successful routine exchange of right 10 Jamaican nephrostomy tube.    NPO Status: NA  Anticoagulation/Antiplatelets/Bleeding tendencies: Xarelto 20mg PO QD  Antibiotics: Not indicated for IR procedure    Review of Systems: A comprehensive 10-point review of systems was performed. All systems were reviewed and negative with exception to those reported in the HPI.    PMH:  Past Medical History:   Diagnosis Date     Hyperlipidemia      Hypertension      Prostate cancer (H) 09/01/2015    T1c. Silvana's 6 (3+3).  Confined to prostate.  Multifocal bilateral comprising 2% of the gland.PSA:5.8.       PSH:  Past Surgical History:   Procedure Laterality Date     BOWEL RESECTION       COLONOSCOPY W/ BIOPSIES  11/04/2020     COLOSTOMY       ESOPHAGOSCOPY, GASTROSCOPY, DUODENOSCOPY (EGD), COMBINED  11/04/2020     IR CHEST PORT PLACEMENT > 5 YRS OF AGE  11/24/2020     IR NEPHROSTOMY TUBE CHANGE RIGHT  4/8/2021     IR NEPHROSTOMY TUBE CHANGE RIGHT  4/8/2021     IR NEPHROSTOMY TUBE CHANGE RIGHT  8/16/2021     IR NEPHROSTOMY TUBE CHANGE RIGHT  10/29/2021     IR NEPHROSTOMY TUBE CHANGE RIGHT  10/29/2021     IR NEPHROSTOMY TUBE CHANGE RIGHT  12/27/2021     IR PORT PLACEMENT >5 YEARS  11/24/2020     NEPHROSTOMY       NC ESOPHAGOGASTRODUODENOSCOPY TRANSORAL DIAGNOSTIC N/A 11/4/2020    Procedure: ESOPHAGOGASTRODUODENOSCOPY (EGD);  Surgeon: Paul Masters MD;  Location: [facility];  Service: Gastroenterology     NC LAP,PROSTATECTOMY,RADICAL,W/NERVE SPARE,INCL ROBOTIC Bilateral 09/01/2015    Procedure: DAVINCI RADICAL RETROPUBIC PROSTATECTOMY BILATERAL PELVIC LYMPH NODE DISSECTION;  Surgeon: Juan C Velazco MD;   Location: [facility];  Service: Urology     PROSTATE BIOPSY  06/08/2015    Ultrasound-guided transrectal biopsy.     SURGERY SCROTAL / TESTICULAR      for undescended testes, removed due to atrophy with vasectomy     [facility]/A 11/4/2020    Procedure: COLONOSCOPY WITH BIOPSY;  Surgeon: Paul Masters MD;  Location: [facility];  Service: Gastroenterology       ALLERGIES:  No Known Allergies    MEDICATIONS:  Current Outpatient Medications   Medication     acetaminophen (TYLENOL) 500 MG tablet     fentaNYL (DURAGESIC) 25 mcg/hr 72 hr patch     ferrous sulfate (FEROSUL) 325 (65 Fe) MG tablet     gabapentin (NEURONTIN) 300 MG capsule     oxybutynin (DITROPAN) 5 MG tablet     oxyCODONE (ROXICODONE) 5 MG tablet     sertraline (ZOLOFT) 50 MG tablet     tamsulosin (FLOMAX) 0.4 MG capsule     XARELTO ANTICOAGULANT 20 MG TABS tablet     zolpidem (AMBIEN) 5 MG tablet     Current Facility-Administered Medications   Medication     lidocaine (LMX4) cream     lidocaine 1 % 0.1-1 mL     sodium chloride (PF) 0.9% PF flush 3 mL     sodium chloride (PF) 0.9% PF flush 3 mL     sodium chloride 0.9% 1000 mL TABLE SOLN         LABS:  INR   Date Value Ref Range Status   08/04/2021 1.33 (H) 0.90 - 1.15 Final     Comment:     Effective 7/11/2021, the reference range for this assay has changed.      Hemoglobin   Date Value Ref Range Status   11/01/2021 7.8 (L) 13.3 - 17.7 g/dL Final   12/24/2020 7.7 (L) 13.3 - 17.7 g/dL Final   ]  Platelet Count   Date Value Ref Range Status   11/01/2021 172 150 - 450 10e3/uL Final   12/24/2020 424 150 - 450 10e9/L Final     Creatinine   Date Value Ref Range Status   11/01/2021 2.20 (H) 0.70 - 1.30 mg/dL Final   12/23/2020 1.33 (H) 0.66 - 1.25 mg/dL Final     Potassium   Date Value Ref Range Status   11/01/2021 4.7 3.5 - 5.0 mmol/L Final   12/23/2020 3.4 3.4 - 5.3 mmol/L Final         EXAM:  BP (!) 159/86 (BP Location: Right arm)   Pulse 87   Temp 97.7  F (36.5  " C) (Oral)   Resp 16   Ht 1.727 m (5' 8\")   Wt 77.1 kg (170 lb)   SpO2 100%   BMI 25.85 kg/m    General:  Stable.  In no acute distress.    Neuro:  A&O x 3. Moves all extremities equally.  Resp:  Lungs clear to auscultation bilaterally.  Cardio:  S1S2 and reg, without murmur, clicks or rubs      RIGHT PNT in place.  Insertion site c/d/i.  Stay suture in place.  Dressing c/d/i.  Drainage ba in place, draining clear yellow urine    Retained stitch superior to port.  No open areas, discharge or drainage.     Pre-Sedation Assessment:    Code Status: FULL CODE      ASSESSMENT:  60 yo M    - Complex urological PMH, per above  - Hx RIGHT ureteral obstruction managed with chronic PNT  - Last exchange 12/27/2021, per above  - No sedation for routine exchanges  - No concerns today RE PNT, concern for retained stitch    Presents for routine exchange    PLAN:    Proceed with routine RIGHT sided percutaneous nephrostomy tube exchange    - No sedation  - No abx  - Will ask Dr. Parson to assess and consider snipping retained stitch.     Procedure, risks/benefits, details, alternatives, and sedation reviewed with patient/family and he verbalized understanding. All questions answered. OK to proceed with above radiology procedure.       BREANNE MIRANDA NP  Interventional Radiology  [phone]    "

## 2022-02-28 NOTE — PRE-PROCEDURE
GENERAL PRE-PROCEDURE:   Procedure:  RIGHT PNT exchange  Date/Time:  2/28/2022 8:56 AM    Written consent obtained?: Yes    Risks and benefits: Risks, benefits and alternatives were discussed    Consent given by:  Patient  Patient states understanding of procedure being performed: Yes    Patient's understanding of procedure matches consent: Yes    Procedure consent matches procedure scheduled: Yes    Appropriately NPO:  Yes  ASA Class:  3  Lungs:  Lungs clear with good breath sounds bilaterally  Heart:  Normal heart sounds and rate  History & Physical reviewed:  History and physical reviewed and no updates needed  Statement of review:  I have reviewed the lab findings, diagnostic data, medications, and the plan for sedation

## 2022-02-28 NOTE — PROGRESS NOTES
"Interventional Radiology Brief Post Procedure Note    Pre Procedure Diagnosis:   ureteral obstruction  Chronic scab over right internal jugular port chamger    Post Procedure Diagnosis: Same    Procedure:   PCN change  Inspection of right internal jugular port site    Proceduralist: Maria Victoria Parson MD    Assistant: None    Time Out: Prior to the start of the procedure and with procedural staff participation, I verbally confirmed the patient s identity using two indicators, relevant allergies, that the procedure was appropriate and matched the consent or emergent situation, and that the correct equipment/implants were available. Immediately prior to starting the procedure I conducted the Time Out with the procedural staff and re-confirmed the patient s name, procedure, and site/side. (The Joint Commission universal protocol was followed.)  Yes    Sedation: None. Local Anesthestic used    Findings:  1. New PCN  in good position    2. 10 mm \"scab\" and underlying skin dehiscence over right internal jugular port chamber hub -- port visible beneath \"scab\".    Estimated Blood Loss: None    SPECIMENS: None    Complications: None    Condition: Stable    Plan:   PCN to gravity    Patient to return tomorrow for possible port revision vs stitch in area of right chest wall skin dehiscence     Comments: See dictated procedure note for full details     Maria Victoria Parson MD        "

## 2022-02-28 NOTE — DISCHARGE INSTRUCTIONS
Percutaneous Nephrostomy Tube (PNT) Exchange/Check Discharge Instructions:  You had your nephrostomy tube checked or exchanged today. Please refer to the below instructions following your check/exchange:    Care Instructions:  - If you received sedation for your procedure, do not drive or operate heavy machinery for the rest of the day.  - Rest today if your tube was exchanged. Avoid strenuous activity and heavy lifting for the rest of the day. Return to your normal activities as you tolerate tomorrow.  - Keep the nephrostomy tube site clean and dry.   - You may shower, but do not submerge the nephrostomy tube site in water (avoid tub baths, Jacuzzis, hot tubs and pools).  - If you have a gauze dressing, change the gauze and tape dressing as needed to keep site clean and dry. If you have a securement device, leave in place until your next exchange.  - Clean around nephrostomy tubes exit sites with soap and water, pat and apply new split gauze around the tube at the exit site.  - Urine going into the bag may be red with blood for the first few days.  - Keep the drainage bag lower than your kidney to keep urine from backing up.  - Inspect the tube often for kinks.  - Empty your drainage bag when it is approximately half way fluid. Follow below instructions for emptying bag:  - Clean hands well with soap and water.  - Place a container near the outlet valve of the drainage bag.  - To open the valve:  - If you have a [facility] leg bag: Twist the blue valve at the bottom of the bag while holding the valve over your container to empty bag. Re-twist the valve closed on the drainage bag once complete.  - If you have a [employer] leg bag: Turn the lever downward while holding the valve over your container to empty the bag. Turn the valve upward to close once complete.  - Discard drainage into toilet once urine drained.    Follow-Up:  - Routine (every 2-3 months) nephrostomy tube exchange is recommended. Your Urologist may  order and schedule exchanges by calling [facility] at [phone].    Call [facility] ([phone]) if you experience the following:  - Nephrostomy tube(s) stops draining urine.  - Nephrostomy tube(s) site is leaking urine.  - Extreme pain at the nephrostomy tube site.  - Nephrostomy tube appears to be falling out or has fallen out, becomes clogged or breaks.    Seek Medical Evaluation for the following:  - Fever (greater than 101 F (38.3C)).  - Purulent (yellow/green/foul smelling) drainage from nephrostomy tube exit sites.  - Significant bleeding from nephrostomy tube site(s).  - Significant or worsening pain at nephrostomy tube exit site(s) or back.

## 2022-03-01 ENCOUNTER — HOSPITAL ENCOUNTER (OUTPATIENT)
Dept: INTERVENTIONAL RADIOLOGY/VASCULAR | Facility: HOSPITAL | Age: 62
Discharge: HOME | End: 2022-03-01
Attending: NURSE PRACTITIONER | Admitting: NURSE PRACTITIONER
Payer: COMMERCIAL

## 2022-03-01 VITALS
HEART RATE: 99 BPM | DIASTOLIC BLOOD PRESSURE: 93 MMHG | TEMPERATURE: 98.1 F | BODY MASS INDEX: 25.76 KG/M2 | OXYGEN SATURATION: 100 % | RESPIRATION RATE: 20 BRPM | WEIGHT: 170 LBS | SYSTOLIC BLOOD PRESSURE: 150 MMHG | HEIGHT: 68 IN

## 2022-03-01 DIAGNOSIS — Z95.828 PORT-A-CATH IN PLACE: ICD-10-CM

## 2022-03-01 PROCEDURE — 272N000602 HC WOUND GLUE CR1

## 2022-03-01 PROCEDURE — G0463 HOSPITAL OUTPT CLINIC VISIT: HCPCS

## 2022-03-01 PROCEDURE — 258N000003 HC RX IP 258 OP 636: Performed by: NURSE PRACTITIONER

## 2022-03-01 RX ORDER — FLUMAZENIL 0.1 MG/ML
0.2 INJECTION, SOLUTION INTRAVENOUS
Status: DISCONTINUED | OUTPATIENT
Start: 2022-03-01 | End: 2022-03-02 | Stop reason: HOSPADM

## 2022-03-01 RX ORDER — CEFAZOLIN SODIUM 2 G/100ML
2 INJECTION, SOLUTION INTRAVENOUS
Status: DISCONTINUED | OUTPATIENT
Start: 2022-03-01 | End: 2022-03-02 | Stop reason: HOSPADM

## 2022-03-01 RX ORDER — LIDOCAINE 40 MG/G
CREAM TOPICAL
Status: DISCONTINUED | OUTPATIENT
Start: 2022-03-01 | End: 2022-03-02 | Stop reason: HOSPADM

## 2022-03-01 RX ORDER — NALOXONE HYDROCHLORIDE 0.4 MG/ML
0.4 INJECTION, SOLUTION INTRAMUSCULAR; INTRAVENOUS; SUBCUTANEOUS
Status: DISCONTINUED | OUTPATIENT
Start: 2022-03-01 | End: 2022-03-02 | Stop reason: HOSPADM

## 2022-03-01 RX ORDER — FENTANYL CITRATE 50 UG/ML
25-50 INJECTION, SOLUTION INTRAMUSCULAR; INTRAVENOUS EVERY 5 MIN PRN
Status: DISCONTINUED | OUTPATIENT
Start: 2022-03-01 | End: 2022-03-02 | Stop reason: HOSPADM

## 2022-03-01 RX ORDER — NALOXONE HYDROCHLORIDE 0.4 MG/ML
0.2 INJECTION, SOLUTION INTRAMUSCULAR; INTRAVENOUS; SUBCUTANEOUS
Status: DISCONTINUED | OUTPATIENT
Start: 2022-03-01 | End: 2022-03-02 | Stop reason: HOSPADM

## 2022-03-01 RX ORDER — SODIUM CHLORIDE 9 MG/ML
INJECTION, SOLUTION INTRAVENOUS CONTINUOUS
Status: DISCONTINUED | OUTPATIENT
Start: 2022-03-01 | End: 2022-03-02 | Stop reason: HOSPADM

## 2022-03-01 RX ADMIN — SODIUM CHLORIDE: 9 INJECTION, SOLUTION INTRAVENOUS at 09:25

## 2022-03-01 NOTE — PRE-PROCEDURE
GENERAL PRE-PROCEDURE:   Procedure:  Port check  Date/Time:  3/1/2022 9:40 AM    Written consent obtained?: Yes    Risks and benefits: Risks, benefits and alternatives were discussed    Consent given by:  Patient  Patient states understanding of procedure being performed: Yes    Patient's understanding of procedure matches consent: Yes    Procedure consent matches procedure scheduled: Yes    Expected level of sedation:  Moderate (as needed)  Appropriately NPO:  Yes  ASA Class:  3  Mallampati  :  Grade 1- soft palate, uvula, tonsillar pillars, and posterior pharyngeal wall visible  Lungs:  Lungs clear with good breath sounds bilaterally  Heart:  Normal heart sounds and rate  History & Physical reviewed:  History and physical reviewed and no updates needed  Statement of review:  I have reviewed the lab findings, diagnostic data, medications, and the plan for sedation

## 2022-03-01 NOTE — DISCHARGE INSTRUCTIONS
Care Instructions:    - You may shower beginning post procedure day #1.  Do not scrub site until well healed; pat dry.  - Avoid submerging the port site under water (tub baths, Jacuzzis, hot tubs and pools) for 10 days or until glue falls off.  - You may take over the counter pain medication for discomfort. Follow the package directions.  - Avoid heavy lifting (greater than 10 pounds) and strenuous activities for 3 days.   - If you experience significant bleeding at site, apply pressure with hands above the clavicle bone, sit upright and seek immediate medical assistance.    Call [facility] ([phone]) if you experience the following:   - Uncontrolled bleeding from port site  - Fever (greater than 101 F (38.3C))  - Purulent (yellow/green/foul smelling) drainage from port insertion site.  - Increasing pain at port site  - Increasing redness at port site

## 2022-03-01 NOTE — H&P
Interventional Radiology - History and Physical  3/1/2022    Procedure Requested: Port Check  Requesting Provider: Dryjanski APRN-CNP    HPI: Rich Wolff is a 61 year old male PMH prostate cancer s/p robotic assisted radical prostatectomy in 2015 and poorly differentiated adenocarcinoma of the colon with RIGHT ureteral obstruction s/p RIGHT PNT (initially placed Malin 11/11/2020).      Seen yesterday in IR for routine PNT exchange.  He noted that he had concerns RE an area he believe to be a retained suture from his port placement and asked IR to evaluate.    Port site inspected by Dr. Parson, noted a 10mm scab and underlying skin dehiscence of the RIGHT internal jugular port chamber hub and port was visible beneath scab.    Presents today for further evaluation and management RE the port.     He does report that he had chills after his PNT exchange last evening and presented to the Urgency Room.  He was given an IV abx and was discharged home on 7 days of Cefdinir 300mg PO BID x7 days abx.  Feeling much better this AM, no fever or chills.       Imaging:   [facility]  LOCATION: [facility]  DATE: 11/24/2020     PROCEDURES:  1.  VENOUS PORT PLACEMENT WITH SUBCUTANEOUS RESERVOIR.  2.  ULTRASOUND GUIDANCE.  3.  FLUOROSCOPY.  4.  CONSCIOUS SEDATION.     INDICATION: Malignant neoplasm of rectosigmoid junction     SEDATION: During the time-out, immediately prior to administration of medications, the patient was reassessed for adequacy to receive conscious sedation.  Versed 1.5 mg and fentanyl 75 mcg were administered intravenously with continuous vital signs   monitoring by the radiology nurse under my direct supervision. Patient was alert and oriented post procedure. Total face to face moderate sedation time: 22 minutes.      ADDITIONAL MEDICATIONS: Ancef 2 g IV.     STERILE BARRIER TECHNIQUE: Maximum sterile barrier technique was used. Cutaneous antisepsis was performed at the  operative site with application of 2% chlorhexidine and a full body sterile drape. Prior to the procedure, the  and assistant   performed hand hygiene and wore hat, mask, sterile gown, and sterile gloves during the entire procedure. Ultrasound was prepped with a sterile probe cover and sterile gel was used.      PROCEDURE:The right side of the neck and anterior chest wall were prepped and draped in a sterile fashion; 1% local lidocaine was infused into the local soft tissues along the side of the neck and in the anterior chest wall. Prior to the procedure,   patency of the right internal jugular vein was confirmed with ultrasound and images recorded. Using real-time ultrasound guidance, the right internal jugular vein was accessed percutaneously, and a guidewire was advanced to the right atrial/superior   vena   caval junction. A 4 Nigerien dilator was then introduced over the guidewire. A skin incision was then made in the right anterior chest wall. Using blunt dissection, a subcutaneous pocket was made in the chest wall. Electrocautery was used to establish   hemostasis. The pocket was then irrigated with normal saline solution. The catheter was attached to a PowerPort.From the pocket and through a subcutaneous tunnel, 8 Nigerien catheter was brought and ultimately placed through a peel-away sheath in the   right   internal jugular vein. Under fluoroscopic guidance, the catheter was advanced to the right atrial/superior vena caval junction.  The port was  secured in the pocket using 4-0 Prolene. The port flushed and aspirated without difficulty. The pocket was   closed using interrupted 4-0 Vicryl.  The venotomy incision was closed with 5-0 Vicryl. Dermabond was applied to the skin incision in the anterior chest wall as well as in the neck region.     Ultimately, the port was flushed using 3 mL of heparinized flush solution.     The patient tolerated the procedure well.     RADIOGRAPHIC SUPERVISION  INTERPRETATION:  1. ULTRASOUND GUIDANCE: Patent right internal jugular vein.     2. FLUOROSCOPY: The tip of the catheter is at the right atrial/superior vena caval junction. Fluoroscopic time: 0.4 mins. Dose: DAP:4 Gycm2.     IMPRESSION:  1. Successful placement of right internal jugular power port.       NPO Status: MN   Anticoagulation/Antiplatelets/Bleeding tendencies: Xarelto 20mg PO every day- last dose this AM  Antibiotics: Ancef 2 g IV x1 ordered for procedure, should port be replaced    Review of Systems: A comprehensive 10-point review of systems was performed. All systems were reviewed and negative with exception to those reported in the HPI.    PMH:  Past Medical History:   Diagnosis Date     Colon cancer (H)      Hyperlipidemia      Hypertension      Prostate cancer (H) 09/01/2015    T1c. Silvana's 6 (3+3).  Confined to prostate.  Multifocal bilateral comprising 2% of the gland.PSA:5.8.       PSH:  Past Surgical History:   Procedure Laterality Date     BOWEL RESECTION       COLONOSCOPY W/ BIOPSIES  11/04/2020     COLOSTOMY       ESOPHAGOSCOPY, GASTROSCOPY, DUODENOSCOPY (EGD), COMBINED  11/04/2020     IR CHEST PORT PLACEMENT > 5 YRS OF AGE  11/24/2020     IR NEPHROSTOMY TUBE CHANGE RIGHT  4/8/2021     IR NEPHROSTOMY TUBE CHANGE RIGHT  4/8/2021     IR NEPHROSTOMY TUBE CHANGE RIGHT  8/16/2021     IR NEPHROSTOMY TUBE CHANGE RIGHT  10/29/2021     IR NEPHROSTOMY TUBE CHANGE RIGHT  10/29/2021     IR NEPHROSTOMY TUBE CHANGE RIGHT  12/27/2021     IR NEPHROSTOMY TUBE CHANGE RIGHT  2/28/2022     IR PORT PLACEMENT >5 YEARS  11/24/2020     NEPHROSTOMY       OR ESOPHAGOGASTRODUODENOSCOPY TRANSORAL DIAGNOSTIC N/A 11/4/2020    Procedure: ESOPHAGOGASTRODUODENOSCOPY (EGD);  Surgeon: Paul Masters MD;  Location: [facility];  Service: Gastroenterology     OR LAP,PROSTATECTOMY,RADICAL,W/NERVE SPARE,INCL ROBOTIC Bilateral 09/01/2015    Procedure: DAVINCI RADICAL RETROPUBIC PROSTATECTOMY BILATERAL PELVIC LYMPH  NODE DISSECTION;  Surgeon: Juan C Velazco MD;  Location: [facility];  Service: Urology     PROSTATE BIOPSY  06/08/2015    Ultrasound-guided transrectal biopsy.     SURGERY SCROTAL / TESTICULAR      for undescended testes, removed due to atrophy with vasectomy     ZZHC COLONOSCOPY W/WO BRUSH/WASH N/A 11/4/2020    Procedure: COLONOSCOPY WITH BIOPSY;  Surgeon: Paul Masters MD;  Location: [facility];  Service: Gastroenterology       ALLERGIES:  No Known Allergies    MEDICATIONS:  Current Outpatient Medications   Medication     acetaminophen (TYLENOL) 500 MG tablet     fentaNYL (DURAGESIC) 25 mcg/hr 72 hr patch     ferrous sulfate (FEROSUL) 325 (65 Fe) MG tablet     gabapentin (NEURONTIN) 300 MG capsule     oxybutynin (DITROPAN) 5 MG tablet     oxyCODONE (ROXICODONE) 5 MG tablet     sertraline (ZOLOFT) 50 MG tablet     tamsulosin (FLOMAX) 0.4 MG capsule     XARELTO ANTICOAGULANT 20 MG TABS tablet     zolpidem (AMBIEN) 5 MG tablet     Current Facility-Administered Medications   Medication     ceFAZolin (ANCEF) intermittent infusion 2 g in 100 mL dextrose PRE-MIX     lidocaine (LMX4) cream     lidocaine 1 % 0.1-1 mL     sodium chloride (PF) 0.9% PF flush 3 mL     sodium chloride (PF) 0.9% PF flush 3 mL     sodium chloride 0.9% infusion         LABS:  INR   Date Value Ref Range Status   08/04/2021 1.33 (H) 0.90 - 1.15 Final     Comment:     Effective 7/11/2021, the reference range for this assay has changed.      Hemoglobin   Date Value Ref Range Status   11/01/2021 7.8 (L) 13.3 - 17.7 g/dL Final   12/24/2020 7.7 (L) 13.3 - 17.7 g/dL Final   ]  Platelet Count   Date Value Ref Range Status   11/01/2021 172 150 - 450 10e3/uL Final   12/24/2020 424 150 - 450 10e9/L Final     Creatinine   Date Value Ref Range Status   11/01/2021 2.20 (H) 0.70 - 1.30 mg/dL Final   12/23/2020 1.33 (H) 0.66 - 1.25 mg/dL Final     Potassium   Date Value Ref Range Status   11/01/2021 4.7 3.5 - 5.0 mmol/L Final    12/23/2020 3.4 3.4 - 5.3 mmol/L Final         EXAM:  BP (!) 150/93 (BP Location: Left arm)   Pulse 99   Temp 98.1  F (36.7  C) (Oral)   Resp 20   SpO2 100%   General:  Stable.  In no acute distress.    Neuro:  A&O x 3. Moves all extremities equally.  Resp:  Lungs clear to auscultation bilaterally.  Cardio:  S1S2 and reg, without murmur, clicks or rubs     RIGHT sided port in place.  Skin just superior to port hub with small, 1-2 mm open area oozing a scant amount of clear fluid.  Port catheter appreciated.  No surrounding erythema, induration, edema.        Pre-Sedation Assessment:  Mallampati Airway Classification:  III - Only soft palate is visible. Intubation is predicted to be difficult  Previous reaction to anesthesia/sedation:  No  Sedation plan based on assessment: Moderate (conscious) sedation as needed  ASA Classification: Class 3 - SEVERE SYSTEMIC DISEASE, DEFINITE FUNCTIONAL LIMITATIONS.   Code Status: FULL CODE    ASSESSMENT:  62 yo M    - Complex PMH, per above, including bladder cancer s/p port placement 1124/2020  - Concern for exposed port under scab during PNT exchange yesterday  - Fever, chills after PNT exchange yesterday.  Seen in Urgency Room, rx Cefdinir    Presents today for a port check    PLAN:    Proceed with a port check with possible intervention, including exchange, with sedation as needed    - Ancef 2 g IV x1 ordered for procedure, should port be exchanged.  - Currently on course of Cefdinir for UTI- continue as prescribed.     Procedure, risks/benefits, details, alternatives, and sedation reviewed with patient/family and he verbalized understanding. All questions answered. OK to proceed with above radiology procedure.       BREANNE MIRANDA NP  Interventional Radiology  [phone]

## 2022-03-01 NOTE — IP AVS SNAPSHOT
[facility] Interventional Radiology  [address] [place] [address]  Phone: [phone]  Fax: [phone]                                    After Visit Summary   3/1/2022    Rich Wolff   MRN: [id number]           After Visit Summary Signature Page    I have received my discharge instructions, and my questions have been answered. I have discussed any challenges I see with this plan with the nurse or doctor.    ..........................................................................................................................................  Patient/Patient Representative Signature      ..........................................................................................................................................  Patient Representative Print Name and Relationship to Patient    ..................................................               ................................................  Date                                   Time    ..........................................................................................................................................  Reviewed by Signature/Title    ...................................................              ..............................................  Date                                               Time          22EPIC Rev 08/18

## 2022-04-20 DIAGNOSIS — Z11.59 ENCOUNTER FOR SCREENING FOR OTHER VIRAL DISEASES: Primary | ICD-10-CM

## 2022-04-28 DIAGNOSIS — Z11.59 ENCOUNTER FOR SCREENING FOR OTHER VIRAL DISEASES: Primary | ICD-10-CM

## 2022-05-05 ENCOUNTER — LAB (OUTPATIENT)
Dept: FAMILY MEDICINE | Facility: CLINIC | Age: 62
End: 2022-05-05
Attending: NURSE PRACTITIONER
Payer: COMMERCIAL

## 2022-05-05 DIAGNOSIS — Z11.59 ENCOUNTER FOR SCREENING FOR OTHER VIRAL DISEASES: ICD-10-CM

## 2022-05-05 LAB — SARS-COV-2 RNA RESP QL NAA+PROBE: NEGATIVE

## 2022-05-05 PROCEDURE — U0005 INFEC AGEN DETEC AMPLI PROBE: HCPCS

## 2022-05-05 PROCEDURE — U0003 INFECTIOUS AGENT DETECTION BY NUCLEIC ACID (DNA OR RNA); SEVERE ACUTE RESPIRATORY SYNDROME CORONAVIRUS 2 (SARS-COV-2) (CORONAVIRUS DISEASE [COVID-19]), AMPLIFIED PROBE TECHNIQUE, MAKING USE OF HIGH THROUGHPUT TECHNOLOGIES AS DESCRIBED BY CMS-2020-01-R: HCPCS

## 2022-05-05 NOTE — IR NOTE
IR Procedure Pre-call    Spoke with: Meadow  Arrival and procedure time: 1030/1100  Patient has : No  Patient is taking blood thinners:No   Patient has H&P within 30 days:Yes   Patient has covid test:Yes   Patient NPO 8 hours prior: No    Pre-call completed.   May 5, 2022 2:33 PM  Lisette Galvan RN

## 2022-05-09 ENCOUNTER — HOSPITAL ENCOUNTER (OUTPATIENT)
Dept: INTERVENTIONAL RADIOLOGY/VASCULAR | Facility: HOSPITAL | Age: 62
Discharge: HOME | End: 2022-05-09
Attending: NURSE PRACTITIONER | Admitting: RADIOLOGY
Payer: COMMERCIAL

## 2022-05-09 VITALS
BODY MASS INDEX: 25.76 KG/M2 | DIASTOLIC BLOOD PRESSURE: 76 MMHG | SYSTOLIC BLOOD PRESSURE: 136 MMHG | TEMPERATURE: 97.3 F | RESPIRATION RATE: 16 BRPM | HEIGHT: 68 IN | HEART RATE: 89 BPM | WEIGHT: 170 LBS | OXYGEN SATURATION: 98 %

## 2022-05-09 DIAGNOSIS — Z93.6 NEPHROSTOMY STATUS (H): ICD-10-CM

## 2022-05-09 PROCEDURE — C1769 GUIDE WIRE: HCPCS

## 2022-05-09 PROCEDURE — C1729 CATH, DRAINAGE: HCPCS

## 2022-05-09 PROCEDURE — 50435 EXCHANGE NEPHROSTOMY CATH: CPT

## 2022-05-09 PROCEDURE — 255N000002 HC RX 255 OP 636: Performed by: NURSE PRACTITIONER

## 2022-05-09 PROCEDURE — 250N000009 HC RX 250: Performed by: RADIOLOGY

## 2022-05-09 RX ORDER — LIDOCAINE HYDROCHLORIDE 20 MG/ML
5 SOLUTION OROPHARYNGEAL
Status: DISCONTINUED | OUTPATIENT
Start: 2022-05-09 | End: 2022-05-09

## 2022-05-09 RX ADMIN — IOHEXOL 5 ML: 350 INJECTION, SOLUTION INTRAVENOUS at 11:25

## 2022-05-09 RX ADMIN — LIDOCAINE HYDROCHLORIDE 20 ML: 10 INJECTION, SOLUTION INFILTRATION; PERINEURAL at 11:06

## 2022-05-09 NOTE — PROGRESS NOTES
Interventional Radiology - Pre-Procedure Note:  2022    Procedure Requested: Preventative maintenance nephrostomy change.    Brief HPI: Rich Wolff is a 61 year old male with right ureteral obstruction managed with nephrostomy drainage.  Last changed 22 with no sedation.     IMAGIN22    NPO: MN  ANTICOAGULANTS: NA  ANTIBIOTICS: NA    ALLERGIES  No Known Allergies      LABS:  INR   Date Value Ref Range Status   2021 1.33 (H) 0.90 - 1.15 Final     Comment:     Effective 2021, the reference range for this assay has changed.      Hemoglobin   Date Value Ref Range Status   2021 7.8 (L) 13.3 - 17.7 g/dL Final   2020 7.7 (L) 13.3 - 17.7 g/dL Final   ]  Platelet Count   Date Value Ref Range Status   2021 172 150 - 450 10e3/uL Final   2020 424 150 - 450 10e9/L Final     Creatinine   Date Value Ref Range Status   2021 2.20 (H) 0.70 - 1.30 mg/dL Final   2020 1.33 (H) 0.66 - 1.25 mg/dL Final     Potassium   Date Value Ref Range Status   2021 4.7 3.5 - 5.0 mmol/L Final   2020 3.4 3.4 - 5.3 mmol/L Final         EXAM:  There were no vitals taken for this visit.  General:  Stable.  In no acute distress.    Resp:  Lungs clear to auscultation bilaterally.      ASSESSMENT/PLAN:   RIGHT PCN change      Tong Leahy MD  Interventional Radiology

## 2022-05-09 NOTE — DISCHARGE INSTRUCTIONS
Percutaneous Nephrostomy Tube (PNT) Discharge Instructions:  You had a nephrostomy tube (PNT) placed. This is a catheter (plastic tube) that is inserted through your skin into your kidney. The nephrostomy tube is placed to drain urine from your body into a collecting bag outside your body. Please follow the below instructions regarding care of your nephrostomy tube:    Care Instructions:  - If you received sedation for your procedure, do not drive or operate heavy machinery for the rest of the day.  - Rest after your drain was placed. Avoid strenuous activity and heavy lifting for the next 2 days. Return to your normal activities as you tolerate after the 2 day restriction.  - Keep the nephrostomy tube site clean and dry.   - You may shower, but do not submerge the nephrostomy tube site in water (avoid tub baths, Jacuzzis, hot tubs and pools)  - If you have a gauze dressing, change the gauze and tape dressing as needed to keep site clean and dry. If you have a securement device, leave in place until your next exchange.  - Clean around nephrostomy tubes exit sites with soap and water, pat and apply new split gauze around the tube at the exit site.  - Urine going into the bag may be red with blood for the first few days.  - Keep the drainage bag lower than your kidney to keep urine from backing up.  - Inspect the tube often for kinks.  - Empty your drainage bag when it is approximately half way fluid. Follow below instructions for emptying bag:  - Clean hands well with soap and water.  - Place a container near the outlet valve of the drainage bag.  - To open the valve:  - If you have a [employer] leg bag: Twist the blue valve at the bottom of the bag while holding the valve over your container to empty bag. Re-twist the valve closed on the drainage bag once complete.  - If you have a [place] leg bag: Turn the lever downward while holding the valve over your container to empty the bag. Turn the valve upward to  "close once complete.  - Discard drainage into toilet once urine drained.  ***- Flush your drainage tube with ***mL sterile Normal Saline ***.   ?   ***Flushing Your Drainage Tube:   1. Collect flushing supplies: ***mL of sterile normal saline in syringe, alcohol pad.  2. Clean the flushing (center) port with alcohol and attach the flushing syringe by twisting into place.  3. Turn the 3 way stopcock valve \"off\" to the drainage bag/bulb.  4. Gently inject/flush the drain so fluid is moving towards your body through the drainage catheter.   5. Turn the stopcock valve back to its center position to allow for drainage to resume.   6. Remove flushing syringe from flushing port by twisting off.    Follow-Up:  - Routine (every 2-3 months***) nephrostomy tube exchange is recommended. Your Urologist may order and schedule exchanges by calling [facility] at [phone]***.  -*** Follow up with your Urologist.    Call [facility] ([phone])*** if you experience the following:  - Nephrostomy tube(s) stops draining urine.  - Nephrostomy tube(s) site is leaking urine.  - Extreme pain at the nephrostomy tube site.  - Nephrostomy tube appears to be falling out or has fallen out, becomes clogged or breaks.    Seek Medical Evaluation for the following:  - Fever (greater than 101 F (38.3C)).  - Purulent (yellow/green/foul smelling) drainage from nephrostomy tube exit sites.  - Significant bleeding from nephrostomy tube site(s).  - Significant or worsening pain at nephrostomy tube exit site(s) or back.   "

## 2022-05-09 NOTE — IP AVS SNAPSHOT
[facility] Interventional Radiology  [address] [place] [address]  Phone: [phone]  Fax: [phone]                                      After Visit Summary   5/9/2022    Rich Wolff   MRN: [id number]           After Visit Summary Signature Page    I have received my discharge instructions, and my questions have been answered. I have discussed any challenges I see with this plan with the nurse or doctor.    ..........................................................................................................................................  Patient/Patient Representative Signature      ..........................................................................................................................................  Patient Representative Print Name and Relationship to Patient    ..................................................               ................................................  Date                                   Time    ..........................................................................................................................................  Reviewed by Signature/Title    ...................................................              ..............................................  Date                                               Time          22EPIC Rev 08/18

## 2022-07-05 ENCOUNTER — HOSPITAL ENCOUNTER (OUTPATIENT)
Dept: INTERVENTIONAL RADIOLOGY/VASCULAR | Facility: HOSPITAL | Age: 62
Discharge: HOME | End: 2022-07-05
Attending: RADIOLOGY | Admitting: RADIOLOGY
Payer: COMMERCIAL

## 2022-07-05 VITALS
SYSTOLIC BLOOD PRESSURE: 133 MMHG | DIASTOLIC BLOOD PRESSURE: 86 MMHG | HEART RATE: 81 BPM | OXYGEN SATURATION: 99 % | TEMPERATURE: 98 F

## 2022-07-05 DIAGNOSIS — N13.5 URETERAL OBSTRUCTION: ICD-10-CM

## 2022-07-05 PROCEDURE — 50435 EXCHANGE NEPHROSTOMY CATH: CPT | Mod: RT

## 2022-07-05 PROCEDURE — 255N000002 HC RX 255 OP 636: Performed by: RADIOLOGY

## 2022-07-05 PROCEDURE — 250N000009 HC RX 250: Performed by: RADIOLOGY

## 2022-07-05 PROCEDURE — C1769 GUIDE WIRE: HCPCS

## 2022-07-05 PROCEDURE — C1729 CATH, DRAINAGE: HCPCS

## 2022-07-05 RX ADMIN — IOHEXOL 10 ML: 350 INJECTION, SOLUTION INTRAVENOUS at 14:03

## 2022-07-05 RX ADMIN — LIDOCAINE HYDROCHLORIDE 10 ML: 10 INJECTION, SOLUTION INFILTRATION; PERINEURAL at 13:55

## 2022-07-05 NOTE — PROGRESS NOTES
Pt indicates no increase in pain. Unable to indicate normal amount of urine due to bag leaking.  No redness or swelling noted.

## 2022-07-05 NOTE — DISCHARGE INSTRUCTIONS
Percutaneous Nephrostomy Tube (PNT) Exchange/Check Discharge Instructions:  You had your nephrostomy tube checked or exchanged today. Please refer to the below instructions following your check/exchange:    Care Instructions:  - If you received sedation for your procedure, do not drive or operate heavy machinery for the rest of the day.  - Rest today if your tube was exchanged. Avoid strenuous activity and heavy lifting for the rest of the day. Return to your normal activities as you tolerate tomorrow.  - Keep the nephrostomy tube site clean and dry.   - You may shower, but do not submerge the nephrostomy tube site in water (avoid tub baths, Jacuzzis, hot tubs and pools).  - If you have a gauze dressing, change the gauze and tape dressing as needed to keep site clean and dry. If you have a securement device, leave in place until your next exchange.  - Clean around nephrostomy tubes exit sites with soap and water, pat and apply new split gauze around the tube at the exit site.  - Urine going into the bag may be red with blood for the first few days.  - Keep the drainage bag lower than your kidney to keep urine from backing up.  - Inspect the tube often for kinks.  - Empty your drainage bag when it is approximately half way fluid. Follow below instructions for emptying bag:  - Clean hands well with soap and water.  - Place a container near the outlet valve of the drainage bag.  - To open the valve:  - If you have a [employer] leg bag: Twist the blue valve at the bottom of the bag while holding the valve over your container to empty bag. Re-twist the valve closed on the drainage bag once complete.  - If you have a [place] leg bag: Turn the lever downward while holding the valve over your container to empty the bag. Turn the valve upward to close once complete.  - Discard drainage into toilet once urine drained.    Follow-Up:  - Routine (every 2-3 months) nephrostomy tube exchange is recommended. Your Urologist may  order and schedule exchanges by calling [facility] at [phone]***.    Call [facility] ([phone])*** if you experience the following:  - Nephrostomy tube(s) stops draining urine.  - Nephrostomy tube(s) site is leaking urine.  - Extreme pain at the nephrostomy tube site.  - Nephrostomy tube appears to be falling out or has fallen out, becomes clogged or breaks.    Seek Medical Evaluation for the following:  - Fever (greater than 101 F (38.3C)).  - Purulent (yellow/green/foul smelling) drainage from nephrostomy tube exit sites.  - Significant bleeding from nephrostomy tube site(s).  - Significant or worsening pain at nephrostomy tube exit site(s) or back.

## 2022-07-05 NOTE — IP AVS SNAPSHOT
[facility] Interventional Radiology  [address] [place] [address]  Phone: [phone]  Fax: [phone]                                      After Visit Summary   7/5/2022    Rich Wolff   MRN: [id number]           After Visit Summary Signature Page    I have received my discharge instructions, and my questions have been answered. I have discussed any challenges I see with this plan with the nurse or doctor.    ..........................................................................................................................................  Patient/Patient Representative Signature      ..........................................................................................................................................  Patient Representative Print Name and Relationship to Patient    ..................................................               ................................................  Date                                   Time    ..........................................................................................................................................  Reviewed by Signature/Title    ...................................................              ..............................................  Date                                               Time          22EPIC Rev 08/18

## 2022-07-06 DIAGNOSIS — N13.5 URETERAL OBSTRUCTION, RIGHT: Primary | ICD-10-CM

## 2022-09-13 ENCOUNTER — TRANSFERRED RECORDS (OUTPATIENT)
Dept: HEALTH INFORMATION MANAGEMENT | Facility: CLINIC | Age: 62
End: 2022-09-13

## 2022-09-15 ENCOUNTER — LAB (OUTPATIENT)
Dept: INFUSION THERAPY | Facility: CLINIC | Age: 62
End: 2022-09-15
Attending: PHYSICIAN ASSISTANT
Payer: COMMERCIAL

## 2022-09-15 VITALS
SYSTOLIC BLOOD PRESSURE: 103 MMHG | RESPIRATION RATE: 16 BRPM | TEMPERATURE: 98.4 F | DIASTOLIC BLOOD PRESSURE: 69 MMHG | OXYGEN SATURATION: 98 % | HEART RATE: 75 BPM

## 2022-09-15 VITALS
RESPIRATION RATE: 16 BRPM | HEART RATE: 77 BPM | TEMPERATURE: 97.7 F | DIASTOLIC BLOOD PRESSURE: 74 MMHG | SYSTOLIC BLOOD PRESSURE: 134 MMHG | OXYGEN SATURATION: 98 %

## 2022-09-15 DIAGNOSIS — C18.9 MALIGNANT NEOPLASM OF COLON, UNSPECIFIED PART OF COLON (H): Primary | ICD-10-CM

## 2022-09-15 DIAGNOSIS — Z11.59 ENCOUNTER FOR SCREENING FOR OTHER VIRAL DISEASES: ICD-10-CM

## 2022-09-15 DIAGNOSIS — D62 ANEMIA DUE TO BLOOD LOSS, ACUTE: Primary | ICD-10-CM

## 2022-09-15 LAB
ABO/RH(D): NORMAL
ANTIBODY SCREEN: NEGATIVE
BASOPHILS # BLD AUTO: 0 10E3/UL (ref 0–0.2)
BASOPHILS NFR BLD AUTO: 0 %
BLD PROD TYP BPU: NORMAL
BLOOD COMPONENT TYPE: NORMAL
CODING SYSTEM: NORMAL
CROSSMATCH: NORMAL
EOSINOPHIL # BLD AUTO: 0 10E3/UL (ref 0–0.7)
EOSINOPHIL NFR BLD AUTO: 1 %
ERYTHROCYTE [DISTWIDTH] IN BLOOD BY AUTOMATED COUNT: 17.6 % (ref 10–15)
HCT VFR BLD AUTO: 23.5 % (ref 40–53)
HGB BLD-MCNC: 7.6 G/DL (ref 13.3–17.7)
HOLD SPECIMEN: NORMAL
IMM GRANULOCYTES # BLD: 0 10E3/UL
IMM GRANULOCYTES NFR BLD: 1 %
ISSUE DATE AND TIME: NORMAL
LYMPHOCYTES # BLD AUTO: 0.4 10E3/UL (ref 0.8–5.3)
LYMPHOCYTES NFR BLD AUTO: 12 %
MCH RBC QN AUTO: 34.2 PG (ref 26.5–33)
MCHC RBC AUTO-ENTMCNC: 32.3 G/DL (ref 31.5–36.5)
MCV RBC AUTO: 106 FL (ref 78–100)
MONOCYTES # BLD AUTO: 0.2 10E3/UL (ref 0–1.3)
MONOCYTES NFR BLD AUTO: 5 %
NEUTROPHILS # BLD AUTO: 2.6 10E3/UL (ref 1.6–8.3)
NEUTROPHILS NFR BLD AUTO: 81 %
NRBC # BLD AUTO: 0 10E3/UL
NRBC BLD AUTO-RTO: 0 /100
PLATELET # BLD AUTO: 267 10E3/UL (ref 150–450)
RBC # BLD AUTO: 2.22 10E6/UL (ref 4.4–5.9)
SPECIMEN EXPIRATION DATE: NORMAL
UNIT ABO/RH: NORMAL
UNIT NUMBER: NORMAL
UNIT STATUS: NORMAL
UNIT TYPE ISBT: 6200
WBC # BLD AUTO: 3.2 10E3/UL (ref 4–11)

## 2022-09-15 PROCEDURE — 36430 TRANSFUSION BLD/BLD COMPNT: CPT

## 2022-09-15 PROCEDURE — 85025 COMPLETE CBC W/AUTO DIFF WBC: CPT | Performed by: NURSE PRACTITIONER

## 2022-09-15 PROCEDURE — 86923 COMPATIBILITY TEST ELECTRIC: CPT | Performed by: PHYSICIAN ASSISTANT

## 2022-09-15 PROCEDURE — 86901 BLOOD TYPING SEROLOGIC RH(D): CPT | Performed by: NURSE PRACTITIONER

## 2022-09-15 PROCEDURE — 250N000011 HC RX IP 250 OP 636: Performed by: NURSE PRACTITIONER

## 2022-09-15 PROCEDURE — P9016 RBC LEUKOCYTES REDUCED: HCPCS | Performed by: PHYSICIAN ASSISTANT

## 2022-09-15 PROCEDURE — 86850 RBC ANTIBODY SCREEN: CPT | Performed by: NURSE PRACTITIONER

## 2022-09-15 RX ORDER — HEPARIN SODIUM,PORCINE 10 UNIT/ML
5 VIAL (ML) INTRAVENOUS
Status: CANCELLED | OUTPATIENT
Start: 2022-09-15

## 2022-09-15 RX ORDER — HEPARIN SODIUM (PORCINE) LOCK FLUSH IV SOLN 100 UNIT/ML 100 UNIT/ML
5 SOLUTION INTRAVENOUS
Status: DISCONTINUED | OUTPATIENT
Start: 2022-09-15 | End: 2022-09-15 | Stop reason: HOSPADM

## 2022-09-15 RX ORDER — HEPARIN SODIUM (PORCINE) LOCK FLUSH IV SOLN 100 UNIT/ML 100 UNIT/ML
5 SOLUTION INTRAVENOUS
Status: CANCELLED | OUTPATIENT
Start: 2022-09-15

## 2022-09-15 RX ADMIN — HEPARIN 5 ML: 100 SYRINGE at 11:46

## 2022-09-15 NOTE — PROGRESS NOTES
Infusion Nursing Note:  Rich Wolff presents today for Labs and blood transfusion. Patient was seen at [facility] this morning for 5 FU pump disconnect.   present during visit today: Not Applicable.    Note: N/A.    Intravenous Access:  Patient arrived with port accessed from [facility]. Port flushes well and positive blood return noted. Port de-accessed at end of visit per protocol. Site intact.    Treatment Conditions:  Lab Results   Component Value Date    HGB 7.6 (L) 09/15/2022    WBC 3.2 (L) 09/15/2022    ANEU 5.3 11/01/2021    ANEUTAUTO 2.6 09/15/2022     09/15/2022        Post Infusion Assessment:  Patient tolerated blood transfusion without incident. Patient received one unit of PRBC's today.    Discharge Plan:   Patient discharged in stable condition accompanied by: self.      Jayshree Galvan RN

## 2022-09-26 ENCOUNTER — HOSPITAL ENCOUNTER (OUTPATIENT)
Dept: INTERVENTIONAL RADIOLOGY/VASCULAR | Facility: HOSPITAL | Age: 62
Discharge: HOME | End: 2022-09-26
Attending: NURSE PRACTITIONER | Admitting: RADIOLOGY
Payer: COMMERCIAL

## 2022-09-26 VITALS
TEMPERATURE: 98.1 F | DIASTOLIC BLOOD PRESSURE: 84 MMHG | RESPIRATION RATE: 16 BRPM | SYSTOLIC BLOOD PRESSURE: 141 MMHG | HEART RATE: 89 BPM | OXYGEN SATURATION: 98 %

## 2022-09-26 DIAGNOSIS — N13.5 URETERAL OBSTRUCTION, RIGHT: Primary | ICD-10-CM

## 2022-09-26 DIAGNOSIS — N13.5 URETERAL OBSTRUCTION, RIGHT: ICD-10-CM

## 2022-09-26 PROCEDURE — C1729 CATH, DRAINAGE: HCPCS

## 2022-09-26 PROCEDURE — 250N000009 HC RX 250: Performed by: RADIOLOGY

## 2022-09-26 PROCEDURE — 255N000002 HC RX 255 OP 636: Performed by: NURSE PRACTITIONER

## 2022-09-26 PROCEDURE — 50435 EXCHANGE NEPHROSTOMY CATH: CPT

## 2022-09-26 PROCEDURE — C1769 GUIDE WIRE: HCPCS

## 2022-09-26 RX ADMIN — IOHEXOL 10 ML: 350 INJECTION, SOLUTION INTRAVENOUS at 15:10

## 2022-09-26 RX ADMIN — LIDOCAINE HYDROCHLORIDE 8 ML: 10 INJECTION, SOLUTION EPIDURAL; INFILTRATION; INTRACAUDAL; PERINEURAL at 15:05

## 2022-09-26 NOTE — IP AVS SNAPSHOT
[facility] Interventional Radiology  [address] [place] [address]  Phone: [phone]  Fax: [phone]                                      After Visit Summary   9/26/2022    Rich Wolff   MRN: [id number]           After Visit Summary Signature Page    I have received my discharge instructions, and my questions have been answered. I have discussed any challenges I see with this plan with the nurse or doctor.    ..........................................................................................................................................  Patient/Patient Representative Signature      ..........................................................................................................................................  Patient Representative Print Name and Relationship to Patient    ..................................................               ................................................  Date                                   Time    ..........................................................................................................................................  Reviewed by Signature/Title    ...................................................              ..............................................  Date                                               Time          22EPIC Rev 08/18

## 2022-09-26 NOTE — H&P
Interventional Radiology - Pre-Procedure Note:  9/26/2022    Procedure Requested: R PNT exchange  Requested by: JUANITO Medina NP    Brief HPI: Rich Wolff is a 61 year old male male PMH prostate cancer s/p robotic assisted radical prostatectomy in 2015 and poorly differentiated adenocarcinoma of the colon with RIGHT ureteral obstruction s/p RIGHT PNT (initially placed [place] 11/11/2020).       Presents today for routine PNT exchange.  Last exchange 7/5/2022, per below.  Does not require sedation for routine exchanges.      No pressing concerns today.  No fever, no chills, no changes to UOP.     Notes his nephrostomy tube is leaking near bag site.       IMAGING:  [facility]  LOCATION: [facility]  DATE: 7/5/2022     PROCEDURE: NEPHROSTOMY TUBE EXCHANGE     ATTENDING: Neville Laird M.D.     INDICATION: 61-year-old male with a right ureteral obstruction managed with long-standing nephrostomy drainage.     CONSENT: The risks, benefits, and alternatives of nephrostomy tube exchange were discussed with the patient in detail. All questions were answered. Informed consent was given to proceed with the procedure.     MODERATE SEDATION: None.     ADDITIONAL MEDICATIONS: None.     FLUOROSCOPIC TIME: 0.7 minutes.     AIR KERMA:  13 mGy.     CONTRAST: 10 mL Omnipaque.     UNIVERSAL PRECAUTIONS: The procedure was performed utilizing maximum sterile barrier technique. Prior to the start of the procedure, a standard pause for patient safety was performed with site marking as indicated.     COMPLICATIONS: No immediate complications.     PROCEDURE:    A  image was obtained. A nephrostogram was performed through the previously placed right nephrostomy tube. Under direct fluoroscopic visualization, the previous tube was removed over a wire and exchange was made for a new 10 [place] nephrostomy. The   loop was locked within the renal pelvis, and the catheter was sutured to the skin. A post  placement nephrostogram was performed.     FINDINGS:    The initial  image shows the previously placed right nephrostomy tube. At the completion of the study, the new nephrostomy loop lies within the renal pelvis and controls the urinary system well.                                                                      IMPRESSION:    Successful right nephrostomy exchange, as discussed above.    NPO: NA  ANTICOAGULANTS: none  ANTIBIOTICS: none indicated for procedure    ALLERGIES  No Known Allergies      LABS:  INR   Date Value Ref Range Status   08/04/2021 1.33 (H) 0.90 - 1.15 Final     Comment:     Effective 7/11/2021, the reference range for this assay has changed.      Hemoglobin   Date Value Ref Range Status   09/15/2022 7.6 (L) 13.3 - 17.7 g/dL Final   12/24/2020 7.7 (L) 13.3 - 17.7 g/dL Final   ]  Platelet Count   Date Value Ref Range Status   09/15/2022 267 150 - 450 10e3/uL Final   12/24/2020 424 150 - 450 10e9/L Final     Creatinine   Date Value Ref Range Status   11/01/2021 2.20 (H) 0.70 - 1.30 mg/dL Final   12/23/2020 1.33 (H) 0.66 - 1.25 mg/dL Final     Potassium   Date Value Ref Range Status   11/01/2021 4.7 3.5 - 5.0 mmol/L Final   12/23/2020 3.4 3.4 - 5.3 mmol/L Final         EXAM:  There were no vitals taken for this visit.  General:  Stable.  In no acute distress.    Neuro:  A&O x 3. Moves all extremities equally.  Resp:  Lungs clear to auscultation bilaterally.  Cardio:  S1S2 and reg, without murmur, clicks or rubs  Abdomen:  Soft, non-distended.  : R PNT dressing CDI, moderate amount of yellow urine OP in gravity bag.      Code Status: Full Code intra procedure.     ASSESSMENT/PLAN:   Requests additional bag to be sent home with him as he does not feel they last long without leaking/breaking    Right nephrostomy tube exchange. No sedation per patient request    Procedure, risks/benefits, details, alternatives, and sedation reviewed with patient and patient verbalized understanding. All  questions answered. OK to proceed with above radiology procedure.     Zuleyma Medina, APRN CNP  Interventional Radiology

## 2022-09-26 NOTE — DISCHARGE INSTRUCTIONS
Percutaneous Nephrostomy Tube (PNT) Discharge Instructions:  You had a nephrostomy tube (PNT) placed. This is a catheter (plastic tube) that is inserted through your skin into your kidney. The nephrostomy tube is placed to drain urine from your body into a collecting bag outside your body. Please follow the below instructions regarding care of your nephrostomy tube:    Care Instructions:  - If you received sedation for your procedure, do not drive or operate heavy machinery for the rest of the day.  - Rest after your drain was placed. Avoid strenuous activity and heavy lifting for the next 2 days. Return to your normal activities as you tolerate after the 2 day restriction.  - Keep the nephrostomy tube site clean and dry.   - You may shower, but do not submerge the nephrostomy tube site in water (avoid tub baths, Jacuzzis, hot tubs and pools)  - If you have a gauze dressing, change the gauze and tape dressing as needed to keep site clean and dry. If you have a securement device, leave in place until your next exchange.  - Clean around nephrostomy tubes exit sites with soap and water, pat and apply new split gauze around the tube at the exit site.  - Urine going into the bag may be red with blood for the first few days.  - Keep the drainage bag lower than your kidney to keep urine from backing up.  - Inspect the tube often for kinks.  - Empty your drainage bag when it is approximately half way fluid. Follow below instructions for emptying bag:  - Clean hands well with soap and water.  - Place a container near the outlet valve of the drainage bag.  - To open the valve:  - If you have a [employer] leg bag: Twist the blue valve at the bottom of the bag while holding the valve over your container to empty bag. Re-twist the valve closed on the drainage bag once complete.  - If you have a [place] leg bag: Turn the lever downward while holding the valve over your container to empty the bag. Turn the valve upward to  "close once complete.  - Discard drainage into toilet once urine drained.  ***- Flush your drainage tube with ***mL sterile Normal Saline ***.   ?   ***Flushing Your Drainage Tube:   1. Collect flushing supplies: ***mL of sterile normal saline in syringe, alcohol pad.  2. Clean the flushing (center) port with alcohol and attach the flushing syringe by twisting into place.  3. Turn the 3 way stopcock valve \"off\" to the drainage bag/bulb.  4. Gently inject/flush the drain so fluid is moving towards your body through the drainage catheter.   5. Turn the stopcock valve back to its center position to allow for drainage to resume.   6. Remove flushing syringe from flushing port by twisting off.    Follow-Up:  - Routine (every 2-3 months***) nephrostomy tube exchange is recommended. Your Urologist may order and schedule exchanges by calling [facility] at [phone]***.  -*** Follow up with your Urologist.    Call [facility] ([phone])*** if you experience the following:  - Nephrostomy tube(s) stops draining urine.  - Nephrostomy tube(s) site is leaking urine.  - Extreme pain at the nephrostomy tube site.  - Nephrostomy tube appears to be falling out or has fallen out, becomes clogged or breaks.    Seek Medical Evaluation for the following:  - Fever (greater than 101 F (38.3C)).  - Purulent (yellow/green/foul smelling) drainage from nephrostomy tube exit sites.  - Significant bleeding from nephrostomy tube site(s).  - Significant or worsening pain at nephrostomy tube exit site(s) or back.  "

## 2022-09-26 NOTE — PROCEDURES
Interventional Radiology Post-Procedure Note   ?   Brief Procedure Note:   Patient name: Rich Wolff  Pt MRN:[id number]   Date of procedure: 9/26/2022     Procedure(s): Image guided percutaneous nephrostomy tube exchange  Sedation method: Moderate sedation was employed. The patient was monitored by an interventional radiology nurse at all times throughout the procedure under my direct guidance.  Pre Procedure Diagnosis: Chronic nephrostomy drainage due to obstruction needing routine exchange  Post Procedure Diagnosis: Same  Indications: Routine exchange    ?   Attending: Jayson Levine M.D.  Specimen(s) removed: None   Additional studies ordered: None  Drains: Right 10 Fr locking pigtail nephrostomy catheter  Estimated Blood Loss: Minimal  Complications: None  Vascular closure method: N/A    Findings/Notes/Comments: Successful exchange of right percutaneous nephrostomy.   ?   Please see dictation in PACS or under the Imaging tab in [facility] for detailed procedure note.     Jayson Levine M.D.   Vascular and Interventional Radiology   Pager: [phone]   After Hours / Scheduling: [phone]     9/26/2022  3:21 PM

## 2022-10-17 ENCOUNTER — INFUSION THERAPY VISIT (OUTPATIENT)
Dept: INFUSION THERAPY | Facility: CLINIC | Age: 62
End: 2022-10-17
Attending: INTERNAL MEDICINE
Payer: COMMERCIAL

## 2022-10-17 VITALS
DIASTOLIC BLOOD PRESSURE: 77 MMHG | HEART RATE: 78 BPM | OXYGEN SATURATION: 98 % | TEMPERATURE: 98.2 F | SYSTOLIC BLOOD PRESSURE: 131 MMHG | RESPIRATION RATE: 16 BRPM

## 2022-10-17 DIAGNOSIS — D62 ANEMIA DUE TO BLOOD LOSS, ACUTE: Primary | ICD-10-CM

## 2022-10-17 LAB
ABO/RH(D): NORMAL
ANTIBODY SCREEN: NEGATIVE
BASOPHILS # BLD AUTO: 0 10E3/UL (ref 0–0.2)
BASOPHILS NFR BLD AUTO: 2 %
BLD PROD TYP BPU: NORMAL
BLOOD COMPONENT TYPE: NORMAL
CODING SYSTEM: NORMAL
CROSSMATCH: NORMAL
EOSINOPHIL # BLD AUTO: 0.1 10E3/UL (ref 0–0.7)
EOSINOPHIL NFR BLD AUTO: 7 %
ERYTHROCYTE [DISTWIDTH] IN BLOOD BY AUTOMATED COUNT: 17.8 % (ref 10–15)
HCT VFR BLD AUTO: 26.1 % (ref 40–53)
HGB BLD-MCNC: 8.4 G/DL (ref 13.3–17.7)
IMM GRANULOCYTES # BLD: 0 10E3/UL
IMM GRANULOCYTES NFR BLD: 1 %
ISSUE DATE AND TIME: NORMAL
LYMPHOCYTES # BLD AUTO: 0.2 10E3/UL (ref 0.8–5.3)
LYMPHOCYTES NFR BLD AUTO: 20 %
MCH RBC QN AUTO: 33.3 PG (ref 26.5–33)
MCHC RBC AUTO-ENTMCNC: 32.2 G/DL (ref 31.5–36.5)
MCV RBC AUTO: 104 FL (ref 78–100)
MONOCYTES # BLD AUTO: 0.2 10E3/UL (ref 0–1.3)
MONOCYTES NFR BLD AUTO: 14 %
NEUTROPHILS # BLD AUTO: 0.7 10E3/UL (ref 1.6–8.3)
NEUTROPHILS NFR BLD AUTO: 56 %
NRBC # BLD AUTO: 0 10E3/UL
NRBC BLD AUTO-RTO: 2 /100
PLATELET # BLD AUTO: 267 10E3/UL (ref 150–450)
RBC # BLD AUTO: 2.52 10E6/UL (ref 4.4–5.9)
SPECIMEN EXPIRATION DATE: NORMAL
UNIT ABO/RH: NORMAL
UNIT NUMBER: NORMAL
UNIT STATUS: NORMAL
UNIT TYPE ISBT: 6200
WBC # BLD AUTO: 1.2 10E3/UL (ref 4–11)

## 2022-10-17 PROCEDURE — 36430 TRANSFUSION BLD/BLD COMPNT: CPT

## 2022-10-17 PROCEDURE — 85004 AUTOMATED DIFF WBC COUNT: CPT | Performed by: NURSE PRACTITIONER

## 2022-10-17 PROCEDURE — 250N000011 HC RX IP 250 OP 636: Performed by: NURSE PRACTITIONER

## 2022-10-17 PROCEDURE — 86923 COMPATIBILITY TEST ELECTRIC: CPT | Performed by: PHYSICIAN ASSISTANT

## 2022-10-17 PROCEDURE — 86901 BLOOD TYPING SEROLOGIC RH(D): CPT | Performed by: NURSE PRACTITIONER

## 2022-10-17 PROCEDURE — P9016 RBC LEUKOCYTES REDUCED: HCPCS | Performed by: PHYSICIAN ASSISTANT

## 2022-10-17 RX ORDER — HEPARIN SODIUM,PORCINE 10 UNIT/ML
5 VIAL (ML) INTRAVENOUS
Status: CANCELLED | OUTPATIENT
Start: 2022-10-17

## 2022-10-17 RX ORDER — HEPARIN SODIUM,PORCINE 10 UNIT/ML
5 VIAL (ML) INTRAVENOUS
Status: DISCONTINUED | OUTPATIENT
Start: 2022-10-17 | End: 2022-10-17 | Stop reason: HOSPADM

## 2022-10-17 RX ORDER — HEPARIN SODIUM (PORCINE) LOCK FLUSH IV SOLN 100 UNIT/ML 100 UNIT/ML
5 SOLUTION INTRAVENOUS
Status: DISCONTINUED | OUTPATIENT
Start: 2022-10-17 | End: 2022-10-17 | Stop reason: HOSPADM

## 2022-10-17 RX ORDER — HEPARIN SODIUM (PORCINE) LOCK FLUSH IV SOLN 100 UNIT/ML 100 UNIT/ML
5 SOLUTION INTRAVENOUS
Status: CANCELLED | OUTPATIENT
Start: 2022-10-17

## 2022-10-17 RX ADMIN — HEPARIN 5 ML: 100 SYRINGE at 11:15

## 2022-10-17 NOTE — PROGRESS NOTES
Infusion Nursing Note:  Rich Wolff presents today for blood transfusion.    Patient seen by provider today: No   present during visit today: Not Applicable    Intravenous Access:  Implanted Port.    Treatment Conditions:  Results reviewed, labs MET treatment parameters, ok to proceed with treatment.    Post Infusion Assessment:  Patient tolerated transfusion without incident.     Discharge Plan:   Patient discharged in stable condition accompanied by: elizabeth MAN RN

## 2022-10-19 ENCOUNTER — MYC MEDICAL ADVICE (OUTPATIENT)
Dept: INTERVENTIONAL RADIOLOGY/VASCULAR | Facility: CLINIC | Age: 62
End: 2022-10-19

## 2022-10-21 NOTE — TELEPHONE ENCOUNTER
Health Maintenance Due   Topic Date Due   • Pneumococcal Vaccine 0-64 (1 of 1 - PPSV23) 07/15/2017       Patient is due for topics as listed above but is not proceeding with Immunization(s) Pneumococcal at this time.     Parents decided against HPV at this time.        6-10 YEAR WELL     Mom will discuss over weekend for HPV     CONCERNS:  ADHD working with Dr. HOMERO Flores out more. Asthma prescriptions  Child accompanied by father  APPETITE:  Good  MILK:  whole and 2 Percent  STOOLS:  Normal  RECENT ILLNESSES:  no  FREQUENT STOMACHACHES OR HEADACHES:  no  INJURIES/ACCIDENTS:  NO  CHOLESTEROL SCREENING:       Parent with cholesterol >240 mg/dL - YES       Parent / grandparent with CAD (coronary artery disease) or PVD (peripheral vascular disease) - YES  SCHOOL HISTORY:       School - [employer]       Grade - 4       Extracurricular Activities - Baseball, art cub    IMMUNIZATION REACTION:  NO    VISION SCREENING:       Right eye:  without glasses       Left eye:  without glasses    Nursing notes reviewed and accepted.    Jose Fernandes is a 9 year old male who presents for well child exam.  Patient presents with Father.    Concerns raised today include:  none    Medical history, surgical history, and family history reviewed and updated.    PHYSICAL EXAM:  Blood pressure 102/68, pulse 65, temperature 99.2 °F (37.3 °C), temperature source Temporal, resp. rate 20, height 4' 1.8\" (1.265 m), weight 25.5 kg.  GENERAL:  Well appearing male, nontoxic, no acute distress.  Alert and interactive.  Well behaved and attentiveSKIN:  Warm, normal turgor.  No cyanosis.  No bruises or lesions.  HEAD:  Normocephalic, atraumatic.  EYES:  Conjunctivae without injection or icterus.  PERRL (Pupils equal, round, reactive to light), EOMI (extraocular movements intact).  NOSE:  No flaring.  Allergic nose  EARS:  TMs (Tympanic membranes) transparent with good landmarks.  THROAT:  Oropharynx with moist mucous membranes and no  Writer spoke with Rich regarding planned procedure with IR via telephone.  I have clarified the appointed site for this procedure with Rich and he acknowledges understanding that the procedure will be done at [facility] and NOT [facility].       Rich acknowledges understanding of pre-procedure instructions.   Rich plans on a home rapid COVID test prior to procedure date.  I have provided Rich with IR number ([phone]) for questions or concerns.    Rich has a documented H&P in his EMR on 9/26/2022.    Asiya DIAZ  Interventional Radiology RN   [phone]               lesions.  NECK:  Supple, no lymphadenopathy or masses.  HEART:  Regular rate and rhythm.  Quiet precordium.  Normal S1, S2.  No murmurs, rubs, gallops.   LUNGS:  Clear to auscultation bilaterally.  No wheezes, rales, rhonchi.  Normal work of breathing.  ABDOMEN:  Soft, nontender.  No organomegaly or masses.  GENITOURINARY:  Kemal 1, testes descended bilaterally.    EXTREMITIES:  Warm, dry, without abnormalities.  NEUROLOGIC:  Normal tone, bulk, strength.    ASSESSMENT:  9 year old male well child.    PLAN:  9-year-old well check:  Dad defers on HPV today, but discussed in detail, will likely give at 10 years    Asthma:  Sees a specialist, refills of Singulair given.  On Symbicort--asthma was much better this year    ADHD:  Sees Psychiatry, making some adjustments but doing well overall.    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  Anticipatory guidance provided, handout given.              Safety/car/bicycle/fire/sharp objects/falls/water              Development              Discipline              Diet              Television              Analgesics/antipyretics              Sun exposure              Tobacco-free home              Dental care                        Immunizations per orders.  Risks, benefits, and side effects discussed.  RTC (Return to clinic) in 1 year for WCE (well child examination) or sooner prn illness/concerns.

## 2022-10-22 ENCOUNTER — HEALTH MAINTENANCE LETTER (OUTPATIENT)
Age: 62
End: 2022-10-22

## 2022-10-25 ENCOUNTER — HOSPITAL ENCOUNTER (OUTPATIENT)
Dept: INTERVENTIONAL RADIOLOGY/VASCULAR | Facility: HOSPITAL | Age: 62
Discharge: HOME | End: 2022-10-25
Attending: NURSE PRACTITIONER | Admitting: NURSE PRACTITIONER
Payer: COMMERCIAL

## 2022-10-25 VITALS
DIASTOLIC BLOOD PRESSURE: 68 MMHG | HEART RATE: 67 BPM | WEIGHT: 178 LBS | SYSTOLIC BLOOD PRESSURE: 125 MMHG | BODY MASS INDEX: 26.98 KG/M2 | HEIGHT: 68 IN | RESPIRATION RATE: 18 BRPM | TEMPERATURE: 97.5 F | OXYGEN SATURATION: 98 %

## 2022-10-25 DIAGNOSIS — N13.5 URETERAL OBSTRUCTION, RIGHT: ICD-10-CM

## 2022-10-25 PROCEDURE — 255N000002 HC RX 255 OP 636: Performed by: NURSE PRACTITIONER

## 2022-10-25 PROCEDURE — C1729 CATH, DRAINAGE: HCPCS

## 2022-10-25 PROCEDURE — C1769 GUIDE WIRE: HCPCS

## 2022-10-25 PROCEDURE — 50435 EXCHANGE NEPHROSTOMY CATH: CPT

## 2022-10-25 PROCEDURE — 272N000119 HC CATH CR4

## 2022-10-25 RX ADMIN — IOHEXOL 10 ML: 350 INJECTION, SOLUTION INTRAVENOUS at 14:02

## 2022-10-25 NOTE — IP AVS SNAPSHOT
[facility] Interventional Radiology  [address] [place] [address]  Phone: [phone]  Fax: [phone]                                    After Visit Summary   10/25/2022    Rich Wolff   MRN: [id number]           After Visit Summary Signature Page    I have received my discharge instructions, and my questions have been answered. I have discussed any challenges I see with this plan with the nurse or doctor.    ..........................................................................................................................................  Patient/Patient Representative Signature      ..........................................................................................................................................  Patient Representative Print Name and Relationship to Patient    ..................................................               ................................................  Date                                   Time    ..........................................................................................................................................  Reviewed by Signature/Title    ...................................................              ..............................................  Date                                               Time          22EPIC Rev 08/18

## 2022-10-25 NOTE — H&P
Interventional Radiology - History and Physical/chart review  10/25/2022    Procedure Requested: PNT exchange  Requesting Provider: Zuleyma Medina CNP    HPI: Rich Wolff is a 61 year old male with a PMH prostate cancer s/p robotic assisted radical prostatectomy in  and poorly differentiated adenocarcinoma of the colon with RIGHT ureteral obstruction s/p RIGHT PNT (initially placed Malin 2020). Last PNT exchanged 22. Patient returns today for routine exchange      Imagin/26/22 last PNT exchange:   FINDINGS:    The initial  image shows the previously placed right nephrostomy tube. At the completion of the study, the new nephrostomy loop lies within the renal pelvis and controls the urinary system well.                                                                      IMPRESSION:    1. Successful right 10 French nephrostomy exchange, as discussed above.     PLAN:  The nephrostomy should be kept to gravity drainage. Preventative maintenance changes should be performed at approximately three month intervals or earlier as needed.    NPO Status: NA; no sedation  Anticoagulation/Antiplatelets/Bleeding tendencies: xarelto  Antibiotics: none      PMH:  Past Medical History:   Diagnosis Date     Colon cancer (H)      Hyperlipidemia      Hypertension      Prostate cancer (H) 2015    T1c. Silvana's 6 (3+3).  Confined to prostate.  Multifocal bilateral comprising 2% of the gland.PSA:5.8.       PSH:  Past Surgical History:   Procedure Laterality Date     BOWEL RESECTION       COLONOSCOPY W/ BIOPSIES  2020     COLOSTOMY       ESOPHAGOSCOPY, GASTROSCOPY, DUODENOSCOPY (EGD), COMBINED  2020     IR CHEST PORT PLACEMENT > 5 YRS OF AGE  2020     IR NEPHROSTOMY TUBE CHANGE RIGHT  2021     IR NEPHROSTOMY TUBE CHANGE RIGHT  2021     IR NEPHROSTOMY TUBE CHANGE RIGHT  2021     IR NEPHROSTOMY TUBE CHANGE RIGHT  10/29/2021     IR NEPHROSTOMY TUBE CHANGE RIGHT  10/29/2021      IR NEPHROSTOMY TUBE CHANGE RIGHT  12/27/2021     IR NEPHROSTOMY TUBE CHANGE RIGHT  2/28/2022     IR NEPHROSTOMY TUBE CHANGE RIGHT  5/9/2022     IR NEPHROSTOMY TUBE CHANGE RIGHT  7/5/2022     IR NEPHROSTOMY TUBE CHANGE RIGHT  9/26/2022     IR PORT PLACEMENT >5 YEARS  11/24/2020     IR SITE CHECK/EVALUATION  4/11/2022     NEPHROSTOMY       LA ESOPHAGOGASTRODUODENOSCOPY TRANSORAL DIAGNOSTIC N/A 11/4/2020    Procedure: ESOPHAGOGASTRODUODENOSCOPY (EGD);  Surgeon: Paul Masters MD;  Location: [facility] GI;  Service: Gastroenterology     LA LAP,PROSTATECTOMY,RADICAL,W/NERVE SPARE,INCL ROBOTIC Bilateral 09/01/2015    Procedure: DAVINCI RADICAL RETROPUBIC PROSTATECTOMY BILATERAL PELVIC LYMPH NODE DISSECTION;  Surgeon: Juan C Velazco MD;  Location: [facility];  Service: Urology     PROSTATE BIOPSY  06/08/2015    Ultrasound-guided transrectal biopsy.     SURGERY SCROTAL / TESTICULAR      for undescended testes, removed due to atrophy with vasectomy     ZZHC COLONOSCOPY W/WO BRUSH/WASH N/A 11/4/2020    Procedure: COLONOSCOPY WITH BIOPSY;  Surgeon: Paul Masters MD;  Location: [facility];  Service: Gastroenterology       ALLERGIES:  No Known Allergies    MEDICATIONS:  Current Outpatient Medications   Medication     acetaminophen (TYLENOL) 500 MG tablet     fentaNYL (DURAGESIC) 25 mcg/hr 72 hr patch     ferrous sulfate (FEROSUL) 325 (65 Fe) MG tablet     gabapentin (NEURONTIN) 300 MG capsule     oxybutynin (DITROPAN) 5 MG tablet     oxyCODONE (ROXICODONE) 5 MG tablet     sertraline (ZOLOFT) 50 MG tablet     tamsulosin (FLOMAX) 0.4 MG capsule     XARELTO ANTICOAGULANT 20 MG TABS tablet     zolpidem (AMBIEN) 5 MG tablet     No current facility-administered medications for this encounter.         LABS:  INR   Date Value Ref Range Status   08/04/2021 1.33 (H) 0.90 - 1.15 Final     Comment:     Effective 7/11/2021, the reference range for this assay has changed.      Hemoglobin   Date Value  Ref Range Status   10/17/2022 8.4 (L) 13.3 - 17.7 g/dL Final   12/24/2020 7.7 (L) 13.3 - 17.7 g/dL Final   ]  Platelet Count   Date Value Ref Range Status   10/17/2022 267 150 - 450 10e3/uL Final   12/24/2020 424 150 - 450 10e9/L Final     Creatinine   Date Value Ref Range Status   11/01/2021 2.20 (H) 0.70 - 1.30 mg/dL Final   12/23/2020 1.33 (H) 0.66 - 1.25 mg/dL Final     Potassium   Date Value Ref Range Status   11/01/2021 4.7 3.5 - 5.0 mmol/L Final   12/23/2020 3.4 3.4 - 5.3 mmol/L Final         EXAM:  PER IR MD    Pre-Sedation Assessment:  PER IR MD      ASSESSMENT:  Prostate cancer; chemo;  Per order details patient with cutaneous tissue thinning, concern for tissue opening.  Requires new port site.     PLAN:    Right PNT exchange; no sedation.         Chart reviewed by:  GRACE Powers CNP  Interventional Radiology  [phone]    Physical exam and consent to be obtained by IR MD.

## 2022-10-25 NOTE — DISCHARGE INSTRUCTIONS
Percutaneous Nephrostomy Tube (PNT) Exchange/Check Discharge Instructions:  You had your nephrostomy tube checked or exchanged today. Please refer to the below instructions following your check/exchange:    Care Instructions:  - If you received sedation for your procedure, do not drive or operate heavy machinery for the rest of the day.  - Rest today if your tube was exchanged. Avoid strenuous activity and heavy lifting for the rest of the day. Return to your normal activities as you tolerate tomorrow.  - Keep the nephrostomy tube site clean and dry.   - You may shower, but do not submerge the nephrostomy tube site in water (avoid tub baths, Jacuzzis, hot tubs and pools).  - If you have a gauze dressing, change the gauze and tape dressing as needed to keep site clean and dry. If you have a securement device, leave in place until your next exchange.  - Clean around nephrostomy tubes exit sites with soap and water, pat and apply new split gauze around the tube at the exit site.  - Urine going into the bag may be red with blood for the first few days.  - Keep the drainage bag lower than your kidney to keep urine from backing up.  - Inspect the tube often for kinks.  - Empty your drainage bag when it is approximately half way fluid. Follow below instructions for emptying bag:  - Clean hands well with soap and water.  - Place a container near the outlet valve of the drainage bag.  - To open the valve:  - If you have a [employer] leg bag: Twist the blue valve at the bottom of the bag while holding the valve over your container to empty bag. Re-twist the valve closed on the drainage bag once complete.  - If you have a [facility] leg bag: Turn the lever downward while holding the valve over your container to empty the bag. Turn the valve upward to close once complete.  - Discard drainage into toilet once urine drained.    Follow-Up:  - Routine (every 2-3 months) nephrostomy tube exchange is recommended. Your Urologist may  order and schedule exchanges by calling [facility] at [phone].    Call [facility] ([phone]) if you experience the following:  - Nephrostomy tube(s) stops draining urine.  - Nephrostomy tube(s) site is leaking urine.  - Extreme pain at the nephrostomy tube site.  - Nephrostomy tube appears to be falling out or has fallen out, becomes clogged or breaks.    Seek Medical Evaluation for the following:  - Fever (greater than 101 F (38.3C)).  - Purulent (yellow/green/foul smelling) drainage from nephrostomy tube exit sites.  - Significant bleeding from nephrostomy tube site(s).  - Significant or worsening pain at nephrostomy tube exit site(s) or back.

## 2022-10-26 RX ORDER — LIDOCAINE 40 MG/G
CREAM TOPICAL
Status: DISCONTINUED | OUTPATIENT
Start: 2022-10-26 | End: 2023-03-24 | Stop reason: HOSPADM

## 2022-10-26 RX ORDER — CEFAZOLIN SODIUM 2 G/100ML
2 INJECTION, SOLUTION INTRAVENOUS
Status: DISCONTINUED | OUTPATIENT
Start: 2022-10-26 | End: 2023-03-24 | Stop reason: HOSPADM

## 2022-10-26 RX ORDER — SODIUM CHLORIDE 9 MG/ML
INJECTION, SOLUTION INTRAVENOUS CONTINUOUS
Status: DISCONTINUED | OUTPATIENT
Start: 2022-10-26 | End: 2023-03-24 | Stop reason: HOSPADM

## 2022-10-27 ENCOUNTER — HOSPITAL ENCOUNTER (OUTPATIENT)
Dept: INTERVENTIONAL RADIOLOGY/VASCULAR | Facility: CLINIC | Age: 62
Discharge: HOME | End: 2022-10-27
Attending: PHYSICIAN ASSISTANT | Admitting: RADIOLOGY
Payer: COMMERCIAL

## 2022-10-27 VITALS
RESPIRATION RATE: 22 BRPM | OXYGEN SATURATION: 100 % | HEART RATE: 75 BPM | DIASTOLIC BLOOD PRESSURE: 81 MMHG | SYSTOLIC BLOOD PRESSURE: 120 MMHG

## 2022-10-27 PROCEDURE — 272N000500 HC NEEDLE CR2

## 2022-10-27 PROCEDURE — 36582 REPLACE TUNNELED CV CATH: CPT

## 2022-10-27 PROCEDURE — 99152 MOD SED SAME PHYS/QHP 5/>YRS: CPT

## 2022-10-27 PROCEDURE — 250N000011 HC RX IP 250 OP 636: Performed by: RADIOLOGY

## 2022-10-27 PROCEDURE — C1769 GUIDE WIRE: HCPCS

## 2022-10-27 PROCEDURE — 99153 MOD SED SAME PHYS/QHP EA: CPT

## 2022-10-27 PROCEDURE — 250N000009 HC RX 250: Performed by: RADIOLOGY

## 2022-10-27 PROCEDURE — 250N000011 HC RX IP 250 OP 636: Performed by: PHYSICIAN ASSISTANT

## 2022-10-27 PROCEDURE — 272N000571 HC SHEATH CR8

## 2022-10-27 PROCEDURE — 258N000003 HC RX IP 258 OP 636: Performed by: PHYSICIAN ASSISTANT

## 2022-10-27 PROCEDURE — 77001 FLUOROGUIDE FOR VEIN DEVICE: CPT

## 2022-10-27 PROCEDURE — 272N000602 HC WOUND GLUE CR1

## 2022-10-27 PROCEDURE — C1788 PORT, INDWELLING, IMP: HCPCS

## 2022-10-27 RX ORDER — NALOXONE HYDROCHLORIDE 0.4 MG/ML
0.4 INJECTION, SOLUTION INTRAMUSCULAR; INTRAVENOUS; SUBCUTANEOUS
Status: DISCONTINUED | OUTPATIENT
Start: 2022-10-27 | End: 2022-10-28 | Stop reason: HOSPADM

## 2022-10-27 RX ORDER — LIDOCAINE 40 MG/G
CREAM TOPICAL
Status: DISCONTINUED | OUTPATIENT
Start: 2022-10-27 | End: 2022-10-28 | Stop reason: HOSPADM

## 2022-10-27 RX ORDER — NALOXONE HYDROCHLORIDE 0.4 MG/ML
0.2 INJECTION, SOLUTION INTRAMUSCULAR; INTRAVENOUS; SUBCUTANEOUS
Status: DISCONTINUED | OUTPATIENT
Start: 2022-10-27 | End: 2022-10-28 | Stop reason: HOSPADM

## 2022-10-27 RX ORDER — FENTANYL CITRATE 50 UG/ML
25-50 INJECTION, SOLUTION INTRAMUSCULAR; INTRAVENOUS EVERY 5 MIN PRN
Status: DISCONTINUED | OUTPATIENT
Start: 2022-10-27 | End: 2022-10-28 | Stop reason: HOSPADM

## 2022-10-27 RX ORDER — CEFAZOLIN SODIUM 2 G/100ML
2 INJECTION, SOLUTION INTRAVENOUS
Status: COMPLETED | OUTPATIENT
Start: 2022-10-27 | End: 2022-10-27

## 2022-10-27 RX ORDER — FLUMAZENIL 0.1 MG/ML
0.2 INJECTION, SOLUTION INTRAVENOUS
Status: DISCONTINUED | OUTPATIENT
Start: 2022-10-27 | End: 2022-10-28 | Stop reason: HOSPADM

## 2022-10-27 RX ORDER — SODIUM CHLORIDE 9 MG/ML
INJECTION, SOLUTION INTRAVENOUS CONTINUOUS
Status: DISCONTINUED | OUTPATIENT
Start: 2022-10-27 | End: 2022-10-28 | Stop reason: HOSPADM

## 2022-10-27 RX ORDER — HEPARIN SODIUM (PORCINE) LOCK FLUSH IV SOLN 100 UNIT/ML 100 UNIT/ML
500 SOLUTION INTRAVENOUS ONCE
Status: COMPLETED | OUTPATIENT
Start: 2022-10-27 | End: 2022-10-27

## 2022-10-27 RX ORDER — LIDOCAINE HYDROCHLORIDE AND EPINEPHRINE 10; 10 MG/ML; UG/ML
1-20 INJECTION, SOLUTION INFILTRATION; PERINEURAL ONCE
Status: COMPLETED | OUTPATIENT
Start: 2022-10-27 | End: 2022-10-27

## 2022-10-27 RX ADMIN — LIDOCAINE HYDROCHLORIDE,EPINEPHRINE BITARTRATE 20 ML: 10; .01 INJECTION, SOLUTION INFILTRATION; PERINEURAL at 13:30

## 2022-10-27 RX ADMIN — MIDAZOLAM HYDROCHLORIDE 1 MG: 1 INJECTION, SOLUTION INTRAMUSCULAR; INTRAVENOUS at 14:18

## 2022-10-27 RX ADMIN — MIDAZOLAM HYDROCHLORIDE 1 MG: 1 INJECTION, SOLUTION INTRAMUSCULAR; INTRAVENOUS at 13:48

## 2022-10-27 RX ADMIN — FENTANYL CITRATE 50 MCG: 50 INJECTION, SOLUTION INTRAMUSCULAR; INTRAVENOUS at 14:18

## 2022-10-27 RX ADMIN — FENTANYL CITRATE 50 MCG: 50 INJECTION, SOLUTION INTRAMUSCULAR; INTRAVENOUS at 13:30

## 2022-10-27 RX ADMIN — CEFAZOLIN SODIUM 2 G: 2 INJECTION, SOLUTION INTRAVENOUS at 12:44

## 2022-10-27 RX ADMIN — SODIUM CHLORIDE: 9 INJECTION, SOLUTION INTRAVENOUS at 12:43

## 2022-10-27 RX ADMIN — MIDAZOLAM HYDROCHLORIDE 1 MG: 1 INJECTION, SOLUTION INTRAMUSCULAR; INTRAVENOUS at 13:30

## 2022-10-27 RX ADMIN — FENTANYL CITRATE 50 MCG: 50 INJECTION, SOLUTION INTRAMUSCULAR; INTRAVENOUS at 13:48

## 2022-10-27 RX ADMIN — HEPARIN 500 UNITS: 100 SYRINGE at 14:21

## 2022-10-27 NOTE — PROGRESS NOTES
Interventional Radiology Brief Post Procedure Note    Pre Procedure Diagnosis: skin breakdown over exisitng right chest wall port    Post Procedure Diagnosis: Same    Procedure: removal of right chest wall port, placment of new left IJ port    Proceduralist: Maria Victoria Parson MD    Time Out: Prior to the start of the procedure and with procedural staff participation, I verbally confirmed the patient s identity using two indicators, relevant allergies, that the procedure was appropriate and matched the consent or emergent situation, and that the correct equipment/implants were available. Immediately prior to starting the procedure I conducted the Time Out with the procedural staff and re-confirmed the patient s name, procedure, and site/side. (The Joint Commission universal protocol was followed.)  Yes    Sedation: IR Nurse Monitored Care   Post Procedure Summary:  Prior to the start of the procedure and with procedural staff participation, I verbally confirmed the patient s identity using two indicators, relevant allergies, that the procedure was appropriate and matched the consent or emergent situation, and that the correct equipment/implants were available. Immediately prior to starting the procedure I conducted the Time Out with the procedural staff and re-confirmed the patient s name, procedure, and site/side. (The Joint Commission universal protocol was followed.)  Yes       Sedatives: Fentanyl and Midazolam (Versed)    Vital signs, airway and pulse oximetry were monitored and remained stable throughout the procedure and sedation was maintained until the procedure was complete.  The patient was monitored by staff until sedation discharge criteria were met.    Patient tolerance: Patient tolerated the procedure well with no immediate complications.    Findings:   1. Right chest wall port removed in its entirety  2. New left internal jugular port in good position    Estimated Blood Loss: Minimal    SPECIMENS:  None    Complications: None    Condition: Stable    Plan: may use new port immediately    Comments: See dictated procedure note for full details     Maria Victoria Parson MD

## 2022-10-27 NOTE — PROGRESS NOTES
Patient Name: Rich Wolff  Medical Record Number: [id number]  Today's Date: 10/27/2022    Procedure: Image Guided Right Chest Port Removal and Replacement of Chest Port  Proceduralist: Dr. Maria Victoria Parson  Pathology present: n/a    Procedure Start: 1320  Procedure end: 1450  Sedation medications administered: Fentanyl 150 mcg, Versed 3 mg    Report given to: SHIRA Rojas IR  : n/a    Other Notes: Pt arrived to IR room #1 from IR Pre / Post #3. Consent reviewed. Pt denies any questions or concerns regarding procedure. Pt positioned supine and monitored per protocol. 8 fr Right PowerPort removed by Dr. Parson. New 8 fr Left Chest PowerPort placed by Dr. Parson. Pt tolerated procedure without any noted complications. Pt transferred back to IR Pre / Post #3.

## 2022-10-27 NOTE — DISCHARGE INSTRUCTIONS
Port Placement Procedure Discharge Instructions:  You had a port placed. A port is a small medical device that is placed under the skin and is connected to a vein with a catheter (thin, flexible tube). Ports can be used to administer IV medications, fluids or blood products (including chemotherapy) or for blood lab draws. Please follow the below instructions:  Care Instructions:  - If you received sedation for your procedure, do not drive or operate heavy machinery for the rest of the day.  - You may shower beginning post procedure day #1.  Do not scrub site until well healed; pat dry.  - Avoid submerging the port site under water (tub baths, Jacuzzis, hot tubs and pools) for 10 days or until glue falls off.  - You may take over the counter pain medication for discomfort. Follow the package directions.  - Avoid heavy lifting (greater than 10 pounds) and strenuous activities for 3 days.   - If you experience significant bleeding at site, apply pressure with hands above the clavicle bone, sit upright and seek immediate medical assistance.    Call [facility] ([phone])*** if you experience the following:   - Uncontrolled bleeding from port site  - Fever (greater than 101 F (38.3C))  - Purulent (yellow/green/foul smelling) drainage from port insertion site.  - Increasing pain at port site  - Increasing redness at port site

## 2022-10-27 NOTE — IP AVS SNAPSHOT
[facility] Interventional Radiology  [address] [address]  Phone: [phone]  Fax: [phone]                                    After Visit Summary   10/27/2022    Rich Wolff   MRN: [id number]           After Visit Summary Signature Page    I have received my discharge instructions, and my questions have been answered. I have discussed any challenges I see with this plan with the nurse or doctor.    ..........................................................................................................................................  Patient/Patient Representative Signature      ..........................................................................................................................................  Patient Representative Print Name and Relationship to Patient    ..................................................               ................................................  Date                                   Time    ..........................................................................................................................................  Reviewed by Signature/Title    ...................................................              ..............................................  Date                                               Time          22EPIC Rev 08/18

## 2022-10-27 NOTE — PROGRESS NOTES
Interventional Radiology Pre-Procedure Sedation Assessment   Time of Assessment: 1:16 PM      HPI: Rich Wolff is a 61 year old male with a PMH prostate cancer s/p robotic assisted radical prostatectomy in 2015 and poorly differentiated adenocarcinoma of the colon with RIGHT ureteral obstruction s/p RIGHT PNT.  Had a right internal jugular port placed 2 years ago.  Now with skin breakdown over port site.  Referred for removal or right port.  Will place new left port      Expected Level: Moderate Sedation    Indication: Sedation is required for the following type of Procedure: port removal and replacement    Sedation and procedural consent: Risks, benefits and alternatives were discussed with patient    PO Intake: Appropriately NPO for procedure    ASA Class: Class 2 - MILD SYSTEMIC DISEASE, NO ACUTE PROBLEMS, NO FUNCTIONAL LIMITATIONS.    Mallampati: Grade 1:  Soft palate, uvula, tonsillar pillars, and posterior pharyngeal wall visible    Lungs: Lungs Clear with good breath sounds bilaterally    Heart: Normal heart sounds and rate    History and physical reviewed and no updates needed. I have reviewed the lab findings, diagnostic data, medications, and the plan for sedation. I have determined this patient to be an appropriate candidate for the planned sedation and procedure and have reassessed the patient IMMEDIATELY PRIOR to sedation and procedure.    Maria Victoria Parson MD

## 2022-11-01 ENCOUNTER — TELEPHONE (OUTPATIENT)
Dept: INTERVENTIONAL RADIOLOGY/VASCULAR | Facility: CLINIC | Age: 62
End: 2022-11-01

## 2022-11-01 NOTE — TELEPHONE ENCOUNTER
Spoke with: Rich  Call attempt: 1  Message left: No  Any pain: No  Any fever: No  Any redness/swelling/ abnormal drainage around puncture site: No  Were you instructed well enough to take care of yourself at home: Yes  Are you satisfied with the care you received: Yes  Any additional concerns or questions: No  IR nurse triage line number provided: No.  Declined    Post call completed.   November 1, 2022 11:56 AM  Asiya Dash RN    
never used

## 2022-12-19 ENCOUNTER — MYC MEDICAL ADVICE (OUTPATIENT)
Dept: INTERVENTIONAL RADIOLOGY/VASCULAR | Facility: CLINIC | Age: 62
End: 2022-12-19

## 2022-12-20 NOTE — TELEPHONE ENCOUNTER
Writer has spoken with Rich regarding planned procedure with IR via telephone.  Rich acknowledges understanding of pre-procedure instructions. Rich has declined any need for having the IR number ([phone]) for questions or concerns.    Asiya DIAZ  Interventional Radiology RN   [phone]

## 2022-12-27 RX ORDER — LIDOCAINE 40 MG/G
CREAM TOPICAL
Status: CANCELLED | OUTPATIENT
Start: 2022-12-27

## 2022-12-27 NOTE — PROGRESS NOTES
Interventional Radiology - Pre-Procedure Note:  12/27/2022    Procedure Requested: RIGHT nephrostomy tube exchange  Requested by: Dottie Ribeiro APRN CNP    Brief HPI: Rich Wolff is a 62 year old male with a PMH of HTN. HLD, colon cancer, prostate cancer s/p prostatectomy, and RIGHT ureteral obstruction s/p RIGHT PNT (initially placed Natalia 11/11/2020) who presents for a routine RIGHT nephrostomy tube exchange. Last exchanged 10/25/2022, see imaging below. Typically does not require sedation or antibiotics for procedure. Denies leaking or other issues with the drain.     IMAGING:  IR Nephrostomy Tube Change Right 10/25/2022  [facility]     EXAM: RIGHT NEPHROSTOMY TUBE REPLACEMENT     LOCATION: [facility]     CLINICAL HISTORY: The patient has a history of long-standing right nephrostomy tube and presents as the tube had fallen out yesterday.     PROCEDURES PERFORMED:  1. Antegrade nephrostogram through existing tube tract.  2. Over the wire advancement of catheter into the collecting system with placement of new 10 Greek nephrostomy tube.  3. Completion nephrostogram.     MODERATE SEDATION: None     ADDITIONAL MEDICATIONS: None        CONTRAST: See EMR     FLUOROSCOPIC TIME: 1.1 minutes  CUMULATIVE AIR KERMA/DOSE: 29 mGy     STERILE BARRIER TECHNIQUE: Maximal Sterile Barrier Technique Utilized: Cap AND mask AND sterile gown AND sterile gloves AND sterile full body drape AND hand hygiene AND skin preparation 2% chlorhexidine for cutaneous antisepsis (or acceptable alternative   antiseptics).   Sterile Ultrasound Technique Utilized ?Sterile gel AND sterile probe covers.     UNIVERSAL PROTOCOL: Standard universal protocol per facility guidelines was followed. See EMR for documentation.      TECHNIQUE:   The patient was placed in the decubitus position on the angiography table and prepped and draped in the usual sterile fashion. The existing tube tract was probed with a vascular  catheter and retrograde injection opacifies a tract. Catheter wire   manipulation were utilized to advance through the tract and eventually engage the collecting system. The catheter was coiled in the collecting system injected to confirm position. A wire was then advanced in over the wire a new 10 [place] nephrostomy tube   was positioned with the pigtail catheter in the collecting system. Completion nephrostogram was performed. The catheter was secured to the skin, and placed to gravity drainage.  The patient tolerated the procedure well and there were no immediate complications.     FINDINGS:   The existing nephrostomy tube was pulled out yesterday. Successful recanalization of the tract and replacement of the tube..     The new 10 [place] nephrostomy tube is in good position.                                                                      IMPRESSION:  1. Uneventful replacement of a nephrostomy catheter that had been pulled out yesterday.  2. Plan: The patient should return in 3 months for routine catheter change    NPO: N/A, no sedation required  ANTICOAGULANTS: Xarelto daily, does not need to hold for procedure  ANTIBIOTICS: Not needed    ALLERGIES  No Known Allergies      LABS:  INR   Date Value Ref Range Status   08/04/2021 1.33 (H) 0.90 - 1.15 Final     Comment:     Effective 7/11/2021, the reference range for this assay has changed.      Hemoglobin   Date Value Ref Range Status   10/17/2022 8.4 (L) 13.3 - 17.7 g/dL Final   12/24/2020 7.7 (L) 13.3 - 17.7 g/dL Final     Platelet Count   Date Value Ref Range Status   10/17/2022 267 150 - 450 10e3/uL Final   12/24/2020 424 150 - 450 10e9/L Final     Creatinine   Date Value Ref Range Status   11/01/2021 2.20 (H) 0.70 - 1.30 mg/dL Final   12/23/2020 1.33 (H) 0.66 - 1.25 mg/dL Final     Potassium   Date Value Ref Range Status   11/01/2021 4.7 3.5 - 5.0 mmol/L Final   12/23/2020 3.4 3.4 - 5.3 mmol/L Final         EXAM:  /73 (BP Location: Left arm)   Pulse  85   Temp 97.8  F (36.6  C) (Oral)   Resp 16   SpO2 95%   General:  Stable.  In no acute distress.    Neuro:  A&O x 3. Moves all extremities equally.  Resp:  Room air. Breathing comfortably.  Skin:  Without excoriations, ecchymosis, erythema, lesions or open sores. RIGHT PNT CDI, clear urine noted in bag.    ASA Classification: Class 3 - SEVERE SYSTEMIC DISEASE, DEFINITE FUNCTIONAL LIMITATIONS.   Code Status: FULL CODE      ASSESSMENT/PLAN:   RIGHT nephrostomy tube exchange. No sedation required.    Procedure, risks/benefits, details, alternatives, and sedation reviewed with patient and patient verbalized understanding. All questions answered. OK to proceed with above radiology procedure.     GRACE PRINCE CNP  Interventional Radiology

## 2022-12-28 ENCOUNTER — HOSPITAL ENCOUNTER (OUTPATIENT)
Dept: INTERVENTIONAL RADIOLOGY/VASCULAR | Facility: HOSPITAL | Age: 62
Discharge: HOME | End: 2022-12-28
Attending: NURSE PRACTITIONER | Admitting: RADIOLOGY
Payer: COMMERCIAL

## 2022-12-28 VITALS
SYSTOLIC BLOOD PRESSURE: 133 MMHG | HEART RATE: 85 BPM | OXYGEN SATURATION: 95 % | TEMPERATURE: 97.8 F | RESPIRATION RATE: 16 BRPM | DIASTOLIC BLOOD PRESSURE: 73 MMHG

## 2022-12-28 DIAGNOSIS — Z93.6 NEPHROSTOMY STATUS (H): ICD-10-CM

## 2022-12-28 DIAGNOSIS — N13.30 HYDRONEPHROSIS: Primary | ICD-10-CM

## 2022-12-28 PROCEDURE — C1729 CATH, DRAINAGE: HCPCS

## 2022-12-28 PROCEDURE — 50435 EXCHANGE NEPHROSTOMY CATH: CPT

## 2022-12-28 PROCEDURE — C1769 GUIDE WIRE: HCPCS

## 2022-12-28 PROCEDURE — 250N000009 HC RX 250: Performed by: RADIOLOGY

## 2022-12-28 PROCEDURE — 255N000002 HC RX 255 OP 636: Performed by: NURSE PRACTITIONER

## 2022-12-28 RX ADMIN — IOHEXOL 10 ML: 350 INJECTION, SOLUTION INTRAVENOUS at 12:58

## 2022-12-28 RX ADMIN — LIDOCAINE HYDROCHLORIDE 10 ML: 10 INJECTION, SOLUTION INFILTRATION; PERINEURAL at 12:52

## 2023-02-12 NOTE — PROGRESS NOTES
Care Management Discharge Note    Discharge Date: 11/01/2021     Discharge Disposition: Home, Home Care    Discharge Services:  Resumption of [facility] for chemotherapy    Discharge DME: None    Discharge Transportation: family or friend will provide (spouse)    Private pay costs discussed: Not applicable    Patient/family educated on Medicare website which has current facility and service quality ratings:      Education Provided on the Discharge Plan:    Persons Notified of Discharge Plans:   Patient/Family in Agreement with the Plan: yes    Handoff Referral Completed: Yes    Additional Information:  Contacted Glenny Enriquez from [facility] [place] regarding discharge coordination and provided update of plan to discharge today.  [facility] [place] has been providing chemotherapy services for pt.    Per Glenny, they will resume services and no new orders are needed since he is going home on no other IVs and their pharmacy will f/u with Oncology clinic for future chemotherapy needs.     Renee Gayle RN         Marianela is a 7m3w F ex-24 wker w/ hx of PDA s/p closure, ROP, and IVH presenting with bronchiolitis in the setting of R/E. She is stable on max HFNC setting with 2L/kg. Her CXR shows possible perihilar opacities in R middle and lower lobes, so will consider treating for PNA if pt is difficult to wean, has worsening fever curve, or exam becomes focal.     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 She is stable on max HFNC setting with 2L/kg. Her CXR shows possible perihilar opacities in R middle and lower lobes, so will consider treating for PNA if pt is difficult to wean, has worsening fever curve, or exam becomes focal.     Resp - bronchiolitis   - HFNC 14L/25%   - Continuous pulse ox     ID - RE+, c/f PNA, B/L purulent conjunctivitis  - Erythromycin ointment q6h for 5 days (2/12- )  - CXR 2/12 read as decreased L lung opacities and residual opacities in R mid and lower lobes (compared to 07/2022)  - Consider US if clinically worsens and there is more c/f PNA    FEN/GI - poor PO  - mIVF D5NS+ 20K  - Regular diet

## 2023-02-22 ENCOUNTER — TELEPHONE (OUTPATIENT)
Dept: INTERVENTIONAL RADIOLOGY/VASCULAR | Facility: HOSPITAL | Age: 63
End: 2023-02-22
Payer: COMMERCIAL

## 2023-02-27 NOTE — H&P
Interventional Radiology - Pre-Procedure Note:  2/28/2023    Procedure Requested: right PNT exchange  Requested by: Dtotie Porter CNP    Brief HPI: Rich Wolff is a 62 year old male with a PMH of HTN, HLD, colon cancer, prostate cancer s/p prostatectomy, and RIGHT ureteral obstruction s/p RIGHT PNT (initially placed Malin 11/11/2020) who presents for a routine RIGHT nephrostomy tube exchange. Last exchanged 12/28/2022. Typically does not require sedation or antibiotics for procedure. Denies leaking or other issues with the drain.      IMAGING:  [facility]  LOCATION: [facility]  DATE: 12/28/2022     PROCEDURE: NEPHROSTOMY TUBE EXCHANGE     ATTENDING: Neville Laird M.D.     INDICATION: 62-year-old male with a right ureteral obstruction managed with long-standing nephrostomy drainage.     CONSENT: The risks, benefits, and alternatives of nephrostomy tube exchange were discussed with the patient in detail. All questions were answered. Informed consent was given to proceed with the procedure.     MODERATE SEDATION: None.     ADDITIONAL MEDICATIONS: None.     FLUOROSCOPIC TIME: 1.0 minutes.     AIR KERMA:  38 mGy.     CONTRAST: 10 mL Omnipaque.     UNIVERSAL PRECAUTIONS: The procedure was performed utilizing maximum sterile barrier technique. Prior to the start of the procedure, a standard pause for patient safety was performed with site marking as indicated.     COMPLICATIONS: No immediate complications.     PROCEDURE:    A  image was obtained. A nephrostogram was performed through the previously placed right nephrostomy tube. Under direct fluoroscopic visualization, the previous tube was removed over a wire and exchange was made for a new 10 [place] nephrostomy. The   loop was locked within the renal pelvis, and the catheter was sutured to the skin. A post placement nephrostogram was performed.     FINDINGS:    The initial  image shows the previously placed right  "nephrostomy tube. At the completion of the study, the new nephrostomy loop lies within the renal pelvis and controls the urinary system well.                                                                      IMPRESSION:    Successful right nephrostomy exchange, as discussed above.    NPO: NA; no sedation  ANTICOAGULANTS: xarelto;  No hold  ANTIBIOTICS: none for IR    ALLERGIES  No Known Allergies      LABS:  INR   Date Value Ref Range Status   08/04/2021 1.33 (H) 0.90 - 1.15 Final     Comment:     Effective 7/11/2021, the reference range for this assay has changed.      Hemoglobin   Date Value Ref Range Status   10/17/2022 8.4 (L) 13.3 - 17.7 g/dL Final   12/24/2020 7.7 (L) 13.3 - 17.7 g/dL Final   ]  Platelet Count   Date Value Ref Range Status   10/17/2022 267 150 - 450 10e3/uL Final   12/24/2020 424 150 - 450 10e9/L Final     Creatinine   Date Value Ref Range Status   11/01/2021 2.20 (H) 0.70 - 1.30 mg/dL Final   12/23/2020 1.33 (H) 0.66 - 1.25 mg/dL Final     Potassium   Date Value Ref Range Status   11/01/2021 4.7 3.5 - 5.0 mmol/L Final   12/23/2020 3.4 3.4 - 5.3 mmol/L Final         EXAM:  Vital signs:  Temp: 98.3  F (36.8  C) Temp src: Oral BP: 118/84 Pulse: 97   Resp: 16   O2 Device: None (Room air)   Height: 172.7 cm (5' 8\") Weight: 80.7 kg (178 lb)  Estimated body mass index is 27.06 kg/m  as calculated from the following:    Height as of this encounter: 1.727 m (5' 8\").    Weight as of this encounter: 80.7 kg (178 lb).  General:  Stable.  In no acute distress.    Neuro:  A&O x 3. Moves all extremities equally.  Resp: On RA; non-labored breathing.   : right PNT intact with yellow urine noted in tubing/bag.       ASA Classification: Class 3 - SEVERE SYSTEMIC DISEASE, DEFINITE FUNCTIONAL LIMITATIONS.   Code Status: FULL CODE    ASSESSMENT/PLAN:   Right nephrostomy tube exchange. No sedation.     Procedure, risks/benefits, details, alternatives reviewed with patient and verbalized understanding. " Questions answered.    Dottie Ribeiro, APRN CNP  Interventional Radiology      25 minutes caring forthis patient, greater than 50% spent in direct patient/family contact and coordination of care.

## 2023-02-28 ENCOUNTER — HOSPITAL ENCOUNTER (OUTPATIENT)
Dept: INTERVENTIONAL RADIOLOGY/VASCULAR | Facility: HOSPITAL | Age: 63
Discharge: HOME | End: 2023-02-28
Admitting: RADIOLOGY
Payer: COMMERCIAL

## 2023-02-28 VITALS
RESPIRATION RATE: 16 BRPM | SYSTOLIC BLOOD PRESSURE: 118 MMHG | TEMPERATURE: 98.3 F | BODY MASS INDEX: 26.98 KG/M2 | WEIGHT: 178 LBS | HEART RATE: 97 BPM | HEIGHT: 68 IN | DIASTOLIC BLOOD PRESSURE: 84 MMHG

## 2023-02-28 DIAGNOSIS — N13.30 HYDRONEPHROSIS: ICD-10-CM

## 2023-02-28 PROCEDURE — 255N000002 HC RX 255 OP 636

## 2023-02-28 PROCEDURE — C1729 CATH, DRAINAGE: HCPCS

## 2023-02-28 PROCEDURE — C1769 GUIDE WIRE: HCPCS

## 2023-02-28 PROCEDURE — 50435 EXCHANGE NEPHROSTOMY CATH: CPT

## 2023-02-28 RX ADMIN — IOHEXOL 5 ML: 350 INJECTION, SOLUTION INTRAVENOUS at 08:39

## 2023-02-28 NOTE — PROVIDER NOTIFICATION
Discharged via wheelchair with SO. Verbalized understanding of discharge instructions. Able to walk without difficulty, denies numbness.

## 2023-02-28 NOTE — IP AVS SNAPSHOT
[facility] Interventional Radiology  [address] [place] [id number]  Phone: [phone]  Fax: [phone]                                    After Visit Summary   2/28/2023    Rich Wolff   MRN: [id number]           After Visit Summary Signature Page    I have received my discharge instructions, and my questions have been answered. I have discussed any challenges I see with this plan with the nurse or doctor.    ..........................................................................................................................................  Patient/Patient Representative Signature      ..........................................................................................................................................  Patient Representative Print Name and Relationship to Patient    ..................................................               ................................................  Date                                   Time    ..........................................................................................................................................  Reviewed by Signature/Title    ...................................................              ..............................................  Date                                               Time          22EPIC Rev 08/18

## 2023-02-28 NOTE — PROVIDER NOTIFICATION
Pt given discharge instructions without questions or concerns and pt ambulates from IR without difficulty.

## 2023-02-28 NOTE — DISCHARGE INSTRUCTIONS
Percutaneous Nephrostomy Tube (PNT) Exchange/Check Discharge Instructions:  You had your nephrostomy tube checked or exchanged today. Please refer to the below instructions following your check/exchange:    Care Instructions:  - If you received sedation for your procedure, do not drive or operate heavy machinery for the rest of the day.  - Rest today if your tube was exchanged. Avoid strenuous activity and heavy lifting for the rest of the day. Return to your normal activities as you tolerate tomorrow.  - Keep the nephrostomy tube site clean and dry.   - You may shower, but do not submerge the nephrostomy tube site in water (avoid tub baths, Jacuzzis, hot tubs and pools).  - If you have a gauze dressing, change the gauze and tape dressing as needed to keep site clean and dry. If you have a securement device, leave in place until your next exchange.  - Clean around nephrostomy tubes exit sites with soap and water, pat and apply new split gauze around the tube at the exit site.  - Urine going into the bag may be red with blood for the first few days.  - Keep the drainage bag lower than your kidney to keep urine from backing up.  - Inspect the tube often for kinks.  - Empty your drainage bag when it is approximately half way fluid. Follow below instructions for emptying bag:  - Clean hands well with soap and water.  - Place a container near the outlet valve of the drainage bag.  - To open the valve:  - If you have a [employer] leg bag: Twist the blue valve at the bottom of the bag while holding the valve over your container to empty bag. Re-twist the valve closed on the drainage bag once complete.  - If you have a [place] leg bag: Turn the lever downward while holding the valve over your container to empty the bag. Turn the valve upward to close once complete.  - Discard drainage into toilet once urine drained.    Follow-Up:  - Routine (every 2-3 months) nephrostomy tube exchange is recommended. Your Urologist may  order and schedule exchanges by calling [facility] at [phone]***.    Call [facility] ([phone])*** if you experience the following:  - Nephrostomy tube(s) stops draining urine.  - Nephrostomy tube(s) site is leaking urine.  - Extreme pain at the nephrostomy tube site.  - Nephrostomy tube appears to be falling out or has fallen out, becomes clogged or breaks.    Seek Medical Evaluation for the following:  - Fever (greater than 101 F (38.3C)).  - Purulent (yellow/green/foul smelling) drainage from nephrostomy tube exit sites.  - Significant bleeding from nephrostomy tube site(s).  - Significant or worsening pain at nephrostomy tube exit site(s) or back.

## 2023-02-28 NOTE — PROCEDURES
Interventional Radiology Post-Procedure Note   ?   Brief Procedure Note:   Patient name: Rich Wolff  Pt MRN:[id number]   Date of procedure: 2/28/2023     Procedure(s): Image guided percutaneous nephrostomy tube exchange  Sedation method: Moderate sedation was employed. The patient was monitored by an interventional radiology nurse at all times throughout the procedure under my direct guidance.  Pre Procedure Diagnosis: Chronic nephrostomy drainage due to obstruction needing routine exchange  Post Procedure Diagnosis: Same  Indications: Routine exchange    ?   Attending: Jayson Levine M.D.  Specimen(s) removed: None   Additional studies ordered: None  Drains: Right 10 Fr locking pigtail nephrostomy catheter  Estimated Blood Loss: Minimal  Complications: None  Vascular closure method: N/A    Findings/Notes/Comments: Successful exchange of right percutaneous nephrostomy.   ?   Please see dictation in PACS or under the Imaging tab in [facility] for detailed procedure note.     Jayson Levine M.D.   Vascular and Interventional Radiology   Pager: [phone]   After Hours / Scheduling: [phone]     2/28/2023  8:55 AM

## 2023-04-25 ENCOUNTER — TELEPHONE (OUTPATIENT)
Dept: INTERVENTIONAL RADIOLOGY/VASCULAR | Facility: CLINIC | Age: 63
End: 2023-04-25
Payer: COMMERCIAL

## 2023-04-27 ENCOUNTER — HOSPITAL ENCOUNTER (OUTPATIENT)
Dept: INTERVENTIONAL RADIOLOGY/VASCULAR | Facility: HOSPITAL | Age: 63
Discharge: HOME | End: 2023-04-27
Attending: NURSE PRACTITIONER | Admitting: RADIOLOGY
Payer: COMMERCIAL

## 2023-04-27 VITALS
TEMPERATURE: 97.6 F | OXYGEN SATURATION: 100 % | SYSTOLIC BLOOD PRESSURE: 139 MMHG | RESPIRATION RATE: 18 BRPM | DIASTOLIC BLOOD PRESSURE: 74 MMHG | HEART RATE: 53 BPM

## 2023-04-27 DIAGNOSIS — N13.30 HYDRONEPHROSIS: Primary | ICD-10-CM

## 2023-04-27 DIAGNOSIS — Z93.6 NEPHROSTOMY STATUS (H): ICD-10-CM

## 2023-04-27 PROCEDURE — 50435 EXCHANGE NEPHROSTOMY CATH: CPT

## 2023-04-27 PROCEDURE — 255N000002 HC RX 255 OP 636: Performed by: NURSE PRACTITIONER

## 2023-04-27 PROCEDURE — C1769 GUIDE WIRE: HCPCS

## 2023-04-27 PROCEDURE — C1729 CATH, DRAINAGE: HCPCS

## 2023-04-27 RX ORDER — IODIXANOL 320 MG/ML
50 INJECTION, SOLUTION INTRAVASCULAR ONCE
Status: COMPLETED | OUTPATIENT
Start: 2023-04-27 | End: 2023-04-27

## 2023-04-27 RX ADMIN — IODIXANOL 15 ML: 320 INJECTION, SOLUTION INTRAVASCULAR at 12:21

## 2023-04-27 NOTE — DISCHARGE INSTRUCTIONS
Percutaneous Nephrostomy Tube (PNT) Exchange/Check Discharge Instructions:  You had your nephrostomy tube checked or exchanged today. Please refer to the below instructions following your check/exchange:    Care Instructions:  - If you received sedation for your procedure, do not drive or operate heavy machinery for the rest of the day.  - Rest today if your tube was exchanged. Avoid strenuous activity and heavy lifting for the rest of the day. Return to your normal activities as you tolerate tomorrow.  - Keep the nephrostomy tube site clean and dry.   - You may shower, but do not submerge the nephrostomy tube site in water (avoid tub baths, Jacuzzis, hot tubs and pools).  - If you have a gauze dressing, change the gauze and tape dressing as needed to keep site clean and dry. If you have a securement device, leave in place until your next exchange.  - Clean around nephrostomy tubes exit sites with soap and water, pat and apply new split gauze around the tube at the exit site.  - Urine going into the bag may be red with blood for the first few days.  - Keep the drainage bag lower than your kidney to keep urine from backing up.  - Inspect the tube often for kinks.  - Empty your drainage bag when it is approximately half way fluid. Follow below instructions for emptying bag:  - Clean hands well with soap and water.  - Place a container near the outlet valve of the drainage bag.  - To open the valve:  - If you have a [employer] leg bag: Twist the blue valve at the bottom of the bag while holding the valve over your container to empty bag. Re-twist the valve closed on the drainage bag once complete.  - If you have a [place] leg bag: Turn the lever downward while holding the valve over your container to empty the bag. Turn the valve upward to close once complete.  - Discard drainage into toilet once urine drained.    Follow-Up:  - Routine (every 2-3 months) nephrostomy tube exchange is recommended. Your Urologist may  order and schedule exchanges by calling [facility] at [phone]***.    Call [facility] ([phone])*** if you experience the following:  - Nephrostomy tube(s) stops draining urine.  - Nephrostomy tube(s) site is leaking urine.  - Extreme pain at the nephrostomy tube site.  - Nephrostomy tube appears to be falling out or has fallen out, becomes clogged or breaks.    Seek Medical Evaluation for the following:  - Fever (greater than 101 F (38.3C)).  - Purulent (yellow/green/foul smelling) drainage from nephrostomy tube exit sites.  - Significant bleeding from nephrostomy tube site(s).  - Significant or worsening pain at nephrostomy tube exit site(s) or back.

## 2023-04-27 NOTE — PROGRESS NOTES
Interventional Radiology - Pre-Procedure Note:  Outpatient - [facility]  04/26/2023     Procedure Requested: RIGHT nephrostomy tube exchange  Requested by: Dottie Ribeiro APRN CNP    Brief HPI: Rich Wolff is a 62 year old male with a PMH of HTN, HLD, colon cancer, prostate cancer s/p prostatectomy, and RIGHT ureteral obstruction s/p RIGHT PNT (initially placed Malin 11/11/2020) who presents for a routine RIGHT nephrostomy tube exchange. Last exchanged 02/28/2023, see imaging below. Typically does not require sedation or antibiotics for procedure. Denies leaking or other issues with his tube.    Of note, urine appears burgundy-colored in his right PNT. Patient states this is normal for him, and usually resolves with exchanges. He is on xarelto. Frequently gets labs checked d/t chemotherapy, most recent Hgb 10.6 on 03/23/2023. He does not follow with a Urologist.    IMAGING:  IR Nephrostomy Tube Change Right 02/28/2023  [facility]  [facility]  DATE: 2/28/2023     PROCEDURE: NEPHROSTOMY TUBE EXCHANGE     INTERVENTIONAL RADIOLOGIST: Jayson Levine MD     INDICATION: Patient is a 62-year-old male who presents for a routine right nephrostomy tube change due to chronic obstruction.     CONSENT: The risks, benefits and alternatives of nephrostomy tube change were discussed with the patient  in detail. All questions were answered. Informed consent was given to proceed with the procedure.     MODERATE SEDATION: None.     CONTRAST: 10 cc Omnipaque a 50  ANTIBIOTICS: None.  ADDITIONAL MEDICATIONS: None.     FLUOROSCOPIC TIME: 0.8 minutes.  RADIATION DOSE: Air Kerma: 28 mGy.     COMPLICATIONS: No immediate complications.     STERILE BARRIER TECHNIQUE: Maximum sterile barrier technique was used. Cutaneous antisepsis was performed at the operative site with application of 2% chlorhexidine and large sterile drape. Prior to the procedure, the   and assistant performed   hand hygiene and wore hat, mask, sterile gown, and sterile gloves during the entire procedure.     PROCEDURE:    A  image was obtained. A nephrostogram was performed through the previously placed right nephrostomy tube. Under direct fluoroscopic visualization, the previous tube was removed over a wire and exchange was made for a new right 10 Romanian   nephrostomy. The loop was locked within the renal pelvis, and the catheter was sutured to the skin. A post placement nephrostogram was performed.     FINDINGS:    The initial  image shows the previously placed right nephrostomy tube. At the completion of the study, the new nephrostomy loop lies within the renal pelvis and controls the urinary system well.                                                                      IMPRESSION:    1. Successful right nephrostomy exchange, as discussed above.    NPO: N/A, no sedation required  ANTICOAGULANTS: Xarelto daily, does not need to hold for procedure  ANTIBIOTICS: Not needed    ALLERGIES  No Known Allergies      LABS:  INR   Date Value Ref Range Status   08/04/2021 1.33 (H) 0.90 - 1.15 Final     Comment:     Effective 7/11/2021, the reference range for this assay has changed.      Hemoglobin   Date Value Ref Range Status   10/17/2022 8.4 (L) 13.3 - 17.7 g/dL Final   12/24/2020 7.7 (L) 13.3 - 17.7 g/dL Final     Platelet Count   Date Value Ref Range Status   10/17/2022 267 150 - 450 10e3/uL Final   12/24/2020 424 150 - 450 10e9/L Final     Creatinine   Date Value Ref Range Status   11/01/2021 2.20 (H) 0.70 - 1.30 mg/dL Final   12/23/2020 1.33 (H) 0.66 - 1.25 mg/dL Final     Potassium   Date Value Ref Range Status   11/01/2021 4.7 3.5 - 5.0 mmol/L Final   12/23/2020 3.4 3.4 - 5.3 mmol/L Final         EXAM:  /74 (BP Location: Left arm)   Pulse 53   Temp 97.6  F (36.4  C) (Oral)   Resp 18   SpO2 100%   General:  Stable.  In no acute distress.    Neuro:  A&O x 3. Moves all  extremities equally.  Resp:  Room air. Breathing comfortably.  Skin:  Without excoriations, ecchymosis, erythema, lesions or open sores. RIGHT PNT CDI, burgundy colored urine noted in bag. Patient states this is normal for him, and usually resolves with exchanges. He is on xarelto. Frequently gets labs checked d/t chemotherapy, most recent Hgb 10.6 on 03/23/2023. He does not follow with a Urologist.    ASA Classification: Class 3 - SEVERE SYSTEMIC DISEASE, DEFINITE FUNCTIONAL LIMITATIONS.   Code Status: FULL CODE      ASSESSMENT/PLAN:   RIGHT nephrostomy tube exchange. No sedation required.    Procedural education reviewed with patient in detail including, but not limited to risks, benefits and alternatives with understanding verbalized by patient.    Total time spent on the date of the encounter: 20 minutes.      GRACE PRINCE CNP  Interventional Radiology

## 2023-04-27 NOTE — IP AVS SNAPSHOT
[facility] Interventional Radiology  [address] [place] [id number]  Phone: [phone]  Fax: [phone]                              After Visit Summary   4/27/2023    Rich Wolff   MRN: [id number]           After Visit Summary Signature Page    I have received my discharge instructions, and my questions have been answered. I have discussed any challenges I see with this plan with the nurse or doctor.    ..........................................................................................................................................  Patient/Patient Representative Signature      ..........................................................................................................................................  Patient Representative Print Name and Relationship to Patient    ..................................................               ................................................  Date                                   Time    ..........................................................................................................................................  Reviewed by Signature/Title    ...................................................              ..............................................  Date                                               Time    22EPIC Rev 08/18

## 2023-05-02 ENCOUNTER — TELEPHONE (OUTPATIENT)
Dept: INTERVENTIONAL RADIOLOGY/VASCULAR | Facility: CLINIC | Age: 63
End: 2023-05-02
Payer: COMMERCIAL

## 2023-05-02 NOTE — TELEPHONE ENCOUNTER
Left VM. This is f/u call from Neph tube change on 4/27. Please return call if any questions or concerns.     Post call completed.   May 2, 2023 11:45 AM  Reyna Fragoso RN

## 2023-05-08 ENCOUNTER — APPOINTMENT (OUTPATIENT)
Dept: MRI IMAGING | Facility: HOSPITAL | Age: 63
End: 2023-05-08
Attending: EMERGENCY MEDICINE
Payer: COMMERCIAL

## 2023-05-08 ENCOUNTER — HOSPITAL ENCOUNTER (EMERGENCY)
Facility: HOSPITAL | Age: 63
Discharge: HOME | End: 2023-05-08
Attending: EMERGENCY MEDICINE | Admitting: EMERGENCY MEDICINE
Payer: COMMERCIAL

## 2023-05-08 ENCOUNTER — APPOINTMENT (OUTPATIENT)
Dept: CT IMAGING | Facility: HOSPITAL | Age: 63
End: 2023-05-08
Attending: EMERGENCY MEDICINE
Payer: COMMERCIAL

## 2023-05-08 VITALS
HEART RATE: 85 BPM | OXYGEN SATURATION: 99 % | DIASTOLIC BLOOD PRESSURE: 81 MMHG | BODY MASS INDEX: 26.52 KG/M2 | WEIGHT: 175 LBS | HEIGHT: 68 IN | RESPIRATION RATE: 34 BRPM | SYSTOLIC BLOOD PRESSURE: 163 MMHG | TEMPERATURE: 98.5 F

## 2023-05-08 DIAGNOSIS — S22.31XA CLOSED FRACTURE OF ONE RIB OF RIGHT SIDE, INITIAL ENCOUNTER: ICD-10-CM

## 2023-05-08 DIAGNOSIS — M48.56XA COMPRESSION FRACTURE OF LUMBAR SPINE, NON-TRAUMATIC, INITIAL ENCOUNTER (H): ICD-10-CM

## 2023-05-08 DIAGNOSIS — M80.00XA OSTEOPOROSIS WITH CURRENT PATHOLOGICAL FRACTURE, UNSPECIFIED OSTEOPOROSIS TYPE, INITIAL ENCOUNTER: ICD-10-CM

## 2023-05-08 LAB
ALBUMIN SERPL BCG-MCNC: 3.8 G/DL (ref 3.5–5.2)
ALP SERPL-CCNC: 286 U/L (ref 40–129)
ALT SERPL W P-5'-P-CCNC: 17 U/L (ref 10–50)
ANION GAP SERPL CALCULATED.3IONS-SCNC: 13 MMOL/L (ref 7–15)
APTT PPP: 33 SECONDS (ref 22–38)
AST SERPL W P-5'-P-CCNC: 22 U/L (ref 10–50)
ATRIAL RATE - MUSE: 60 BPM
BASOPHILS # BLD AUTO: 0.1 10E3/UL (ref 0–0.2)
BASOPHILS NFR BLD AUTO: 0 %
BILIRUB SERPL-MCNC: 0.4 MG/DL
BUN SERPL-MCNC: 24.9 MG/DL (ref 8–23)
CALCIUM SERPL-MCNC: 8.9 MG/DL (ref 8.8–10.2)
CHLORIDE SERPL-SCNC: 103 MMOL/L (ref 98–107)
CREAT SERPL-MCNC: 2.48 MG/DL (ref 0.67–1.17)
D DIMER PPP FEU-MCNC: 0.48 UG/ML FEU (ref 0–0.5)
DEPRECATED HCO3 PLAS-SCNC: 23 MMOL/L (ref 22–29)
DIASTOLIC BLOOD PRESSURE - MUSE: NORMAL MMHG
EOSINOPHIL # BLD AUTO: 0 10E3/UL (ref 0–0.7)
EOSINOPHIL NFR BLD AUTO: 0 %
ERYTHROCYTE [DISTWIDTH] IN BLOOD BY AUTOMATED COUNT: 19.4 % (ref 10–15)
GFR SERPL CREATININE-BSD FRML MDRD: 29 ML/MIN/1.73M2
GLUCOSE SERPL-MCNC: 132 MG/DL (ref 70–99)
HCT VFR BLD AUTO: 26.7 % (ref 40–53)
HGB BLD-MCNC: 8.5 G/DL (ref 13.3–17.7)
HOLD SPECIMEN: NORMAL
IMM GRANULOCYTES # BLD: 0.5 10E3/UL
IMM GRANULOCYTES NFR BLD: 3 %
INR PPP: 1.31 (ref 0.85–1.15)
INTERPRETATION ECG - MUSE: NORMAL
LIPASE SERPL-CCNC: 12 U/L (ref 13–60)
LYMPHOCYTES # BLD AUTO: 0.3 10E3/UL (ref 0.8–5.3)
LYMPHOCYTES NFR BLD AUTO: 2 %
MCH RBC QN AUTO: 34.3 PG (ref 26.5–33)
MCHC RBC AUTO-ENTMCNC: 31.8 G/DL (ref 31.5–36.5)
MCV RBC AUTO: 108 FL (ref 78–100)
MONOCYTES # BLD AUTO: 0.9 10E3/UL (ref 0–1.3)
MONOCYTES NFR BLD AUTO: 6 %
NEUTROPHILS # BLD AUTO: 15.3 10E3/UL (ref 1.6–8.3)
NEUTROPHILS NFR BLD AUTO: 89 %
NRBC # BLD AUTO: 0 10E3/UL
NRBC BLD AUTO-RTO: 0 /100
NT-PROBNP SERPL-MCNC: 523 PG/ML (ref 0–900)
P AXIS - MUSE: 45 DEGREES
PLATELET # BLD AUTO: 260 10E3/UL (ref 150–450)
POTASSIUM SERPL-SCNC: 4.4 MMOL/L (ref 3.4–5.3)
PR INTERVAL - MUSE: 144 MS
PROT SERPL-MCNC: 6.7 G/DL (ref 6.4–8.3)
QRS DURATION - MUSE: 68 MS
QT - MUSE: 430 MS
QTC - MUSE: 430 MS
R AXIS - MUSE: 24 DEGREES
RBC # BLD AUTO: 2.48 10E6/UL (ref 4.4–5.9)
SODIUM SERPL-SCNC: 139 MMOL/L (ref 136–145)
SYSTOLIC BLOOD PRESSURE - MUSE: NORMAL MMHG
T AXIS - MUSE: 21 DEGREES
TROPONIN T SERPL HS-MCNC: 13 NG/L
TROPONIN T SERPL HS-MCNC: 14 NG/L
VENTRICULAR RATE- MUSE: 60 BPM
WBC # BLD AUTO: 17 10E3/UL (ref 4–11)

## 2023-05-08 PROCEDURE — 93005 ELECTROCARDIOGRAM TRACING: CPT | Performed by: EMERGENCY MEDICINE

## 2023-05-08 PROCEDURE — 83690 ASSAY OF LIPASE: CPT | Performed by: EMERGENCY MEDICINE

## 2023-05-08 PROCEDURE — 85610 PROTHROMBIN TIME: CPT | Performed by: EMERGENCY MEDICINE

## 2023-05-08 PROCEDURE — 74174 CTA ABD&PLVS W/CONTRAST: CPT

## 2023-05-08 PROCEDURE — 96376 TX/PRO/DX INJ SAME DRUG ADON: CPT

## 2023-05-08 PROCEDURE — 83880 ASSAY OF NATRIURETIC PEPTIDE: CPT | Performed by: EMERGENCY MEDICINE

## 2023-05-08 PROCEDURE — 85025 COMPLETE CBC W/AUTO DIFF WBC: CPT | Performed by: EMERGENCY MEDICINE

## 2023-05-08 PROCEDURE — 85730 THROMBOPLASTIN TIME PARTIAL: CPT | Performed by: EMERGENCY MEDICINE

## 2023-05-08 PROCEDURE — 93005 ELECTROCARDIOGRAM TRACING: CPT | Performed by: STUDENT IN AN ORGANIZED HEALTH CARE EDUCATION/TRAINING PROGRAM

## 2023-05-08 PROCEDURE — 250N000011 HC RX IP 250 OP 636: Performed by: EMERGENCY MEDICINE

## 2023-05-08 PROCEDURE — 84484 ASSAY OF TROPONIN QUANT: CPT | Performed by: EMERGENCY MEDICINE

## 2023-05-08 PROCEDURE — 96374 THER/PROPH/DIAG INJ IV PUSH: CPT | Mod: 59

## 2023-05-08 PROCEDURE — 72148 MRI LUMBAR SPINE W/O DYE: CPT

## 2023-05-08 PROCEDURE — 36415 COLL VENOUS BLD VENIPUNCTURE: CPT | Performed by: EMERGENCY MEDICINE

## 2023-05-08 PROCEDURE — 85379 FIBRIN DEGRADATION QUANT: CPT | Performed by: EMERGENCY MEDICINE

## 2023-05-08 PROCEDURE — 96375 TX/PRO/DX INJ NEW DRUG ADDON: CPT

## 2023-05-08 PROCEDURE — 99285 EMERGENCY DEPT VISIT HI MDM: CPT | Mod: 25

## 2023-05-08 PROCEDURE — 80053 COMPREHEN METABOLIC PANEL: CPT | Performed by: EMERGENCY MEDICINE

## 2023-05-08 RX ORDER — HEPARIN SODIUM,PORCINE 10 UNIT/ML
5-10 VIAL (ML) INTRAVENOUS EVERY 24 HOURS
Status: DISCONTINUED | OUTPATIENT
Start: 2023-05-08 | End: 2023-05-08

## 2023-05-08 RX ORDER — IOPAMIDOL 755 MG/ML
75 INJECTION, SOLUTION INTRAVASCULAR ONCE
Status: COMPLETED | OUTPATIENT
Start: 2023-05-08 | End: 2023-05-08

## 2023-05-08 RX ORDER — MORPHINE SULFATE 4 MG/ML
4 INJECTION, SOLUTION INTRAMUSCULAR; INTRAVENOUS EVERY 30 MIN PRN
Status: COMPLETED | OUTPATIENT
Start: 2023-05-08 | End: 2023-05-08

## 2023-05-08 RX ORDER — HEPARIN SODIUM (PORCINE) LOCK FLUSH IV SOLN 100 UNIT/ML 100 UNIT/ML
5-10 SOLUTION INTRAVENOUS
Status: DISCONTINUED | OUTPATIENT
Start: 2023-05-08 | End: 2023-05-08 | Stop reason: HOSPADM

## 2023-05-08 RX ORDER — HEPARIN SODIUM,PORCINE 10 UNIT/ML
5-10 VIAL (ML) INTRAVENOUS
Status: DISCONTINUED | OUTPATIENT
Start: 2023-05-08 | End: 2023-05-08

## 2023-05-08 RX ORDER — HYDROMORPHONE HYDROCHLORIDE 2 MG/1
2 TABLET ORAL EVERY 6 HOURS PRN
Qty: 10 TABLET | Refills: 0 | Status: SHIPPED | OUTPATIENT
Start: 2023-05-08 | End: 2023-05-11

## 2023-05-08 RX ORDER — ONDANSETRON 2 MG/ML
4 INJECTION INTRAMUSCULAR; INTRAVENOUS ONCE
Status: COMPLETED | OUTPATIENT
Start: 2023-05-08 | End: 2023-05-08

## 2023-05-08 RX ADMIN — IOPAMIDOL 75 ML: 755 INJECTION, SOLUTION INTRAVENOUS at 09:59

## 2023-05-08 RX ADMIN — Medication 5 ML: at 07:35

## 2023-05-08 RX ADMIN — MORPHINE SULFATE 4 MG: 4 INJECTION, SOLUTION INTRAMUSCULAR; INTRAVENOUS at 10:22

## 2023-05-08 RX ADMIN — MORPHINE SULFATE 4 MG: 4 INJECTION, SOLUTION INTRAMUSCULAR; INTRAVENOUS at 08:31

## 2023-05-08 RX ADMIN — MORPHINE SULFATE 4 MG: 4 INJECTION, SOLUTION INTRAMUSCULAR; INTRAVENOUS at 07:34

## 2023-05-08 RX ADMIN — ONDANSETRON 4 MG: 2 INJECTION INTRAMUSCULAR; INTRAVENOUS at 07:31

## 2023-05-08 ASSESSMENT — ACTIVITIES OF DAILY LIVING (ADL)
ADLS_ACUITY_SCORE: 35

## 2023-05-08 ASSESSMENT — ENCOUNTER SYMPTOMS
BACK PAIN: 1
ABDOMINAL PAIN: 1

## 2023-05-08 NOTE — DISCHARGE INSTRUCTIONS
Osteoporosis with fractures of the right eighth rib, L4 and L5, S1 and L3.  We recommended admission.  You elected to go home.  Ice off-and-on to sore areas and painful areas can help with pain.  Over-the-counter Tylenol or ibuprofen every 6 hours as tolerated.  One half Dilaudid every 6 hours as needed for stronger pain.  Do not drive with this, climb ladders, or operate machinery.  Follow-up with your doctors this week.  See them sooner or return if worse or problems.  The S1 compression fracture appears to extend into the sacroiliac joints on both sides.  Your doctors may want to have you follow-up with orthopedics.

## 2023-05-08 NOTE — ED TRIAGE NOTES
Pt reports onset of mid upper abdominal pain that radiates to his mid chest and back. Pt denies hx of heart disease. Denies SOB. Pt has colon cancer and is currently getting chemo therapy. Also reports 2 episodes of emesis.

## 2023-05-08 NOTE — ED PROVIDER NOTES
EMERGENCY DEPARTMENT ENCOUNTER      NAME: Rich Wolff  AGE: 62 year old male  YOB: 1960  MRN: [id number]  EVALUATION DATE & TIME: 2023  6:11 AM    PCP: Maycol Worrell    ED PROVIDER: Silvestre Guerra M.D.      Chief Complaint   Patient presents with     Abdominal Pain     Chest Pain     Back Pain         FINAL IMPRESSION:  1.  Acute abdominal pain/back pain/chest pain.  2.  Right eighth rib fracture.  3.  Acute L4 and L5 compression fractures.  4.  Additional S1 and L3 fractures are noted.    ED COURSE & MEDICAL DECISION MAKIN:49 AM I met with the patient to gather history and to perform my initial exam. We discussed plans for the ED course, including diagnostic testing and treatment. PPE worn: cloth mask.  Patient with abdominal pain, back pain, chest pain with relatively sudden onset last night.  He has no history of coronary artery disease.  He does have a history of pulmonary embolus and takes Xarelto.  Additionally has colon cancer, currently undergoing chemotherapy.  Also history of prostate cancer.  10:59 AM I reevaluated and updated the patient.  X-ray showing compression fractures of L4 and L5, possibly pathological.  MRI was ordered after explaining this to the patient.  Also right eighth rib fracture is noted.  1:06 PM I reevaluated and updated the patient.  1:37 PM.  Spoke with the patient and recommended admission for pain control because of multiple fractures including right eighth rib, L4 and L5, S1, L3 and chronic T11 and T12 fractures.  Patient refused admission.  Patient will be discharged home to follow-up with his oncologist tomorrow.  Prescription for Dilaudid for pain control.    Pertinent Labs & Imaging studies reviewed. (See chart for details)    62 year old male presents to the Emergency Department for evaluation of chest pain, abdominal pain, back pain.    Medical Decision Making    History:    Supplemental history from: Documented in chart, if  applicable    External Record(s) reviewed: All inpatient and outpatient computer records reviewed.    Work Up:    Chart documentation includes differential considered and any EKGs or imaging independently interpreted by provider, where specified.  Differential diagnosis includes metastatic cancer, coronary artery syndrome, aortic pathology, PE, etc.    In additional to work up documented, I considered the following work up: Documented in chart, if applicable.    External consultation:    Discussion of management with another provider: Documented in chart, if applicable    Complicating factors:    Care impacted by chronic illness: Anticoagulated State, Cancer/Chemotherapy, Chronic Kidney Disease, Hyperlipidemia and Hypertension    Care affected by social determinants of health: N/A    Disposition considerations: Patient may well require admission given his presenting complaint.    At the conclusion of the encounter I discussed the results of all of the tests and the disposition. The questions were answered. The patient or family acknowledged understanding and was agreeable with the care plan.         MEDICATIONS GIVEN IN THE EMERGENCY:  Medications   sodium chloride (PF) 0.9% PF flush 10-20 mL (has no administration in time range)   sodium chloride (PF) 0.9% PF flush 10-20 mL (has no administration in time range)   sodium chloride (PF) 0.9% PF flush 10-20 mL (20 mLs Intracatheter $Given 5/8/23 0732)   heparin 100 unit/mL injection 5-10 mL (5 mLs Intracatheter $Given 5/8/23 0735)   ondansetron (ZOFRAN) injection 4 mg (4 mg Intravenous $Given 5/8/23 0731)   morphine (PF) injection 4 mg (4 mg Intravenous $Given 5/8/23 1022)   iopamidol (ISOVUE-370) solution 75 mL (75 mLs Intravenous $Given 5/8/23 0959)       NEW PRESCRIPTIONS STARTED AT TODAY'S ER VISIT  New Prescriptions    No medications on file          =================================================================    HPI    Patient information was obtained from:  Patient    Use of : N/A        Rich BOWENS New is a 62 year old male with a pertinent history of prior PE on Xarelto, colon and prostate cancer on chemotherapy, s/p colostomy, short bowel syndrome, s/p nephrostomy, CKD 3 who presents to this ED by walk in for evaluation of abdominal pain, chest pain, back pain. Patient endorses mid back pain, chest pain, abdominal pain since yesterday at 10 PM. Patient's pain changes with changes to his position while shifting in bed. He describes his pain as sharp with a sudden onset. Pain is nonradiating. He denies any recent strenuous activity. No change to his pain with deep inspiration. Patient states he had a blood clot 2.5 years ago for which he is not anticoagulated.    Does not identify any waxing or waning symptoms otherwise, exacerbating or alleviating features,associated symptoms except as mentioned.    REVIEW OF SYSTEMS   Review of Systems   Cardiovascular: Positive for chest pain.   Gastrointestinal: Positive for abdominal pain.   Musculoskeletal: Positive for back pain.   All other systems reviewed and are negative.       PAST MEDICAL HISTORY:  Past Medical History:   Diagnosis Date     Colon cancer (H)      Hyperlipidemia      Hypertension      Prostate cancer (H) 09/01/2015    T1c. Mcclellan's 6 (3+3).  Confined to prostate.  Multifocal bilateral comprising 2% of the gland.PSA:5.8.       PAST SURGICAL HISTORY:  Past Surgical History:   Procedure Laterality Date     BOWEL RESECTION       COLONOSCOPY W/ BIOPSIES  11/04/2020     COLOSTOMY       ESOPHAGOSCOPY, GASTROSCOPY, DUODENOSCOPY (EGD), COMBINED  11/04/2020     IR CHEST PORT PLACEMENT > 5 YRS OF AGE  11/24/2020     IR CHEST PORT REPLACEMENT SAME SITE RIGHT  10/27/2022     IR NEPHROSTOMY TUBE CHANGE RIGHT  4/8/2021     IR NEPHROSTOMY TUBE CHANGE RIGHT  4/8/2021     IR NEPHROSTOMY TUBE CHANGE RIGHT  8/16/2021     IR NEPHROSTOMY TUBE CHANGE RIGHT  10/29/2021     IR NEPHROSTOMY TUBE CHANGE RIGHT   10/29/2021     IR NEPHROSTOMY TUBE CHANGE RIGHT  12/27/2021     IR NEPHROSTOMY TUBE CHANGE RIGHT  2/28/2022     IR NEPHROSTOMY TUBE CHANGE RIGHT  5/9/2022     IR NEPHROSTOMY TUBE CHANGE RIGHT  7/5/2022     IR NEPHROSTOMY TUBE CHANGE RIGHT  9/26/2022     IR NEPHROSTOMY TUBE CHANGE RIGHT  10/25/2022     IR NEPHROSTOMY TUBE CHANGE RIGHT  12/28/2022     IR NEPHROSTOMY TUBE CHANGE RIGHT  2/28/2023     IR NEPHROSTOMY TUBE CHANGE RIGHT  4/27/2023     IR PORT PLACEMENT >5 YEARS  11/24/2020     IR SITE CHECK/EVALUATION  4/11/2022     NEPHROSTOMY       MS ESOPHAGOGASTRODUODENOSCOPY TRANSORAL DIAGNOSTIC N/A 11/4/2020    Procedure: ESOPHAGOGASTRODUODENOSCOPY (EGD);  Surgeon: Paul Masters MD;  Location: [facility];  Service: Gastroenterology     MS LAP,PROSTATECTOMY,RADICAL,W/NERVE SPARE,INCL ROBOTIC Bilateral 09/01/2015    Procedure: DAVINCI RADICAL RETROPUBIC PROSTATECTOMY BILATERAL PELVIC LYMPH NODE DISSECTION;  Surgeon: Juan C Velazco MD;  Location: [facility];  Service: Urology     PROSTATE BIOPSY  06/08/2015    Ultrasound-guided transrectal biopsy.     SURGERY SCROTAL / TESTICULAR      for undescended testes, removed due to atrophy with vasectomy     ZZHC COLONOSCOPY W/WO BRUSH/WASH N/A 11/4/2020    Procedure: COLONOSCOPY WITH BIOPSY;  Surgeon: Paul Masters MD;  Location: [facility];  Service: Gastroenterology           CURRENT MEDICATIONS:    acetaminophen (TYLENOL) 500 MG tablet  fentaNYL (DURAGESIC) 25 mcg/hr 72 hr patch  ferrous sulfate (FEROSUL) 325 (65 Fe) MG tablet  gabapentin (NEURONTIN) 300 MG capsule  oxybutynin (DITROPAN) 5 MG tablet  oxyCODONE (ROXICODONE) 5 MG tablet  sertraline (ZOLOFT) 50 MG tablet  tamsulosin (FLOMAX) 0.4 MG capsule  XARELTO ANTICOAGULANT 20 MG TABS tablet  zolpidem (AMBIEN) 5 MG tablet        ALLERGIES:  Allergies   Allergen Reactions     Bee Venom Hives       FAMILY HISTORY:  Family History   Problem Relation Age of Onset     Breast Cancer  "Mother      Osteoporosis Mother      Valvular heart disease Father      Prostate Cancer Father 70.00     No Known Problems Sister      No Known Problems Son      No Known Problems Daughter        SOCIAL HISTORY:   Social History     Socioeconomic History     Marital status:    Tobacco Use     Smoking status: Never     Smokeless tobacco: Never   Substance and Sexual Activity     Alcohol use: Not Currently     Comment: Alcoholic Drinks/day: rarely     Drug use: No     Sexual activity: Not Currently   Rare alcohol.  No drugs or tobacco.    VITALS:  BP (!) 163/81   Pulse 65   Temp 98.5  F (36.9  C) (Oral)   Resp 21   Ht 1.727 m (5' 8\")   Wt 79.4 kg (175 lb)   SpO2 100%   BMI 26.61 kg/m      PHYSICAL EXAM    Vital Signs:  BP (!) 163/81   Pulse 65   Temp 98.5  F (36.9  C) (Oral)   Resp 21   Ht 1.727 m (5' 8\")   Wt 79.4 kg (175 lb)   SpO2 100%   BMI 26.61 kg/m    General:  On entering the room he is in no apparent distress.    Neck:  Neck supple with full range of motion and nontender.    Back:  Back and spine are nontender.  No costovertebral angle tenderness.    HEENT:  Oropharynx clear with moist mucous membranes.  HEENT unremarkable.    Pulmonary:  Chest clear to auscultation without rhonchi rales or wheezing.  Pain not reproducible with palpation of back, chest, or abdomen.  Cardiovascular:  Cardiac regular rate and rhythm without murmurs rubs or gallops.    Abdomen:  Abdomen soft nontender.  There is no rebound or guarding.    Muskuloskeletal:  he moves all 4 without any difficulty and has normal neurovascular exams.  Extremities without clubbing, cyanosis, or edema.  Legs and calves are nontender.    Neuro:  he is alert and oriented ×3 and moves all extremities symmetrically.    Psych:  Normal affect.    Skin:  Unremarkable and warm and dry.       LAB:  All pertinent labs reviewed and interpreted.  Labs Ordered and Resulted from Time of ED Arrival to Time of ED Departure   COMPREHENSIVE " METABOLIC PANEL - Abnormal       Result Value    Sodium 139      Potassium 4.4      Chloride 103      Carbon Dioxide (CO2) 23      Anion Gap 13      Urea Nitrogen 24.9 (*)     Creatinine 2.48 (*)     Calcium 8.9      Glucose 132 (*)     Alkaline Phosphatase 286 (*)     AST 22      ALT 17      Protein Total 6.7      Albumin 3.8      Bilirubin Total 0.4      GFR Estimate 29 (*)    LIPASE - Abnormal    Lipase 12 (*)    INR - Abnormal    INR 1.31 (*)    CBC WITH PLATELETS AND DIFFERENTIAL - Abnormal    WBC Count 17.0 (*)     RBC Count 2.48 (*)     Hemoglobin 8.5 (*)     Hematocrit 26.7 (*)      (*)     MCH 34.3 (*)     MCHC 31.8      RDW 19.4 (*)     Platelet Count 260      % Neutrophils 89      % Lymphocytes 2      % Monocytes 6      % Eosinophils 0      % Basophils 0      % Immature Granulocytes 3      NRBCs per 100 WBC 0      Absolute Neutrophils 15.3 (*)     Absolute Lymphocytes 0.3 (*)     Absolute Monocytes 0.9      Absolute Eosinophils 0.0      Absolute Basophils 0.1      Absolute Immature Granulocytes 0.5 (*)     Absolute NRBCs 0.0     TROPONIN T, HIGH SENSITIVITY - Normal    Troponin T, High Sensitivity 14     NT PROBNP INPATIENT - Normal    N terminal Pro BNP Inpatient 523     D DIMER QUANTITATIVE - Normal    D-Dimer Quantitative 0.48     PARTIAL THROMBOPLASTIN TIME - Normal    aPTT 33     TROPONIN T, HIGH SENSITIVITY - Normal    Troponin T, High Sensitivity 13         RADIOLOGY:  Reviewed all pertinent imaging. Please see official radiology report.  Lumbar spine MRI w/o contrast   Final Result   IMPRESSION:   1.  Probable subacute compression fractures of the L4 and L5 vertebral bodies with approximately 45% height loss.   2.  Probable subacute nondisplaced fracture origin vertically through the S1 vertebra propagating into the bilateral sacroiliac.   3.  Probable subacute nondisplaced fracture through the inferior L3 vertebral body.   4.  Chronic compression deformities of the T11 and T12 vertebral  bodies.   5.  No high-grade spinal canal or neural foraminal stenosis.      CTA Chest Abdomen Pelvis w Contrast   Final Result   IMPRESSION:   1.  No evidence of aortic injury.   2.  Distended gallbladder containing stones.   3.  Stable appearance of the right kidney and right lower quadrant loops of bowel as described above.   4.  Subacute fracture of the right lateral eighth rib.   5.  Sclerosis of the L4, L5 and S1 vertebral bodies with associated compression fractures of L4 and L5. Findings are concerning for possible pathologic fractures. Further evaluation with MRI is recommended.   6.  Additional age-indeterminate compression fracture of T12.                    EKG:    Normal sinus with 60, premature atrial contractions, probable borderline LVH pattern but otherwise negative.  Very similar to previous EKG of October 28, 2021 but the PACs and LVH pattern are new.    I have independently reviewed and interpreted the EKG(s) documented above.    PROCEDURES:         I, Tae Ellis, am serving as a scribe to document services personally performed by Dr. Guerra based on my observation and the provider's statements to me. I, Silvestre Guerra MD attest that Tae Ellis is acting in a scribe capacity, has observed my performance of the services and has documented them in accordance with my direction.    Silvestre Guerra M.D.  Emergency Medicine  [facility] EMERGENCY DEPARTMENT  [facility] [facility] [id number] [phone]  Dept: [phone]     Silvestre Guerra MD  05/08/23 1574

## 2023-06-23 ENCOUNTER — TELEPHONE (OUTPATIENT)
Dept: INTERVENTIONAL RADIOLOGY/VASCULAR | Facility: CLINIC | Age: 63
End: 2023-06-23
Payer: COMMERCIAL

## 2023-06-26 NOTE — PROGRESS NOTES
Interventional Radiology - Pre-Procedure Chart Check:  Outpatient - [facility]  06/27/2023     Procedure Requested: RIGHT nephrostomy tube exchange  Requested by: GRACE PRINCE CNP    Brief HPI: Rich Wolff is a 62 year old male with a PMH of HTN, HLD, colon cancer, prostate cancer s/p prostatectomy, and RIGHT ureteral obstruction s/p RIGHT PNT (initially placed [place] 11/11/2020) who presents for a routine RIGHT nephrostomy tube exchange. Last exchanged 04/27/2023, see imaging below. Typically does not require sedation or antibiotics for procedure.    IMAGING:  IR Nephrostomy Tube Change Right 04/27/2023  LOCATION: [facility]  DATE: 4/27/2023     PROCEDURE: NEPHROSTOMY TUBE EXCHANGE     INTERVENTIONAL RADIOLOGIST: Concepción Callahan MD     INDICATION: Routine right nephrostomy tube exchange.     CONSENT: The risks, benefits and alternatives of the procedure were discussed with the patient  in detail. All questions were answered. Informed consent was given to proceed with the procedure.     MODERATE SEDATION: None.      CONTRAST: 15 mL Visipaque     FLUOROSCOPIC TIME: 0.9 minutes.  RADIATION DOSE: Air Kerma: 61 mGy.     COMPLICATIONS: No immediate complications.     UNIVERSAL PROTOCOL: The operative site was marked and any prior imaging was reviewed. Required items including blood products, implants, devices and special equipment was made available. Patient identity was confirmed either verbally, with demographic   information, hospital assigned identification or other identification markers. A timeout was performed immediately prior to the procedure.     STERILE BARRIER TECHNIQUE: Maximum sterile barrier technique was used. Cutaneous antisepsis was performed at the operative site with application of 2% chlorhexidine and large sterile drape. Prior to the procedure, the  and assistant performed   hand hygiene and wore hat, mask, sterile gown, and  sterile gloves during the entire procedure.     PROCEDURE:    A  image was obtained. A nephrostogram was performed through the previously placed right nephrostomy tube. Under direct fluoroscopic visualization, the previous tube was removed over a wire and exchange was made for a new 10 [place] nephrostomy. The   loop was locked within the renal pelvis, and the catheter was sutured to the skin. A post placement nephrostogram was performed.     FINDINGS:    The initial  image shows the previously placed right nephrostomy tube. At the completion of the study, the new nephrostomy loop lies within the renal pelvis and controls the urinary system well.                                                                      IMPRESSION:    1. Successful right nephrostomy exchange, as discussed above.    NPO: N/A, no sedation required  ANTICOAGULANTS: Xarelto daily, does not need to hold for procedure  ANTIBIOTICS: Not needed    ALLERGIES  Allergies   Allergen Reactions     Bee Venom Hives         LABS:  INR   Date Value Ref Range Status   05/08/2023 1.31 (H) 0.85 - 1.15 Final      Hemoglobin   Date Value Ref Range Status   05/08/2023 8.5 (L) 13.3 - 17.7 g/dL Final   12/24/2020 7.7 (L) 13.3 - 17.7 g/dL Final     Platelet Count   Date Value Ref Range Status   05/08/2023 260 150 - 450 10e3/uL Final   12/24/2020 424 150 - 450 10e9/L Final     Creatinine   Date Value Ref Range Status   05/08/2023 2.48 (H) 0.67 - 1.17 mg/dL Final   12/23/2020 1.33 (H) 0.66 - 1.25 mg/dL Final     Potassium   Date Value Ref Range Status   05/08/2023 4.4 3.4 - 5.3 mmol/L Final   11/01/2021 4.7 3.5 - 5.0 mmol/L Final   12/23/2020 3.4 3.4 - 5.3 mmol/L Final         EXAM (per chart review):  BP (!) 145/75 (BP Location: Left arm)   Pulse 62   Temp 97.5  F (36.4  C) (Oral)   Resp 12   SpO2 100%       ASSESSMENT/PLAN:   RIGHT nephrostomy tube exchange. Procedure discussion and consent to be obtained by performing provider.    Total time spent on  the date of the encounter: 10 minutes.      GRACE PRINCE CNP  Interventional Radiology

## 2023-06-27 ENCOUNTER — HOSPITAL ENCOUNTER (OUTPATIENT)
Dept: INTERVENTIONAL RADIOLOGY/VASCULAR | Facility: HOSPITAL | Age: 63
Discharge: HOME | End: 2023-06-27
Admitting: RADIOLOGY
Payer: COMMERCIAL

## 2023-06-27 VITALS
RESPIRATION RATE: 12 BRPM | TEMPERATURE: 97.5 F | OXYGEN SATURATION: 100 % | HEART RATE: 62 BPM | SYSTOLIC BLOOD PRESSURE: 145 MMHG | DIASTOLIC BLOOD PRESSURE: 75 MMHG

## 2023-06-27 DIAGNOSIS — N13.5 URETERAL OBSTRUCTION: Primary | ICD-10-CM

## 2023-06-27 DIAGNOSIS — N13.30 HYDRONEPHROSIS: ICD-10-CM

## 2023-06-27 PROCEDURE — C1769 GUIDE WIRE: HCPCS

## 2023-06-27 PROCEDURE — C1729 CATH, DRAINAGE: HCPCS

## 2023-06-27 PROCEDURE — 50435 EXCHANGE NEPHROSTOMY CATH: CPT

## 2023-06-27 NOTE — IP AVS SNAPSHOT
[facility] Interventional Radiology  [address] [place] [id number]  Phone: [phone]  Fax: [phone]                              After Visit Summary   6/27/2023    Rich Wolff   MRN: [id number]           After Visit Summary Signature Page    I have received my discharge instructions, and my questions have been answered. I have discussed any challenges I see with this plan with the nurse or doctor.    ..........................................................................................................................................  Patient/Patient Representative Signature      ..........................................................................................................................................  Patient Representative Print Name and Relationship to Patient    ..................................................               ................................................  Date                                   Time    ..........................................................................................................................................  Reviewed by Signature/Title    ...................................................              ..............................................  Date                                               Time    22EPIC Rev 08/18

## 2023-06-27 NOTE — DISCHARGE INSTRUCTIONS
Percutaneous Nephrostomy Tube (PNT) Exchange/Check Discharge Instructions:  You had your nephrostomy tube checked or exchanged today. Please refer to the below instructions following your check/exchange:    Care Instructions:  - If you received sedation for your procedure, do not drive or operate heavy machinery for the rest of the day.  - Rest today if your tube was exchanged. Avoid strenuous activity and heavy lifting for the rest of the day. Return to your normal activities as you tolerate tomorrow.  - Keep the nephrostomy tube site clean and dry.   - You may shower, but do not submerge the nephrostomy tube site in water (avoid tub baths, Jacuzzis, hot tubs and pools).  - If you have a gauze dressing, change the gauze and tape dressing as needed to keep site clean and dry. If you have a securement device, leave in place until your next exchange.  - Clean around nephrostomy tubes exit sites with soap and water, pat and apply new split gauze around the tube at the exit site.  - Urine going into the bag may be red with blood for the first few days.  - Keep the drainage bag lower than your kidney to keep urine from backing up.  - Inspect the tube often for kinks.  - Empty your drainage bag when it is approximately half way fluid. Follow below instructions for emptying bag:  - Clean hands well with soap and water.  - Place a container near the outlet valve of the drainage bag.  - To open the valve:  - If you have a [facility] leg bag: Twist the blue valve at the bottom of the bag while holding the valve over your container to empty bag. Re-twist the valve closed on the drainage bag once complete.  - If you have a [place] leg bag: Turn the lever downward while holding the valve over your container to empty the bag. Turn the valve upward to close once complete.  - Discard drainage into toilet once urine drained.    Follow-Up:  - Routine (every 2-3 months) nephrostomy tube exchange is recommended. Your Urologist may  order and schedule exchanges by calling [facility] at [phone]***.    Call [facility] ([phone])*** if you experience the following:  - Nephrostomy tube(s) stops draining urine.  - Nephrostomy tube(s) site is leaking urine.  - Extreme pain at the nephrostomy tube site.  - Nephrostomy tube appears to be falling out or has fallen out, becomes clogged or breaks.    Seek Medical Evaluation for the following:  - Fever (greater than 101 F (38.3C)).  - Purulent (yellow/green/foul smelling) drainage from nephrostomy tube exit sites.  - Significant bleeding from nephrostomy tube site(s).  - Significant or worsening pain at nephrostomy tube exit site(s) or back.

## 2023-06-28 ENCOUNTER — TELEPHONE (OUTPATIENT)
Dept: INTERVENTIONAL RADIOLOGY/VASCULAR | Facility: HOSPITAL | Age: 63
End: 2023-06-28
Payer: COMMERCIAL

## 2023-06-28 NOTE — TELEPHONE ENCOUNTER
POST CALL    Call attempt: 1  Message left: Yes    [facility] number provided: Yes    Post call completed.   June 28, 2023 1:56 PM  Reyna Cooley, RN

## 2023-08-01 ENCOUNTER — TRANSFERRED RECORDS (OUTPATIENT)
Dept: HEALTH INFORMATION MANAGEMENT | Facility: CLINIC | Age: 63
End: 2023-08-01
Payer: COMMERCIAL

## 2023-08-01 NOTE — PROGRESS NOTES
Medication Therapy Management (MTM) Encounter    ASSESSMENT:                            Medication Adherence/Access: No issues identified      Osteoporosis: Needs improvement. Sent in rx for Forteo per Dr. Worrell's plan. Patient to start Forteo daily injections. Need to monitor serum calcium, vitamin D, phosphorus, uric acid and urinary calcium.    Chronic Pain: Stable.     Anxiety: Stable.     Urinary Frequency: Stable.     History of PE: Stable.     Insomnia: Stable.     Supplements: Stable.     PLAN:                            Today we sent in a prescription for you to start Forteo 20 mcg daily injection for your osteoporosis.   Once you start this medication we will recheck labs routinely to ensure it is safe and effective for you.     Follow-up: Return in about 4 weeks (around 8/30/2023) for Follow up.    SUBJECTIVE/OBJECTIVE:                          Rich Wolff is a 62 year old male coming in for an initial visit. He was referred to me from Dr. Worrell.      Reason for visit: MTM and osteoporosis management.    Allergies/ADRs: Reviewed in chart  Past Medical History: Reviewed in chart  Tobacco: He reports that he has never smoked. He has never used smokeless tobacco.  Alcohol: none      Medication Adherence/Access: no issues reported      Osteoporosis:   Patient is in clinic today to discuss forteo therapy. Recommend by Dr. Worrell and had planned on starting two months ago, unclear why prescription was never filled. Patient discussed forteo with oncology who cleared him for use. Discussed administration and side effects and monitoring required for the medication.   Forteo 600 MCG/2.4ML Solution Pen-injector, 20 mcg daily injection - not yet started.   calcium 600 mg daily   DEXA History:   5/24/22   T-score of -4.2 Lumbar Spine    Risk factors: recent fracture  Last vitamin D level:  No results found for: VITDT -- due for lab draw, completes labs through [facility].     Chronic Pain:  Reports current  regimen manages symptoms well and denies side effects at this time. Pain is well controlled. Takes oxycodone infrequently, only 1-2 times weekly.   fentaNYL 50 MCG/HR Patch 72 Hour, 1 patch to skin Transdermal every 72 hours  Gabapentin 300 MG Capsule, 1 capsule Orally 2 times daily   oxyCODONE HCl 5 MG Tablet, 1 tablet orally every 4-6 hours as needed    Anxiety:   Reports current regimen manages symptoms well and denies side effects at this time.   Sertraline 50 mg tablet daily     Urinary Frequency:  Reports current regimen manages symptoms well and denies side effects at this time.   Tamsulosin HCl 0.4 MG Capsule, one daily   Oxybutynin Chloride 5 MG Tablet, one daily     History of PE:  Denies side effects, excess bruising or bleeding.  Xarelto 20 MG Tablet, 1 tablet Oral Once a day     Insomnia:   Reports he has always struggled with sleep, feels current regimen helps a little. Still struggles with sleep. Denies side effects.   Zolpidem Tartrate 5 MG Tablet, one tablet nightly as needed     Supplements:   Taking for general health and denies side effects at this time.    Zip fizz daily drink   Ferrous Sulfate 325 (65 Fe) MG Tablet, two tablets in the morning and one in the evening    Today's Vitals: /56   Pulse 60   ----------------      I spent 25 minutes with this patient today. All changes were made via verbal approval with Maycol Worrell MD. A copy of the visit note was provided to the patient's provider(s).    A summary of these recommendations was declined by the patient.    Clemencia Mercer, Susi  Medication Therapy Management Pharmacist     Medication Therapy Recommendations  Osteoporosis, unspecified osteoporosis type, unspecified pathological fracture presence    Rationale: Untreated condition - Needs additional medication therapy - Indication   Recommendation: Start Medication   Status: Accepted per Provider   Note: Forteo

## 2023-08-02 ENCOUNTER — OFFICE VISIT (OUTPATIENT)
Dept: PHARMACY | Facility: PHYSICIAN GROUP | Age: 63
End: 2023-08-02
Payer: COMMERCIAL

## 2023-08-02 VITALS — SYSTOLIC BLOOD PRESSURE: 112 MMHG | DIASTOLIC BLOOD PRESSURE: 56 MMHG | HEART RATE: 60 BPM

## 2023-08-02 DIAGNOSIS — R35.0 URINARY FREQUENCY: ICD-10-CM

## 2023-08-02 DIAGNOSIS — M81.0 OSTEOPOROSIS, UNSPECIFIED OSTEOPOROSIS TYPE, UNSPECIFIED PATHOLOGICAL FRACTURE PRESENCE: Primary | ICD-10-CM

## 2023-08-02 DIAGNOSIS — F41.9 ANXIETY: ICD-10-CM

## 2023-08-02 DIAGNOSIS — Z86.711 HISTORY OF PULMONARY EMBOLISM: ICD-10-CM

## 2023-08-02 DIAGNOSIS — Z78.9 TAKES DIETARY SUPPLEMENTS: ICD-10-CM

## 2023-08-02 DIAGNOSIS — G89.3 CHRONIC PAIN DUE TO NEOPLASM: ICD-10-CM

## 2023-08-02 DIAGNOSIS — G47.00 INSOMNIA, UNSPECIFIED TYPE: ICD-10-CM

## 2023-08-02 PROCEDURE — 99607 MTMS BY PHARM ADDL 15 MIN: CPT | Performed by: PHARMACIST

## 2023-08-02 PROCEDURE — 99605 MTMS BY PHARM NP 15 MIN: CPT | Performed by: PHARMACIST

## 2023-08-02 NOTE — PATIENT INSTRUCTIONS
"Recommendations from today's MTM visit:                                                    MTM (medication therapy management) is a service provided by a clinical pharmacist designed to help you get the most of out of your medicines.   Today we reviewed what your medicines are for, how to know if they are working, that your medicines are safe and how to make your medicine regimen as easy as possible.      Today we sent in a prescription for you to start Forteo 20 mcg daily injection for your osteoporosis.   Once you start this medication we will recheck labs routinely to ensure it is safe and effective for you.     Follow-up: Return in about 4 weeks (around 8/30/2023) for Follow up.    It was great speaking with you today.  I value your experience and would be very thankful for your time in providing feedback in our clinic survey. In the next few days, you may receive an email or text message from [employer] with a link to a survey related to your  clinical pharmacist.\"     To schedule another MTM appointment, please call the clinic directly or you may call the MTM scheduling line at [phone] or toll-free at [phone].     My Clinical Pharmacist's contact information:                                                      Please feel free to contact me with any questions or concerns you have.      Clemencia Mercer, PharmD  Medication Therapy Management Pharmacist     "

## 2023-08-07 RX ORDER — HEPARIN SODIUM,PORCINE 10 UNIT/ML
5 VIAL (ML) INTRAVENOUS
Status: CANCELLED | OUTPATIENT
Start: 2023-08-07

## 2023-08-07 RX ORDER — HEPARIN SODIUM (PORCINE) LOCK FLUSH IV SOLN 100 UNIT/ML 100 UNIT/ML
5 SOLUTION INTRAVENOUS
Status: CANCELLED | OUTPATIENT
Start: 2023-08-07

## 2023-08-08 ENCOUNTER — INFUSION THERAPY VISIT (OUTPATIENT)
Dept: INFUSION THERAPY | Facility: HOSPITAL | Age: 63
End: 2023-08-08
Attending: NURSE PRACTITIONER
Payer: COMMERCIAL

## 2023-08-08 VITALS
RESPIRATION RATE: 16 BRPM | OXYGEN SATURATION: 100 % | HEART RATE: 63 BPM | TEMPERATURE: 98.6 F | DIASTOLIC BLOOD PRESSURE: 63 MMHG | SYSTOLIC BLOOD PRESSURE: 112 MMHG

## 2023-08-08 DIAGNOSIS — D62 ANEMIA DUE TO BLOOD LOSS, ACUTE: Primary | ICD-10-CM

## 2023-08-08 LAB
ABO/RH(D): NORMAL
ANTIBODY SCREEN: NEGATIVE
BLD PROD TYP BPU: NORMAL
BLD PROD TYP BPU: NORMAL
BLOOD COMPONENT TYPE: NORMAL
BLOOD COMPONENT TYPE: NORMAL
CODING SYSTEM: NORMAL
CODING SYSTEM: NORMAL
CROSSMATCH: NORMAL
CROSSMATCH: NORMAL
ISSUE DATE AND TIME: NORMAL
ISSUE DATE AND TIME: NORMAL
SPECIMEN EXPIRATION DATE: NORMAL
UNIT ABO/RH: NORMAL
UNIT ABO/RH: NORMAL
UNIT NUMBER: NORMAL
UNIT NUMBER: NORMAL
UNIT STATUS: NORMAL
UNIT STATUS: NORMAL
UNIT TYPE ISBT: 6200
UNIT TYPE ISBT: 6200

## 2023-08-08 PROCEDURE — P9016 RBC LEUKOCYTES REDUCED: HCPCS | Performed by: NURSE PRACTITIONER

## 2023-08-08 PROCEDURE — 36430 TRANSFUSION BLD/BLD COMPNT: CPT

## 2023-08-08 PROCEDURE — 250N000011 HC RX IP 250 OP 636: Mod: JZ | Performed by: NURSE PRACTITIONER

## 2023-08-08 PROCEDURE — 36591 DRAW BLOOD OFF VENOUS DEVICE: CPT | Performed by: NURSE PRACTITIONER

## 2023-08-08 PROCEDURE — 86923 COMPATIBILITY TEST ELECTRIC: CPT | Performed by: NURSE PRACTITIONER

## 2023-08-08 PROCEDURE — 86850 RBC ANTIBODY SCREEN: CPT | Performed by: NURSE PRACTITIONER

## 2023-08-08 RX ORDER — HEPARIN SODIUM,PORCINE 10 UNIT/ML
5 VIAL (ML) INTRAVENOUS
Status: CANCELLED | OUTPATIENT
Start: 2023-08-08

## 2023-08-08 RX ORDER — HEPARIN SODIUM (PORCINE) LOCK FLUSH IV SOLN 100 UNIT/ML 100 UNIT/ML
5 SOLUTION INTRAVENOUS
Status: CANCELLED | OUTPATIENT
Start: 2023-08-08

## 2023-08-08 RX ORDER — HEPARIN SODIUM (PORCINE) LOCK FLUSH IV SOLN 100 UNIT/ML 100 UNIT/ML
5 SOLUTION INTRAVENOUS
Status: DISCONTINUED | OUTPATIENT
Start: 2023-08-08 | End: 2023-08-08 | Stop reason: HOSPADM

## 2023-08-08 RX ADMIN — Medication 5 ML: at 13:33

## 2023-08-08 NOTE — PROGRESS NOTES
Infusion Nursing Note:  Rich Wolff presents today for 2 units of RBC's.    Patient seen by provider today: No   present during visit today: Not Applicable.    Note: Rich comes in today for 2 units of RBC's today. Rich has had blood in the past and tolerated it. I went over the plan of care with Rich and he verbalized understanding. Port was accessed with great blood return throughout. T& S drawn. Blood was hung per order. Rich tolerated without any sign or symptom of a reaction. Port was de-accessed and covered with gauze. Rich left ambulatory to the [facility] around 1345.      Intravenous Access:  Labs drawn without difficulty.  Implanted Port.    Treatment Conditions:  Results reviewed, labs MET treatment parameters, ok to proceed with treatment.  Blood transfusion consent signed 08/1/2023.      Post Infusion Assessment:  Patient tolerated infusion without incident.  Blood return noted pre and post infusion.  Site patent and intact, free from redness, edema or discomfort.  No evidence of extravasations.  Access discontinued per protocol.       Discharge Plan:   Copy of AVS reviewed with patient and/or family.   Patient discharged in stable condition accompanied by: self.  Departure Mode: Ambulatory.      CUCA NICOLE RN

## 2023-08-15 ENCOUNTER — TRANSFERRED RECORDS (OUTPATIENT)
Dept: HEALTH INFORMATION MANAGEMENT | Facility: CLINIC | Age: 63
End: 2023-08-15

## 2023-08-18 ENCOUNTER — INFUSION THERAPY VISIT (OUTPATIENT)
Dept: INFUSION THERAPY | Facility: HOSPITAL | Age: 63
End: 2023-08-18
Payer: COMMERCIAL

## 2023-08-18 DIAGNOSIS — D62 ANEMIA DUE TO BLOOD LOSS, ACUTE: Primary | ICD-10-CM

## 2023-08-18 PROCEDURE — 86923 COMPATIBILITY TEST ELECTRIC: CPT | Performed by: NURSE PRACTITIONER

## 2023-08-18 PROCEDURE — 36415 COLL VENOUS BLD VENIPUNCTURE: CPT | Performed by: NURSE PRACTITIONER

## 2023-08-18 PROCEDURE — 86901 BLOOD TYPING SEROLOGIC RH(D): CPT | Performed by: NURSE PRACTITIONER

## 2023-08-18 PROCEDURE — 86850 RBC ANTIBODY SCREEN: CPT | Performed by: NURSE PRACTITIONER

## 2023-08-18 RX ORDER — HEPARIN SODIUM,PORCINE 10 UNIT/ML
5 VIAL (ML) INTRAVENOUS
Status: CANCELLED | OUTPATIENT
Start: 2023-08-18

## 2023-08-18 RX ORDER — HEPARIN SODIUM (PORCINE) LOCK FLUSH IV SOLN 100 UNIT/ML 100 UNIT/ML
SOLUTION INTRAVENOUS
Status: DISCONTINUED
Start: 2023-08-18 | End: 2023-08-18 | Stop reason: WASHOUT

## 2023-08-18 RX ORDER — HEPARIN SODIUM,PORCINE 10 UNIT/ML
5 VIAL (ML) INTRAVENOUS
Status: DISCONTINUED | OUTPATIENT
Start: 2023-08-18 | End: 2023-08-18 | Stop reason: HOSPADM

## 2023-08-18 RX ORDER — HEPARIN SODIUM (PORCINE) LOCK FLUSH IV SOLN 100 UNIT/ML 100 UNIT/ML
5 SOLUTION INTRAVENOUS
Status: CANCELLED | OUTPATIENT
Start: 2023-08-18

## 2023-08-18 RX ORDER — HEPARIN SODIUM (PORCINE) LOCK FLUSH IV SOLN 100 UNIT/ML 100 UNIT/ML
5 SOLUTION INTRAVENOUS
Status: DISCONTINUED | OUTPATIENT
Start: 2023-08-18 | End: 2023-08-18 | Stop reason: HOSPADM

## 2023-08-18 NOTE — PROGRESS NOTES
Rich comes in today for a type and screen and then 2 units of blood on Monday.I went over the plan of care and rich verbalized understanding. Rich has a port  but wanted me to draw his labs peripherally. Type and screen done. Site covered with gauze and coban. Lab brought to blood bank and prepare was released so blood would be ready on Monday. Rich left ambulatory to the [facility] an plans on returning on Monday.    Peggy Betts RN

## 2023-08-21 ENCOUNTER — INFUSION THERAPY VISIT (OUTPATIENT)
Dept: INFUSION THERAPY | Facility: HOSPITAL | Age: 63
End: 2023-08-21
Payer: COMMERCIAL

## 2023-08-21 VITALS
TEMPERATURE: 98.2 F | DIASTOLIC BLOOD PRESSURE: 66 MMHG | OXYGEN SATURATION: 99 % | SYSTOLIC BLOOD PRESSURE: 116 MMHG | HEART RATE: 62 BPM | RESPIRATION RATE: 16 BRPM

## 2023-08-21 DIAGNOSIS — D62 ANEMIA DUE TO BLOOD LOSS, ACUTE: Primary | ICD-10-CM

## 2023-08-21 PROCEDURE — P9016 RBC LEUKOCYTES REDUCED: HCPCS | Performed by: NURSE PRACTITIONER

## 2023-08-21 PROCEDURE — 36430 TRANSFUSION BLD/BLD COMPNT: CPT

## 2023-08-21 PROCEDURE — 250N000011 HC RX IP 250 OP 636: Mod: JZ | Performed by: NURSE PRACTITIONER

## 2023-08-21 RX ORDER — CHOLECALCIFEROL (VITAMIN D3) 50 MCG
1 TABLET ORAL DAILY
COMMUNITY

## 2023-08-21 RX ORDER — PHENOL 1.4 %
10 AEROSOL, SPRAY (ML) MUCOUS MEMBRANE
COMMUNITY

## 2023-08-21 RX ORDER — HEPARIN SODIUM (PORCINE) LOCK FLUSH IV SOLN 100 UNIT/ML 100 UNIT/ML
5 SOLUTION INTRAVENOUS
Status: DISCONTINUED | OUTPATIENT
Start: 2023-08-21 | End: 2023-08-21 | Stop reason: HOSPADM

## 2023-08-21 RX ORDER — TRAZODONE HYDROCHLORIDE 50 MG/1
1 TABLET, FILM COATED ORAL
COMMUNITY
Start: 2022-09-27

## 2023-08-21 RX ORDER — SODIUM BICARBONATE 650 MG/1
1 TABLET ORAL
COMMUNITY
Start: 2023-04-14

## 2023-08-21 RX ORDER — HEPARIN SODIUM,PORCINE 10 UNIT/ML
5 VIAL (ML) INTRAVENOUS
Status: CANCELLED | OUTPATIENT
Start: 2023-08-21

## 2023-08-21 RX ORDER — HEPARIN SODIUM (PORCINE) LOCK FLUSH IV SOLN 100 UNIT/ML 100 UNIT/ML
5 SOLUTION INTRAVENOUS
Status: CANCELLED | OUTPATIENT
Start: 2023-08-21

## 2023-08-21 RX ADMIN — Medication 5 ML: at 14:24

## 2023-08-21 NOTE — PROGRESS NOTES
Infusion Nursing Note:  Rich Wolff presents today for transfusion of two units packed red blood cells.    Patient seen by provider today: No   present during visit today: Not Applicable.    Note: Rich arrived ambulatory in stable condition. He is here for a blood transfusion for Hgb of 7.9 on 8/15/23.  Two units PRBC's transfused without any signs of reaction. Vital signs remained stable throughout transfusion. Port flushed with heparin prior to de-accessing.       Intravenous Access:  Implanted Port.    Treatment Conditions:  Results reviewed, labs MET treatment parameters, ok to proceed with treatment.  Blood transfusion consent signed 8/1/23.      Post Infusion Assessment:  Patient tolerated infusion without incident.  Blood return noted pre and post infusion.  Access discontinued per protocol.       Discharge Plan:   Patient discharged in stable condition accompanied by: self.  Departure Mode: Ambulatory.      Deb Marin RN

## 2023-08-29 NOTE — PROGRESS NOTES
Interventional Radiology - Pre-Procedure Note:  [facility]  08/30/2023     Procedure Requested: Right nephrostomy tube exchange  Requested by: Chapo Porter NP    History and Physical Reviewed:  I have personally reviewed the patient's medical history and have updated the medical record as necessary.    Past Medical History:   Diagnosis Date    Colon cancer (H)     Hyperlipidemia     Hypertension     Prostate cancer (H) 09/01/2015    T1c. Silvana's 6 (3+3).  Confined to prostate.  Multifocal bilateral comprising 2% of the gland.PSA:5.8.      Past Surgical History:   Procedure Laterality Date    BOWEL RESECTION      COLONOSCOPY W/ BIOPSIES  11/04/2020    COLOSTOMY      ESOPHAGOSCOPY, GASTROSCOPY, DUODENOSCOPY (EGD), COMBINED  11/04/2020    IR CHEST PORT PLACEMENT > 5 YRS OF AGE  11/24/2020    IR CHEST PORT REPLACEMENT SAME SITE RIGHT  10/27/2022    IR NEPHROSTOMY TUBE CHANGE RIGHT  4/8/2021    IR NEPHROSTOMY TUBE CHANGE RIGHT  4/8/2021    IR NEPHROSTOMY TUBE CHANGE RIGHT  8/16/2021    IR NEPHROSTOMY TUBE CHANGE RIGHT  10/29/2021    IR NEPHROSTOMY TUBE CHANGE RIGHT  10/29/2021    IR NEPHROSTOMY TUBE CHANGE RIGHT  12/27/2021    IR NEPHROSTOMY TUBE CHANGE RIGHT  2/28/2022    IR NEPHROSTOMY TUBE CHANGE RIGHT  5/9/2022    IR NEPHROSTOMY TUBE CHANGE RIGHT  7/5/2022    IR NEPHROSTOMY TUBE CHANGE RIGHT  9/26/2022    IR NEPHROSTOMY TUBE CHANGE RIGHT  10/25/2022    IR NEPHROSTOMY TUBE CHANGE RIGHT  12/28/2022    IR NEPHROSTOMY TUBE CHANGE RIGHT  2/28/2023    IR NEPHROSTOMY TUBE CHANGE RIGHT  4/27/2023    IR NEPHROSTOMY TUBE CHANGE RIGHT  6/27/2023    IR PORT PLACEMENT >5 YEARS  11/24/2020    IR SITE CHECK/EVALUATION  4/11/2022    NEPHROSTOMY      WI ESOPHAGOGASTRODUODENOSCOPY TRANSORAL DIAGNOSTIC N/A 11/4/2020    Procedure: ESOPHAGOGASTRODUODENOSCOPY (EGD);  Surgeon: Paul Masters MD;  Location: [facility];  Service: Gastroenterology    WI  LAP,PROSTATECTOMY,RADICAL,W/NERVE SPARE,INCL ROBOTIC Bilateral 09/01/2015    Procedure: DAVINCI RADICAL RETROPUBIC PROSTATECTOMY BILATERAL PELVIC LYMPH NODE DISSECTION;  Surgeon: Juan C Velazco MD;  Location: [facility];  Service: Urology    PROSTATE BIOPSY  06/08/2015    Ultrasound-guided transrectal biopsy.    SURGERY SCROTAL / TESTICULAR      for undescended testes, removed due to atrophy with vasectomy    ZZHC COLONOSCOPY W/WO BRUSH/WASH N/A 11/4/2020    Procedure: COLONOSCOPY WITH BIOPSY;  Surgeon: Paul Masters MD;  Location: [facility];  Service: Gastroenterology        Brief HPI: Rich Wolff is a 62 year old male with a PMH of HTN, HLD, colon cancer, prostate cancer s/p prostatectomy, and RIGHT ureteral obstruction s/p RIGHT PNT (initially placed [place] 11/11/2020) who presents for a routine RIGHT nephrostomy tube exchange. Last exchanged 06/27/2023, see imaging below. Typically does not require sedation or antibiotics for procedure.        IMAGING:  [facility]  LOCATION: [facility]  DATE: 6/27/2023     PROCEDURE: RIGHT NEPHROSTOMY TUBE EXCHANGE     INTERVENTIONAL RADIOLOGIST: To Diamond MD.     INDICATION: Chronic indwelling right nephrostomy tube due to ureteral obstruction here for routine exchange.     CONSENT: The risks, benefits and alternatives of right nephrostomy tube exchange were discussed with the patient  in detail. All questions were answered. Informed consent was given to proceed with the procedure.        CONTRAST: 5 mL of Omni 350  ANTIBIOTICS: None.  ADDITIONAL MEDICATIONS: None.     FLUOROSCOPIC TIME: 0.7 minutes.  RADIATION DOSE: Air Kerma: 32 mGy.     COMPLICATIONS: No immediate complications.     STERILE BARRIER TECHNIQUE: Maximum sterile barrier technique was used. Cutaneous antisepsis was performed at the operative site with application of 2% chlorhexidine and large sterile drape. Prior to the procedure, the   and assistant performed   hand hygiene and wore hat, mask, sterile gown, and sterile gloves during the entire procedure.     PROCEDURE:    Contrast was injected through the indwelling right nephrostomy tube demonstrating the tube to be in good position within the right renal collecting system. Over a stiff Amplatz wire the existing nephrostomy tube was removed. Over the wire a new 10 [place]   nephrostomy tube was placed with the pigtail well positioned within the right renal pelvis.                                                                         IMPRESSION:    Successful routine exchange of right 10 [place] nephrostomy tube.  ____________________________________________________________________    NPO: since [place]  ANTICOAGULANTS: Xarelto daily, No hold required  ANTIBIOTICS: not indicated    ALLERGIES  Allergies   Allergen Reactions    Bee Venom Hives         LABS:  INR   Date Value Ref Range Status   05/08/2023 1.31 (H) 0.85 - 1.15 Final      Hemoglobin   Date Value Ref Range Status   05/08/2023 8.5 (L) 13.3 - 17.7 g/dL Final   12/24/2020 7.7 (L) 13.3 - 17.7 g/dL Final     Platelet Count   Date Value Ref Range Status   05/08/2023 260 150 - 450 10e3/uL Final   12/24/2020 424 150 - 450 10e9/L Final     Creatinine   Date Value Ref Range Status   05/08/2023 2.48 (H) 0.67 - 1.17 mg/dL Final   12/23/2020 1.33 (H) 0.66 - 1.25 mg/dL Final     Potassium   Date Value Ref Range Status   05/08/2023 4.4 3.4 - 5.3 mmol/L Final   11/01/2021 4.7 3.5 - 5.0 mmol/L Final   12/23/2020 3.4 3.4 - 5.3 mmol/L Final         EXAM:  /65   Temp 97.8  F (36.6  C) (Oral)   Resp 18   SpO2 97%   General:  Stable.  In no acute distress.    Neuro:  A&O x 3. Moves all extremities equally.  Resp:  Lungs clear to auscultation bilaterally. RA  Cardio:  S1S2 and reg, without murmur, clicks or rubs  Abdomen:  Soft, non-distended, non-tender.  Skin:  Without excoriations, ecchymosis, erythema, lesions or open sores around R PNT.  MSK:  No  gross motor weakness.  Sensation intact.      Pre-Sedation Assessment:  NO sedation planned  Code Status: Full Code intra procedure, per discussion with patient.         ASSESSMENT/PLAN:   RIGHT nephrostomy tube exchange without sedation    Procedural education reviewed with patient/family in detail including, but not limited to risks, benefits and alternatives with understanding verbalized by patient/family.    Total time spent on the date of the encounter: 45 minutes.      GRACE HOWARD CNP  Interventional Radiology

## 2023-08-30 ENCOUNTER — HOSPITAL ENCOUNTER (OUTPATIENT)
Dept: INTERVENTIONAL RADIOLOGY/VASCULAR | Facility: HOSPITAL | Age: 63
Discharge: HOME | End: 2023-08-30
Admitting: RADIOLOGY
Payer: COMMERCIAL

## 2023-08-30 VITALS
DIASTOLIC BLOOD PRESSURE: 65 MMHG | TEMPERATURE: 97.8 F | RESPIRATION RATE: 18 BRPM | SYSTOLIC BLOOD PRESSURE: 116 MMHG | OXYGEN SATURATION: 97 %

## 2023-08-30 DIAGNOSIS — N13.5 URETERAL OBSTRUCTION: ICD-10-CM

## 2023-08-30 DIAGNOSIS — N13.5 OBSTRUCTION OF RIGHT URETER: Primary | ICD-10-CM

## 2023-08-30 PROCEDURE — 50435 EXCHANGE NEPHROSTOMY CATH: CPT

## 2023-08-30 PROCEDURE — C1769 GUIDE WIRE: HCPCS

## 2023-08-30 PROCEDURE — C1729 CATH, DRAINAGE: HCPCS

## 2023-08-30 RX ORDER — NALOXONE HYDROCHLORIDE 0.4 MG/ML
0.4 INJECTION, SOLUTION INTRAMUSCULAR; INTRAVENOUS; SUBCUTANEOUS
Status: DISCONTINUED | OUTPATIENT
Start: 2023-08-30 | End: 2023-08-31 | Stop reason: HOSPADM

## 2023-08-30 RX ORDER — NALOXONE HYDROCHLORIDE 0.4 MG/ML
0.2 INJECTION, SOLUTION INTRAMUSCULAR; INTRAVENOUS; SUBCUTANEOUS
Status: DISCONTINUED | OUTPATIENT
Start: 2023-08-30 | End: 2023-08-31 | Stop reason: HOSPADM

## 2023-08-30 RX ORDER — FENTANYL CITRATE 50 UG/ML
25-50 INJECTION, SOLUTION INTRAMUSCULAR; INTRAVENOUS EVERY 5 MIN PRN
Status: DISCONTINUED | OUTPATIENT
Start: 2023-08-30 | End: 2023-08-31 | Stop reason: HOSPADM

## 2023-08-30 NOTE — DISCHARGE INSTRUCTIONS
Percutaneous Nephrostomy Tube (PNT) Exchange/Check Discharge Instructions:  You had your nephrostomy tube checked or exchanged today. Please refer to the below instructions following your check/exchange:    Care Instructions:  - If you received sedation for your procedure, do not drive or operate heavy machinery for the rest of the day.  - Rest today if your tube was exchanged. Avoid strenuous activity and heavy lifting for the rest of the day. Return to your normal activities as you tolerate tomorrow.  - Keep the nephrostomy tube site clean and dry.   - You may shower, but do not submerge the nephrostomy tube site in water (avoid tub baths, Jacuzzis, hot tubs and pools).  - If you have a gauze dressing, change the gauze and tape dressing as needed to keep site clean and dry. If you have a securement device, leave in place until your next exchange.  - Clean around nephrostomy tubes exit sites with soap and water, pat and apply new split gauze around the tube at the exit site.  - Urine going into the bag may be red with blood for the first few days.  - Keep the drainage bag lower than your kidney to keep urine from backing up.  - Inspect the tube often for kinks.  - Empty your drainage bag when it is approximately half way fluid. Follow below instructions for emptying bag:  - Clean hands well with soap and water.  - Place a container near the outlet valve of the drainage bag.  - To open the valve:  - If you have a [employer] leg bag: Twist the blue valve at the bottom of the bag while holding the valve over your container to empty bag. Re-twist the valve closed on the drainage bag once complete.  - If you have a [employer] leg bag: Turn the lever downward while holding the valve over your container to empty the bag. Turn the valve upward to close once complete.  - Discard drainage into toilet once urine drained.    Follow-Up:  - Routine (every 2-3 months) nephrostomy tube exchange is recommended. Your Urologist may  order and schedule exchanges by calling [facility] at [phone]***.    Call [facility] ([phone])*** if you experience the following:  - Nephrostomy tube(s) stops draining urine.  - Nephrostomy tube(s) site is leaking urine.  - Extreme pain at the nephrostomy tube site.  - Nephrostomy tube appears to be falling out or has fallen out, becomes clogged or breaks.    Seek Medical Evaluation for the following:  - Fever (greater than 101 F (38.3C)).  - Purulent (yellow/green/foul smelling) drainage from nephrostomy tube exit sites.  - Significant bleeding from nephrostomy tube site(s).  - Significant or worsening pain at nephrostomy tube exit site(s) or back.

## 2023-08-30 NOTE — PROCEDURES
[facility]    Procedure: Right nephrostomy tube    Date/Time: 8/30/2023 12:54 PM    Performed by: Marcellus Lakhani MD  Authorized by: Marcellus Lakhani MD      UNIVERSAL PROTOCOL   Site Marked: NA  Prior Images Obtained and Reviewed:  Yes  Required items: Required blood products, implants, devices and special equipment available    Patient identity confirmed:  Verbally with patient, arm band, provided demographic data and hospital-assigned identification number  Patient was reevaluated immediately before administering moderate or deep sedation or anesthesia  Confirmation Checklist:  Patient's identity using two indicators, relevant allergies, procedure was appropriate and matched the consent or emergent situation and correct equipment/implants were available  Time out: Immediately prior to the procedure a time out was called    Universal Protocol: the Joint Commission Universal Protocol was followed    Preparation: Patient was prepped and draped in usual sterile fashion       ANESTHESIA    Anesthesia:  Local infiltration  Local Anesthetic:  Lidocaine 1% without epinephrine      SEDATION    Patient Sedated: No    See dictated procedure note for full details.  Findings: Successful right PCN tube exchange.    Specimens: none    Complications: None    Condition: Stable      PROCEDURE    Patient Tolerance:  Patient tolerated the procedure well with no immediate complications  Length of time physician/provider present for 1:1 monitoring during sedation: 0    Setve Lakhani MD  Interventional Radiology

## 2023-10-09 ENCOUNTER — INFUSION THERAPY VISIT (OUTPATIENT)
Dept: INFUSION THERAPY | Facility: HOSPITAL | Age: 63
End: 2023-10-09
Attending: INTERNAL MEDICINE
Payer: COMMERCIAL

## 2023-10-09 DIAGNOSIS — D62 ANEMIA DUE TO BLOOD LOSS, ACUTE: Primary | ICD-10-CM

## 2023-10-09 PROCEDURE — 36591 DRAW BLOOD OFF VENOUS DEVICE: CPT | Performed by: NURSE PRACTITIONER

## 2023-10-09 PROCEDURE — 86850 RBC ANTIBODY SCREEN: CPT | Performed by: NURSE PRACTITIONER

## 2023-10-09 PROCEDURE — 86901 BLOOD TYPING SEROLOGIC RH(D): CPT | Performed by: NURSE PRACTITIONER

## 2023-10-09 PROCEDURE — 86923 COMPATIBILITY TEST ELECTRIC: CPT | Performed by: NURSE PRACTITIONER

## 2023-10-09 PROCEDURE — 250N000011 HC RX IP 250 OP 636: Mod: JZ | Performed by: NURSE PRACTITIONER

## 2023-10-09 RX ORDER — HEPARIN SODIUM (PORCINE) LOCK FLUSH IV SOLN 100 UNIT/ML 100 UNIT/ML
5 SOLUTION INTRAVENOUS
Status: CANCELLED | OUTPATIENT
Start: 2023-10-09

## 2023-10-09 RX ORDER — HEPARIN SODIUM,PORCINE 10 UNIT/ML
5 VIAL (ML) INTRAVENOUS
Status: CANCELLED | OUTPATIENT
Start: 2023-10-09

## 2023-10-09 RX ORDER — HEPARIN SODIUM (PORCINE) LOCK FLUSH IV SOLN 100 UNIT/ML 100 UNIT/ML
5 SOLUTION INTRAVENOUS
Status: DISCONTINUED | OUTPATIENT
Start: 2023-10-09 | End: 2023-10-09 | Stop reason: HOSPADM

## 2023-10-09 RX ADMIN — Medication 5 ML: at 14:18

## 2023-10-09 NOTE — PROGRESS NOTES
Infusion Nursing Note:  Rich Wolff presents today for labs (T/S).    Patient seen by provider today: No   present during visit today: Not Applicable.    Note: Rich here for a T/S for blood tomorrow. His port was accessed under sterile technique. Excellent blood return noted. Blood collected and sent to the lab. Ordered 2 units of pRBC for tomorrow. Requested to keep the port accessed overnight, so all will be ready when he arrives tomorrow. Left infusion ambulatory and in stable condition.    Intravenous Access:  Implanted Port.    Treatment Conditions:  Results reviewed, labs MET treatment parameters, ok to proceed with treatment. Hgb 7.3 on 10/3.  Blood transfusion consent signed 8/1/2023.    Post Infusion Assessment:  No transfusion today, will be back tomorrow for 2 units of pRBC.     Discharge Plan:   Discharge instructions reviewed with: Patient.  Patient and/or family verbalized understanding of discharge instructions and all questions answered.  AVS to patient via [employer].  Patient will return tomorrow for next appointment.   Patient discharged in stable condition accompanied by: self.  Departure Mode: Ambulatory.    Ivy Roach RN

## 2023-10-10 ENCOUNTER — INFUSION THERAPY VISIT (OUTPATIENT)
Dept: INFUSION THERAPY | Facility: HOSPITAL | Age: 63
End: 2023-10-10
Attending: INTERNAL MEDICINE
Payer: COMMERCIAL

## 2023-10-10 VITALS
DIASTOLIC BLOOD PRESSURE: 64 MMHG | SYSTOLIC BLOOD PRESSURE: 108 MMHG | TEMPERATURE: 98.3 F | HEART RATE: 76 BPM | OXYGEN SATURATION: 97 % | RESPIRATION RATE: 16 BRPM

## 2023-10-10 DIAGNOSIS — D62 ANEMIA DUE TO BLOOD LOSS, ACUTE: Primary | ICD-10-CM

## 2023-10-10 PROCEDURE — 250N000011 HC RX IP 250 OP 636: Mod: JZ | Performed by: NURSE PRACTITIONER

## 2023-10-10 PROCEDURE — 36430 TRANSFUSION BLD/BLD COMPNT: CPT

## 2023-10-10 PROCEDURE — P9016 RBC LEUKOCYTES REDUCED: HCPCS | Performed by: NURSE PRACTITIONER

## 2023-10-10 RX ORDER — HEPARIN SODIUM (PORCINE) LOCK FLUSH IV SOLN 100 UNIT/ML 100 UNIT/ML
5 SOLUTION INTRAVENOUS
Status: DISCONTINUED | OUTPATIENT
Start: 2023-10-10 | End: 2023-10-10 | Stop reason: HOSPADM

## 2023-10-10 RX ORDER — HEPARIN SODIUM,PORCINE 10 UNIT/ML
5 VIAL (ML) INTRAVENOUS
OUTPATIENT
Start: 2023-10-10

## 2023-10-10 RX ORDER — HEPARIN SODIUM (PORCINE) LOCK FLUSH IV SOLN 100 UNIT/ML 100 UNIT/ML
5 SOLUTION INTRAVENOUS
OUTPATIENT
Start: 2023-10-10

## 2023-10-10 RX ADMIN — Medication 5 ML: at 12:56

## 2023-10-10 NOTE — PROGRESS NOTES
Infusion Nursing Note:  Rich Wolff presents today for 2 units of pRBC.    Patient seen by provider today: No   present during visit today: Not Applicable.    Note: Rich comes to infusion ambulatory and in stable condition. Here for pRBC. States has been fatigued, short of breath and dizzy at times. Tachycardic when VS were initially done. HR trended down throughout today's appointment. His port was already accessed from yesterday's blood draw. 2 units of pRBC transfused and then the line was flushed with NS. Once completed, the port was de accessed per protocol. The site was covered with 2x2 gauze and secured in place with paper tape. Left infusion ambulatory and in stable condition. Will return as needed. Gets labs checked every other week at [facility].     Intravenous Access:  Implanted Port.    Treatment Conditions:  Hgb 7.3. Blood transfusion consent signed 8/1/2023.    Post Infusion Assessment:  Patient tolerated infusion without incident.  Blood return noted pre and post infusion.  Site patent and intact, free from redness, edema or discomfort.  No evidence of extravasations.  Access discontinued per protocol.     Discharge Plan:   Discharge instructions reviewed with: Patient.  Patient and/or family verbalized understanding of discharge instructions and all questions answered.  AVS to patient via [employer].  Patient will return as needed for next appointment.   Patient discharged in stable condition accompanied by: self.  Departure Mode: Ambulatory.    Ivy Roach RN

## 2023-10-23 ENCOUNTER — MYC MEDICAL ADVICE (OUTPATIENT)
Dept: INTERVENTIONAL RADIOLOGY/VASCULAR | Facility: CLINIC | Age: 63
End: 2023-10-23
Payer: COMMERCIAL

## 2023-10-24 ENCOUNTER — TELEPHONE (OUTPATIENT)
Dept: INTERVENTIONAL RADIOLOGY/VASCULAR | Facility: CLINIC | Age: 63
End: 2023-10-24
Payer: COMMERCIAL

## 2023-10-25 ENCOUNTER — HOSPITAL ENCOUNTER (OUTPATIENT)
Dept: INTERVENTIONAL RADIOLOGY/VASCULAR | Facility: HOSPITAL | Age: 63
Discharge: HOME | End: 2023-10-25
Attending: NURSE PRACTITIONER | Admitting: RADIOLOGY
Payer: COMMERCIAL

## 2023-10-25 VITALS
DIASTOLIC BLOOD PRESSURE: 66 MMHG | SYSTOLIC BLOOD PRESSURE: 123 MMHG | RESPIRATION RATE: 18 BRPM | TEMPERATURE: 97.8 F | OXYGEN SATURATION: 97 % | HEART RATE: 70 BPM

## 2023-10-25 DIAGNOSIS — N13.5 OBSTRUCTION OF RIGHT URETER: ICD-10-CM

## 2023-10-25 DIAGNOSIS — N13.30 HYDRONEPHROSIS, UNSPECIFIED HYDRONEPHROSIS TYPE: Primary | ICD-10-CM

## 2023-10-25 PROCEDURE — C1750 CATH, HEMODIALYSIS,LONG-TERM: HCPCS

## 2023-10-25 PROCEDURE — C1729 CATH, DRAINAGE: HCPCS

## 2023-10-25 PROCEDURE — C1769 GUIDE WIRE: HCPCS

## 2023-10-25 PROCEDURE — 50435 EXCHANGE NEPHROSTOMY CATH: CPT

## 2023-10-25 NOTE — PROGRESS NOTES
Interventional Radiology - Pre-Procedure Note:  Outpatient - [facility]  10/25/2023     Procedure Requested: RIGHT nephrostomy tube exchange  Requested by: Dayna Mckeon APRN CNP     Brief HPI: Rich Wolff is a 62 year old male with a PMH of HTN, HLD, colon cancer, prostate cancer s/p prostatectomy, and RIGHT ureteral obstruction s/p RIGHT PNT (initially placed [place] 11/11/2020) who presents for a routine RIGHT nephrostomy tube exchange. Last exchanged 08/30/2023, see imaging below. Typically does not require sedation or antibiotics for procedure. Denies any leaking or other issues with his tube.      IMAGING:  IR Nephrostomy Tube Change Right 08/30/2023  [facility]  LOCATION: [facility]  DATE: 8/30/2023     PROCEDURE: NEPHROSTOMY TUBE EXCHANGE     ATTENDING: ROMARIO Lakhani MD.     INDICATION: Routine two-month nephrostomy tube exchange     CONSENT: The risks, benefits, and alternatives of nephrostomy tube exchange were discussed with the patient in detail. All questions were answered. Informed consent was given to proceed with the procedure.     FLUOROSCOPIC TIME: 0.4 minutes.  AIR KERMA:  5 mGy.  CONTRAST: 10 [place]mn[place] 350     UNIVERSAL PRECAUTIONS: The procedure was performed utilizing maximum sterile barrier technique. Prior to the start of the procedure, a standard pause for patient safety was performed with site marking as indicated.     COMPLICATIONS: No immediate complications.     PROCEDURE:  A  image was obtained which demonstrated the right PCN tube to be in expected location. A nephrostogram was performed through the nephrostomy tube which demonstrated mild hydronephrosis. Under direct fluoroscopic visualization, the   previous tube was removed over a wire and exchange was made for a new 10 [place] nephrostomy. The loop was locked within the renal pelvis. A post placement nephrostogram was performed which  demonstrated the new nephrostomy loop to lie within the renal   pelvis. The nephrostomy tube was secured to the skin.                                                                      IMPRESSION:    1. Successful right nephrostomy exchange.     PLAN:  1. Patient to be recovered and discharged from IR.  2. Right PCN to gravity drainage.  3. No flushing.  4. Follow-up with IR in 2 months for routine PCN exchange.    NPO: N/A, no sedation  ANTICOAGULANTS: Xarelto daily, does not need to hold for procedure  ANTIBIOTICS: Not needed    ALLERGIES  Allergies   Allergen Reactions    Bee Venom Hives         LABS:  INR   Date Value Ref Range Status   05/08/2023 1.31 (H) 0.85 - 1.15 Final      Hemoglobin   Date Value Ref Range Status   05/08/2023 8.5 (L) 13.3 - 17.7 g/dL Final   12/24/2020 7.7 (L) 13.3 - 17.7 g/dL Final     Platelet Count   Date Value Ref Range Status   05/08/2023 260 150 - 450 10e3/uL Final   12/24/2020 424 150 - 450 10e9/L Final     Creatinine   Date Value Ref Range Status   05/08/2023 2.48 (H) 0.67 - 1.17 mg/dL Final   12/23/2020 1.33 (H) 0.66 - 1.25 mg/dL Final     Potassium   Date Value Ref Range Status   05/08/2023 4.4 3.4 - 5.3 mmol/L Final   11/01/2021 4.7 3.5 - 5.0 mmol/L Final   12/23/2020 3.4 3.4 - 5.3 mmol/L Final         EXAM:  /66   Pulse 70   Temp 97.8  F (36.6  C) (Oral)   Resp 18   SpO2 97%   General:  Stable.  In no acute distress.    Neuro:  A&O x 3. Moves all extremities equally.  Resp:  Room air. Breathing comfortably.  Skin:  Without excoriations, ecchymosis, erythema, lesions or open sores. RIGHT PNT CDI, no drainage in bag.      ASA Classification: Class 3 - SEVERE SYSTEMIC DISEASE, DEFINITE FUNCTIONAL LIMITATIONS.   Code Status: FULL CODE      ASSESSMENT/PLAN:   RIGHT nephrostomy tube exchange. No sedation required.    Procedural education reviewed with patient in detail including, but not limited to risks, benefits and alternatives with understanding verbalized by  patient.    Total time spent on the date of the encounter: 20 minutes.      GRACE PRINCE CNP  Interventional Radiology

## 2023-10-25 NOTE — PROCEDURES
Interventional Radiology Post-Procedure Note   ?   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   Specimen(s) removed: None   Additional studies ordered: None  Drains: 10 Fr locking pigtail nephrostomy catheter  Estimated Blood Loss: Minimal  Complications: None  Vascular closure method: N/A    Findings/Notes/Comments: Successful exchange of right percutaneous nephrostomy.   ?   Please see dictation in PACS or under the Imaging tab in [employer] for detailed procedure note.     Eddie Ng M.D.   Vascular and Interventional Radiology   Pager: [phone]   After Hours / Scheduling: [phone]     10/25/2023  12:27 PM

## 2023-10-25 NOTE — DISCHARGE INSTRUCTIONS
Percutaneous Nephrostomy Tube (PNT) Exchange/Check Discharge Instructions:  You had your nephrostomy tube checked or exchanged today. Please refer to the below instructions following your check/exchange:    Care Instructions:  - If you received sedation for your procedure, do not drive or operate heavy machinery for the rest of the day.  - Rest today if your tube was exchanged. Avoid strenuous activity and heavy lifting for the rest of the day. Return to your normal activities as you tolerate tomorrow.  - Keep the nephrostomy tube site clean and dry.   - You may shower, but do not submerge the nephrostomy tube site in water (avoid tub baths, Jacuzzis, hot tubs and pools).  - If you have a gauze dressing, change the gauze and tape dressing as needed to keep site clean and dry. If you have a securement device, leave in place until your next exchange.  - Clean around nephrostomy tubes exit sites with soap and water, pat and apply new split gauze around the tube at the exit site.  - Urine going into the bag may be red with blood for the first few days.  - Keep the drainage bag lower than your kidney to keep urine from backing up.  - Inspect the tube often for kinks.  - Empty your drainage bag when it is approximately half way fluid. Follow below instructions for emptying bag:  - Clean hands well with soap and water.  - Place a container near the outlet valve of the drainage bag.  - To open the valve:  - If you have a [employer] leg bag: Twist the blue valve at the bottom of the bag while holding the valve over your container to empty bag. Re-twist the valve closed on the drainage bag once complete.  - If you have a [employer] leg bag: Turn the lever downward while holding the valve over your container to empty the bag. Turn the valve upward to close once complete.  - Discard drainage into toilet once urine drained.    Follow-Up:  - Routine (every 2-3 months) nephrostomy tube exchange is recommended. Contact [facility]  IR Outpatient Scheduling at [phone] to schedule exchanges.    Contact [facility] IR RN Line at [phone] if you experience the following:  - Nephrostomy tube(s) stops draining urine.  - Nephrostomy tube(s) site is leaking urine.  - Extreme pain at the nephrostomy tube site.  - Nephrostomy tube appears to be falling out or has fallen out, becomes clogged or breaks.    Seek Medical Evaluation for the following:  - Fever (greater than 101 F (38.3C)  - Purulent (yellow/green/foul smelling) drainage from nephrostomy tube exit sites.  - Significant bleeding from nephrostomy tube site(s).  - Significant or worsening pain at nephrostomy tube exit site(s) or back.

## 2023-10-25 NOTE — PROGRESS NOTES
Patient Name: Rich Wolff  Medical Record Number: [id number]  Today's Date: 10/25/2023    Procedure: Image guided nephrostomy tube change right without sedation  Proceduralist: Dr. Ng  Pathology present: NA    Procedure Start: 1210  Procedure end: 1213    Sedation medications administered: none     : none    Other Notes: Pt arrived to IR room 1 from Radiology waiting room. Consent reviewed. Pt denies any questions or concerns regarding procedure. Pt positioned prone and monitored per protocol. Pt tolerated procedure without any noted complications. Pt transferred back to Ir Pre/post 1.     Discharge instructions provided and AVS given to patient.

## 2023-11-05 ENCOUNTER — HEALTH MAINTENANCE LETTER (OUTPATIENT)
Age: 63
End: 2023-11-05

## 2023-11-12 NOTE — PROCEDURES
[facility]    Procedure: IR Procedure Note    Date/Time: 7/5/2022 1:59 PM  Performed by: Neville Laird MD  Authorized by: Neville Laird MD       UNIVERSAL PROTOCOL   Site Marked: NA  Prior Images Obtained and Reviewed:  Yes  Required items: Required blood products, implants, devices and special equipment available    Patient identity confirmed:  Verbally with patient, arm band, provided demographic data and hospital-assigned identification number  Patient was reevaluated immediately before administering moderate or deep sedation or anesthesia  Confirmation Checklist:  Patient's identity using two indicators, relevant allergies, procedure was appropriate and matched the consent or emergent situation and correct equipment/implants were available  Time out: Immediately prior to the procedure a time out was called    Universal Protocol: the Joint Commission Universal Protocol was followed    Preparation: Patient was prepped and draped in usual sterile fashion       ANESTHESIA    Anesthesia: Local infiltration  Local Anesthetic:  Lidocaine 1% without epinephrine      SEDATION    Patient Sedated: No    See dictated procedure note for full details.  Findings: PCN change as per PACS.    Specimens: none    Complications: None    Condition: Stable    Plan: Interventional Radiology Post-Procedure Note    Procedure: Nephrostomy tube change.    Attending: Neville Laird MD    Findings: Routine change of right nephrostomy tube.          PROCEDURE    Patient Tolerance:  Patient tolerated the procedure well with no immediate complications  Length of time physician/provider present for 1:1 monitoring during sedation: 0      
(M6) obeys commands

## 2023-12-20 ENCOUNTER — TELEPHONE (OUTPATIENT)
Dept: INTERVENTIONAL RADIOLOGY/VASCULAR | Facility: HOSPITAL | Age: 63
End: 2023-12-20
Payer: COMMERCIAL

## 2023-12-27 ENCOUNTER — HOSPITAL ENCOUNTER (OUTPATIENT)
Dept: INTERVENTIONAL RADIOLOGY/VASCULAR | Facility: HOSPITAL | Age: 63
Discharge: HOME | End: 2023-12-27
Payer: COMMERCIAL

## 2023-12-27 VITALS
TEMPERATURE: 97.6 F | OXYGEN SATURATION: 97 % | RESPIRATION RATE: 18 BRPM | SYSTOLIC BLOOD PRESSURE: 154 MMHG | DIASTOLIC BLOOD PRESSURE: 82 MMHG | HEART RATE: 61 BPM

## 2023-12-27 DIAGNOSIS — N13.30 HYDRONEPHROSIS, UNSPECIFIED HYDRONEPHROSIS TYPE: Primary | ICD-10-CM

## 2023-12-27 DIAGNOSIS — N13.30 HYDRONEPHROSIS, UNSPECIFIED HYDRONEPHROSIS TYPE: ICD-10-CM

## 2023-12-27 PROCEDURE — 50435 EXCHANGE NEPHROSTOMY CATH: CPT

## 2023-12-27 PROCEDURE — C1769 GUIDE WIRE: HCPCS

## 2023-12-27 PROCEDURE — C1729 CATH, DRAINAGE: HCPCS

## 2023-12-27 NOTE — PROGRESS NOTES
Interventional Radiology - Pre-Procedure Note:  Outpatient - [facility]  12/27/2023     Procedure Requested: RIGHT nephrostomy tube exchange  Requested by: GRACE PRINCE CNP    Brief HPI: Rich Wolff is a 63 year old male with a PMH of HTN, HLD, colon cancer, prostate cancer s/p prostatectomy, and RIGHT ureteral obstruction s/p RIGHT PNT (initially placed [place] 11/11/2020) who presents for a routine RIGHT nephrostomy tube exchange. Last exchanged 10/25/2023, see imaging below. Typically does not require sedation or antibiotics for procedure. Denies any leaking or other issues with his tube.      IMAGING:  IR Nephrostomy Tube Change Right 10/25/2023  [facility]  LOCATION: [facility]  DATE: 10/25/2023     PROCEDURE: NEPHROSTOMY TUBE EXCHANGE     ATTENDING: Eddie Ng MD.     INDICATION: 62-year-old male with long-standing nephrostomy tube catheter presents for exchange.     CONSENT: The risks, benefits, and alternatives of nephrostomy tube exchange were discussed with the patient in detail. All questions were answered. Informed consent was given to proceed with the procedure.     SEDATION: None     ADDITIONAL MEDICATIONS: None     FLUOROSCOPIC TIME: 0.8 minutes.     AIR KERMA:  18 mGy.     CONTRAST: See EMR     UNIVERSAL PRECAUTIONS: The procedure was performed utilizing maximum sterile barrier technique. Prior to the start of the procedure, a standard pause for patient safety was performed with site marking as indicated.     COMPLICATIONS: No immediate complications.     PROCEDURE:    A  image was obtained. A nephrostogram was performed through the previously placed right nephrostomy tube. Under direct fluoroscopic visualization, the previous tube was removed over a wire and exchange was made for a new 10 [place] nephrostomy. The   loop was locked within the renal pelvis, and the catheter was sutured to the skin. A post placement  nephrostogram was performed.     FINDINGS:    The initial  image shows the previously placed right nephrostomy tube. At the completion of the study, the new nephrostomy loop lies within the renal pelvis and controls the urinary system well.                                                                      IMPRESSION:    Successful right nephrostomy exchange, as discussed above.     PLAN:  1. Patient to be recovered and discharged from IR.  2. right PCN to gravity drainage.  3. No flushing.  4. Follow-up with IR in 2 months for routine PCN exchange.    NPO: N/A, no sedation  ANTICOAGULANTS: Xarelto daily, does not need to hold for procedure  ANTIBIOTICS: Not needed    ALLERGIES  Allergies   Allergen Reactions    Bee Venom Hives         LABS:  INR   Date Value Ref Range Status   05/08/2023 1.31 (H) 0.85 - 1.15 Final      Hemoglobin   Date Value Ref Range Status   05/08/2023 8.5 (L) 13.3 - 17.7 g/dL Final   12/24/2020 7.7 (L) 13.3 - 17.7 g/dL Final     Platelet Count   Date Value Ref Range Status   05/08/2023 260 150 - 450 10e3/uL Final   12/24/2020 424 150 - 450 10e9/L Final     Creatinine   Date Value Ref Range Status   05/08/2023 2.48 (H) 0.67 - 1.17 mg/dL Final   12/23/2020 1.33 (H) 0.66 - 1.25 mg/dL Final     Potassium   Date Value Ref Range Status   05/08/2023 4.4 3.4 - 5.3 mmol/L Final   11/01/2021 4.7 3.5 - 5.0 mmol/L Final   12/23/2020 3.4 3.4 - 5.3 mmol/L Final         EXAM:  There were no vitals taken for this visit.  General:  Stable.  In no acute distress.    Neuro:  A&O x 3. Moves all extremities equally.  Resp:  Room air. Breathing comfortably.  Skin:  Without excoriations, ecchymosis, erythema, lesions or open sores. RIGHT PNT CDI, sediment-appearing urine noted in bag.      ASA Classification: Class 3 - SEVERE SYSTEMIC DISEASE, DEFINITE FUNCTIONAL LIMITATIONS.   Code Status: FULL CODE      ASSESSMENT/PLAN:   RIGHT nephrostomy tube exchange. No sedation required.    Procedural education reviewed  with patient in detail including, but not limited to risks, benefits and alternatives with understanding verbalized by patient.    Total time spent on the date of the encounter: 20 minutes.      GRACE PRINCE CNP  Interventional Radiology

## 2024-02-14 ENCOUNTER — LAB REQUISITION (OUTPATIENT)
Dept: LAB | Facility: CLINIC | Age: 64
End: 2024-02-14

## 2024-02-14 DIAGNOSIS — R30.0 DYSURIA: ICD-10-CM

## 2024-02-14 PROCEDURE — 87086 URINE CULTURE/COLONY COUNT: CPT | Performed by: STUDENT IN AN ORGANIZED HEALTH CARE EDUCATION/TRAINING PROGRAM

## 2024-02-14 PROCEDURE — 87186 SC STD MICRODIL/AGAR DIL: CPT | Performed by: STUDENT IN AN ORGANIZED HEALTH CARE EDUCATION/TRAINING PROGRAM

## 2024-02-16 ENCOUNTER — MYC MEDICAL ADVICE (OUTPATIENT)
Dept: INTERVENTIONAL RADIOLOGY/VASCULAR | Facility: CLINIC | Age: 64
End: 2024-02-16
Payer: COMMERCIAL

## 2024-02-16 LAB — BACTERIA UR CULT: ABNORMAL

## 2024-02-21 NOTE — TELEPHONE ENCOUNTER
Writer has spoken with Rich regarding planned procedure with IR via telephone.      Rich acknowledges understanding of pre-procedure instructions.         I have provided Rich with IR number ([phone]) for questions or concerns.    Asiya DIAZ  Interventional Radiology RN   [phone]

## 2024-02-26 NOTE — PROGRESS NOTES
Interventional Radiology - Pre-Procedure Note:  Outpatient - [facility]  02/28/2024     Procedure Requested: right PNT exchange  Requested by: Dottie Porter CNP    Brief HPI: Rich Wolff is a 63 year old male with a PMH of HTN, HLD, colon cancer, prostate cancer s/p prostatectomy, and RIGHT ureteral obstruction s/p RIGHT PNT (initially placed [place] 11/11/2020). Right PNT 10F last exchanged 12/27/23; here today for routine 2 month exchange.      IMAGING:  Narrative & Impression   [facility]  LOCATION: [facility]  DATE: 12/27/2023     1. NEPHROSTOMY TUBE EXCHANGE        ATTENDING: Estevan Savage MD.     INDICATION: 63-year-old male with ureteral obstruction and indwelling right nephrostomy tube presents for right nephrostomy tube exchange.      CONSENT: The risks, benefits, and alternatives of nephrostomy tube exchange were discussed with the patient in detail. All questions were answered. Informed consent was given to proceed with the procedure.     ADDITIONAL MEDICATIONS: none     FLUOROSCOPIC TIME: 0.8 mins.  DOSE: Air Kerma:9 mGy.     CONTRAST: 5 mL.     UNIVERSAL PRECAUTIONS: The procedure was performed utilizing maximum sterile barrier technique. Prior to the start of the procedure, a standard pause for patient safety was performed with site marking as indicated.     COMPLICATIONS: No immediate complications.     PROCEDURE: The patient was prepped and draped in a sterile fashion. Contrast was injected through the existing right nephrostomy tube and nephrostogram obtained.     Local anesthesia infiltrated around the  nephrostomy tube. Guidewire was inserted. The existing nephrostomy tube was removed. A new 10 Irish nephrostomy tube was advanced over a guidewire and positioned within the right  renal pelvis.   The patient tolerated the procedure well without immediate complications.     FINDINGS: NEPHROSTOMY TUBE EXCHANGE:   New 10 Irish  nephrostomy tube was positioned within the right  renal pelvis.                                                                      IMPRESSION:  1.   Distal ureteral obstruction  2.   Exchange of right  10 Vietnamese nephrostomy tube.       PLAN:  The nephrostomy should be kept to gravity drainage.  Preventative maintenance changes should be performed at 2-3 month intervals.       NPO: NA; no sedation  ANTICOAGULANTS: xarelto; no hold required.   ANTIBIOTICS: none    ALLERGIES  Allergies   Allergen Reactions    Bee Venom Hives         LABS:  INR   Date Value Ref Range Status   05/08/2023 1.31 (H) 0.85 - 1.15 Final      Hemoglobin   Date Value Ref Range Status   05/08/2023 8.5 (L) 13.3 - 17.7 g/dL Final   12/24/2020 7.7 (L) 13.3 - 17.7 g/dL Final     Platelet Count   Date Value Ref Range Status   05/08/2023 260 150 - 450 10e3/uL Final   12/24/2020 424 150 - 450 10e9/L Final     Creatinine   Date Value Ref Range Status   05/08/2023 2.48 (H) 0.67 - 1.17 mg/dL Final   12/23/2020 1.33 (H) 0.66 - 1.25 mg/dL Final     Potassium   Date Value Ref Range Status   05/08/2023 4.4 3.4 - 5.3 mmol/L Final   11/01/2021 4.7 3.5 - 5.0 mmol/L Final   12/23/2020 3.4 3.4 - 5.3 mmol/L Final         EXAM:  BP (!) 142/89   Pulse 100   Temp 97.6  F (36.4  C)   Resp 16    General: Stable. In no acute distress.    Neuro: Alert and oriented x 3. No focal deficits.  Psych: Appropriate mood and affect. Linear/coherent thought process.   Resp: Normal respirations. Lungs clear to auscultation bilaterally.  Cardio: S1S2, regular rate and rhythm, without murmur, clicks or rubs  : right PNT dark blood tinged urine  Skin: Warm and dry. Without excoriations, ecchymosis, erythema, lesions or open sores on around site.      Code Status: Full Code intra procedure, per discussion with patient.         ASSESSMENT/PLAN:   Right nephrostomy tube exchange; no sedation needed.    Procedural education reviewed with patient/family in detail including, but not  limited to risks, benefits and alternatives with understanding verbalized by patient/family.    Total time spent on the date of the encounter: 45 minutes.    Note prepped by:  GRACE Powers CNP  Interventional Radiology    Exam and procedure discussion completed by: GRACE HOWARD CNP on 2/28/2024 at 11:48 AM

## 2024-02-28 ENCOUNTER — HOSPITAL ENCOUNTER (OUTPATIENT)
Dept: INTERVENTIONAL RADIOLOGY/VASCULAR | Facility: HOSPITAL | Age: 64
Discharge: HOME | End: 2024-02-28
Admitting: RADIOLOGY
Payer: COMMERCIAL

## 2024-02-28 VITALS
SYSTOLIC BLOOD PRESSURE: 142 MMHG | DIASTOLIC BLOOD PRESSURE: 89 MMHG | TEMPERATURE: 97.6 F | RESPIRATION RATE: 16 BRPM | HEART RATE: 100 BPM

## 2024-02-28 DIAGNOSIS — N13.30 HYDRONEPHROSIS, UNSPECIFIED HYDRONEPHROSIS TYPE: ICD-10-CM

## 2024-02-28 DIAGNOSIS — N13.30 HYDRONEPHROSIS, UNSPECIFIED HYDRONEPHROSIS TYPE: Primary | ICD-10-CM

## 2024-02-28 PROCEDURE — 255N000002 HC RX 255 OP 636: Performed by: RADIOLOGY

## 2024-02-28 PROCEDURE — C1729 CATH, DRAINAGE: HCPCS

## 2024-02-28 PROCEDURE — 50435 EXCHANGE NEPHROSTOMY CATH: CPT

## 2024-02-28 PROCEDURE — C1769 GUIDE WIRE: HCPCS

## 2024-02-28 RX ADMIN — IOHEXOL 15 ML: 350 INJECTION, SOLUTION INTRAVENOUS at 12:13

## 2024-03-24 ENCOUNTER — HEALTH MAINTENANCE LETTER (OUTPATIENT)
Age: 64
End: 2024-03-24

## 2024-04-18 ENCOUNTER — MYC MEDICAL ADVICE (OUTPATIENT)
Dept: INTERVENTIONAL RADIOLOGY/VASCULAR | Facility: CLINIC | Age: 64
End: 2024-04-18
Payer: COMMERCIAL

## 2024-04-22 NOTE — PROGRESS NOTES
Interventional Radiology - Pre-Procedure Note:  Outpatient - [facility]  04/24/2024     Procedure Requested: right PNT exchange  Requested by: Dayna Guillen CNP    Brief HPI: Rich Wolff is a 63 year old male with a PMH of HTN, HLD, colon cancer, prostate cancer s/p prostatectomy, and RIGHT ureteral obstruction s/p RIGHT PNT (initially placed [place] 11/11/2020). Last right PNT exchanged 2/28/24. Patient returns for routine right PNT exchange ([id number]); no sedation.      IMAGING:  [facility]  LOCATION: [facility]  DATE: 2/28/2024     1. NEPHROSTOMY TUBE EXCHANGE        INDICATION: 63-year-old male with ureteral obstruction and indwelling right nephrostomy tube presents for right nephrostomy tube exchange.      CONSENT: The risks, benefits, and alternatives of nephrostomy tube exchange were discussed with the patient in detail. All questions were answered. Informed consent was given to proceed with the procedure.     ADDITIONAL MEDICATIONS: none     FLUOROSCOPIC TIME: 0.2 mins.  DOSE: Air Kerma:9 mGy.     CONTRAST: 10 mL.     UNIVERSAL PRECAUTIONS: The procedure was performed utilizing maximum sterile barrier technique. Prior to the start of the procedure, a standard pause for patient safety was performed with site marking as indicated.     COMPLICATIONS: No immediate complications.     PROCEDURE: The patient was prepped and draped in a sterile fashion. Contrast was injected through the existing right nephrostomy tube and nephrostogram obtained.     Local anesthesia infiltrated around the  nephrostomy tube. Guidewire was inserted. The existing nephrostomy tube was removed. A new 10 Marshallese nephrostomy tube was advanced over a guidewire and positioned within the right  renal pelvis.   The patient tolerated the procedure well without immediate complications.     FINDINGS: NEPHROSTOMY TUBE EXCHANGE:   New 10 Marshallese nephrostomy tube was positioned within  "the right  renal pelvis.                                                                      IMPRESSION:  1.   Distal ureteral obstruction  2.   Exchange of right  10 [place] nephrostomy tube.       PLAN:  The nephrostomy should be kept to gravity drainage.  Preventative maintenance changes should be performed at 2-3 month intervals.    NPO: NA; no sedation  ANTICOAGULANTS: xarelto; no hold needed.   ANTIBIOTICS: none    ALLERGIES  Allergies   Allergen Reactions    Bee Venom Hives         LABS:  INR   Date Value Ref Range Status   05/08/2023 1.31 (H) 0.85 - 1.15 Final      Hemoglobin   Date Value Ref Range Status   05/08/2023 8.5 (L) 13.3 - 17.7 g/dL Final   12/24/2020 7.7 (L) 13.3 - 17.7 g/dL Final     Platelet Count   Date Value Ref Range Status   05/08/2023 260 150 - 450 10e3/uL Final   12/24/2020 424 150 - 450 10e9/L Final     Creatinine   Date Value Ref Range Status   05/08/2023 2.48 (H) 0.67 - 1.17 mg/dL Final   12/23/2020 1.33 (H) 0.66 - 1.25 mg/dL Final     Potassium   Date Value Ref Range Status   05/08/2023 4.4 3.4 - 5.3 mmol/L Final   11/01/2021 4.7 3.5 - 5.0 mmol/L Final   12/23/2020 3.4 3.4 - 5.3 mmol/L Final         EXAM:  BP (!) 140/89   Pulse 92   Temp 98  F (36.7  C)   Resp 16   Ht 1.727 m (5' 8\")   Wt 79.4 kg (175 lb)   SpO2 97%   BMI 26.61 kg/m     General: Stable. In no acute distress.    Neuro: Alert and oriented x 3. No focal deficits.  Psych: Appropriate mood and affect. Linear/coherent thought process.   Resp: Normal respirations. Lungs clear to auscultation bilaterally.  Cardio: S1S2, regular rate and rhythm, without murmur, clicks or rubs  : right PNT to gravity bag with cranberry colored urine in bag   Skin: Warm and dry.       Pre-Sedation Assessment:  No sedation needed  Code Status: Full Code        ASSESSMENT/PLAN:   # RIGHT ureteral obstruction s/p RIGHT PNT    Right nephrostomy tube exchange; no sedation.     Procedural education reviewed with patient/family in detail " including, but not limited to risks, benefits and alternatives with understanding verbalized by patient/family.    Total time spent on the date of the encounter: 35 minutes.    AHSAN JeffersonC  Interventional Radiology

## 2024-04-24 ENCOUNTER — HOSPITAL ENCOUNTER (OUTPATIENT)
Dept: INTERVENTIONAL RADIOLOGY/VASCULAR | Facility: HOSPITAL | Age: 64
Discharge: HOME | End: 2024-04-24
Attending: NURSE PRACTITIONER | Admitting: RADIOLOGY
Payer: COMMERCIAL

## 2024-04-24 VITALS
WEIGHT: 175 LBS | HEART RATE: 92 BPM | RESPIRATION RATE: 16 BRPM | DIASTOLIC BLOOD PRESSURE: 89 MMHG | HEIGHT: 68 IN | TEMPERATURE: 98 F | BODY MASS INDEX: 26.52 KG/M2 | OXYGEN SATURATION: 97 % | SYSTOLIC BLOOD PRESSURE: 140 MMHG

## 2024-04-24 DIAGNOSIS — N13.30 HYDRONEPHROSIS, UNSPECIFIED HYDRONEPHROSIS TYPE: ICD-10-CM

## 2024-04-24 PROCEDURE — 255N000002 HC RX 255 OP 636: Performed by: RADIOLOGY

## 2024-04-24 PROCEDURE — C1769 GUIDE WIRE: HCPCS

## 2024-04-24 PROCEDURE — 50435 EXCHANGE NEPHROSTOMY CATH: CPT | Mod: RT

## 2024-04-24 PROCEDURE — C1729 CATH, DRAINAGE: HCPCS

## 2024-04-24 RX ORDER — GABAPENTIN 300 MG/1
300 TABLET, FILM COATED ORAL 2 TIMES DAILY
COMMUNITY

## 2024-04-24 RX ADMIN — IOHEXOL 5 ML: 350 INJECTION, SOLUTION INTRAVENOUS at 12:04

## 2024-04-24 NOTE — PROCEDURES
[facility]    Procedure: Right nephrostomy tube exchange    Date/Time: 4/24/2024 12:18 PM    Performed by: Marcellus Lakhani MD  Authorized by: Marcellus Lakhani MD      UNIVERSAL PROTOCOL   Site Marked: NA  Prior Images Obtained and Reviewed:  Yes  Required items: Required blood products, implants, devices and special equipment available    Patient identity confirmed:  Verbally with patient, arm band, provided demographic data and hospital-assigned identification number  Patient was reevaluated immediately before administering moderate or deep sedation or anesthesia  Confirmation Checklist:  Patient's identity using two indicators, relevant allergies, procedure was appropriate and matched the consent or emergent situation and correct equipment/implants were available  Time out: Immediately prior to the procedure a time out was called    Universal Protocol: the Joint Commission Universal Protocol was followed    Preparation: Patient was prepped and draped in usual sterile fashion       ANESTHESIA    Anesthesia:  Local infiltration  Local Anesthetic:  Lidocaine 1% without epinephrine      SEDATION    Patient Sedated: No    See dictated procedure note for full details.  Findings: Successful right nephrostomy tube exchange.    Specimens: none    Complications: None    Condition: Stable      PROCEDURE    Patient Tolerance:  Patient tolerated the procedure well with no immediate complications  Length of time physician/provider present for 1:1 monitoring during sedation: 0    Steve Lakhani MD  Interventional Radiology

## 2024-04-24 NOTE — DISCHARGE INSTRUCTIONS
Percutaneous Nephrostomy Tube (PNT) Exchange/Check Discharge Instructions:  You had your nephrostomy tube checked or exchanged today. Please refer to the below instructions following your check/exchange:    Care Instructions:  - If you received sedation for your procedure, do not drive or operate heavy machinery for the rest of the day.  - Rest today if your tube was exchanged. Avoid strenuous activity and heavy lifting for the rest of the day. Return to your normal activities as you tolerate tomorrow.  - Keep the nephrostomy tube site clean and dry.   - You may shower, but do not submerge the nephrostomy tube site in water (avoid tub baths, Jacuzzis, hot tubs and pools).  - If you have a gauze dressing, change the gauze and tape dressing as needed to keep site clean and dry. If you have a securement device, leave in place until your next exchange.  - Clean around nephrostomy tubes exit sites with soap and water, pat and apply new split gauze around the tube at the exit site.  - Urine going into the bag may be red with blood for the first few days.  - Keep the drainage bag lower than your kidney to keep urine from backing up.  - Inspect the tube often for kinks.  - Empty your drainage bag when it is approximately half way fluid. Follow below instructions for emptying bag:  - Clean hands well with soap and water.  - Place a container near the outlet valve of the drainage bag.  - To open the valve:  - If you have a [facility] leg bag: Twist the blue valve at the bottom of the bag while holding the valve over your container to empty bag. Re-twist the valve closed on the drainage bag once complete.  - If you have a [place] leg bag: Turn the lever downward while holding the valve over your container to empty the bag. Turn the valve upward to close once complete.  - Discard drainage into toilet once urine drained.    Follow-Up:  - Routine (every 2-3 months) nephrostomy tube exchange is recommended. Contact [facility] Outpatient Scheduling at [phone] to schedule exchanges.    Contact [facility] RN Line at [phone] if you experience the following:  - Nephrostomy tube(s) stops draining urine.  - Nephrostomy tube(s) site is leaking urine.  - Extreme pain at the nephrostomy tube site.  - Nephrostomy tube appears to be falling out or has fallen out, becomes clogged or breaks.    Seek Medical Evaluation for the following:  - Fever (greater than 101 F (38.3C)).  - Purulent (yellow/green/foul smelling) drainage from nephrostomy tube exit sites.  - Significant bleeding from nephrostomy tube site(s).  - Significant or worsening pain at nephrostomy tube exit site(s) or back.

## 2024-06-05 ENCOUNTER — MYC MEDICAL ADVICE (OUTPATIENT)
Dept: INTERVENTIONAL RADIOLOGY/VASCULAR | Facility: CLINIC | Age: 64
End: 2024-06-05
Payer: COMMERCIAL

## 2024-06-26 ENCOUNTER — HOSPITAL ENCOUNTER (OUTPATIENT)
Dept: INTERVENTIONAL RADIOLOGY/VASCULAR | Facility: HOSPITAL | Age: 64
Discharge: HOME | End: 2024-06-26
Attending: PHYSICIAN ASSISTANT | Admitting: RADIOLOGY
Payer: COMMERCIAL

## 2024-06-26 VITALS
OXYGEN SATURATION: 99 % | TEMPERATURE: 97.8 F | DIASTOLIC BLOOD PRESSURE: 82 MMHG | RESPIRATION RATE: 16 BRPM | SYSTOLIC BLOOD PRESSURE: 135 MMHG | HEART RATE: 97 BPM

## 2024-06-26 DIAGNOSIS — N13.5 URETERAL OBSTRUCTION: ICD-10-CM

## 2024-06-26 DIAGNOSIS — N13.30 HYDRONEPHROSIS, UNSPECIFIED HYDRONEPHROSIS TYPE: Primary | ICD-10-CM

## 2024-06-26 PROCEDURE — 50435 EXCHANGE NEPHROSTOMY CATH: CPT | Mod: RT

## 2024-06-26 PROCEDURE — C1729 CATH, DRAINAGE: HCPCS

## 2024-06-26 PROCEDURE — C1769 GUIDE WIRE: HCPCS

## 2024-06-26 PROCEDURE — 255N000002 HC RX 255 OP 636: Performed by: PHYSICIAN ASSISTANT

## 2024-06-26 RX ADMIN — IOHEXOL 10 ML: 350 INJECTION, SOLUTION INTRAVENOUS at 12:07

## 2024-06-26 NOTE — PROGRESS NOTES
Interventional Radiology - Pre-Procedure Note:  Outpatient - [facility]  06/26/2024     Procedure Requested: RIGHT nephrostomy tube exchange  Requested by: Aaliyah Nye PA-C     Brief HPI: Rich Wolff is a 63 year old male with a PMH of HTN, HLD, colon cancer, prostate cancer s/p prostatectomy, and RIGHT ureteral obstruction s/p RIGHT PNT (initially placed [place] 11/11/2020) who presents for a routine RIGHT nephrostomy tube exchange. Last exchanged 04/24/2024, see imaging below. Typically does not require sedation or antibiotics for procedure. Denies any leaking or other issues with his tube.     IMAGING:  [facility]  LOCATION: [facility]  DATE: 4/24/2024     PROCEDURE: NEPHROSTOMY TUBE EXCHANGE     ATTENDING: ROMARIO Lakhani MD.     INDICATION: Routine two-month nephrostomy tube exchange     CONSENT: The risks, benefits, and alternatives of nephrostomy tube exchange were discussed with the patient in detail. All questions were answered. Informed consent was given to proceed with the procedure.     MODERATE SEDATION: None     FLUOROSCOPIC TIME: 0.3 minutes.  AIR KERMA:  14 mGy.  CONTRAST: 5 mL into the collecting system     UNIVERSAL PRECAUTIONS: The procedure was performed utilizing maximum sterile barrier technique. Prior to the start of the procedure, a standard pause for patient safety was performed with site marking as indicated.     COMPLICATIONS: No immediate complications.     PROCEDURE:  A  image was obtained which demonstrated the right PCN tube to be in expected location. A nephrostogram was performed through the nephrostomy tube which demonstrated mild hydronephrosis. Under direct fluoroscopic visualization, the   previous tube was removed over a wire and exchange was made for a new 10 [place]ic nephrostomy tube. The loop was locked within the renal pelvis. A post placement nephrostogram was performed which  demonstrated the nephrostomy loop to lie within the renal   pelvis. The nephrostomy tube was secured to the skin.                                                                      IMPRESSION:    1. Successful right nephrostomy tube exchange.     PLAN:  1. Patient to be recovered and discharged from IR.  2. Right PCN tube to gravity drainage.  3. No flushing.  4. Follow-up with IR in 2 months for routine PCN exchange.    NPO: N/A, no sedation  ANTICOAGULANTS: Xarelto, no hold required  ANTIBIOTICS: Not needed    ALLERGIES  Allergies   Allergen Reactions    Bee Venom Hives         LABS:  INR   Date Value Ref Range Status   05/08/2023 1.31 (H) 0.85 - 1.15 Final      Hemoglobin   Date Value Ref Range Status   05/08/2023 8.5 (L) 13.3 - 17.7 g/dL Final   12/24/2020 7.7 (L) 13.3 - 17.7 g/dL Final     Platelet Count   Date Value Ref Range Status   05/08/2023 260 150 - 450 10e3/uL Final   12/24/2020 424 150 - 450 10e9/L Final     Creatinine   Date Value Ref Range Status   05/08/2023 2.48 (H) 0.67 - 1.17 mg/dL Final   12/23/2020 1.33 (H) 0.66 - 1.25 mg/dL Final     Potassium   Date Value Ref Range Status   05/08/2023 4.4 3.4 - 5.3 mmol/L Final   11/01/2021 4.7 3.5 - 5.0 mmol/L Final   12/23/2020 3.4 3.4 - 5.3 mmol/L Final         EXAM:  /82 (BP Location: Left arm)   Pulse 97   Temp 97.8  F (36.6  C) (Oral)   Resp 16   SpO2 99%    General: Stable. In no acute distress.    Neuro: Alert and oriented x 3. No focal deficits.  Psych: Appropriate mood and affect. Linear/coherent thought process.   Resp: Normal respirations. Room air.  Skin: Warm and dry. Without excoriations, ecchymosis, erythema, lesions or open sores. RIGHT PNT site CDI, sediment appearing yellow urine noted in bag.      ASA Classification: Class 3 - SEVERE SYSTEMIC DISEASE, DEFINITE FUNCTIONAL LIMITATIONS.   Code Status: FULL CODE      ASSESSMENT/PLAN:   RIGHT nephrostomy tube exchange. No sedation required.    Procedural education reviewed with  patient in detail including, but not limited to risks, benefits and alternatives with understanding verbalized by patient.    Total time spent on the date of the encounter: 20 minutes.      GRACE PRINCE CNP  Interventional Radiology

## 2024-06-26 NOTE — DISCHARGE INSTRUCTIONS
Percutaneous Nephrostomy Tube (PNT) Exchange/Check Discharge Instructions:  You had your nephrostomy tube checked or exchanged today. Please refer to the below instructions following your check/exchange:    Care Instructions:  - If you received sedation for your procedure, do not drive or operate heavy machinery for the rest of the day.  - Rest today if your tube was exchanged. Avoid strenuous activity and heavy lifting for the rest of the day. Return to your normal activities as you tolerate tomorrow.  - Keep the nephrostomy tube site clean and dry.   - You may shower, but do not submerge the nephrostomy tube site in water (avoid tub baths, Jacuzzis, hot tubs and pools).  - If you have a gauze dressing, change the gauze and tape dressing as needed to keep site clean and dry. If you have a securement device, leave in place until your next exchange.  - Clean around nephrostomy tubes exit sites with soap and water, pat and apply new split gauze around the tube at the exit site.  - Urine going into the bag may be red with blood for the first few days.  - Keep the drainage bag lower than your kidney to keep urine from backing up.  - Inspect the tube often for kinks.  - Empty your drainage bag when it is approximately half way fluid. Follow below instructions for emptying bag:  - Clean hands well with soap and water.  - Place a container near the outlet valve of the drainage bag.  - To open the valve:  - If you have a [facility] [facility] leg bag: Twist the blue valve at the bottom of the bag while holding the valve over your container to empty bag. Re-twist the valve closed on the drainage bag once complete.  - If you have a [place] leg bag: Turn the lever downward while holding the valve over your container to empty the bag. Turn the valve upward to close once complete.  - Discard drainage into toilet once urine drained.    Follow-Up:  - Routine (every 2-3 months) nephrostomy tube exchange is recommended. Contact [facility]  IR Outpatient Scheduling at [phone] to schedule exchanges.    Contact [facility] IR RN Line at [phone] if you experience the following:  - Nephrostomy tube(s) stops draining urine.  - Nephrostomy tube(s) site is leaking urine.  - Extreme pain at the nephrostomy tube site.  - Nephrostomy tube appears to be falling out or has fallen out, becomes clogged or breaks.    Seek Medical Evaluation for the following:  - Fever (greater than 101 F (38.3C)).  - Purulent (yellow/green/foul smelling) drainage from nephrostomy tube exit sites.  - Significant bleeding from nephrostomy tube site(s).  - Significant or worsening pain at nephrostomy tube exit site(s) or back.

## 2024-06-27 ENCOUNTER — TELEPHONE (OUTPATIENT)
Dept: INTERVENTIONAL RADIOLOGY/VASCULAR | Facility: HOSPITAL | Age: 64
End: 2024-06-27
Payer: COMMERCIAL

## 2024-06-27 NOTE — TELEPHONE ENCOUNTER
"Patient calling this am with complaints of leaking from his newly exchanged PNT tube.  Tube exchanged on 6/26/2024.    Rich states that since discharge, he has noticed a slow drip or urine from the connection site between the PNT tube itself and the drainage bag.      This leak is causing urine to wet his clothing.      He has attempted to take off and reattach the drainage bag multiple times without any decrease in leakage.      Rich does state that that there is a \"different looking piece\" to his drainage bag at connection that he is not familiar with so he wonders if there is something wrong with bag, or bag is faulty.    Rich does not access to [employer] account for photo transfer of tubing.    I will send secure chat to [facility] IR Provider with update on patient status.  Rich understands that I will notify him with their recommendations.    Per IR provider, plan for patient to bee seen anytime today between 9-4 for evaluation and exchange of drainage bag.  NO appointment needed.  Rich aware of plan.    Asiya DIAZ  Interventional Radiology RN   [phone]      "

## 2024-08-22 ENCOUNTER — TELEPHONE (OUTPATIENT)
Dept: INTERVENTIONAL RADIOLOGY/VASCULAR | Facility: HOSPITAL | Age: 64
End: 2024-08-22
Payer: COMMERCIAL

## 2024-08-27 NOTE — PROGRESS NOTES
Interventional Radiology - Pre-Procedure Note:  Outpatient - [facility]  08/28/2024     Procedure Requested: RIGHT nephrostomy tube exchange  Requested by: GRACE PRINCE CNP    Brief HPI: Rich Wolff is a 63 year old male with a PMH of HTN, HLD, colon cancer, prostate cancer s/p prostatectomy, and RIGHT ureteral obstruction s/p RIGHT PNT (initially placed [place] 11/11/2020) who presents for a routine RIGHT nephrostomy tube exchange. Last exchanged 06/26/2024, per below. Typically does not require sedation or antibiotics for procedure. Denies any leaking or other issues with his tube.      IMAGING:  [facility]  LOCATION: [facility]  DATE: 6/26/2024     PROCEDURE: RIGHT NEPHROSTOMY TUBE EXCHANGE     ATTENDING: To Villanueva MD     INDICATION: Routine nephrostomy tube exchange.     CONSENT: The risks, benefits and alternatives of the above procedures were discussed with the patient in detail. All questions were answered. Informed consent was given to proceed.     MODERATE SEDATION: None.     CONTRAST: 10 mL Omnipaque urinary.  ANTIBIOTICS: None.  ADDITIONAL MEDICATIONS: None.     FLUOROSCOPIC TIME: 0.3 minutes.  RADIATION DOSE: Air Kerma: 20 mGy.     COMPLICATIONS: No immediate complications.     STERILE BARRIER TECHNIQUE: Maximum sterile barrier technique was used. Cutaneous antisepsis was performed at the operative site with application of 2% chlorhexidine and large sterile drape. Prior to the procedure, the  and assistant performed   hand hygiene and wore hat, mask, sterile gown, and sterile gloves during the entire procedure.     PROCEDURE:     fluoroscopic image was obtained. Antegrade nephrostogram was performed through the indwelling right 10 Fr nephrostomy tube. Under direct fluoroscopic visualization, the tube was removed over a wire and exchange was made for a new 10 Fr nephrostomy.   The loop was locked within the renal  pelvis, and post placement nephrostogram was performed. The catheter was secured to the skin with adhesive dressings and attached to gravity bag drainage.     FINDINGS:    The initial  image and nephrostogram demonstrates the previously placed nephrostomy tube is patent and in proper position. Following exchange, the new nephrostomy loop lies within the renal pelvis and controls the urinary system well.                                                                      IMPRESSION:    Successful right percutaneous nephrostomy tube exchange.    NPO: N/A, no sedation  ANTICOAGULANTS: Xarelto, no hold required  ANTIBIOTICS: Not needed    ALLERGIES  Allergies   Allergen Reactions    Bee Venom Hives         LABS:  INR   Date Value Ref Range Status   05/08/2023 1.31 (H) 0.85 - 1.15 Final      Hemoglobin   Date Value Ref Range Status   05/08/2023 8.5 (L) 13.3 - 17.7 g/dL Final   12/24/2020 7.7 (L) 13.3 - 17.7 g/dL Final     Platelet Count   Date Value Ref Range Status   05/08/2023 260 150 - 450 10e3/uL Final   12/24/2020 424 150 - 450 10e9/L Final     Creatinine   Date Value Ref Range Status   05/08/2023 2.48 (H) 0.67 - 1.17 mg/dL Final   12/23/2020 1.33 (H) 0.66 - 1.25 mg/dL Final     Potassium   Date Value Ref Range Status   05/08/2023 4.4 3.4 - 5.3 mmol/L Final   11/01/2021 4.7 3.5 - 5.0 mmol/L Final   12/23/2020 3.4 3.4 - 5.3 mmol/L Final         EXAM:  /70 (BP Location: Left arm)   Pulse 60   Temp 97.5  F (36.4  C) (Oral)   Resp 16   SpO2 100%    General: Stable. In no acute distress.    Neuro: Alert and oriented x 3. No focal deficits.  Psych: Appropriate mood and affect. Linear/coherent thought process.   Resp: Normal respirations. Room air.  Skin: Warm and dry. Without excoriations, ecchymosis, erythema, lesions or open sores. RIGHT PNT site CDI, no drainage in bag.    ASA Classification: Class 3 - SEVERE SYSTEMIC DISEASE, DEFINITE FUNCTIONAL LIMITATIONS.   Code Status: FULL  CODE      ASSESSMENT/PLAN:   RIGHT nephrostomy tube exchange. No sedation required.    Procedural education reviewed with patient in detail including, but not limited to risks, benefits and alternatives with understanding verbalized by patient.    Total time spent on the date of the encounter: 20 minutes.      GRACE PRINCE CNP  Interventional Radiology

## 2024-08-28 ENCOUNTER — HOSPITAL ENCOUNTER (OUTPATIENT)
Dept: INTERVENTIONAL RADIOLOGY/VASCULAR | Facility: HOSPITAL | Age: 64
Discharge: HOME | End: 2024-08-28
Admitting: STUDENT IN AN ORGANIZED HEALTH CARE EDUCATION/TRAINING PROGRAM
Payer: COMMERCIAL

## 2024-08-28 VITALS
RESPIRATION RATE: 16 BRPM | OXYGEN SATURATION: 100 % | TEMPERATURE: 97.5 F | SYSTOLIC BLOOD PRESSURE: 124 MMHG | HEART RATE: 60 BPM | DIASTOLIC BLOOD PRESSURE: 70 MMHG

## 2024-08-28 DIAGNOSIS — N13.30 HYDRONEPHROSIS, UNSPECIFIED HYDRONEPHROSIS TYPE: ICD-10-CM

## 2024-08-28 DIAGNOSIS — N13.30 HYDRONEPHROSIS, UNSPECIFIED HYDRONEPHROSIS TYPE: Primary | ICD-10-CM

## 2024-08-28 PROCEDURE — 255N000002 HC RX 255 OP 636: Performed by: STUDENT IN AN ORGANIZED HEALTH CARE EDUCATION/TRAINING PROGRAM

## 2024-08-28 PROCEDURE — C1769 GUIDE WIRE: HCPCS

## 2024-08-28 PROCEDURE — 50435 EXCHANGE NEPHROSTOMY CATH: CPT

## 2024-08-28 PROCEDURE — C1729 CATH, DRAINAGE: HCPCS

## 2024-08-28 RX ORDER — ROPINIROLE 0.25 MG/1
0.25 TABLET, FILM COATED ORAL AT BEDTIME
COMMUNITY

## 2024-08-28 RX ADMIN — IOHEXOL 10 ML: 350 INJECTION, SOLUTION INTRAVENOUS at 13:24

## 2024-08-28 NOTE — PROCEDURES
[facility]    Procedure: IR Procedure Note    Date/Time: 8/28/2024 1:25 PM    Performed by: Paul Campoverde MD  Authorized by: Paul Campoverde MD  IR Fellow Physician:    Pre Procedure Diagnosis: Hydronephrosis  Post Procedure Diagnosis: Hydronephrosis    UNIVERSAL PROTOCOL   Site Marked: Yes  Prior Images Obtained and Reviewed:  Yes  Required items: Required blood products, implants, devices and special equipment available    Patient identity confirmed:  Verbally with patient  NA - No sedation, light sedation, or local anesthesia  Confirmation Checklist:  Patient's identity using two indicators  Universal Protocol: the Joint Commission Universal Protocol was followed      SEDATION    Patient Sedated: No    Findings: Routine right nephrostomy tube exchange    Plan: Routine exchange in 8-10 weeks      PROCEDURE    Length of time physician/provider present for 1:1 monitoring during sedation:  0 min

## 2024-08-28 NOTE — DISCHARGE INSTRUCTIONS
Percutaneous Nephrostomy Tube (PNT) Exchange/Check Discharge Instructions:  You had your nephrostomy tube checked or exchanged today. Please refer to the below instructions following your check/exchange:    Care Instructions:  - If you received sedation for your procedure, do not drive or operate heavy machinery for the rest of the day.  - Rest today if your tube was exchanged. Avoid strenuous activity and heavy lifting for the rest of the day. Return to your normal activities as you tolerate tomorrow.  - Keep the nephrostomy tube site clean and dry.   - You may shower, but do not submerge the nephrostomy tube site in water (avoid tub baths, Jacuzzis, hot tubs and pools).  - If you have a gauze dressing, change the gauze and tape dressing as needed to keep site clean and dry. If you have a securement device, leave in place until your next exchange.  - Clean around nephrostomy tubes exit sites with soap and water, pat and apply new split gauze around the tube at the exit site.  - Urine going into the bag may be red with blood for the first few days.  - Keep the drainage bag lower than your kidney to keep urine from backing up.  - Inspect the tube often for kinks.  - Empty your drainage bag when it is approximately half way fluid. Follow below instructions for emptying bag:  - Clean hands well with soap and water.  - Place a container near the outlet valve of the drainage bag.  - To open the valve:  - If you have a [employer] leg bag: Twist the blue valve at the bottom of the bag while holding the valve over your container to empty bag. Re-twist the valve closed on the drainage bag once complete.  - If you have a [place] leg bag: Turn the lever downward while holding the valve over your container to empty the bag. Turn the valve upward to close once complete.  - Discard drainage into toilet once urine drained.    Follow-Up:  - Routine (every 2-3 months) nephrostomy tube exchange is recommended. Contact [facility]  IR Outpatient Scheduling at [phone] to schedule exchanges.    Contact [facility] IR RN Line at [phone] if you experience the following:  - Nephrostomy tube(s) stops draining urine.  - Nephrostomy tube(s) site is leaking urine.  - Extreme pain at the nephrostomy tube site.  - Nephrostomy tube appears to be falling out or has fallen out, becomes clogged or breaks.    Seek Medical Evaluation for the following:  - Fever (greater than 101 F (38.3C)).  - Purulent (yellow/green/foul smelling) drainage from nephrostomy tube exit sites.  - Significant bleeding from nephrostomy tube site(s).  - Significant or worsening pain at nephrostomy tube exit site(s) or back.

## 2024-08-28 NOTE — PROCEDURES
INTERVENTIONAL RADIOLOGY POST PROCEDURE NOTE    Procedure: Right nephrostomy tube exchange    Interventional Radiologist: Paul Campoverde MD    Complications:  No immediate complications.    Sedation: None     Blood loss: Minimal.    Brief Findings: Routine nephrostomy tube exchange    Plan: Follow-up in 8-12 weeks for routine exchange.    Please see dictation in PACS or under the Imaging tab in [facility] for detailed procedure note.     Paul Campoverde MD  Interventional Radiology

## 2024-08-28 NOTE — SEDATION DOCUMENTATION
Patient Name: Rich Wolff  Medical Record Number: [id number]  Today's Date: 8/28/2024    Procedure: Nephrostomy Tube Exchange  Proceduralist: Dr. Campoverde    Procedure Start: 1318  Procedure end: 1321    Other Notes: Pt arrived to IR room 2 from IR holding. Consent reviewed. Pt denies any questions or concerns regarding procedure. Pt positioned prone and monitored per protocol. Pt tolerated procedure without any noted complications. VSS post procedure. Pt transferred back to IR holding.  ]

## 2024-10-28 ENCOUNTER — TELEPHONE (OUTPATIENT)
Dept: INTERVENTIONAL RADIOLOGY/VASCULAR | Facility: HOSPITAL | Age: 64
End: 2024-10-28
Payer: COMMERCIAL

## 2024-10-30 ENCOUNTER — HOSPITAL ENCOUNTER (OUTPATIENT)
Dept: INTERVENTIONAL RADIOLOGY/VASCULAR | Facility: HOSPITAL | Age: 64
Discharge: HOME | End: 2024-10-30
Admitting: RADIOLOGY
Payer: COMMERCIAL

## 2024-10-30 VITALS
SYSTOLIC BLOOD PRESSURE: 139 MMHG | OXYGEN SATURATION: 98 % | DIASTOLIC BLOOD PRESSURE: 82 MMHG | TEMPERATURE: 97.4 F | RESPIRATION RATE: 18 BRPM | HEART RATE: 77 BPM

## 2024-10-30 DIAGNOSIS — N13.30 HYDRONEPHROSIS, UNSPECIFIED HYDRONEPHROSIS TYPE: ICD-10-CM

## 2024-10-30 PROCEDURE — 255N000002 HC RX 255 OP 636: Performed by: RADIOLOGY

## 2024-10-30 PROCEDURE — 50435 EXCHANGE NEPHROSTOMY CATH: CPT

## 2024-10-30 PROCEDURE — C1729 CATH, DRAINAGE: HCPCS

## 2024-10-30 PROCEDURE — C1769 GUIDE WIRE: HCPCS

## 2024-10-30 RX ORDER — HEPARIN SODIUM 200 [USP'U]/100ML
1 INJECTION, SOLUTION INTRAVENOUS EVERY 5 MIN PRN
Status: DISCONTINUED | OUTPATIENT
Start: 2024-10-30 | End: 2024-10-31 | Stop reason: HOSPADM

## 2024-10-30 RX ORDER — CALCIUM CARBONATE/VITAMIN D3 600 MG-10
1 TABLET ORAL DAILY
COMMUNITY

## 2024-10-30 RX ADMIN — IOHEXOL 10 ML: 350 INJECTION, SOLUTION INTRAVENOUS at 13:27

## 2024-10-30 NOTE — PROGRESS NOTES
Pt understands capping trial and to re-connect to drainage bag if he feels any pressure or pain in his kidney area.  Pt is aware to follow-up early next week to re-assess. Scheduling and IR Triage numbers provided. Pt ambulatory at time of discharge.

## 2024-10-30 NOTE — SEDATION DOCUMENTATION
Patient Name: Rich Wolff  Medical Record Number: [id number]  Today's Date: 10/30/2024    Procedure: Nephrostomy Tube Change  Proceduralist: Dr. Savage    Procedure Start: 1316  Procedure end: 1320    Other Notes: Pt arrived to IR room 1 from IR holding. Consent reviewed. Pt denies any questions or concerns regarding procedure. Pt positioned left side lying and monitored per protocol. Pt tolerated procedure without any noted complications. Pt transferred back to IR holding.

## 2024-10-30 NOTE — DISCHARGE INSTRUCTIONS
Percutaneous Nephrostomy Tube (PNT) or Nephroureteral Stent (NU) Discharge Instructions:  A PNT is a catheter (plastic tube) that is inserted through your skin into your kidney to allow for drainage of your urine outside of your body into a gravity bag. A nephroureteral stent (NU) is a catheter (plastic tube) that is placed through your skin into your kidney and then goes down the ureter into your bladder - these tubes can drain externally into a bag or be capped to drain into the bladder. Please follow the below instructions regarding the care of your tube/stent.    Care Instructions:  - If you received sedation for your procedure, do not drive or operate heavy machinery for the rest of the day.  - Rest after your drain was placed. Avoid strenuous activity and heavy lifting for the next 2 days. Return to your normal activities as you tolerate after the 2 day restriction.  - Keep the nephrostomy tube/nephroureteral stent site clean and dry.   - You may shower, but do not submerge the nephrostomy tube site in water (avoid tub baths, Jacuzzis, hot tubs and pools)  - If you have a gauze dressing, change the gauze and tape dressing as needed to keep site clean and dry. If you have a securement device, leave in place until your next exchange.  - Clean around nephrostomy tube/nephroureteral stent exit sites with soap and water, pat and apply new split gauze around the tube at the exit site.  - Urine going into the bag may be red with blood for the first few days.  - Keep the drainage bag lower than your kidney to keep urine from backing up.  - Inspect the tube often for kinks.  - Empty your drainage bag when it is approximately half way fluid. Follow below instructions for emptying bag:  - Clean hands well with soap and water.  - Place a container near the outlet valve of the drainage bag.  - To open the valve:  - If you have a [employer] leg bag: Twist the blue valve at the bottom of the bag while holding the valve  over your container to empty bag. Re-twist the valve closed on the drainage bag once complete.  - If you have a [place] leg bag: Turn the lever downward while holding the valve over your container to empty the bag. Turn the valve upward to close once complete.  - Discard drainage into toilet once urine drained.    Follow-Up:  - Follow up early next week for re-check.  After, then routine (every 2-3 months) nephrostomy tube exchange is recommended. Your Urologist may order and schedule exchanges by calling [facility] Outpatient Scheduling at [phone]  - Follow up with your Urologist as previously instructed.    Contact [facility] RN Line at [phone] if you experience the following:  - Nephrostomy tube/nephroureteral stent(s) stops draining urine.  - Nephrostomy tube/nephroureteral stent(s) site is leaking urine.  - Extreme pain at the nephrostomy tube/nephroureteral stent site.  - Nephrostomy tube/nephroureteral stent appears to be falling out or has fallen out, becomes clogged or breaks.    Seek Medical Evaluation for the following:  - Fever (greater than 101 F (38.3C)).  - Purulent (yellow/green/foul smelling) drainage from nephrostomy tube/nephroureteral stent exit sites.  - Significant bleeding from nephrostomy tube/nephroureteral stent(s).  - Significant or worsening pain at nephrostomy tube/nephroureteral stent exit site(s) or back.

## 2024-10-31 ENCOUNTER — MYC MEDICAL ADVICE (OUTPATIENT)
Dept: INTERVENTIONAL RADIOLOGY/VASCULAR | Facility: CLINIC | Age: 64
End: 2024-10-31
Payer: COMMERCIAL

## 2024-11-01 NOTE — PROGRESS NOTES
Interventional Radiology - Pre Procedure Chart Review  Outpatient - [facility]  Nov 4, 2024      Procedure Requested: R PNT exchange  Requested by: GOGO Cho NP      HPI: 63 year old patient with a pertinent past medical history of colon and prostate cancer s/p prostatectomy c/b right ureteral obstruction with hydronephrosis s/p percutaneous nephrostomy tube placement at [facility] on 11/11/2020 who presents today for nephrostogram. PNT previously capped 10/30/24.      Clinical notes reviewed    Pertinent medications reviewed:   Anticoagulation: Xarelto per chart review   Antibiotic: none indicated for routine procedure    Allergies   Allergen Reactions    Bee Venom Hives       IMAGING:    Study Result    Narrative & Impression   [facility]  LOCATION: [facility]  DATE: 10/30/2024     1. NEPHROSTOMY TUBE EXCHANGE     ATTENDING: Estevan Savage MD.     INDICATION: 63-year-old male with history of colon and prostate cancer and indwelling right nephrostomy tube presents for right nephrostomy tube exchange.      CONSENT: The risks, benefits, and alternatives of nephrostomy tube exchange were discussed with the patient in detail. All questions were answered. Informed consent was given to proceed with the procedure.     ADDITIONAL MEDICATIONS: none     FLUOROSCOPIC TIME: 1.5 mins.  DOSE: Air Kerma:46 mGy.     CONTRAST: 10 mL.     UNIVERSAL PRECAUTIONS: The procedure was performed utilizing maximum sterile barrier technique. Prior to the start of the procedure, a standard pause for patient safety was performed with site marking as indicated.     COMPLICATIONS: No immediate complications.     PROCEDURE: The patient was prepped and draped in a sterile fashion. Contrast was injected through the existing nephrostomy tube and nephrostogram obtained.   Guidewire was inserted. The existing nephrostomy tube was removed. A new 10 [place] nephrostomy tube was advanced  "over a guidewire and positioned within the right  renal pelvis.      The patient tolerated the procedure well without immediate complications.     FINDINGS: NEPHROSTOMY TUBE EXCHANGE:   New 10 [place] nephrostomy tube was positioned within the right  renal pelvis. Contrast drains through the right ureter into the bladder with no evidence of significant obstruction.                                                                      IMPRESSION:  1.   The right ureter appears patent with no evidence of significant obstruction.  2.   Exchange of right  10 [place] nephrostomy tube.       PLAN:  The nephrostomy tube was capped and the patient will undergo capping trial. The patient was sent home with a drainage bag with the plan to return next week for possible nephrostomy tube removal.          EXAM:  There were no vitals taken for this visit.  Not assessed    LABS:  INR (no units)   Date Value   05/08/2023 1.31 (H)       Lab Results   Component Value Date    WBC 17.0 (H) 05/08/2023    HGB 8.5 (L) 05/08/2023     05/08/2023       No results found for: \"CREATININE\"    No components found for: \"K\"      PLAN:  Planning for right sided nephrostogram with potential intervention in IR.     Radiologist to further review procedure with patient.    EMR reviewed. No formal assessment completed.     Total time: 15 minutes       GRACE Naik CNP  Interventional Radiology    "

## 2024-11-04 ENCOUNTER — HOSPITAL ENCOUNTER (OUTPATIENT)
Dept: INTERVENTIONAL RADIOLOGY/VASCULAR | Facility: CLINIC | Age: 64
Discharge: HOME | End: 2024-11-04
Attending: NURSE PRACTITIONER | Admitting: RADIOLOGY
Payer: COMMERCIAL

## 2024-11-04 VITALS
SYSTOLIC BLOOD PRESSURE: 150 MMHG | DIASTOLIC BLOOD PRESSURE: 87 MMHG | TEMPERATURE: 97.9 F | OXYGEN SATURATION: 98 % | HEART RATE: 84 BPM

## 2024-11-04 DIAGNOSIS — N13.5 URETERAL OBSTRUCTION: ICD-10-CM

## 2024-11-04 DIAGNOSIS — N13.30 HYDRONEPHROSIS: ICD-10-CM

## 2024-11-04 PROCEDURE — C1769 GUIDE WIRE: HCPCS

## 2024-11-04 PROCEDURE — C1729 CATH, DRAINAGE: HCPCS

## 2024-11-04 PROCEDURE — 50435 EXCHANGE NEPHROSTOMY CATH: CPT

## 2024-11-04 PROCEDURE — 50431 NJX PX NFROSGRM &/URTRGRM: CPT

## 2024-11-04 NOTE — DISCHARGE INSTRUCTIONS
Drain/Tube Removal Discharge Instructions:  Please follow the below instructions following your drain/tube removal.    Care Instructions after drain/tube removal:  - OK to shower after removal of your drain/tube.  - Avoid stagnant water such as tub baths, Jacuzzis and pools for 3 days after drain/tube removal.  - Keep previous drain/tube exit site covered with dressing of your preference until drainage stops. Change dressings daily and as needed to keep site dry and clean.  - Expect drainage will stop in approximately 3 days time.    Seek medical evaluation for the following:  - Fever (greater than 101 F (38.3C)).  - Purulent (yellow/green/foul smelling) drainage from previous drain exit site.  - Develop pain at or near the previous drain exit site.  - Persistent drainage lasting longer than three days from previous drain exit site.

## 2024-11-04 NOTE — IR NOTE
Patient Name: Rich Wolff  Medical Record Number: [id number]  Today's Date: 11/4/2024    Procedure: Image guided right nephstrogram   Proceduralist: Dr. Lakhani  Pathology present: NA    Procedure Start: 1303  Procedure end: 1314  Sedation medications administered: none     : triston    Other Notes: Pt arrived to IR room 2 from Radiology waiting room. Consent reviewed. Pt denies any questions or concerns regarding procedure. Pt positioned prone and monitored per protocol. Pt tolerated procedure without any noted complications. Pt transferred back to IR pre/post  2.      Patient arrives with nephrostomy tube capped, urgency with urination but patient says this is baseline, no pain, afebrile, VSS. When patient has had nephrostomy tube exchanges in the past, they are without sedation per patient.     Nephrostomy tube removed today by Dr. Lakhani. Patient discharge instructions given and AVS provided. Patient ambulatory to waiting room.

## 2024-11-04 NOTE — PROCEDURES
[facility]    Procedure: Antegrade right nephrostogram    Date/Time: 11/4/2024 1:15 PM    Performed by: Marcellus Lakhani MD  Authorized by: Marcellus Lakhani MD  IR Fellow Physician:    Pre Procedure Diagnosis: Right hydronephrosis  Post Procedure Diagnosis: Resolved right hydronephrosis    UNIVERSAL PROTOCOL   Site Marked: NA  Prior Images Obtained and Reviewed:  Yes  Required items: Required blood products, implants, devices and special equipment available    Patient identity confirmed:  Verbally with patient  NA - No sedation, light sedation, or local anesthesia  Confirmation Checklist:  Patient's identity using two indicators  Universal Protocol: the Joint Commission Universal Protocol was followed      SEDATION    Patient Sedated: No    Findings: Antegrade right nephrostogram demonstrated free flow of contrast through the ureter into the bladder. Patient denied any fevers/chills or flank pain for the past week during his capping trial. Given these findings the nephrostomy tube was removed.      PROCEDURE    Length of time physician/provider present for 1:1 monitoring during sedation:  0 min    Steve Lakhani MD  Interventional Radiology

## 2024-12-11 ENCOUNTER — LAB (OUTPATIENT)
Dept: LAB | Facility: HOSPITAL | Age: 64
End: 2024-12-11
Payer: COMMERCIAL

## 2024-12-11 DIAGNOSIS — E87.5 HYPERKALEMIA: ICD-10-CM

## 2024-12-11 DIAGNOSIS — N17.9 AKI (ACUTE KIDNEY INJURY) (H): Primary | ICD-10-CM

## 2024-12-11 DIAGNOSIS — N17.9 AKI (ACUTE KIDNEY INJURY) (H): ICD-10-CM

## 2024-12-11 LAB
ALBUMIN SERPL BCG-MCNC: 4 G/DL (ref 3.5–5.2)
ANION GAP SERPL CALCULATED.3IONS-SCNC: 9 MMOL/L (ref 7–15)
BUN SERPL-MCNC: 32.4 MG/DL (ref 8–23)
CALCIUM SERPL-MCNC: 9.2 MG/DL (ref 8.8–10.4)
CHLORIDE SERPL-SCNC: 108 MMOL/L (ref 98–107)
CREAT SERPL-MCNC: 2.2 MG/DL (ref 0.67–1.17)
EGFRCR SERPLBLD CKD-EPI 2021: 33 ML/MIN/1.73M2
GLUCOSE SERPL-MCNC: 94 MG/DL (ref 70–99)
HCO3 SERPL-SCNC: 22 MMOL/L (ref 22–29)
MAGNESIUM SERPL-MCNC: 1.9 MG/DL (ref 1.7–2.3)
PHOSPHATE SERPL-MCNC: 3 MG/DL (ref 2.5–4.5)
POTASSIUM SERPL-SCNC: 5 MMOL/L (ref 3.4–5.3)
SODIUM SERPL-SCNC: 139 MMOL/L (ref 135–145)

## 2024-12-11 PROCEDURE — 84520 ASSAY OF UREA NITROGEN: CPT

## 2024-12-11 PROCEDURE — 80048 BASIC METABOLIC PNL TOTAL CA: CPT

## 2024-12-11 PROCEDURE — 36415 COLL VENOUS BLD VENIPUNCTURE: CPT

## 2024-12-11 PROCEDURE — 83735 ASSAY OF MAGNESIUM: CPT

## 2025-01-15 ENCOUNTER — HOSPITAL ENCOUNTER (OUTPATIENT)
Dept: CARDIOLOGY | Facility: HOSPITAL | Age: 65
Discharge: HOME | End: 2025-01-15
Payer: COMMERCIAL

## 2025-01-15 DIAGNOSIS — Z01.818 EXAMINATION PRIOR TO CHEMOTHERAPY: ICD-10-CM

## 2025-01-15 DIAGNOSIS — C18.9 PRIMARY MALIGNANT NEOPLASM OF COLON (H): ICD-10-CM

## 2025-01-15 LAB — LVEF ECHO: NORMAL

## 2025-01-15 PROCEDURE — 93306 TTE W/DOPPLER COMPLETE: CPT | Mod: 26 | Performed by: INTERNAL MEDICINE

## 2025-01-15 PROCEDURE — 999N000208 ECHOCARDIOGRAM COMPLETE

## 2025-01-15 PROCEDURE — C8929 TTE W OR WO FOL WCON,DOPPLER: HCPCS

## 2025-01-15 PROCEDURE — 255N000002 HC RX 255 OP 636

## 2025-01-15 RX ADMIN — PERFLUTREN 2 ML: 6.52 INJECTION, SUSPENSION INTRAVENOUS at 13:40

## 2025-04-13 ENCOUNTER — HEALTH MAINTENANCE LETTER (OUTPATIENT)
Age: 65
End: 2025-04-13

## 2025-05-27 ENCOUNTER — TRANSFERRED RECORDS (OUTPATIENT)
Dept: HEALTH INFORMATION MANAGEMENT | Facility: CLINIC | Age: 65
End: 2025-05-27
Payer: COMMERCIAL

## 2025-05-27 ENCOUNTER — MEDICAL CORRESPONDENCE (OUTPATIENT)
Dept: HEALTH INFORMATION MANAGEMENT | Facility: CLINIC | Age: 65
End: 2025-05-27
Payer: COMMERCIAL

## 2025-05-30 ENCOUNTER — APPOINTMENT (OUTPATIENT)
Dept: RADIOLOGY | Facility: HOSPITAL | Age: 65
End: 2025-05-30
Attending: UROLOGY
Payer: COMMERCIAL

## 2025-05-30 ENCOUNTER — APPOINTMENT (OUTPATIENT)
Dept: CT IMAGING | Facility: HOSPITAL | Age: 65
End: 2025-05-30
Attending: FAMILY MEDICINE
Payer: COMMERCIAL

## 2025-05-30 ENCOUNTER — ANESTHESIA EVENT (OUTPATIENT)
Dept: SURGERY | Facility: HOSPITAL | Age: 65
End: 2025-05-30
Payer: COMMERCIAL

## 2025-05-30 ENCOUNTER — HOSPITAL ENCOUNTER (INPATIENT)
Facility: HOSPITAL | Age: 65
LOS: 5 days | Discharge: HOME | End: 2025-06-04
Attending: FAMILY MEDICINE | Admitting: INTERNAL MEDICINE
Payer: COMMERCIAL

## 2025-05-30 ENCOUNTER — ANESTHESIA (OUTPATIENT)
Dept: SURGERY | Facility: HOSPITAL | Age: 65
End: 2025-05-30
Payer: COMMERCIAL

## 2025-05-30 DIAGNOSIS — N17.9 ACUTE KIDNEY INJURY SUPERIMPOSED ON CKD: ICD-10-CM

## 2025-05-30 DIAGNOSIS — N13.30 HYDRONEPHROSIS OF LEFT KIDNEY: ICD-10-CM

## 2025-05-30 DIAGNOSIS — R65.20 SEPSIS WITH ACUTE RENAL FAILURE WITHOUT SEPTIC SHOCK, DUE TO UNSPECIFIED ORGANISM, UNSPECIFIED ACUTE RENAL FAILURE TYPE (H): ICD-10-CM

## 2025-05-30 DIAGNOSIS — N18.9 ACUTE KIDNEY INJURY SUPERIMPOSED ON CKD: ICD-10-CM

## 2025-05-30 DIAGNOSIS — Z79.69 ON ANTINEOPLASTIC CHEMOTHERAPY: ICD-10-CM

## 2025-05-30 DIAGNOSIS — Z71.89 OTHER SPECIFIED COUNSELING: Chronic | ICD-10-CM

## 2025-05-30 DIAGNOSIS — A41.9 SEPSIS WITH ACUTE RENAL FAILURE WITHOUT SEPTIC SHOCK, DUE TO UNSPECIFIED ORGANISM, UNSPECIFIED ACUTE RENAL FAILURE TYPE (H): ICD-10-CM

## 2025-05-30 DIAGNOSIS — N10 ACUTE PYELONEPHRITIS: ICD-10-CM

## 2025-05-30 DIAGNOSIS — Z93.3 COLOSTOMY STATUS (H): ICD-10-CM

## 2025-05-30 DIAGNOSIS — E83.42 HYPOMAGNESEMIA: ICD-10-CM

## 2025-05-30 DIAGNOSIS — N17.9 SEPSIS WITH ACUTE RENAL FAILURE WITHOUT SEPTIC SHOCK, DUE TO UNSPECIFIED ORGANISM, UNSPECIFIED ACUTE RENAL FAILURE TYPE (H): ICD-10-CM

## 2025-05-30 LAB
ALBUMIN SERPL BCG-MCNC: 3.1 G/DL (ref 3.5–5.2)
ALBUMIN SERPL BCG-MCNC: 4.1 G/DL (ref 3.5–5.2)
ALBUMIN UR-MCNC: 300 MG/DL
ALP SERPL-CCNC: 67 U/L (ref 40–150)
ALP SERPL-CCNC: 94 U/L (ref 40–150)
ALT SERPL W P-5'-P-CCNC: 19 U/L (ref 0–70)
ALT SERPL W P-5'-P-CCNC: 27 U/L (ref 0–70)
ANION GAP SERPL CALCULATED.3IONS-SCNC: 12 MMOL/L (ref 7–15)
ANION GAP SERPL CALCULATED.3IONS-SCNC: 15 MMOL/L (ref 7–15)
APPEARANCE UR: ABNORMAL
AST SERPL W P-5'-P-CCNC: 32 U/L (ref 0–45)
AST SERPL W P-5'-P-CCNC: 37 U/L (ref 0–45)
BACTERIA #/AREA URNS HPF: ABNORMAL /HPF
BASOPHILS # BLD AUTO: 0 10E3/UL (ref 0–0.2)
BASOPHILS NFR BLD AUTO: 0 %
BILIRUB DIRECT SERPL-MCNC: 0.25 MG/DL (ref 0–0.3)
BILIRUB SERPL-MCNC: 0.8 MG/DL
BILIRUB SERPL-MCNC: 0.9 MG/DL
BILIRUB UR QL STRIP: NEGATIVE
BUN SERPL-MCNC: 40.1 MG/DL (ref 8–23)
BUN SERPL-MCNC: 40.8 MG/DL (ref 8–23)
CALCIUM SERPL-MCNC: 10 MG/DL (ref 8.8–10.4)
CALCIUM SERPL-MCNC: 8.3 MG/DL (ref 8.8–10.4)
CHLORIDE SERPL-SCNC: 101 MMOL/L (ref 98–107)
CHLORIDE SERPL-SCNC: 107 MMOL/L (ref 98–107)
COLOR UR AUTO: YELLOW
CREAT SERPL-MCNC: 3.37 MG/DL (ref 0.67–1.17)
CREAT SERPL-MCNC: 3.4 MG/DL (ref 0.67–1.17)
EGFRCR SERPLBLD CKD-EPI 2021: 19 ML/MIN/1.73M2
EGFRCR SERPLBLD CKD-EPI 2021: 20 ML/MIN/1.73M2
EOSINOPHIL # BLD AUTO: 0 10E3/UL (ref 0–0.7)
EOSINOPHIL NFR BLD AUTO: 0 %
ERYTHROCYTE [DISTWIDTH] IN BLOOD BY AUTOMATED COUNT: 17.8 % (ref 10–15)
EST. AVERAGE GLUCOSE BLD GHB EST-MCNC: 91 MG/DL
FLUAV RNA SPEC QL NAA+PROBE: NEGATIVE
FLUBV RNA RESP QL NAA+PROBE: NEGATIVE
GLUCOSE BLDC GLUCOMTR-MCNC: 119 MG/DL (ref 70–99)
GLUCOSE BLDC GLUCOMTR-MCNC: 94 MG/DL (ref 70–99)
GLUCOSE BLDC GLUCOMTR-MCNC: 97 MG/DL (ref 70–99)
GLUCOSE SERPL-MCNC: 101 MG/DL (ref 70–99)
GLUCOSE SERPL-MCNC: 122 MG/DL (ref 70–99)
GLUCOSE UR STRIP-MCNC: NEGATIVE MG/DL
HBA1C MFR BLD: 4.8 %
HCO3 SERPL-SCNC: 18 MMOL/L (ref 22–29)
HCO3 SERPL-SCNC: 20 MMOL/L (ref 22–29)
HCT VFR BLD AUTO: 30.7 % (ref 40–53)
HGB BLD-MCNC: 9.8 G/DL (ref 13.3–17.7)
HGB UR QL STRIP: ABNORMAL
IMM GRANULOCYTES # BLD: 0.2 10E3/UL
IMM GRANULOCYTES NFR BLD: 2 %
KETONES UR STRIP-MCNC: NEGATIVE MG/DL
LACTATE SERPL-SCNC: 1.1 MMOL/L (ref 0.7–2)
LACTATE SERPL-SCNC: 3.3 MMOL/L (ref 0.7–2)
LEUKOCYTE ESTERASE UR QL STRIP: ABNORMAL
LIPASE SERPL-CCNC: 23 U/L (ref 13–60)
LYMPHOCYTES # BLD AUTO: 0.3 10E3/UL (ref 0.8–5.3)
LYMPHOCYTES NFR BLD AUTO: 3 %
MAGNESIUM SERPL-MCNC: 1.5 MG/DL (ref 1.7–2.3)
MAGNESIUM SERPL-MCNC: 1.8 MG/DL (ref 1.7–2.3)
MCH RBC QN AUTO: 34.3 PG (ref 26.5–33)
MCHC RBC AUTO-ENTMCNC: 31.9 G/DL (ref 31.5–36.5)
MCV RBC AUTO: 107 FL (ref 78–100)
MONOCYTES # BLD AUTO: 0.9 10E3/UL (ref 0–1.3)
MONOCYTES NFR BLD AUTO: 9 %
NEUTROPHILS # BLD AUTO: 8.5 10E3/UL (ref 1.6–8.3)
NEUTROPHILS NFR BLD AUTO: 87 %
NITRATE UR QL: POSITIVE
NRBC # BLD AUTO: 0 10E3/UL
NRBC BLD AUTO-RTO: 0 /100
PH UR STRIP: 6 [PH] (ref 5–7)
PHOSPHATE SERPL-MCNC: 1.4 MG/DL (ref 2.5–4.5)
PLATELET # BLD AUTO: 194 10E3/UL (ref 150–450)
POTASSIUM SERPL-SCNC: 4.7 MMOL/L (ref 3.4–5.3)
POTASSIUM SERPL-SCNC: 5.1 MMOL/L (ref 3.4–5.3)
PROCALCITONIN SERPL IA-MCNC: 6.77 NG/ML
PROT SERPL-MCNC: 6.1 G/DL (ref 6.4–8.3)
PROT SERPL-MCNC: 8.5 G/DL (ref 6.4–8.3)
RBC # BLD AUTO: 2.86 10E6/UL (ref 4.4–5.9)
RBC URINE: 10 /HPF
RSV RNA SPEC NAA+PROBE: NEGATIVE
SARS-COV-2 RNA RESP QL NAA+PROBE: NEGATIVE
SODIUM SERPL-SCNC: 136 MMOL/L (ref 135–145)
SODIUM SERPL-SCNC: 137 MMOL/L (ref 135–145)
SP GR UR STRIP: 1.02 (ref 1–1.03)
SQUAMOUS EPITHELIAL: 1 /HPF
UROBILINOGEN UR STRIP-MCNC: 0.2 MG/DL
WBC # BLD AUTO: 9.8 10E3/UL (ref 4–11)
WBC CLUMPS #/AREA URNS HPF: PRESENT /HPF
WBC URINE: 25 /HPF

## 2025-05-30 PROCEDURE — 81001 URINALYSIS AUTO W/SCOPE: CPT | Performed by: FAMILY MEDICINE

## 2025-05-30 PROCEDURE — 84145 PROCALCITONIN (PCT): CPT | Performed by: FAMILY MEDICINE

## 2025-05-30 PROCEDURE — 71250 CT THORAX DX C-: CPT

## 2025-05-30 PROCEDURE — 84132 ASSAY OF SERUM POTASSIUM: CPT | Performed by: FAMILY MEDICINE

## 2025-05-30 PROCEDURE — 80048 BASIC METABOLIC PNL TOTAL CA: CPT | Performed by: NURSE PRACTITIONER

## 2025-05-30 PROCEDURE — 83605 ASSAY OF LACTIC ACID: CPT | Performed by: FAMILY MEDICINE

## 2025-05-30 PROCEDURE — 83690 ASSAY OF LIPASE: CPT | Performed by: FAMILY MEDICINE

## 2025-05-30 PROCEDURE — 258N000003 HC RX IP 258 OP 636: Performed by: NURSE PRACTITIONER

## 2025-05-30 PROCEDURE — 87154 CUL TYP ID BLD PTHGN 6+ TRGT: CPT | Performed by: FAMILY MEDICINE

## 2025-05-30 PROCEDURE — 82565 ASSAY OF CREATININE: CPT | Performed by: FAMILY MEDICINE

## 2025-05-30 PROCEDURE — 250N000013 HC RX MED GY IP 250 OP 250 PS 637: Performed by: UROLOGY

## 2025-05-30 PROCEDURE — 99292 CRITICAL CARE ADDL 30 MIN: CPT

## 2025-05-30 PROCEDURE — 250N000013 HC RX MED GY IP 250 OP 250 PS 637: Performed by: STUDENT IN AN ORGANIZED HEALTH CARE EDUCATION/TRAINING PROGRAM

## 2025-05-30 PROCEDURE — 360N000082 HC SURGERY LEVEL 2 W/ FLUORO, PER MIN: Performed by: UROLOGY

## 2025-05-30 PROCEDURE — 87186 SC STD MICRODIL/AGAR DIL: CPT | Performed by: FAMILY MEDICINE

## 2025-05-30 PROCEDURE — 81003 URINALYSIS AUTO W/O SCOPE: CPT | Performed by: FAMILY MEDICINE

## 2025-05-30 PROCEDURE — 96368 THER/DIAG CONCURRENT INF: CPT

## 2025-05-30 PROCEDURE — 250N000011 HC RX IP 250 OP 636: Performed by: FAMILY MEDICINE

## 2025-05-30 PROCEDURE — 36592 COLLECT BLOOD FROM PICC: CPT | Performed by: FAMILY MEDICINE

## 2025-05-30 PROCEDURE — 255N000002 HC RX 255 OP 636: Performed by: UROLOGY

## 2025-05-30 PROCEDURE — 272N000001 HC OR GENERAL SUPPLY STERILE: Performed by: UROLOGY

## 2025-05-30 PROCEDURE — 200N000001 HC R&B ICU

## 2025-05-30 PROCEDURE — C1769 GUIDE WIRE: HCPCS | Performed by: UROLOGY

## 2025-05-30 PROCEDURE — 96365 THER/PROPH/DIAG IV INF INIT: CPT

## 2025-05-30 PROCEDURE — 999N000141 HC STATISTIC PRE-PROCEDURE NURSING ASSESSMENT: Performed by: UROLOGY

## 2025-05-30 PROCEDURE — 99140 ANES COMP EMERGENCY COND: CPT | Performed by: PAIN MEDICINE

## 2025-05-30 PROCEDURE — 82040 ASSAY OF SERUM ALBUMIN: CPT | Performed by: FAMILY MEDICINE

## 2025-05-30 PROCEDURE — 710N000009 HC RECOVERY PHASE 1, LEVEL 1, PER MIN: Performed by: UROLOGY

## 2025-05-30 PROCEDURE — 87086 URINE CULTURE/COLONY COUNT: CPT | Performed by: FAMILY MEDICINE

## 2025-05-30 PROCEDURE — 84100 ASSAY OF PHOSPHORUS: CPT | Performed by: NURSE PRACTITIONER

## 2025-05-30 PROCEDURE — 250N000013 HC RX MED GY IP 250 OP 250 PS 637: Performed by: FAMILY MEDICINE

## 2025-05-30 PROCEDURE — 83036 HEMOGLOBIN GLYCOSYLATED A1C: CPT | Performed by: NURSE PRACTITIONER

## 2025-05-30 PROCEDURE — 96375 TX/PRO/DX INJ NEW DRUG ADDON: CPT

## 2025-05-30 PROCEDURE — 258N000003 HC RX IP 258 OP 636: Performed by: FAMILY MEDICINE

## 2025-05-30 PROCEDURE — 85025 COMPLETE CBC W/AUTO DIFF WBC: CPT | Performed by: FAMILY MEDICINE

## 2025-05-30 PROCEDURE — C2617 STENT, NON-COR, TEM W/O DEL: HCPCS | Performed by: UROLOGY

## 2025-05-30 PROCEDURE — 370N000017 HC ANESTHESIA TECHNICAL FEE, PER MIN: Performed by: UROLOGY

## 2025-05-30 PROCEDURE — 96361 HYDRATE IV INFUSION ADD-ON: CPT

## 2025-05-30 PROCEDURE — 83735 ASSAY OF MAGNESIUM: CPT | Performed by: FAMILY MEDICINE

## 2025-05-30 PROCEDURE — 99140 ANES COMP EMERGENCY COND: CPT | Performed by: NURSE ANESTHETIST, CERTIFIED REGISTERED

## 2025-05-30 PROCEDURE — 250N000011 HC RX IP 250 OP 636: Performed by: STUDENT IN AN ORGANIZED HEALTH CARE EDUCATION/TRAINING PROGRAM

## 2025-05-30 PROCEDURE — 84075 ASSAY ALKALINE PHOSPHATASE: CPT | Performed by: FAMILY MEDICINE

## 2025-05-30 PROCEDURE — 36415 COLL VENOUS BLD VENIPUNCTURE: CPT | Performed by: NURSE PRACTITIONER

## 2025-05-30 PROCEDURE — 87077 CULTURE AEROBIC IDENTIFY: CPT | Performed by: FAMILY MEDICINE

## 2025-05-30 PROCEDURE — 0T778DZ DILATION OF LEFT URETER WITH INTRALUMINAL DEVICE, VIA NATURAL OR ARTIFICIAL OPENING ENDOSCOPIC: ICD-10-PCS | Performed by: UROLOGY

## 2025-05-30 PROCEDURE — 96367 TX/PROPH/DG ADDL SEQ IV INF: CPT

## 2025-05-30 PROCEDURE — 87637 SARSCOV2&INF A&B&RSV AMP PRB: CPT | Performed by: FAMILY MEDICINE

## 2025-05-30 PROCEDURE — 74176 CT ABD & PELVIS W/O CONTRAST: CPT

## 2025-05-30 PROCEDURE — 250N000009 HC RX 250: Performed by: NURSE ANESTHETIST, CERTIFIED REGISTERED

## 2025-05-30 PROCEDURE — 52332 CYSTOSCOPY AND TREATMENT: CPT | Performed by: PAIN MEDICINE

## 2025-05-30 PROCEDURE — 52332 CYSTOSCOPY AND TREATMENT: CPT | Performed by: NURSE ANESTHETIST, CERTIFIED REGISTERED

## 2025-05-30 PROCEDURE — 999N000180 XR SURGERY CARM FLUORO LESS THAN 5 MIN

## 2025-05-30 PROCEDURE — 258N000003 HC RX IP 258 OP 636: Performed by: NURSE ANESTHETIST, CERTIFIED REGISTERED

## 2025-05-30 PROCEDURE — 250N000011 HC RX IP 250 OP 636: Mod: JZ | Performed by: NURSE PRACTITIONER

## 2025-05-30 PROCEDURE — 250N000009 HC RX 250: Performed by: FAMILY MEDICINE

## 2025-05-30 PROCEDURE — 3E043XZ INTRODUCTION OF VASOPRESSOR INTO CENTRAL VEIN, PERCUTANEOUS APPROACH: ICD-10-PCS | Performed by: NURSE PRACTITIONER

## 2025-05-30 PROCEDURE — 250N000011 HC RX IP 250 OP 636: Performed by: NURSE ANESTHETIST, CERTIFIED REGISTERED

## 2025-05-30 PROCEDURE — 99232 SBSQ HOSP IP/OBS MODERATE 35: CPT | Performed by: NURSE PRACTITIONER

## 2025-05-30 PROCEDURE — 36415 COLL VENOUS BLD VENIPUNCTURE: CPT | Performed by: FAMILY MEDICINE

## 2025-05-30 PROCEDURE — 83735 ASSAY OF MAGNESIUM: CPT | Performed by: NURSE PRACTITIONER

## 2025-05-30 PROCEDURE — 258N000003 HC RX IP 258 OP 636: Performed by: PAIN MEDICINE

## 2025-05-30 PROCEDURE — 99285 EMERGENCY DEPT VISIT HI MDM: CPT | Mod: 25

## 2025-05-30 PROCEDURE — 250N000009 HC RX 250: Performed by: NURSE PRACTITIONER

## 2025-05-30 DEVICE — URETERAL STENT
Type: IMPLANTABLE DEVICE | Site: URETER | Status: FUNCTIONAL
Brand: PERCUFLEX™ PLUS

## 2025-05-30 RX ORDER — ACETAMINOPHEN 650 MG/1
650 SUPPOSITORY RECTAL EVERY 4 HOURS PRN
Status: DISCONTINUED | OUTPATIENT
Start: 2025-05-30 | End: 2025-06-04 | Stop reason: HOSPADM

## 2025-05-30 RX ORDER — HYDROMORPHONE HCL IN WATER/PF 6 MG/30 ML
0.4 PATIENT CONTROLLED ANALGESIA SYRINGE INTRAVENOUS EVERY 5 MIN PRN
Status: DISCONTINUED | OUTPATIENT
Start: 2025-05-30 | End: 2025-05-30

## 2025-05-30 RX ORDER — ONDANSETRON 4 MG/1
4 TABLET, ORALLY DISINTEGRATING ORAL EVERY 6 HOURS PRN
Status: DISCONTINUED | OUTPATIENT
Start: 2025-05-30 | End: 2025-06-04 | Stop reason: HOSPADM

## 2025-05-30 RX ORDER — ONDANSETRON 2 MG/ML
4 INJECTION INTRAMUSCULAR; INTRAVENOUS EVERY 6 HOURS PRN
Status: DISCONTINUED | OUTPATIENT
Start: 2025-05-30 | End: 2025-06-04 | Stop reason: HOSPADM

## 2025-05-30 RX ORDER — ACETAMINOPHEN 325 MG/1
975 TABLET ORAL ONCE
Status: DISCONTINUED | OUTPATIENT
Start: 2025-05-30 | End: 2025-05-30

## 2025-05-30 RX ORDER — DEXAMETHASONE SODIUM PHOSPHATE 4 MG/ML
4 INJECTION, SOLUTION INTRA-ARTICULAR; INTRALESIONAL; INTRAMUSCULAR; INTRAVENOUS; SOFT TISSUE
Status: DISCONTINUED | OUTPATIENT
Start: 2025-05-30 | End: 2025-05-30

## 2025-05-30 RX ORDER — OXYCODONE HYDROCHLORIDE 5 MG/1
5 TABLET ORAL
Status: DISCONTINUED | OUTPATIENT
Start: 2025-05-30 | End: 2025-05-30

## 2025-05-30 RX ORDER — CEFAZOLIN SODIUM 2 G/50ML
2 SOLUTION INTRAVENOUS
Status: COMPLETED | OUTPATIENT
Start: 2025-05-30 | End: 2025-05-30

## 2025-05-30 RX ORDER — MEPERIDINE HYDROCHLORIDE 25 MG/ML
25 INJECTION INTRAMUSCULAR; INTRAVENOUS; SUBCUTANEOUS EVERY 4 HOURS PRN
Status: DISCONTINUED | OUTPATIENT
Start: 2025-05-30 | End: 2025-06-04 | Stop reason: HOSPADM

## 2025-05-30 RX ORDER — NALOXONE HYDROCHLORIDE 0.4 MG/ML
0.2 INJECTION, SOLUTION INTRAMUSCULAR; INTRAVENOUS; SUBCUTANEOUS
Status: DISCONTINUED | OUTPATIENT
Start: 2025-05-30 | End: 2025-06-04 | Stop reason: HOSPADM

## 2025-05-30 RX ORDER — PROPOFOL 10 MG/ML
INJECTION, EMULSION INTRAVENOUS PRN
Status: DISCONTINUED | OUTPATIENT
Start: 2025-05-30 | End: 2025-05-30

## 2025-05-30 RX ORDER — TRIFLURIDINE AND TIPIRACIL 15; 6.14 MG/1; MG/1
2 TABLET, FILM COATED ORAL SEE ADMIN INSTRUCTIONS
COMMUNITY
Start: 2023-09-14

## 2025-05-30 RX ORDER — NALOXONE HYDROCHLORIDE 0.4 MG/ML
0.1 INJECTION, SOLUTION INTRAMUSCULAR; INTRAVENOUS; SUBCUTANEOUS
Status: DISCONTINUED | OUTPATIENT
Start: 2025-05-30 | End: 2025-05-30

## 2025-05-30 RX ORDER — FENTANYL 50 UG/1
50 PATCH TRANSDERMAL
Refills: 0 | Status: DISCONTINUED | OUTPATIENT
Start: 2025-05-31 | End: 2025-06-04 | Stop reason: HOSPADM

## 2025-05-30 RX ORDER — MAGNESIUM SULFATE HEPTAHYDRATE 40 MG/ML
2 INJECTION, SOLUTION INTRAVENOUS ONCE
Status: COMPLETED | OUTPATIENT
Start: 2025-05-30 | End: 2025-05-30

## 2025-05-30 RX ORDER — ACETAMINOPHEN 325 MG/10.15ML
650 LIQUID ORAL EVERY 4 HOURS PRN
Status: DISCONTINUED | OUTPATIENT
Start: 2025-05-30 | End: 2025-06-04 | Stop reason: HOSPADM

## 2025-05-30 RX ORDER — FENTANYL CITRATE 50 UG/ML
25 INJECTION, SOLUTION INTRAMUSCULAR; INTRAVENOUS EVERY 5 MIN PRN
Status: DISCONTINUED | OUTPATIENT
Start: 2025-05-30 | End: 2025-05-30

## 2025-05-30 RX ORDER — MEROPENEM 500 MG/1
500 INJECTION, POWDER, FOR SOLUTION INTRAVENOUS EVERY 12 HOURS
Status: DISCONTINUED | OUTPATIENT
Start: 2025-05-30 | End: 2025-06-01

## 2025-05-30 RX ORDER — OXYCODONE HYDROCHLORIDE 5 MG/1
5 TABLET ORAL EVERY 4 HOURS PRN
Refills: 0 | Status: DISCONTINUED | OUTPATIENT
Start: 2025-05-30 | End: 2025-06-04 | Stop reason: HOSPADM

## 2025-05-30 RX ORDER — HYDROMORPHONE HCL IN WATER/PF 6 MG/30 ML
0.2 PATIENT CONTROLLED ANALGESIA SYRINGE INTRAVENOUS EVERY 5 MIN PRN
Status: DISCONTINUED | OUTPATIENT
Start: 2025-05-30 | End: 2025-05-30

## 2025-05-30 RX ORDER — FENTANYL CITRATE 50 UG/ML
50 INJECTION, SOLUTION INTRAMUSCULAR; INTRAVENOUS EVERY 5 MIN PRN
Status: DISCONTINUED | OUTPATIENT
Start: 2025-05-30 | End: 2025-05-30

## 2025-05-30 RX ORDER — ONDANSETRON 2 MG/ML
4 INJECTION INTRAMUSCULAR; INTRAVENOUS ONCE
Status: COMPLETED | OUTPATIENT
Start: 2025-05-30 | End: 2025-05-30

## 2025-05-30 RX ORDER — ROPIVACAINE IN 0.9% SOD CHL/PF 0.1 %
.01-.2 PLASTIC BAG, INJECTION (ML) EPIDURAL CONTINUOUS
Status: DISCONTINUED | OUTPATIENT
Start: 2025-05-30 | End: 2025-05-30

## 2025-05-30 RX ORDER — OXYCODONE HYDROCHLORIDE 5 MG/1
10 TABLET ORAL
Status: DISCONTINUED | OUTPATIENT
Start: 2025-05-30 | End: 2025-05-30

## 2025-05-30 RX ORDER — ONDANSETRON 4 MG/1
4 TABLET, ORALLY DISINTEGRATING ORAL EVERY 30 MIN PRN
Status: DISCONTINUED | OUTPATIENT
Start: 2025-05-30 | End: 2025-05-30

## 2025-05-30 RX ORDER — ONDANSETRON 2 MG/ML
4 INJECTION INTRAMUSCULAR; INTRAVENOUS EVERY 30 MIN PRN
Status: DISCONTINUED | OUTPATIENT
Start: 2025-05-30 | End: 2025-05-30

## 2025-05-30 RX ORDER — PIPERACILLIN SODIUM, TAZOBACTAM SODIUM 3; .375 G/15ML; G/15ML
3.38 INJECTION, POWDER, LYOPHILIZED, FOR SOLUTION INTRAVENOUS ONCE
Status: COMPLETED | OUTPATIENT
Start: 2025-05-30 | End: 2025-05-30

## 2025-05-30 RX ORDER — NOREPINEPHRINE BITARTRATE 0.02 MG/ML
.01-.6 INJECTION, SOLUTION INTRAVENOUS CONTINUOUS
Status: DISCONTINUED | OUTPATIENT
Start: 2025-05-30 | End: 2025-05-31

## 2025-05-30 RX ORDER — NALOXONE HYDROCHLORIDE 0.4 MG/ML
0.4 INJECTION, SOLUTION INTRAMUSCULAR; INTRAVENOUS; SUBCUTANEOUS
Status: DISCONTINUED | OUTPATIENT
Start: 2025-05-30 | End: 2025-06-04 | Stop reason: HOSPADM

## 2025-05-30 RX ORDER — SODIUM CHLORIDE 9 MG/ML
INJECTION, SOLUTION INTRAVENOUS CONTINUOUS PRN
Status: DISCONTINUED | OUTPATIENT
Start: 2025-05-30 | End: 2025-05-30

## 2025-05-30 RX ORDER — LIDOCAINE 40 MG/G
CREAM TOPICAL
Status: DISCONTINUED | OUTPATIENT
Start: 2025-05-30 | End: 2025-05-30

## 2025-05-30 RX ORDER — SODIUM CHLORIDE, SODIUM LACTATE, POTASSIUM CHLORIDE, CALCIUM CHLORIDE 600; 310; 30; 20 MG/100ML; MG/100ML; MG/100ML; MG/100ML
INJECTION, SOLUTION INTRAVENOUS CONTINUOUS
Status: DISCONTINUED | OUTPATIENT
Start: 2025-05-30 | End: 2025-05-30

## 2025-05-30 RX ORDER — MEPERIDINE HYDROCHLORIDE 25 MG/ML
25 INJECTION INTRAMUSCULAR; INTRAVENOUS; SUBCUTANEOUS
Status: DISCONTINUED | OUTPATIENT
Start: 2025-05-30 | End: 2025-05-30

## 2025-05-30 RX ORDER — MEPERIDINE HYDROCHLORIDE 25 MG/ML
25 INJECTION INTRAMUSCULAR; INTRAVENOUS; SUBCUTANEOUS EVERY 4 HOURS PRN
Status: DISCONTINUED | OUTPATIENT
Start: 2025-05-30 | End: 2025-05-30

## 2025-05-30 RX ORDER — FERROUS SULFATE 325(65) MG
650 TABLET, DELAYED RELEASE (ENTERIC COATED) ORAL EVERY MORNING
COMMUNITY

## 2025-05-30 RX ORDER — FENTANYL 50 UG/1
1 PATCH TRANSDERMAL
COMMUNITY
Start: 2025-05-19

## 2025-05-30 RX ORDER — ACETAMINOPHEN 325 MG/1
650 TABLET ORAL EVERY 4 HOURS PRN
Status: DISCONTINUED | OUTPATIENT
Start: 2025-05-30 | End: 2025-06-04 | Stop reason: HOSPADM

## 2025-05-30 RX ORDER — ACETAMINOPHEN 650 MG/1
650 SUPPOSITORY RECTAL ONCE
Status: DISCONTINUED | OUTPATIENT
Start: 2025-05-30 | End: 2025-05-30

## 2025-05-30 RX ORDER — SODIUM CHLORIDE, SODIUM LACTATE, POTASSIUM CHLORIDE, CALCIUM CHLORIDE 600; 310; 30; 20 MG/100ML; MG/100ML; MG/100ML; MG/100ML
INJECTION, SOLUTION INTRAVENOUS CONTINUOUS
Status: DISCONTINUED | OUTPATIENT
Start: 2025-05-30 | End: 2025-05-31

## 2025-05-30 RX ORDER — KETAMINE HYDROCHLORIDE 10 MG/ML
INJECTION INTRAMUSCULAR; INTRAVENOUS PRN
Status: DISCONTINUED | OUTPATIENT
Start: 2025-05-30 | End: 2025-05-30

## 2025-05-30 RX ORDER — DEXTROSE MONOHYDRATE 25 G/50ML
25-50 INJECTION, SOLUTION INTRAVENOUS
Status: DISCONTINUED | OUTPATIENT
Start: 2025-05-30 | End: 2025-06-04 | Stop reason: HOSPADM

## 2025-05-30 RX ORDER — NICOTINE POLACRILEX 4 MG
15-30 LOZENGE BUCCAL
Status: DISCONTINUED | OUTPATIENT
Start: 2025-05-30 | End: 2025-06-04 | Stop reason: HOSPADM

## 2025-05-30 RX ORDER — ACETAMINOPHEN 325 MG/1
650 TABLET ORAL ONCE
Status: COMPLETED | OUTPATIENT
Start: 2025-05-30 | End: 2025-05-30

## 2025-05-30 RX ORDER — HEPARIN SODIUM 5000 [USP'U]/.5ML
5000 INJECTION, SOLUTION INTRAVENOUS; SUBCUTANEOUS EVERY 8 HOURS
Status: DISCONTINUED | OUTPATIENT
Start: 2025-05-31 | End: 2025-05-31

## 2025-05-30 RX ADMIN — SODIUM CHLORIDE, SODIUM LACTATE, POTASSIUM CHLORIDE, AND CALCIUM CHLORIDE: .6; .31; .03; .02 INJECTION, SOLUTION INTRAVENOUS at 21:26

## 2025-05-30 RX ADMIN — ONDANSETRON 4 MG: 2 INJECTION, SOLUTION INTRAMUSCULAR; INTRAVENOUS at 16:14

## 2025-05-30 RX ADMIN — SODIUM CHLORIDE: 9 INJECTION, SOLUTION INTRAVENOUS at 19:27

## 2025-05-30 RX ADMIN — MAGNESIUM SULFATE HEPTAHYDRATE 2 G: 2 INJECTION, SOLUTION INTRAVENOUS at 13:27

## 2025-05-30 RX ADMIN — SODIUM CHLORIDE 1250 MG: 0.9 INJECTION, SOLUTION INTRAVENOUS at 12:51

## 2025-05-30 RX ADMIN — ACETAMINOPHEN 650 MG: 325 TABLET ORAL at 21:17

## 2025-05-30 RX ADMIN — SODIUM PHOSPHATE, MONOBASIC, MONOHYDRATE AND SODIUM PHOSPHATE, DIBASIC, ANHYDROUS 9 MMOL: 142; 276 INJECTION, SOLUTION INTRAVENOUS at 22:53

## 2025-05-30 RX ADMIN — SODIUM CHLORIDE 1000 ML: 0.9 INJECTION, SOLUTION INTRAVENOUS at 13:16

## 2025-05-30 RX ADMIN — OXYCODONE HYDROCHLORIDE 5 MG: 5 TABLET ORAL at 21:17

## 2025-05-30 RX ADMIN — PROPOFOL 30 MG: 10 INJECTION, EMULSION INTRAVENOUS at 19:44

## 2025-05-30 RX ADMIN — PROPOFOL 30 MG: 10 INJECTION, EMULSION INTRAVENOUS at 19:42

## 2025-05-30 RX ADMIN — CEFAZOLIN SODIUM 2 G: 2 SOLUTION INTRAVENOUS at 19:23

## 2025-05-30 RX ADMIN — SODIUM CHLORIDE 1000 ML: 0.9 INJECTION, SOLUTION INTRAVENOUS at 11:24

## 2025-05-30 RX ADMIN — KETAMINE HYDROCHLORIDE 20 MG: 10 INJECTION INTRAMUSCULAR; INTRAVENOUS at 19:30

## 2025-05-30 RX ADMIN — PROPOFOL 30 MG: 10 INJECTION, EMULSION INTRAVENOUS at 19:31

## 2025-05-30 RX ADMIN — PIPERACILLIN AND TAZOBACTAM 3.38 G: 3; .375 INJECTION, POWDER, FOR SOLUTION INTRAVENOUS at 11:57

## 2025-05-30 RX ADMIN — PROPOFOL 20 MG: 10 INJECTION, EMULSION INTRAVENOUS at 19:47

## 2025-05-30 RX ADMIN — MEROPENEM 500 MG: 500 INJECTION, POWDER, FOR SOLUTION INTRAVENOUS at 18:30

## 2025-05-30 RX ADMIN — ACETAMINOPHEN 650 MG: 650 SUPPOSITORY RECTAL at 16:16

## 2025-05-30 RX ADMIN — ACETAMINOPHEN 650 MG: 325 TABLET ORAL at 11:27

## 2025-05-30 RX ADMIN — KETAMINE HYDROCHLORIDE 10 MG: 10 INJECTION INTRAMUSCULAR; INTRAVENOUS at 19:42

## 2025-05-30 RX ADMIN — ONDANSETRON 4 MG: 2 INJECTION, SOLUTION INTRAMUSCULAR; INTRAVENOUS at 11:21

## 2025-05-30 RX ADMIN — MAGNESIUM SULFATE HEPTAHYDRATE 2 G: 2 INJECTION, SOLUTION INTRAVENOUS at 21:22

## 2025-05-30 RX ADMIN — NOREPINEPHRINE BITARTRATE 0.03 MCG/KG/MIN: 0.02 INJECTION, SOLUTION INTRAVENOUS at 13:55

## 2025-05-30 ASSESSMENT — ACTIVITIES OF DAILY LIVING (ADL)
ADLS_ACUITY_SCORE: 52
ADLS_ACUITY_SCORE: 52
ADLS_ACUITY_SCORE: 80
ADLS_ACUITY_SCORE: 78
ADLS_ACUITY_SCORE: 80
ADLS_ACUITY_SCORE: 78
ADLS_ACUITY_SCORE: 80
ADLS_ACUITY_SCORE: 78
ADLS_ACUITY_SCORE: 52

## 2025-05-30 ASSESSMENT — ENCOUNTER SYMPTOMS
DIARRHEA: 1
ABDOMINAL PAIN: 0
BACK PAIN: 1
RHINORRHEA: 1
DYSURIA: 1

## 2025-05-30 ASSESSMENT — COLUMBIA-SUICIDE SEVERITY RATING SCALE - C-SSRS
1. IN THE PAST MONTH, HAVE YOU WISHED YOU WERE DEAD OR WISHED YOU COULD GO TO SLEEP AND NOT WAKE UP?: NO
2. HAVE YOU ACTUALLY HAD ANY THOUGHTS OF KILLING YOURSELF IN THE PAST MONTH?: NO
6. HAVE YOU EVER DONE ANYTHING, STARTED TO DO ANYTHING, OR PREPARED TO DO ANYTHING TO END YOUR LIFE?: NO

## 2025-05-30 NOTE — PHARMACY-VANCOMYCIN DOSING SERVICE
Pharmacy Vancomycin Initial Note  Date of Service May 30, 2025  Patient's  1960  64 year old, male    Indication: Sepsis and Urinary Tract Infection    Current estimated CrCl = Estimated Creatinine Clearance: 21.6 mL/min (A) (based on SCr of 3.4 mg/dL (H)).    Creatinine for last 3 days  2025: 11:08 AM Creatinine 3.40 mg/dL    Recent Vancomycin Level(s) for last 3 days  No results found for requested labs within last 3 days.      Vancomycin IV Administrations (past 72 hours)                     vancomycin (VANCOCIN) 1,250 mg in 0.9% NaCl 262.5 mL intermittent infusion (mg) 1,250 mg New Bag 25 1251                    Nephrotoxins and other renal medications (From now, onward)      Start     Dose/Rate Route Frequency Ordered Stop    25 1743  vancomycin place diaz - receiving intermittent dosing         1 each Intravenous SEE ADMIN INSTRUCTIONS 25 1743      25 1730  norepinephrine (LEVOPHED) 4 mg in  mL infusion PREMIX         0.01-0.6 mcg/kg/min × 68.9 kg  2.6-155 mL/hr  Intravenous CONTINUOUS 25 1727              Contrast Orders - past 72 hours (72h ago, onward)      None            Insight poor fit with extended half life but graph suggest might need level tomorrow before going to possibly q48 dosing. Level  to guide.    Suha Mejia, [facility]

## 2025-05-30 NOTE — H&P
Critical Care Progress Note  5/30/2025   6:08 PM    Admit Date: 5/30/2025  ICU Admission Day: 5/30/2025   Code Status: full code      Problem List:   Active Problems:    Colostomy status (H)    Hypomagnesemia    On antineoplastic chemotherapy    Acute kidney injury superimposed on CKD    Sepsis with acute renal failure without septic shock, due to unspecified organism, unspecified acute renal failure type (H)       Plan by System:     Neuro/Psych: chronic pain - malignancy related.    - Continue 50mcg/hr fentanyl patch - currently in place. Change out Q72h - due to change tomorrow.    - PRN oxycodone per home regimen   - hold gabapentin due to renal injury     Pulmonary: no acute issues    - CT clear, no infiltrate. Resp viral panel negative    - Stable on room air   - goal SpO2 > 92%     CV: Septic shock - pyelonephritis. Recent Echo 5/21 showed no mass or thrombus within the right atrium, normal LV size and function with EF 50%   - Fluid resuscitated with 3L crystalloid    - NE for MAP > 65; doing ok on low doses    - Cardiac monitoring     GI/: unresectable colon cancer with tumor infiltration involving all retroperitoneal structures (distal aorta, vena cava, bilateral common iliac arteries and veins, bilateral ureters, bladder, sigmoid colon and possibly sacrum) making it impossible to safely achieve an R0 resection.S/P Ileostomy in 2021.  Hx of ureteral obstruction require Right nephrostomy tube placement in 2021, removed 11/2024. Now with CT evidence of Left hydroureteronephrosis secondary to bowel mass. Small 2-3 mm calculus in urinary bladder.    - Consult Urology for eval of left hydronephrosis    - Left renal ultrasound    - Diet: clears   - Last BM: ileostomy   - WOC consult for ostomy care     - Bowel meds: prn     Renal: CHAYO on CKD; hypomag    - Replace electrolytes per protocol    - Follow up CMP this evening    - fluid resuscitated with 3L crystalloid.     ID: acute pyelonephritis   -  hx of  Enterobacter in 2024 resistant to Zosyn   - hx of Klebsiella    - Meropenem and Vanc until cultures return     Heme/Coag:  unresectable colon cancer dx 2021. Follow with Dr. Kern here and also down in [place]. On active  chemotherapy with LONSURF, last dose 5/24   - DVT proph: Xarelto - on hold     Endocrine: no acute issues         Clinically Significant Risk Factors Present on Admission             # Hypomagnesemia: Lowest Mg = 1.5 mg/dL in last 2 days, will replace as needed    # Drug Induced Coagulation Defect: home medication list includes an anticoagulant medication      # Circulatory Shock: required vasopressors within past 24 hours       # Anemia: based on hgb <11               # Anemia: based on hgb <11             Dispo: ICU     Dora Mosqueda, CNP  [facility] Pulmonary/Critical Care    Total critical care time: 45-minutes  I have personally provided 45 minutes of critical care time exclusive of time spent on separately billable procedures.   _______________________________________________________________    HPI: 64 year old man with hx of metastatic colon cancer, colostomy, chronic pain, ureter obstruction from mass effect s/p right nephrostomy tube. Presented to our ER this morning with urinary urgency, dysuria, chills.  Has a history of urinary infections with previous right sided nephrostomy tube - removed 11/2024. Labs showed acute kidney injury, elevated procalcitonin. He was febrile with rigors, tachycardic, hypotensive. He was fluid resuscitated with 3L crystalloid and started on norepi. CT chest/abd/pelvis showed Left hydroureteronephrosis secondary architectural distortion/tissue retraction secondary to bowel mass. He was admitted to ICU for further management.      ROS: feeling a bit better than earlier; no shortness of breath, no nausea, no chest pain. Denies abdominal pain at this time.          Objective:     Vitals:    05/30/25 1715 05/30/25 1730 05/30/25 1745 05/30/25 1800   BP:  96/55 (!) 86/49 (!) 86/51 97/53   BP Location:       Pulse: 78 76 83 75   Resp: 30 28 22 24   Temp:       TempSrc:       SpO2: 98% 99% 98% 98%   Weight:       Height:              I/O: No intake or output data in the 24 hours ending 05/30/25 1808  Wt Readings from Last 3 Encounters:   05/30/25 69.6 kg (153 lb 6.4 oz)   04/24/24 79.4 kg (175 lb)   05/08/23 79.4 kg (175 lb)      Weight change:     Physical Exam:  Gen: awake and alert  HEENMT: AT/NC  NEURO: grossly nonfocal but does have some word finding difficulty   CARDIOVASCULAR: RRR S1S2   PULMONARY: unlabored on RA; lungs are clear   GASTROINTESTINAL: soft; ileostomy with liquid green stool   INTEGUMENT: visible skin intact   PSYCH: slightly anxious     Labs:   Recent Labs   Lab 05/30/25  1108      CO2 20*   BUN 40.1*   ALKPHOS 94   ALT 19   AST 32       Recent Labs   Lab 05/30/25  1108   WBC 9.8   HGB 9.8*   HCT 30.7*          Micro:   Pending     Imaging: all imaging personalized reviewed         Dora Mosqueda, CNP  [facility] Pulmonary/Critical Care

## 2025-05-30 NOTE — ED TRIAGE NOTES
Pt here with complaints of painful urination and describes his urine as foul smelling. Was seen at [place] recently and diagnosed with L-sided hydronephrosis. Was to be seen at urology clinic tomorrow.   Pt has the chills and is mildly confused.      Triage Assessment (Adult)       Row Name 05/30/25 1037          Triage Assessment    Airway WDL WDL        Respiratory WDL    Respiratory WDL WDL        Skin Circulation/Temperature WDL    Skin Circulation/Temperature WDL WDL        Cardiac WDL    Cardiac WDL X;rhythm     Pulse Rate & Regularity tachycardic        Peripheral/Neurovascular WDL    Peripheral Neurovascular WDL WDL        Cognitive/Neuro/Behavioral WDL    Cognitive/Neuro/Behavioral WDL WDL

## 2025-05-30 NOTE — ED PROVIDER NOTES
EMERGENCY DEPARTMENT ENCOUNTER      NAME: Rich Wolff  AGE: 64 year old male  YOB: 1960  MRN: [id number]  EVALUATION DATE & TIME: No admission date for patient encounter.    PCP: Maycol Worrell    ED PROVIDER: Tom Castro M.D.    Chief Complaint   Patient presents with    Dysuria    Chills       FINAL IMPRESSION:  1. Colostomy status (H)    2. On antineoplastic chemotherapy    3. Acute kidney injury superimposed on CKD    4. Hypomagnesemia    5. Acute pyelonephritis    6. Sepsis with acute renal failure without septic shock, due to unspecified organism, unspecified acute renal failure type (H)    7. Hydronephrosis of left kidney        ED COURSE & MEDICAL DECISION MAKING:    Pertinent Labs & Imaging studies independently interpreted by me. (See chart for details)  Reviewed prior emergency department visit from May 21 which was for concern for possible atrial thrombus, however TTE was negative, CT angio was negative as well, patient on Xarelto.  ED Course as of 05/30/25 1437   Fri May 30, 2025   1045 Patient seen and examined, presents today with chills, vomiting, and urinary frequency with dysuria starting.  Patient with a history of urinary infections, was recently on antibiotics, finished 2 weeks ago.  No change in ostomy output.  No chest pain, cough, runny nose.  Patient with colon cancer currently on chemotherapy, last round was Tuesday.  On exam here, tachycardic, afebrile but with shaking rigors, lungs are clear, no abdominal tenderness.  Suspect pyelonephritis, also consider viral respiratory illness.  Labs are ordered, consider CT scan chest, abdomen, pelvis depending on labs and clinical course.   1133 Labs independently interpreted by me with lactate 3.3, normal white blood cell count, hemoglobin 9.8 which is stable for patient.  Patient has noted to now have fever.  In conjunction with his tachycardia and elevated lactate, concern for sepsis and broad-spectrum  antibiotics will be initiated for likely urinary tract infection.   1134 Updated patient and spouse with findings and plan   1157 Lab contacted me with elevated procalcitonin consistent with presumptive diagnosis of sepsis.  Antibiotics and blood cultures have already been ordered.  Labs independently interpreted by me with creatinine 3.4 up from patient's baseline of 2.2.  CT chest, abdomen, pelvis are ordered pending urinalysis for possible other sources of sepsis.   1226 Labs independently interpreted by me with normal hepatic panel, normal lipase, negative respiratory panel   1244 Urinalysis consistent with urinary tract infection   1245 CT scan of the chest independently interpreted by me negative for acute infiltrate.  CT scan of the abdomen independently interpreted by me with stranding and hydronephrosis particularly around the left kidney urinary bladder is decompressed there is a question of a small bladder.   1257 Patient rechecked, updated patient on findings and plan.  Noted to be hypotensive now which was not present earlier, additional fluids are ordered and port will be accessed.  Port will be accessed, additional blood culture from port ordered.  Review of chart shows recent UTI in 2024 with Enterococcus that is resistant to Zosyn.  Cefepime will be ordered.   1304 Reviewed prior urine culture for 2024 which showed a multidrug-resistant Enterobacter unfortunately resistant to Zosyn which has already been given.  It is sensitive to quinolones and cefepime.   1336 Patient's blood pressures continue to decrease and spite of fluid resuscitation.  Will be started on pressors and admit to ICU.   1337 Care discussed with pharmacist, will defer further antibiotics until urine culture completed but will discuss with hospitalist.   1340 Care discussed with Dr. Ramos, intensivist who accepts patient for admission and will be attending.   1346 Blood pressure 77/42, pressors are being started now.   1407  Blood pressure improving on pressors.   1419 Repeat lactate 1.1       At the conclusion of the encounter I discussed the results of all of the tests and the disposition. The questions were answered. The patient or family acknowledged understanding and was agreeable with the care plan.     Medical Decision Making      Admit.    MIPS (CTPE, Dental pain, Akdins, Sinusitis, Asthma/COPD, Head Trauma): Not Applicable    SEPSIS:   Sepsis ED evaluation   The patient has signs of sepsis as evidenced by:  1. Presence of 2 SIRS criteria, suspected infection, AND  2. Organ dysfunction: Lactic Acidosis with value >2.0 due to sepsis and CHAYO with Cr >2 or Urine output <0.5/kg/hr for more than 2 hours despite fluid resuscitation due to sepsis    Sepsis Care Initiation: Starting at 11:59 AM  on 05/30/25, until specified. Prior to this documentation, sepsis, severe sepsis, or septic shock was NOT thought to be a significant cause of illness. This order represents the first time infection was seriously considered to be affecting the patient.    Lactic Acid Results:  Recent Labs   Lab Test 05/30/25  1404 05/30/25  1108 10/29/21  2357   LACT 1.1 3.3* 1.3       3 Hour Bundle 6 Hour Bundle (Reassessment)   Blood Cultures before IV Antibiotics: Yes  Antibiotics given: see below  Prehospital fluid volume (mL):                     Total fluids given (ED +Pre-hospital):  Full 30 mL/kg bolus given based on weight: 2,070 mL   Repeat Lactic Acid Level: Ordered by reflex for 2 hours after initial lactic acid collection.  Vasopressors: Vasopressors started for septic shock due to persistent hypotension.  Repeat perfusion exam: I attest to having performed a repeat sepsis exam and assessment of perfusion at 1:43 PM .   BMI Readings from Last 1 Encounters:   05/30/25 24.14 kg/m        Anti-infectives (From admission through now)      Start     Dose/Rate Route Frequency Ordered Stop    05/30/25 1200  piperacillin-tazobactam (ZOSYN) 3.375 g vial to  attach to  mL bag         3.375 g  over 30 Minutes Intravenous ONCE 05/30/25 1134 05/30/25 1253    05/30/25 1200  vancomycin (VANCOCIN) 1,250 mg in 0.9% NaCl 262.5 mL intermittent infusion         1,250 mg  over 90 Minutes Intravenous ONCE 05/30/25 1134 05/30/25 1421              PROCEDURES:     Critical Care   Performed by: Dr Tom Castro  Total critical care time: 190 minutes  Critical care was necessary to treat or prevent imminent or life-threatening deterioration of the following conditions: Sepsis with septic shock, pressors and antibiotics  Critical care was time spent personally by me on the following activities: development of treatment plan with patient or surrogate, discussions with consultants, examination of patient, evaluation of patient's response to treatment, obtaining history from patient or surrogate, ordering and performing treatments and interventions, ordering and review of laboratory studies, ordering and review of radiographic studies, re-evaluation of patient's condition and monitoring for potential decompensation.  Critical care time was exclusive of separately billable procedures and treating other patients.     MEDICATIONS GIVEN IN THE EMERGENCY:  Medications   norepinephrine (LEVOPHED) 4 mg in  mL PERIPHERAL infusion (0.03 mcg/kg/min × 68.9 kg Intravenous $New Bag 5/30/25 1355)   sodium chloride 0.9% BOLUS 1,000 mL (0 mLs Intravenous Stopped 5/30/25 1254)   ondansetron (ZOFRAN) injection 4 mg (4 mg Intravenous $Given 5/30/25 1121)   acetaminophen (TYLENOL) tablet 650 mg (650 mg Oral $Given 5/30/25 1127)   piperacillin-tazobactam (ZOSYN) 3.375 g vial to attach to  mL bag (0 g Intravenous Stopped 5/30/25 1253)   vancomycin (VANCOCIN) 1,250 mg in 0.9% NaCl 262.5 mL intermittent infusion (1,250 mg Intravenous $New Bag 5/30/25 1251)   sodium chloride 0.9% BOLUS 1,000 mL (0 mLs Intravenous Stopped 5/30/25 1406)   magnesium sulfate 2 g in 50 mL sterile water intermittent  infusion (0 g Intravenous Paused 5/30/25 1406)   sodium chloride 0.9% BOLUS 1,000 mL (0 mLs Intravenous Stopped 5/30/25 1406)       NEW PRESCRIPTIONS STARTED AT TODAY'S ER VISIT  New Prescriptions    No medications on file       =================================================================    HPI    Patient information was obtained from: patient and spouse      Rich Wolff is a 64 year old male with a pertinent history of hypertension, hyperlipidemia, colostomy, colon cancer, prostrate cancer, chronic pain, who presents to this ED for evaluation of dysuria and chills. Patient reports an onset dysuria and chills overnight with associated vomiting starting at 8 PM yesterday evening. Endorses use of antibiotics for dysuria, most recent being 2 weeks ago. Denies administering over the counter oral medications for symptoms prior to arrival. Loose stool at baseline due to colon cancer, and notes last chemo visit on Tuesday (3 days ago) at [facility]. Confirms rhinorrhea but denies abdominal pain. History of chronic back pain. Endorses use of anticoagulants.    Per chart review, patient was seen at [facility] ED on 5/21/2025 due to possible atrial thrombus. Screening labs were obtained and are notable for a PT of 22.8. TTE did not reveal any obvious mass. CT cardiac angio was reassuring, and patient was discharged.    REVIEW OF SYSTEMS   Review of Systems   HENT:  Positive for rhinorrhea.    Gastrointestinal:  Positive for diarrhea. Negative for abdominal pain.   Genitourinary:  Positive for dysuria.   Musculoskeletal:  Positive for back pain.      All other systems reviewed and negative    PAST MEDICAL HISTORY:  Past Medical History:   Diagnosis Date    Colon cancer (H)     Hyperlipidemia     Hypertension     Prostate cancer (H) 09/01/2015    T1c. Silvana's 6 (3+3).  Confined to prostate.  Multifocal bilateral comprising 2% of the gland.PSA:5.8.       PAST SURGICAL HISTORY:  Past Surgical History:    Procedure Laterality Date    BOWEL RESECTION      COLONOSCOPY W/ BIOPSIES  11/04/2020    COLOSTOMY      ESOPHAGOSCOPY, GASTROSCOPY, DUODENOSCOPY (EGD), COMBINED  11/04/2020    IR CHEST PORT PLACEMENT > 5 YRS OF AGE  11/24/2020    IR CHEST PORT REPLACEMENT SAME SITE RIGHT  10/27/2022    IR NEPHROSTOMY TUBE CHANGE RIGHT  4/8/2021    IR NEPHROSTOMY TUBE CHANGE RIGHT  4/8/2021    IR NEPHROSTOMY TUBE CHANGE RIGHT  8/16/2021    IR NEPHROSTOMY TUBE CHANGE RIGHT  10/29/2021    IR NEPHROSTOMY TUBE CHANGE RIGHT  10/29/2021    IR NEPHROSTOMY TUBE CHANGE RIGHT  12/27/2021    IR NEPHROSTOMY TUBE CHANGE RIGHT  2/28/2022    IR NEPHROSTOMY TUBE CHANGE RIGHT  5/9/2022    IR NEPHROSTOMY TUBE CHANGE RIGHT  7/5/2022    IR NEPHROSTOMY TUBE CHANGE RIGHT  9/26/2022    IR NEPHROSTOMY TUBE CHANGE RIGHT  10/25/2022    IR NEPHROSTOMY TUBE CHANGE RIGHT  12/28/2022    IR NEPHROSTOMY TUBE CHANGE RIGHT  2/28/2023    IR NEPHROSTOMY TUBE CHANGE RIGHT  4/27/2023    IR NEPHROSTOMY TUBE CHANGE RIGHT  6/27/2023    IR NEPHROSTOMY TUBE CHANGE RIGHT  8/30/2023    IR NEPHROSTOMY TUBE CHANGE RIGHT  10/25/2023    IR NEPHROSTOMY TUBE CHANGE RIGHT  12/27/2023    IR NEPHROSTOMY TUBE CHANGE RIGHT  2/28/2024    IR NEPHROSTOMY TUBE CHANGE RIGHT  4/24/2024    IR NEPHROSTOMY TUBE CHANGE RIGHT  6/26/2024    IR NEPHROSTOMY TUBE CHANGE RIGHT  8/28/2024    IR NEPHROSTOMY TUBE CHANGE RIGHT  10/30/2024    IR PORT PLACEMENT >5 YEARS  11/24/2020    IR SITE CHECK/EVALUATION  4/11/2022    NEPHROSTOMY      AK ESOPHAGOGASTRODUODENOSCOPY TRANSORAL DIAGNOSTIC N/A 11/4/2020    Procedure: ESOPHAGOGASTRODUODENOSCOPY (EGD);  Surgeon: Paul Masters MD;  Location: [facility];  Service: Gastroenterology    AK LAP,PROSTATECTOMY,RADICAL,W/NERVE SPARE,INCL ROBOTIC Bilateral 09/01/2015    Procedure: DAVINCI RADICAL RETROPUBIC PROSTATECTOMY BILATERAL PELVIC LYMPH NODE DISSECTION;  Surgeon: Juan C Velazco MD;  [place] [facility] [place];  Service: Urology    PROSTATE  BIOPSY  06/08/2015    Ultrasound-guided transrectal biopsy.    SURGERY SCROTAL / TESTICULAR      for undescended testes, removed due to atrophy with vasectomy    ZZHC COLONOSCOPY W/WO BRUSH/WASH N/A 11/4/2020    Procedure: COLONOSCOPY WITH BIOPSY;  Surgeon: Paul Masters MD;  Location: [facility];  Service: Gastroenterology       CURRENT MEDICATIONS:    Current Facility-Administered Medications   Medication Dose Route Frequency Provider Last Rate Last Admin    norepinephrine (LEVOPHED) 4 mg in  mL PERIPHERAL infusion  0.01-0.2 mcg/kg/min Intravenous Continuous Tom Castro MD 7.8 mL/hr at 05/30/25 1355 0.03 mcg/kg/min at 05/30/25 1355     Current Outpatient Medications   Medication Sig Dispense Refill    acetaminophen (TYLENOL) 500 MG tablet Take 1,000 mg by mouth every 6 hours as needed for pain.      calcium carbonate-vitamin D (CALTRATE) 600-10 MG-MCG per tablet Take 1 tablet by mouth daily.      fentaNYL (DURAGESIC) 50 mcg/hr 72 hr patch Place 1 patch onto the skin every 72 hours.      ferrous sulfate (FE TABS) 325 (65 Fe) MG EC tablet Take 650 mg by mouth every morning.      ferrous sulfate (FEROSUL) 325 (65 Fe) MG tablet Take 325 mg by mouth every evening.      gabapentin (NEURONTIN) 300 MG capsule Take 300 mg by mouth at bedtime.      LONSURF 15-6.14 MG tablet Take 2 tablets by mouth See Admin Instructions. Take 2 tablets by mouth twice daily on days 1-5 and 15-19 of each 28 day cycle.      Melatonin 10 MG TABS tablet Take 10 mg by mouth nightly as needed for sleep      Multiple Vitamin (MULTIVITAMIN PO) Multivitamin powder: take 1 scoop daily      oxyCODONE (ROXICODONE) 5 MG tablet Take 5 mg by mouth every 4 hours as needed for severe pain       rOPINIRole (REQUIP) 0.25 MG tablet Take 0.25 mg by mouth at bedtime.      sertraline (ZOLOFT) 50 MG tablet Take 50 mg by mouth At Bedtime       tamsulosin (FLOMAX) 0.4 MG capsule Take 0.4 mg by mouth daily      traZODone (DESYREL) 50 MG  tablet Take 1 tablet by mouth at bedtime.      vitamin D3 (CHOLECALCIFEROL) 50 mcg (2000 units) tablet Take 1 tablet by mouth daily      XARELTO ANTICOAGULANT 20 MG TABS tablet Take 20 mg by mouth daily         ALLERGIES:  Allergies   Allergen Reactions    Bee Venom Hives       FAMILY HISTORY:  Family History   Problem Relation Age of Onset    Breast Cancer Mother     Osteoporosis Mother     Valvular heart disease Father     Prostate Cancer Father 70.00    No Known Problems Sister     No Known Problems Son     No Known Problems Daughter        SOCIAL HISTORY:   Social History     Socioeconomic History    Marital status:    Tobacco Use    Smoking status: Never    Smokeless tobacco: Never   Substance and Sexual Activity    Alcohol use: Not Currently     Comment: Alcoholic Drinks/day: rarely    Drug use: No    Sexual activity: Not Currently     Social Drivers of Health     Financial Resource Strain: Low Risk  (2/23/2021)    Received from [facility]    Overall Financial Resource Strain (CARDIA)     Difficulty of Paying Living Expenses: Not very hard   Food Insecurity: No Food Insecurity (2/23/2021)    Received from [facility]    Hunger Vital Sign     Worried About Running Out of Food in the Last Year: Never true     Ran Out of Food in the Last Year: Never true   Transportation Needs: Unknown (2/23/2021)    Received from [facility]    PRAPARE - Transportation     Lack of Transportation (Medical): No   Physical Activity: Sufficiently Active (2/23/2021)    Received from [facility]    Exercise Vital Sign     Days of Exercise per Week: 6 days     Minutes of Exercise per Session: 30 min   Stress: Stress Concern Present (2/23/2021)    Received from [facility]    [employer] - Occupational Stress Questionnaire     Feeling of Stress : Rather much   Social Connections: Moderately Integrated (2/23/2021)    Received from [facility]    Social Connection and Isolation Panel [NHANES]      "Frequency of Communication with Friends and Family: More than three times a week     Frequency of Social Gatherings with Friends and Family: Twice a week     Attends Restorationist Services: 1 to 4 times per year     Active Member of Clubs or Organizations: No     Attends Club or Organization Meetings: Never     Marital Status:    Interpersonal Safety: Low Risk  (10/30/2024)    Interpersonal Safety     Do you feel physically and emotionally safe where you currently live?: Yes     Within the past 12 months, have you been hit, slapped, kicked or otherwise physically hurt by someone?: No     Within the past 12 months, have you been humiliated or emotionally abused in other ways by your partner or ex-partner?: No       VITALS:  BP (!) 82/49   Pulse 76   Temp (!) 102  F (38.9  C)   Resp 22   Ht 1.69 m (5' 6.54\")   Wt 68.9 kg (152 lb)   SpO2 99%   BMI 24.14 kg/m      PHYSICAL EXAM:  Physical Exam  Vitals and nursing note reviewed.   Constitutional:       Appearance: Normal appearance.      Comments: Shivering   HENT:      Head: Normocephalic and atraumatic.      Right Ear: External ear normal.      Left Ear: External ear normal.      Mouth/Throat:      Mouth: Mucous membranes are moist.   Eyes:      Extraocular Movements: Extraocular movements intact.      Conjunctiva/sclera: Conjunctivae normal.      Pupils: Pupils are equal, round, and reactive to light.   Cardiovascular:      Rate and Rhythm: Regular rhythm. Tachycardia present.   Pulmonary:      Effort: Pulmonary effort is normal.      Breath sounds: Normal breath sounds. No wheezing or rales.   Abdominal:      General: Abdomen is flat. There is no distension.      Palpations: Abdomen is soft.      Tenderness: There is no abdominal tenderness. There is no guarding.   Musculoskeletal:         General: Normal range of motion.      Cervical back: Normal range of motion and neck supple.      Right lower leg: No edema.      Left lower leg: No edema. "   Lymphadenopathy:      Cervical: No cervical adenopathy.   Skin:     General: Skin is warm and dry.   Neurological:      General: No focal deficit present.      Mental Status: He is alert and oriented to person, place, and time. Mental status is at baseline.      Comments: No gross focal neurologic deficits   Psychiatric:         Mood and Affect: Mood normal.         Behavior: Behavior normal.         Thought Content: Thought content normal.          LAB:  All pertinent labs reviewed and interpreted.  Results for orders placed or performed during the hospital encounter of 05/30/25   CT Chest Abdomen Pelvis w/o Contrast    Impression    IMPRESSION:    1.  A irregular soft tissue mass associated with the ileocecal valve has increased in size from 5/8/2023 consistent with history of unresectable bowel malignancy. No findings to suggest new distant metastases.  2.  Left hydroureteronephrosis secondary architectural distortion/tissue retraction secondary to #1.  3.  Radiopaque 2-3 mm calculus in the urinary bladder adjacent to the right ureterovesicular junction. No right hydroureteronephrosis or other radiopaque renal or ureteral calculi.     UA with Microscopic reflex to Culture    Specimen: Urine, Midstream   Result Value Ref Range    Color Urine Yellow Colorless, Straw, Light Yellow, Yellow    Appearance Urine Hazy (A) Clear    Glucose Urine Negative Negative mg/dL    Bilirubin Urine Negative Negative    Ketones Urine Negative Negative mg/dL    Specific Gravity Urine 1.020 1.003 - 1.035    Blood Urine Large (A) Negative    pH Urine 6.0 5.0 - 7.0    Protein Albumin Urine 300 (A) Negative mg/dL    Urobilinogen Urine 0.2 0.2, 1.0 mg/dL    Nitrite Urine Positive (A) Negative    Leukocyte Esterase Urine Moderate (A) Negative    Bacteria Urine Few (A) None Seen /HPF    WBC Clumps Urine Present (A) None Seen /HPF    RBC Urine 10 (H) <=2 /HPF    WBC Urine 25 (H) <=5 /HPF    Squamous Epithelials Urine 1 <=1 /HPF   Basic  metabolic panel   Result Value Ref Range    Sodium 136 135 - 145 mmol/L    Potassium 5.1 3.4 - 5.3 mmol/L    Chloride 101 98 - 107 mmol/L    Carbon Dioxide (CO2) 20 (L) 22 - 29 mmol/L    Anion Gap 15 7 - 15 mmol/L    Urea Nitrogen 40.1 (H) 8.0 - 23.0 mg/dL    Creatinine 3.40 (H) 0.67 - 1.17 mg/dL    GFR Estimate 19 (L) >60 mL/min/1.73m2    Calcium 10.0 8.8 - 10.4 mg/dL    Glucose 122 (H) 70 - 99 mg/dL   Result Value Ref Range    Magnesium 1.5 (L) 1.7 - 2.3 mg/dL   Result Value Ref Range    Procalcitonin 6.77 (HH) <0.50 ng/mL   Lactic acid whole blood with 1x repeat in 2 hr when >2   Result Value Ref Range    Lactic Acid, Initial 3.3 (H) 0.7 - 2.0 mmol/L   Hepatic function panel   Result Value Ref Range    Protein Total 8.5 (H) 6.4 - 8.3 g/dL    Albumin 4.1 3.5 - 5.2 g/dL    Bilirubin Total 0.8 <=1.2 mg/dL    Alkaline Phosphatase 94 40 - 150 U/L    AST 32 0 - 45 U/L    ALT 19 0 - 70 U/L    Bilirubin Direct 0.25 0.00 - 0.30 mg/dL   Result Value Ref Range    Lipase 23 13 - 60 U/L   CBC with platelets and differential   Result Value Ref Range    WBC Count 9.8 4.0 - 11.0 10e3/uL    RBC Count 2.86 (L) 4.40 - 5.90 10e6/uL    Hemoglobin 9.8 (L) 13.3 - 17.7 g/dL    Hematocrit 30.7 (L) 40.0 - 53.0 %     (H) 78 - 100 fL    MCH 34.3 (H) 26.5 - 33.0 pg    MCHC 31.9 31.5 - 36.5 g/dL    RDW 17.8 (H) 10.0 - 15.0 %    Platelet Count 194 150 - 450 10e3/uL    % Neutrophils 87 %    % Lymphocytes 3 %    % Monocytes 9 %    % Eosinophils 0 %    % Basophils 0 %    % Immature Granulocytes 2 %    NRBCs per 100 WBC 0 <1 /100    Absolute Neutrophils 8.5 (H) 1.6 - 8.3 10e3/uL    Absolute Lymphocytes 0.3 (L) 0.8 - 5.3 10e3/uL    Absolute Monocytes 0.9 0.0 - 1.3 10e3/uL    Absolute Eosinophils 0.0 0.0 - 0.7 10e3/uL    Absolute Basophils 0.0 0.0 - 0.2 10e3/uL    Absolute Immature Granulocytes 0.2 <=0.4 10e3/uL    Absolute NRBCs 0.0 10e3/uL   Influenza A/B, RSV and SARS-CoV2 PCR (COVID-19) Nasopharyngeal    Specimen: Nasopharyngeal; Swab    Result Value Ref Range    Influenza A PCR Negative Negative    Influenza B PCR Negative Negative    RSV PCR Negative Negative    SARS CoV2 PCR Negative Negative   Lactic acid whole blood   Result Value Ref Range    Lactic Acid 1.1 0.7 - 2.0 mmol/L       RADIOLOGY:  Reviewed all pertinent imaging. Please see official radiology report.  CT Chest Abdomen Pelvis w/o Contrast   Final Result   IMPRESSION:      1.  A irregular soft tissue mass associated with the ileocecal valve has increased in size from 5/8/2023 consistent with history of unresectable bowel malignancy. No findings to suggest new distant metastases.   2.  Left hydroureteronephrosis secondary architectural distortion/tissue retraction secondary to #1.   3.  Radiopaque 2-3 mm calculus in the urinary bladder adjacent to the right ureterovesicular junction. No right hydroureteronephrosis or other radiopaque renal or ureteral calculi.             I, Jamie Guerrero, am serving as a scribe to document services personally performed by Dr. Castro based on my observation and the provider's statements to me. I, Tom Castro MD attest that Jamie Guerrero is acting in a scribe capacity, has observed my performance of the services and has documented them in accordance with my direction.    Tom Castro M.D.  Emergency Medicine  [facility] EMERGENCY DEPARTMENT  [facility] [facility] [id number] [phone]  Dept: [phone]       Tom Castro MD  05/30/25 [id number]       Tom Castro MD  05/30/25 [id number]

## 2025-05-30 NOTE — ED PROVIDER NOTES
4:00 PM I was called to the patient's room due to increased confusion, nausea, vomiting, and fever of 106 per rectal temp. Tylenol and zofran ordered. Recent heart rate read as high, leads are currently off, we will try an EKG when more calm. Will place on 1:1 as well. External cooling started by nursing as well.  4:10 PM I spoke with Dr. Martinez, Intensivist, to inform them of the patient's updated status. They are in agreement with my plan of care.     Per chart review: Patient was admitted to the ICU earlier today (5/30/2025) for urosepsis with septic shock on pressors. He received vancomycin and Zosyn.     I, Joey Castro, am serving as a scribe to document services personally performed by Dr. Mouna Batista based on my observation and the provider's statements to me. I, Mouna Batista MD attest that Joey Castro is acting in a scribe capacity, has observed my performance of the services and has documented them in accordance with my direction.     Mouna Batista M.D.  Emergency Medicine  [facility] EMERGENCY DEPARTMENT  [address] [place] [id number]  [phone]  Dept: [phone]          Mouna Batista MD  05/30/25 1626       Mouna Batista MD  05/30/25 1625

## 2025-05-30 NOTE — PHARMACY-ADMISSION MEDICATION HISTORY
Pharmacist Admission Medication History    Admission medication history is complete. The information provided in this note is only as accurate as the sources available at the time of the update.    Information Source(s): Patient and CareEverywhere/SureScripts via in-person    Pertinent Information: on bevacizumab/lonsurf, due for lonsurf on 6/3/25, takes 2 tablets by mouth twice daily on days 1-5 and 15-19 of each 28 day cycle (takes Tues-Sat every other week), dose recently reduced per Hannacroix. Recently completed course of ciprofloxacin, last dose on 5/26/25. Due for fentanyl patch change tomorrow. Gabapentin and trazodone prescribed for higher dose but patient only takes one of each at bedtime. Educated xarelto best to take with largest meal of the day, patient states he currently isn't taking it with much food.     Changes made to PTA medication list:  Added: lonsurf, multivitamin  Deleted: abaloparatide, oxybutynin, sodium bicarb  Changed: fentanyl patch, gabapentin, iron    Allergies reviewed with patient and updates made in EHR: yes    Medication History Completed By: NISH JARAMILLO RPH 5/30/2025 2:24 PM    PTA Med List   Medication Sig Last Dose/Taking    acetaminophen (TYLENOL) 500 MG tablet Take 1,000 mg by mouth every 6 hours as needed for pain. Past Week    calcium carbonate-vitamin D (CALTRATE) 600-10 MG-MCG per tablet Take 1 tablet by mouth daily. 5/29/2025 Morning    fentaNYL (DURAGESIC) 50 mcg/hr 72 hr patch Place 1 patch onto the skin every 72 hours. 5/28/2025 Morning    ferrous sulfate (FE TABS) 325 (65 Fe) MG EC tablet Take 650 mg by mouth every morning. 5/29/2025 Morning    ferrous sulfate (FEROSUL) 325 (65 Fe) MG tablet Take 325 mg by mouth every evening. 5/29/2025 Evening    gabapentin (NEURONTIN) 300 MG capsule Take 300 mg by mouth at bedtime. 5/28/2025 Bedtime    LONSURF 15-6.14 MG tablet Take 2 tablets by mouth See Admin Instructions. Take 2 tablets by mouth twice daily on days 1-5 and 15-19 of  each 28 day cycle. 5/24/2025 Evening    Melatonin 10 MG TABS tablet Take 10 mg by mouth nightly as needed for sleep Taking As Needed    Multiple Vitamin (MULTIVITAMIN PO) Multivitamin powder: take 1 scoop daily 5/29/2025 Morning    oxyCODONE (ROXICODONE) 5 MG tablet Take 5 mg by mouth every 4 hours as needed for severe pain  5/29/2025 Morning    rOPINIRole (REQUIP) 0.25 MG tablet Take 0.25 mg by mouth at bedtime. 5/28/2025 Bedtime    sertraline (ZOLOFT) 50 MG tablet Take 50 mg by mouth At Bedtime  5/28/2025 Bedtime    tamsulosin (FLOMAX) 0.4 MG capsule Take 0.4 mg by mouth daily 5/29/2025 Morning    traZODone (DESYREL) 50 MG tablet Take 1 tablet by mouth at bedtime. 5/28/2025 Bedtime    vitamin D3 (CHOLECALCIFEROL) 50 mcg (2000 units) tablet Take 1 tablet by mouth daily 5/29/2025 Morning    XARELTO ANTICOAGULANT 20 MG TABS tablet Take 20 mg by mouth daily 5/29/2025 Morning

## 2025-05-30 NOTE — ANESTHESIA PREPROCEDURE EVALUATION
Anesthesia Pre-Procedure Evaluation    Patient: Rich Wolff   MRN: [id number] : 1960          Procedure : Procedure(s):  CYSTOSCOPY, LEFT RETROGRADE PYELOGRAM, WITH URETERAL STENT INSERTION         Past Medical History:   Diagnosis Date    Colon cancer (H)     Hyperlipidemia     Hypertension     Prostate cancer (H) 2015    T1c. Leroy's 6 (3+3).  Confined to prostate.  Multifocal bilateral comprising 2% of the gland.PSA:5.8.      Past Surgical History:   Procedure Laterality Date    BOWEL RESECTION      COLONOSCOPY W/ BIOPSIES  2020    COLOSTOMY      ESOPHAGOSCOPY, GASTROSCOPY, DUODENOSCOPY (EGD), COMBINED  2020    IR CHEST PORT PLACEMENT > 5 YRS OF AGE  2020    IR CHEST PORT REPLACEMENT SAME SITE RIGHT  10/27/2022    IR NEPHROSTOMY TUBE CHANGE RIGHT  2021    IR NEPHROSTOMY TUBE CHANGE RIGHT  2021    IR NEPHROSTOMY TUBE CHANGE RIGHT  2021    IR NEPHROSTOMY TUBE CHANGE RIGHT  10/29/2021    IR NEPHROSTOMY TUBE CHANGE RIGHT  10/29/2021    IR NEPHROSTOMY TUBE CHANGE RIGHT  2021    IR NEPHROSTOMY TUBE CHANGE RIGHT  2022    IR NEPHROSTOMY TUBE CHANGE RIGHT  2022    IR NEPHROSTOMY TUBE CHANGE RIGHT  2022    IR NEPHROSTOMY TUBE CHANGE RIGHT  2022    IR NEPHROSTOMY TUBE CHANGE RIGHT  10/25/2022    IR NEPHROSTOMY TUBE CHANGE RIGHT  2022    IR NEPHROSTOMY TUBE CHANGE RIGHT  2023    IR NEPHROSTOMY TUBE CHANGE RIGHT  2023    IR NEPHROSTOMY TUBE CHANGE RIGHT  2023    IR NEPHROSTOMY TUBE CHANGE RIGHT  2023    IR NEPHROSTOMY TUBE CHANGE RIGHT  10/25/2023    IR NEPHROSTOMY TUBE CHANGE RIGHT  2023    IR NEPHROSTOMY TUBE CHANGE RIGHT  2024    IR NEPHROSTOMY TUBE CHANGE RIGHT  2024    IR NEPHROSTOMY TUBE CHANGE RIGHT  2024    IR NEPHROSTOMY TUBE CHANGE RIGHT  2024    IR NEPHROSTOMY TUBE CHANGE RIGHT  10/30/2024    IR PORT PLACEMENT >5 YEARS  2020    IR SITE CHECK/EVALUATION  2022    NEPHROSTOMY       IA ESOPHAGOGASTRODUODENOSCOPY TRANSORAL DIAGNOSTIC N/A 11/4/2020    Procedure: ESOPHAGOGASTRODUODENOSCOPY (EGD);  Surgeon: Paul Masters MD;  Location: [facility];  Service: Gastroenterology    IA LAP,PROSTATECTOMY,RADICAL,W/NERVE SPARE,INCL ROBOTIC Bilateral 09/01/2015    Procedure: DAVINCI RADICAL RETROPUBIC PROSTATECTOMY BILATERAL PELVIC LYMPH NODE DISSECTION;  Surgeon: Juan C Velazco MD;  Location: [facility] [place], [place];  Service: Urology    PROSTATE BIOPSY  06/08/2015    Ultrasound-guided transrectal biopsy.    SURGERY SCROTAL / TESTICULAR      for undescended testes, removed due to atrophy with vasectomy    ZZHC COLONOSCOPY W/WO BRUSH/WASH N/A 11/4/2020    Procedure: COLONOSCOPY WITH BIOPSY;  Surgeon: Paul Masters MD;  Location: [facility];  Service: Gastroenterology      Allergies   Allergen Reactions    Bee Venom Hives      Social History     Tobacco Use    Smoking status: Never    Smokeless tobacco: Never   Substance Use Topics    Alcohol use: Not Currently     Comment: Alcoholic Drinks/day: rarely      Wt Readings from Last 1 Encounters:   05/30/25 69.6 kg (153 lb 6.4 oz)        Anesthesia Evaluation   Pt has had prior anesthetic. Type: General and MAC.    No history of anesthetic complications       ROS/MED HX  ENT/Pulmonary:  - neg pulmonary ROS     Neurologic:  - neg neurologic ROS     Cardiovascular: Comment: Echo 5/21/2025    1. BEDSIDE ECHOCARDIOGRAM (in Emergency Dept):   2. No intracardiac mass or thrombus, within the limitations of transthoracic echocardiography.   3. Normal left ventricular size; estimated ejection fraction 50%.   4. Borderline generalized left ventricular hypokinesis.   5. Mildly thickened aortic valve; trivial aortic valve regurgitation.   6. Mild-moderate mitral valve regurgitation; normal left atrial size.   7. Normal right ventricular size; mildly reduced systolic function.   8. Mild tricuspid valve regurgitation; normal right atrial size.    9. Tip of central venous catheter noted in right atrium; no thrombus on catheter tip.   10. No abnormal structure identified in right atrium to account for increased FDG uptake noted on PET scan.   11. Inferior vena cava not well visualized.       (+) Dyslipidemia hypertension- -   -  - -   Taking blood thinners                                   METS/Exercise Tolerance:     Hematologic:     (+)      anemia,          Musculoskeletal:       GI/Hepatic:       Renal/Genitourinary:     (+) renal disease, type: CRI and ARF,            Endo:       Psychiatric/Substance Use:     (+) psychiatric history anxiety and depression       Infectious Disease: Comment: + sepsis      Malignancy:   (+) Malignancy, History of GI.    Other:      (+)  , H/O Chronic Pain,           Physical Exam  Airway  Mallampati: III  TM distance: >3 FB  Neck ROM: full  Mouth opening: >= 4 cm    Cardiovascular - normal exam Comments: Echo 5/21/2025    1. BEDSIDE ECHOCARDIOGRAM (in Emergency Dept):   2. No intracardiac mass or thrombus, within the limitations of transthoracic echocardiography.   3. Normal left ventricular size; estimated ejection fraction 50%.   4. Borderline generalized left ventricular hypokinesis.   5. Mildly thickened aortic valve; trivial aortic valve regurgitation.   6. Mild-moderate mitral valve regurgitation; normal left atrial size.   7. Normal right ventricular size; mildly reduced systolic function.   8. Mild tricuspid valve regurgitation; normal right atrial size.   9. Tip of central venous catheter noted in right atrium; no thrombus on catheter tip.   10. No abnormal structure identified in right atrium to account for increased FDG uptake noted on PET scan.   11. Inferior vena cava not well visualized.     Dental   (+) Edentulous      Pulmonary - normal exam      Neurological - normal exam  He appears awake, alert and oriented x3.    Other Findings       OUTSIDE LABS:  CBC:   Lab Results   Component Value Date    WBC 9.8  "05/30/2025    WBC 17.0 (H) 05/08/2023    HGB 9.8 (L) 05/30/2025    HGB 8.5 (L) 05/08/2023    HCT 30.7 (L) 05/30/2025    HCT 26.7 (L) 05/08/2023     05/30/2025     05/08/2023     BMP:   Lab Results   Component Value Date     05/30/2025     12/11/2024    POTASSIUM 5.1 05/30/2025    POTASSIUM 5.0 12/11/2024    CHLORIDE 101 05/30/2025    CHLORIDE 108 (H) 12/11/2024    CO2 20 (L) 05/30/2025    CO2 22 12/11/2024    BUN 40.1 (H) 05/30/2025    BUN 32.4 (H) 12/11/2024    CR 3.40 (H) 05/30/2025    CR 2.20 (H) 12/11/2024     (H) 05/30/2025     (H) 05/30/2025     COAGS:   Lab Results   Component Value Date    PTT 33 05/08/2023    INR 1.31 (H) 05/08/2023     POC: No results found for: \"BGM\", \"HCG\", \"HCGS\"  HEPATIC:   Lab Results   Component Value Date    ALBUMIN 4.1 05/30/2025    PROTTOTAL 8.5 (H) 05/30/2025    ALT 19 05/30/2025    AST 32 05/30/2025    ALKPHOS 94 05/30/2025    BILITOTAL 0.8 05/30/2025     OTHER:   Lab Results   Component Value Date    LACT 1.1 05/30/2025    A1C 4.8 05/30/2025    AARON 10.0 05/30/2025    PHOS 3.0 12/11/2024    MAG 1.5 (L) 05/30/2025    LIPASE 23 05/30/2025       Anesthesia Plan    ASA Status:  3, emergent      NPO Status: NPO Appropriate   Anesthesia Type: MAC.  Induction: intravenous.  Maintenance: TIVA.   Techniques and Equipment:       - Monitoring Plan: standard ASA monitoring     Consents    Anesthesia Plan(s) and associated risks, benefits, and realistic alternatives discussed. Questions answered and patient/representative(s) expressed understanding.     - Discussed: CRNA     - Discussed with:  Patient        - Pt is DNR/DNI Status: no DNR          Postoperative Care    Pain management: non-narcotic analgesics, plan for postoperative opioid use, multimodal analgesia.     Comments:                   Lynn Koch MD    I have reviewed the pertinent notes and labs in the chart from the past 30 days and (re)examined the patient.  Any updates or changes " from those notes are reflected in this note.    Clinically Significant Risk Factors Present on Admission             # Hypomagnesemia: Lowest Mg = 1.5 mg/dL in last 2 days, will replace as needed    # Drug Induced Coagulation Defect: home medication list includes an anticoagulant medication      # Circulatory Shock: required vasopressors within past 24 hours       # Anemia: based on hgb <11

## 2025-05-31 ENCOUNTER — APPOINTMENT (OUTPATIENT)
Dept: ULTRASOUND IMAGING | Facility: HOSPITAL | Age: 65
End: 2025-05-31
Attending: UROLOGY
Payer: COMMERCIAL

## 2025-05-31 LAB
ACB COMPLEX DNA BLD POS QL NAA+NON-PROBE: NOT DETECTED
ANION GAP SERPL CALCULATED.3IONS-SCNC: 15 MMOL/L (ref 7–15)
B FRAGILIS DNA BLD POS QL NAA+NON-PROBE: NOT DETECTED
BACTERIA UR CULT: ABNORMAL
BLACTX-M ISLT/SPM QL: NOT DETECTED
BLAIMP ISLT/SPM QL: NOT DETECTED
BLAKPC ISLT/SPM QL: NOT DETECTED
BLAOXA-48-LIKE ISLT/SPM QL: NOT DETECTED
BLAVIM ISLT/SPM QL: NOT DETECTED
BUN SERPL-MCNC: 40.2 MG/DL (ref 8–23)
C ALBICANS DNA BLD POS QL NAA+NON-PROBE: NOT DETECTED
C AURIS DNA BLD POS QL NAA+NON-PROBE: NOT DETECTED
C GATTII+NEOFOR DNA BLD POS QL NAA+N-PRB: NOT DETECTED
C GLABRATA DNA BLD POS QL NAA+NON-PROBE: NOT DETECTED
C KRUSEI DNA BLD POS QL NAA+NON-PROBE: NOT DETECTED
C PARAP DNA BLD POS QL NAA+NON-PROBE: NOT DETECTED
C TROPICLS DNA BLD POS QL NAA+NON-PROBE: NOT DETECTED
CALCIUM SERPL-MCNC: 8.8 MG/DL (ref 8.8–10.4)
CHLORIDE SERPL-SCNC: 104 MMOL/L (ref 98–107)
COLISTIN RES MCR-1 ISLT/SPM QL: NOT DETECTED
CREAT SERPL-MCNC: 3.36 MG/DL (ref 0.67–1.17)
E CLOAC COMP DNA BLD POS NAA+NON-PROBE: NOT DETECTED
E COLI DNA BLD POS QL NAA+NON-PROBE: DETECTED
E FAECALIS DNA BLD POS QL NAA+NON-PROBE: NOT DETECTED
E FAECIUM DNA BLD POS QL NAA+NON-PROBE: NOT DETECTED
EGFRCR SERPLBLD CKD-EPI 2021: 20 ML/MIN/1.73M2
ERYTHROCYTE [DISTWIDTH] IN BLOOD BY AUTOMATED COUNT: 18 % (ref 10–15)
GLUCOSE BLDC GLUCOMTR-MCNC: 104 MG/DL (ref 70–99)
GLUCOSE BLDC GLUCOMTR-MCNC: 90 MG/DL (ref 70–99)
GLUCOSE SERPL-MCNC: 98 MG/DL (ref 70–99)
GP B STREP DNA BLD POS QL NAA+NON-PROBE: NOT DETECTED
HAEM INFLU DNA BLD POS QL NAA+NON-PROBE: NOT DETECTED
HCO3 SERPL-SCNC: 17 MMOL/L (ref 22–29)
HCT VFR BLD AUTO: 26.3 % (ref 40–53)
HGB BLD-MCNC: 8.7 G/DL (ref 13.3–17.7)
K OXYTOCA DNA BLD POS QL NAA+NON-PROBE: NOT DETECTED
KLEBSIELLA SP DNA BLD POS QL NAA+NON-PRB: NOT DETECTED
KLEBSIELLA SP DNA BLD POS QL NAA+NON-PRB: NOT DETECTED
L MONOCYTOG DNA BLD POS QL NAA+NON-PROBE: NOT DETECTED
MAGNESIUM SERPL-MCNC: 2.7 MG/DL (ref 1.7–2.3)
MCH RBC QN AUTO: 35.7 PG (ref 26.5–33)
MCHC RBC AUTO-ENTMCNC: 33.1 G/DL (ref 31.5–36.5)
MCV RBC AUTO: 108 FL (ref 78–100)
N MEN DNA BLD POS QL NAA+NON-PROBE: NOT DETECTED
NDM: NOT DETECTED
P AERUGINOSA DNA BLD POS NAA+NON-PROBE: NOT DETECTED
PHOSPHATE SERPL-MCNC: 4.9 MG/DL (ref 2.5–4.5)
PLATELET # BLD AUTO: 159 10E3/UL (ref 150–450)
POTASSIUM SERPL-SCNC: 5.3 MMOL/L (ref 3.4–5.3)
PROTEUS SP DNA BLD POS QL NAA+NON-PROBE: NOT DETECTED
RBC # BLD AUTO: 2.44 10E6/UL (ref 4.4–5.9)
S AUREUS DNA BLD POS QL NAA+NON-PROBE: NOT DETECTED
S AUREUS+CONS DNA BLD POS NAA+NON-PROBE: NOT DETECTED
S EPIDERMIDIS DNA BLD POS QL NAA+NON-PRB: NOT DETECTED
S LUGDUNENSIS DNA BLD POS QL NAA+NON-PRB: NOT DETECTED
S MALTOPHILIA DNA BLD POS QL NAA+NON-PRB: NOT DETECTED
S MARCESCENS DNA BLD POS NAA+NON-PROBE: NOT DETECTED
S PNEUM DNA BLD POS QL NAA+NON-PROBE: NOT DETECTED
S PYO DNA BLD POS QL NAA+NON-PROBE: NOT DETECTED
SALMONELLA DNA BLD POS QL NAA+NON-PROBE: NOT DETECTED
SODIUM SERPL-SCNC: 136 MMOL/L (ref 135–145)
STREPTOCOCCUS DNA BLD POS NAA+NON-PROBE: NOT DETECTED
VANCOMYCIN SERPL-MCNC: 15.5 UG/ML (ref ?–25)
WBC # BLD AUTO: 10.6 10E3/UL (ref 4–11)

## 2025-05-31 PROCEDURE — 250N000011 HC RX IP 250 OP 636: Mod: JZ | Performed by: NURSE PRACTITIONER

## 2025-05-31 PROCEDURE — 82947 ASSAY GLUCOSE BLOOD QUANT: CPT | Performed by: UROLOGY

## 2025-05-31 PROCEDURE — 250N000013 HC RX MED GY IP 250 OP 250 PS 637: Performed by: NURSE PRACTITIONER

## 2025-05-31 PROCEDURE — 80202 ASSAY OF VANCOMYCIN: CPT | Performed by: UROLOGY

## 2025-05-31 PROCEDURE — 84132 ASSAY OF SERUM POTASSIUM: CPT | Performed by: UROLOGY

## 2025-05-31 PROCEDURE — 258N000003 HC RX IP 258 OP 636: Performed by: NURSE PRACTITIONER

## 2025-05-31 PROCEDURE — 250N000011 HC RX IP 250 OP 636: Mod: JZ | Performed by: UROLOGY

## 2025-05-31 PROCEDURE — 76770 US EXAM ABDO BACK WALL COMP: CPT

## 2025-05-31 PROCEDURE — 258N000003 HC RX IP 258 OP 636: Performed by: PAIN MEDICINE

## 2025-05-31 PROCEDURE — 250N000011 HC RX IP 250 OP 636: Performed by: NURSE PRACTITIONER

## 2025-05-31 PROCEDURE — 99232 SBSQ HOSP IP/OBS MODERATE 35: CPT | Performed by: NURSE PRACTITIONER

## 2025-05-31 PROCEDURE — 85041 AUTOMATED RBC COUNT: CPT | Performed by: UROLOGY

## 2025-05-31 PROCEDURE — 36415 COLL VENOUS BLD VENIPUNCTURE: CPT | Performed by: UROLOGY

## 2025-05-31 PROCEDURE — 258N000003 HC RX IP 258 OP 636: Performed by: INTERNAL MEDICINE

## 2025-05-31 PROCEDURE — 120N000004 HC R&B MS OVERFLOW

## 2025-05-31 PROCEDURE — 83735 ASSAY OF MAGNESIUM: CPT | Performed by: NURSE PRACTITIONER

## 2025-05-31 PROCEDURE — 99232 SBSQ HOSP IP/OBS MODERATE 35: CPT | Performed by: INTERNAL MEDICINE

## 2025-05-31 PROCEDURE — 84100 ASSAY OF PHOSPHORUS: CPT | Performed by: NURSE PRACTITIONER

## 2025-05-31 PROCEDURE — 250N000013 HC RX MED GY IP 250 OP 250 PS 637: Performed by: UROLOGY

## 2025-05-31 RX ORDER — TRAZODONE HYDROCHLORIDE 50 MG/1
50 TABLET ORAL AT BEDTIME
Status: DISCONTINUED | OUTPATIENT
Start: 2025-05-31 | End: 2025-06-04 | Stop reason: HOSPADM

## 2025-05-31 RX ORDER — SODIUM CHLORIDE, SODIUM LACTATE, POTASSIUM CHLORIDE, CALCIUM CHLORIDE 600; 310; 30; 20 MG/100ML; MG/100ML; MG/100ML; MG/100ML
INJECTION, SOLUTION INTRAVENOUS CONTINUOUS
Status: DISCONTINUED | OUTPATIENT
Start: 2025-05-31 | End: 2025-06-01

## 2025-05-31 RX ORDER — SODIUM CHLORIDE, SODIUM LACTATE, POTASSIUM CHLORIDE, CALCIUM CHLORIDE 600; 310; 30; 20 MG/100ML; MG/100ML; MG/100ML; MG/100ML
INJECTION, SOLUTION INTRAVENOUS CONTINUOUS
Status: ACTIVE | OUTPATIENT
Start: 2025-05-31 | End: 2025-05-31

## 2025-05-31 RX ORDER — FERROUS SULFATE 325(65) MG
325 TABLET ORAL EVERY EVENING
Status: DISCONTINUED | OUTPATIENT
Start: 2025-05-31 | End: 2025-06-04 | Stop reason: HOSPADM

## 2025-05-31 RX ORDER — ROPINIROLE 0.25 MG/1
0.25 TABLET, FILM COATED ORAL AT BEDTIME
Status: DISCONTINUED | OUTPATIENT
Start: 2025-05-31 | End: 2025-06-04 | Stop reason: HOSPADM

## 2025-05-31 RX ORDER — VITAMIN B COMPLEX
50 TABLET ORAL DAILY
Status: DISCONTINUED | OUTPATIENT
Start: 2025-05-31 | End: 2025-06-04 | Stop reason: HOSPADM

## 2025-05-31 RX ORDER — FERROUS SULFATE 325(65) MG
650 TABLET ORAL EVERY MORNING
Status: DISCONTINUED | OUTPATIENT
Start: 2025-05-31 | End: 2025-06-04 | Stop reason: HOSPADM

## 2025-05-31 RX ADMIN — OXYCODONE HYDROCHLORIDE 5 MG: 5 TABLET ORAL at 10:04

## 2025-05-31 RX ADMIN — SERTRALINE HYDROCHLORIDE 50 MG: 50 TABLET ORAL at 21:10

## 2025-05-31 RX ADMIN — MEPERIDINE HYDROCHLORIDE 25 MG: 25 INJECTION INTRAMUSCULAR; INTRAVENOUS; SUBCUTANEOUS at 04:22

## 2025-05-31 RX ADMIN — ACETAMINOPHEN 650 MG: 325 TABLET ORAL at 04:28

## 2025-05-31 RX ADMIN — OXYCODONE HYDROCHLORIDE 5 MG: 5 TABLET ORAL at 00:18

## 2025-05-31 RX ADMIN — MEROPENEM 500 MG: 500 INJECTION, POWDER, FOR SOLUTION INTRAVENOUS at 05:35

## 2025-05-31 RX ADMIN — MEROPENEM 500 MG: 500 INJECTION, POWDER, FOR SOLUTION INTRAVENOUS at 19:08

## 2025-05-31 RX ADMIN — RIVAROXABAN 20 MG: 10 TABLET, FILM COATED ORAL at 16:19

## 2025-05-31 RX ADMIN — MEPERIDINE HYDROCHLORIDE 25 MG: 25 INJECTION INTRAMUSCULAR; INTRAVENOUS; SUBCUTANEOUS at 14:00

## 2025-05-31 RX ADMIN — ROPINIROLE HYDROCHLORIDE 0.25 MG: 0.25 TABLET, FILM COATED ORAL at 21:10

## 2025-05-31 RX ADMIN — SODIUM CHLORIDE, SODIUM LACTATE, POTASSIUM CHLORIDE, AND CALCIUM CHLORIDE 500 ML: .6; .31; .03; .02 INJECTION, SOLUTION INTRAVENOUS at 23:45

## 2025-05-31 RX ADMIN — TRAZODONE HYDROCHLORIDE 50 MG: 50 TABLET ORAL at 21:10

## 2025-05-31 RX ADMIN — Medication 1 TABLET: at 10:37

## 2025-05-31 RX ADMIN — FERROUS SULFATE TAB 325 MG (65 MG ELEMENTAL FE) 650 MG: 325 (65 FE) TAB at 10:37

## 2025-05-31 RX ADMIN — ACETAMINOPHEN 650 MG: 325 TABLET ORAL at 16:18

## 2025-05-31 RX ADMIN — SODIUM CHLORIDE 750 MG: 0.9 INJECTION, SOLUTION INTRAVENOUS at 12:35

## 2025-05-31 RX ADMIN — SODIUM CHLORIDE, SODIUM LACTATE, POTASSIUM CHLORIDE, AND CALCIUM CHLORIDE: .6; .31; .03; .02 INJECTION, SOLUTION INTRAVENOUS at 06:06

## 2025-05-31 RX ADMIN — OXYCODONE HYDROCHLORIDE 5 MG: 5 TABLET ORAL at 06:08

## 2025-05-31 RX ADMIN — OXYCODONE HYDROCHLORIDE 5 MG: 5 TABLET ORAL at 16:19

## 2025-05-31 RX ADMIN — FENTANYL 1 PATCH: 50 PATCH TRANSDERMAL at 10:46

## 2025-05-31 RX ADMIN — Medication 50 MCG: at 10:36

## 2025-05-31 RX ADMIN — SODIUM CHLORIDE, SODIUM LACTATE, POTASSIUM CHLORIDE, AND CALCIUM CHLORIDE: .6; .31; .03; .02 INJECTION, SOLUTION INTRAVENOUS at 16:02

## 2025-05-31 RX ADMIN — FERROUS SULFATE TAB 325 MG (65 MG ELEMENTAL FE) 325 MG: 325 (65 FE) TAB at 21:10

## 2025-05-31 ASSESSMENT — ACTIVITIES OF DAILY LIVING (ADL)
ADLS_ACUITY_SCORE: 59
ADLS_ACUITY_SCORE: 76
ADLS_ACUITY_SCORE: 76
ADLS_ACUITY_SCORE: 68
ADLS_ACUITY_SCORE: 59
ADLS_ACUITY_SCORE: 51
ADLS_ACUITY_SCORE: 59
ADLS_ACUITY_SCORE: 76
ADLS_ACUITY_SCORE: 51
ADLS_ACUITY_SCORE: 51
EATING/SWALLOWING: OTHER (SEE COMMENTS)
ADLS_ACUITY_SCORE: 78
ADLS_ACUITY_SCORE: 51
ADLS_ACUITY_SCORE: 68
ADLS_ACUITY_SCORE: 68
ADLS_ACUITY_SCORE: 51
ADLS_ACUITY_SCORE: 59
ADLS_ACUITY_SCORE: 51
ADLS_ACUITY_SCORE: 51
ADLS_ACUITY_SCORE: 68
ADLS_ACUITY_SCORE: 51
WEAR_GLASSES_OR_BLIND: OTHER (SEE COMMENTS)

## 2025-05-31 NOTE — ANESTHESIA CARE TRANSFER NOTE
Patient: Rich Wolff    Procedure: Procedure(s):  CYSTOSCOPY, LEFT RETROGRADE PYELOGRAM, WITH URETERAL STENT INSERTION       Diagnosis: Hydronephrosis of left kidney [N13.30]  Diagnosis Additional Information: No value filed.    Anesthesia Type:   MAC     Note:    Oropharynx: oropharynx clear of all foreign objects and spontaneously breathing  Level of Consciousness: awake  Oxygen Supplementation: room air    Independent Airway: airway patency satisfactory and stable  Dentition: dentition unchanged  Vital Signs Stable: post-procedure vital signs reviewed and stable  Report to RN Given: handoff report given  Patient transferred to: PACU    Handoff Report: Identifed the Patient, Identified the Reponsible Provider, Reviewed the pertinent medical history, Discussed the surgical course, Reviewed Intra-OP anesthesia mangement and issues during anesthesia, Set expectations for post-procedure period and Allowed opportunity for questions and acknowledgement of understanding      Vitals:  Vitals Value Taken Time   /62    Temp 37    Pulse 100 05/30/25 20:05   Resp 18 05/30/25 20:05   SpO2 100 % 05/30/25 20:05   Vitals shown include unfiled device data.    Electronically Signed By: GRACE Gordon CRNA  May 30, 2025  8:07 PM

## 2025-05-31 NOTE — PROGRESS NOTES
Place of Service:  [facility]     Reason for follow up: left ureteral obstruction secondary to colon cancer, UTI, s/p left ureteral stent insertion    SUBJECTIVE:  Events: no acute events overnight    Patient doing well. He denies abdominal or flank pain. Denies fevers, chills, N/V. Tolerating garcias. Yellow output.    Vitally stable, afebrile. Last febrile at 102 yesterday evening.  WBC 10.6.  Creatinine 3.36 from 3.4 yesterday.   UC positive for E. Coli.  BC positive for gram negative bacilli.    OBJECTIVE:  PHYSICAL EXAM:  Temp: 97.8  F (36.6  C) Temp src: Oral BP: 120/64 Pulse: 81   Resp: 25 SpO2: 100 % O2 Device: None (Room air)    General: NAD, alert, cooperative  Head: normocephalic, without abnormality / atraumatic  Abdomen: soft, non tender, non distended. no suprapubic fullness, no suprapubic tenderness. no CVA tenderness, colostomy with feces and gas  Genitourinary: garcias darining yellow urine.   Psychological: alert and oriented, answers questions appropriately    LABS:  Creatinine   Date Value Ref Range Status   05/31/2025 3.36 (H) 0.67 - 1.17 mg/dL Final   12/23/2020 1.33 (H) 0.66 - 1.25 mg/dL Final     WBC   Date Value Ref Range Status   12/24/2020 5.4 4.0 - 11.0 10e9/L Final     WBC Count   Date Value Ref Range Status   05/31/2025 10.6 4.0 - 11.0 10e3/uL Final     Hemoglobin   Date Value Ref Range Status   05/31/2025 8.7 (L) 13.3 - 17.7 g/dL Final   12/24/2020 7.7 (L) 13.3 - 17.7 g/dL Final   ]  Platelet Count   Date Value Ref Range Status   05/31/2025 159 150 - 450 10e3/uL Final   12/24/2020 424 150 - 450 10e9/L Final       UA:  UA RESULTS:  Recent Labs   Lab Test 05/30/25  1157 10/28/21  1754 11/04/20  1736   COLOR Yellow   < > Yellow   APPEARANCE Hazy*   < > Clear   URINEGLC Negative   < > Negative   URINEBILI Negative   < > Negative   URINEKETONE Negative   < > 100 mg/dL*   SG 1.020   < > 1.020   UBLD Large*   < > Negative   URINEPH 6.0   < > 6.0   PROTEIN 300*   < > Negative    UROBILINOGEN  --   --  <2.0 E.U./dL   NITRITE Positive*   < > Negative   LEUKEST Moderate*   < > Negative   RBCU 10*   < >  --    WBCU 25*   < >  --     < > = values in this interval not displayed.         Cultures:  Urine   Culture >100,000 CFU/mL Escherichia coli Abnormal           Blood  Culture Positive on the 1st day of incubation Abnormal    Gram negative bacilli Panic    1 of 2 bottles          Lab Results: personally reviewed.     ASSESSMENT/PLAN:  Rich Wolff is being seen by [facility] for left ureteral obstruction secondary to colon cancer, UTI, s/p left ureteral stent insertion.    - 64 year old critically ill male presents for high fevers, found to have new left hydroureteronephrosis secondary to known unresectable mass associated with colon cancer, with positive UTI, s/p left ureteral stent insertion 5/30/25.  - UC positive for E. Coli. BC positive for gram negative bacilli. Continue broad spectrum antibiotics. Will need extended course of antibiotics.  - Will need ureteral stent exchanges every 3 months for malignant obstruction due to an unresectable mass.  - We will follow.      Tom Monroy PA-C  [facility]   [phone]

## 2025-05-31 NOTE — CONSULTS
[facility] CONSULTATION    Type of Consult: inpatient  Place of Service: [facility]   Reason for consult: Left hydronephrosis, obstructive pyelonephritis  Request for consult by: Dora Mosqueda    History of present illness:   Rich Wolff is a 64 year old male with past history most significant for unresectable colon cancer who is now admitted with new left-sided hydroureteronephrosis and possible urosepsis.    He has a history of malignant obstruction of the right ureter which was treated with a nephrostomy tube.  The nephrostomy tube was ultimately removed and he has not developed right-sided hydronephrosis.  To the best of my understanding, the new left-sided hydroureteronephrosis was first identified 10 days ago on a PET scan.  The patient was scheduled to meet with us to discuss management of this but he developed dysuria, urinary urgency and shaking chills this morning.  He was found to be in septic shock.  A CT scan confirmed left hydroureteronephrosis.  Urinalysis is consistent with infection.  He has now been fluid resuscitated and feels a bit stronger.  He denies flank pain.  He does have a history of prostate cancer as well.  This was a grade group 1 prostate cancer.  I do not have additional details at this time.    Past Medical History:  Past Medical History:   Diagnosis Date    Colon cancer (H)     Hyperlipidemia     Hypertension     Prostate cancer (H) 09/01/2015    T1c. Silvana's 6 (3+3).  Confined to prostate.  Multifocal bilateral comprising 2% of the gland.PSA:5.8.       Past Surgical History:  Past Surgical History:   Procedure Laterality Date    BOWEL RESECTION      COLONOSCOPY W/ BIOPSIES  11/04/2020    COLOSTOMY      ESOPHAGOSCOPY, GASTROSCOPY, DUODENOSCOPY (EGD), COMBINED  11/04/2020    IR CHEST PORT PLACEMENT > 5 YRS OF AGE  11/24/2020    IR CHEST PORT REPLACEMENT SAME SITE RIGHT  10/27/2022    IR NEPHROSTOMY TUBE CHANGE RIGHT  4/8/2021    IR NEPHROSTOMY TUBE CHANGE  RIGHT  4/8/2021    IR NEPHROSTOMY TUBE CHANGE RIGHT  8/16/2021    IR NEPHROSTOMY TUBE CHANGE RIGHT  10/29/2021    IR NEPHROSTOMY TUBE CHANGE RIGHT  10/29/2021    IR NEPHROSTOMY TUBE CHANGE RIGHT  12/27/2021    IR NEPHROSTOMY TUBE CHANGE RIGHT  2/28/2022    IR NEPHROSTOMY TUBE CHANGE RIGHT  5/9/2022    IR NEPHROSTOMY TUBE CHANGE RIGHT  7/5/2022    IR NEPHROSTOMY TUBE CHANGE RIGHT  9/26/2022    IR NEPHROSTOMY TUBE CHANGE RIGHT  10/25/2022    IR NEPHROSTOMY TUBE CHANGE RIGHT  12/28/2022    IR NEPHROSTOMY TUBE CHANGE RIGHT  2/28/2023    IR NEPHROSTOMY TUBE CHANGE RIGHT  4/27/2023    IR NEPHROSTOMY TUBE CHANGE RIGHT  6/27/2023    IR NEPHROSTOMY TUBE CHANGE RIGHT  8/30/2023    IR NEPHROSTOMY TUBE CHANGE RIGHT  10/25/2023    IR NEPHROSTOMY TUBE CHANGE RIGHT  12/27/2023    IR NEPHROSTOMY TUBE CHANGE RIGHT  2/28/2024    IR NEPHROSTOMY TUBE CHANGE RIGHT  4/24/2024    IR NEPHROSTOMY TUBE CHANGE RIGHT  6/26/2024    IR NEPHROSTOMY TUBE CHANGE RIGHT  8/28/2024    IR NEPHROSTOMY TUBE CHANGE RIGHT  10/30/2024    IR PORT PLACEMENT >5 YEARS  11/24/2020    IR SITE CHECK/EVALUATION  4/11/2022    NEPHROSTOMY      CA ESOPHAGOGASTRODUODENOSCOPY TRANSORAL DIAGNOSTIC N/A 11/4/2020    Procedure: ESOPHAGOGASTRODUODENOSCOPY (EGD);  Surgeon: Paul Masters MD;  Location: [facility];  Service: Gastroenterology    CA LAP,PROSTATECTOMY,RADICAL,W/NERVE SPARE,INCL ROBOTIC Bilateral 09/01/2015    Procedure: DAVINCI RADICAL RETROPUBIC PROSTATECTOMY BILATERAL PELVIC LYMPH NODE DISSECTION;  Surgeon: Juan C Velazco MD;  Location: [facility];  Service: Urology    PROSTATE BIOPSY  06/08/2015    Ultrasound-guided transrectal biopsy.    SURGERY SCROTAL / TESTICULAR      for undescended testes, removed due to atrophy with vasectomy    ZZHC COLONOSCOPY W/WO BRUSH/WASH N/A 11/4/2020    Procedure: COLONOSCOPY WITH BIOPSY;  Surgeon: Paul Masters MD;  Location: [facility];  Service: Gastroenterology       Social  History:  Social History     Socioeconomic History    Marital status:      Spouse name: Not on file    Number of children: Not on file    Years of education: Not on file    Highest education level: Not on file   Occupational History    Not on file   Tobacco Use    Smoking status: Never    Smokeless tobacco: Never   Substance and Sexual Activity    Alcohol use: Not Currently     Comment: Alcoholic Drinks/day: rarely    Drug use: No    Sexual activity: Not Currently   Other Topics Concern    Parent/sibling w/ CABG, MI or angioplasty before 65F 55M? Not Asked   Social History Narrative    Not on file     Social Drivers of Health     Financial Resource Strain: Low Risk  (2/23/2021)    Received from [facility]    Overall Financial Resource Strain (CARDIA)     Difficulty of Paying Living Expenses: Not very hard   Food Insecurity: No Food Insecurity (2/23/2021)    Received from [facility]    Hunger Vital Sign     Worried About Running Out of Food in the Last Year: Never true     Ran Out of Food in the Last Year: Never true   Transportation Needs: Unknown (2/23/2021)    Received from [facility]    PRAPARE - Transportation     Lack of Transportation (Medical): No     Lack of Transportation (Non-Medical): Not on file   Physical Activity: Sufficiently Active (2/23/2021)    Received from [facility]    Exercise Vital Sign     Days of Exercise per Week: 6 days     Minutes of Exercise per Session: 30 min   Stress: Stress Concern Present (2/23/2021)    Received from [facility]    [employer] - Occupational Stress Questionnaire     Feeling of Stress : Rather much   Social Connections: Moderately Integrated (2/23/2021)    Received from [facility]    Social Connection and Isolation Panel [NHANES]     Frequency of Communication with Friends and Family: More than three times a week     Frequency of Social Gatherings with Friends and Family: Twice a week     Attends [employer] Services: 1 to 4  times per year     Active Member of Clubs or Organizations: No     Attends Club or Organization Meetings: Never     Marital Status:    Interpersonal Safety: Low Risk  (10/30/2024)    Interpersonal Safety     Do you feel physically and emotionally safe where you currently live?: Yes     Within the past 12 months, have you been hit, slapped, kicked or otherwise physically hurt by someone?: No     Within the past 12 months, have you been humiliated or emotionally abused in other ways by your partner or ex-partner?: No   Housing Stability: Not on file       Medications:  Current Facility-Administered Medications   Medication Dose Route Frequency Provider Last Rate Last Admin    acetaminophen (TYLENOL) tablet 650 mg  650 mg Oral Q4H PRN Stephanie Mosquedaecca A, APRN CNP        Or    acetaminophen (TYLENOL) oral liquid 650 mg  650 mg Per NG tube Q4H PRN Stephanie Mosquedaecca A APRN LEATHA        acetaminophen (TYLENOL) Suppository 650 mg  650 mg Rectal Q4H PRN Mouna Batista MD   650 mg at 05/30/25 1616    ceFAZolin (ANCEF) 2 g in dextrose 50 mL intermittent infusion  2 g Intravenous Pre-Op/Pre-procedure x 1 dose Tom Monroy PA-C        glucose gel 15-30 g  15-30 g Oral Q15 Min PRN Harriet Mosquedaca GOGO APRN CNP        Or    dextrose 50 % injection 25-50 mL  25-50 mL Intravenous Q15 Min PRN Jaylin Dora A APRN CNP        Or    glucagon injection 1 mg  1 mg Subcutaneous Q15 Min PRN Stephanie Mosquedaecca A APRN CNP        [START ON 5/31/2025] fentaNYL (DURAGESIC) 50 mcg/hr 72 hr patch 1 patch  50 mcg Transdermal Q72H Stephanie Mosquedaecca A APRN LEATHA        And    fentaNYL (DURAGESIC) Patch in Place   Transdermal Q8H [facility] Stephanie Mosquedaecca A APRN CNP        [START ON 5/31/2025] heparin ANTICOAGULANT injection 5,000 Units  5,000 Units Subcutaneous Q8H Jaylin Dora A APRN CNP        insulin aspart (NovoLOG) injection (RAPID ACTING)  1-7 Units Subcutaneous TID AC Harriet Mosquedaca A APRN CNP        insulin aspart (NovoLOG) injection  (RAPID ACTING)  1-5 Units Subcutaneous At Bedtime Dora Mosqueda APRN CNP        meperidine (DEMEROL) injection 25 mg  25 mg Intravenous Q4H PRN Dora Mosqueda APRN CNP        meropenem (MERREM) 500 mg vial to attach to  mL bag for ADULTS or 25 mL bag for PEDS  500 mg Intravenous Q12H Harriet Mosquedaca GRACE BOWENS CNP   500 mg at 05/30/25 1830    naloxone (NARCAN) injection 0.2 mg  0.2 mg Intravenous Q2 Min PRN Jaylin, Dora AGUILA BOWENSN LEATHA        Or    naloxone (NARCAN) injection 0.4 mg  0.4 mg Intravenous Q2 Min PRN Harriet Mosquedaca GRACE BOWENS CNP        Or    naloxone (NARCAN) injection 0.2 mg  0.2 mg Intramuscular Q2 Min PRN Jaylin, Dora GRACE BOWENS CNP        Or    naloxone (NARCAN) injection 0.4 mg  0.4 mg Intramuscular Q2 Min PRN Harriet Mosquedaca GOGO APRN LEATHA        norepinephrine (LEVOPHED) 4 mg in  mL infusion PREMIX  0.01-0.6 mcg/kg/min Intravenous Continuous Harriet Mosquedaca AGUILA BOWENSN CNP 10.3 mL/hr at 05/30/25 1745 0.04 mcg/kg/min at 05/30/25 1745    ondansetron (ZOFRAN ODT) ODT tab 4 mg  4 mg Oral Q6H PRN Jaylin Dora A APRN LEATHA        Or    ondansetron (ZOFRAN) injection 4 mg  4 mg Intravenous Q6H PRN Stephanie Mosquedaecca A APRN CNP        oxyCODONE (ROXICODONE) tablet 5 mg  5 mg Oral or Feeding Tube Q4H PRN Stephanie Mosquedaecca A APRN CNP        [Held by provider] rivaroxaban ANTICOAGULANT (XARELTO) tablet 20 mg  20 mg Oral Daily with supper Harriet Mosquedaca AGUILA BOWENSN CNP        vancomycin place diaz - receiving intermittent dosing  1 each Intravenous See Admin Instructions Jaylin Dora A APRN LEATHA           Allergies:   Allergies   Allergen Reactions    Bee Venom Hives       Review of Systems:   A full 12 point review of systems was taken and is negative aside from what is noted above in the HPI    Physical Exam:  Temp:  [99.8  F (37.7  C)-102.7  F (39.3  C)] 102.7  F (39.3  C)  Pulse:  [] 95  Resp:  [13-37] 26  BP: ()/(49-83) 139/67  SpO2:  [95 %-100 %] 97 %  General: NAD, alert,  cooperative  Head: normocephalic, without abnormality / atraumatic  Abdomen: soft, not tender, not distended.  No suprapubic fullness/tenderness.  Possibly mild left CVA tenderness  Geniturinary: No lesions noted  Skin: no rashes or lesions  Musculoskeletal: moves all extremities equally; no calf edema or tenderness  Psychological: alert and oriented, answers questions appropriately      Labs:   Creatinine   Date Value Ref Range Status   05/30/2025 3.37 (H) 0.67 - 1.17 mg/dL Final   12/23/2020 1.33 (H) 0.66 - 1.25 mg/dL Final       Lab Results   Component Value Date    WBC 9.8 05/30/2025    WBC 5.4 12/24/2020     Lab Results   Component Value Date    HGB 9.8 05/30/2025    HGB 7.7 12/24/2020     Lab Results   Component Value Date     05/30/2025     12/24/2020       UA RESULTS:  Recent Labs   Lab Test 05/30/25  1157 10/28/21  1754 11/04/20  1736   COLOR Yellow   < > Yellow   APPEARANCE Hazy*   < > Clear   URINEGLC Negative   < > Negative   URINEBILI Negative   < > Negative   URINEKETONE Negative   < > 100 mg/dL*   SG 1.020   < > 1.020   UBLD Large*   < > Negative   URINEPH 6.0   < > 6.0   PROTEIN 300*   < > Negative   UROBILINOGEN  --   --  <2.0 E.U./dL   NITRITE Positive*   < > Negative   LEUKEST Moderate*   < > Negative   RBCU 10*   < >  --    WBCU 25*   < >  --     < > = values in this interval not displayed.         Urine culture: pending    Lab Results: personally reviewed     Imaging:  I reviewed the CT scan as well as the report.  There appears to be left-sided hydroureteronephrosis down to the mid ureteral segment where there is a retroperitoneal mass, presumably resulting in extrinsic obstruction of the ureter.    I have personally reviewed the imaging reports above.     Assessment / Plan : Rich Wolff is being seen by [facility] for left hydronephrosis secondary to a malignant retroperitoneal mass and urinary tract infection with concerns for obstructive pyelonephritis/sepsis.   We will attempt to place a stent.  If this is not successful, he will need a nephrostomy tube.  We discussed the risks, benefits and alternatives to left ureteral stent insertion and he wishes to proceed.    Thank you for consulting [facility] regarding this patient's care. Please contact us with questions or concerns.     Parag Phillips MD  [facility]   [phone]

## 2025-05-31 NOTE — PLAN OF CARE
Goal Outcome Evaluation:      Plan of Care Reviewed With: patient    Overall Patient Progress: improvingOverall Patient Progress: improving         [facility] - ICU    RN Progress Note:            Pertinent Assessments:      Please refer to flowsheet rows for full assessment     Improved mentation, more Alert and orient this shift. Able to move all extrmities and make needs known.            Key Events - This Shift:       -Frequent large watery BM in ileostomy bag.  -Positive blood culture from right and left arm for gram negative Bacilli. Also positive blood culture from Left arm for E coli. Dr Marcum notified.    -Fever with rigors noted this am, PRN demerol given as ordered.  -Levophed gtt titrated off this AM.      RN Managed Protocols Ordered:  Yes  Protocols:Potassium, Magnesium, and Phosphorus  Protocols Status: Reviewed with Oncoming RN                         Problem: Adult Inpatient Plan of Care  Goal: Optimal Comfort and Wellbeing  Intervention: Monitor Pain and Promote Comfort  Recent Flowsheet Documentation  Taken 5/31/2025 0018 by Mairo Martin RN  Pain Management Interventions: medication (see MAR)     Problem: UTI (Urinary Tract Infection)  Goal: Improved Infection Symptoms  5/31/2025 0251 by Mario Martin, RN  Outcome: Progressing     Problem: Sepsis/Septic Shock  Goal: Absence of Infection Signs and Symptoms  5/31/2025 0251 by Mario Martin RN  Outcome: Progressing     Problem: Infection  Goal: Absence of Infection Signs and Symptoms  Outcome: Progressing     Problem: Pain Acute  Goal: Optimal Pain Control and Function  Outcome: Progressing

## 2025-05-31 NOTE — PHARMACY-VANCOMYCIN DOSING SERVICE
Pharmacy Vancomycin Note  Date of Service May 31, 2025  Patient's  1960   64 year old, male    Indication: Sepsis and Urinary Tract Infection  Day of Therapy: 2  Current vancomycin regimen:  Intermittent  Current vancomycin monitoring method: AUC  Current vancomycin therapeutic monitoring goal: 400-600 mg*h/L    Current estimated CrCl = Estimated Creatinine Clearance: 21.6 mL/min (A) (based on SCr of 3.36 mg/dL (H)).    Creatinine for last 3 days  2025: 11:08 AM Creatinine 3.40 mg/dL;  6:32 PM Creatinine 3.37 mg/dL  2025:  4:15 AM Creatinine 3.36 mg/dL    Recent Vancomycin Levels (past 3 days)  2025:  4:15 AM Vancomycin 15.5 ug/mL    Vancomycin IV Administrations (past 72 hours)                     vancomycin (VANCOCIN) 1,250 mg in 0.9% NaCl 262.5 mL intermittent infusion (mg) 1,250 mg New Bag 25 1251                    Nephrotoxins and other renal medications (From now, onward)      Start     Dose/Rate Route Frequency Ordered Stop    25 1100  vancomycin (VANCOCIN) 750 mg in 0.9% NaCl 257.5 mL intermittent infusion         750 mg  over 60 Minutes Intravenous EVERY 36 HOURS 25 1004      25 1730  norepinephrine (LEVOPHED) 4 mg in  mL infusion PREMIX         0.01-0.6 mcg/kg/min × 68.9 kg  2.6-155 mL/hr  Intravenous CONTINUOUS 25 1727                 Contrast Orders - past 72 hours (72h ago, onward)      Start     Dose/Rate Route Frequency Stop    25 1945  iohexol (OMNIPAQUE) injection  Status:  Discontinued           PRN 25            Interpretation of levels and current regimen:    Has serum creatinine changed greater than 50% in last 72 hours: No    Urine output:  unable to determine    Renal Function: CHAYO on CKD    InsightRX Prediction of Planned New Vancomycin Regimen  Initial Loading dose: 1250 mg  12:51  Regimen: 750 mg IV every 36 hours.  Start time: 13:00 on 2025  Exposure target: AUC24 (range) 400-600 mg/L.hr   AUC24,ss: 467  mg/L.hr  Probability of AUC24 > 400: 79 %  Ctrough,ss: 14.4 mg/L  Probability of Ctrough,ss > 20: 11 %  Probability of nephrotoxicity (Lodise ROCIO 2009): 10 %        Plan:  Increase Dose to Vancomycin 750mg q36h, giving the next dose 12 hours after the loading dose.  Vancomycin monitoring method: AUC  Vancomycin therapeutic monitoring goal: 400-600 mg*h/L  Pharmacy will check vancomycin levels as appropriate in 1-3 Days.  Serum creatinine levels will be ordered daily for the first week of therapy and at least twice weekly for subsequent weeks.    Aniyah Bojorquez, [facility]

## 2025-05-31 NOTE — PROGRESS NOTES
Critical Care Progress Note  5/31/2025   10:07 AM    Admit Date: 5/30/2025  ICU Admission Day: 5/30/25  Code Status: FULL CODE      Problem List:   Active Problems:    Colostomy status (H)    Hypomagnesemia    On antineoplastic chemotherapy    Acute kidney injury superimposed on CKD    Sepsis with acute renal failure without septic shock, due to unspecified organism, unspecified acute renal failure type (H)      Plan by System:   Neuro/Psych: chronic pain - malignancy related.               - Continue 50mcg/hr fentanyl patch - currently in place. Change out Q72h - due to change today.               - PRN oxycodone per home regimen              - hold gabapentin due to renal injury      Pulmonary: no acute issues               - CT clear, no infiltrate. Resp viral panel negative               - Stable on room air              - goal SpO2 > 92%      CV: Septic shock - pyelonephritis. Recent Echo 5/21 showed no mass or thrombus within the right atrium, normal LV size and function with EF 50%              - Fluid resuscitated with 3L crystalloid               - NE for MAP > 65; off since early this morning.                - Cardiac monitoring      GI/: unresectable colon cancer with tumor infiltration involving all retroperitoneal structures (distal aorta, vena cava, bilateral common iliac arteries and veins, bilateral ureters, bladder, sigmoid colon and possibly sacrum) making it impossible to safely achieve an R0 resection.S/P Ileostomy in 2021.  Hx of ureteral obstruction require Right nephrostomy tube placement in 2021, removed 11/2024. Now with CT evidence of Left hydroureteronephrosis secondary to bowel mass. Small 2-3 mm calculus in urinary bladder.               - Consult Urology for eval of left hydronephrosis               - Left renal ultrasound               - Diet: regular              - Last BM: ileostomy   - WOC consult for ostomy care                - Bowel meds: prn      Renal: CHAYO on CKD; hypomag                - Replace electrolytes per protocol               - Follow BMP              - fluid resuscitated with 3L crystalloid.    - Give 1-liter LR over 10-hours     ID: acute pyelonephritis              -  hx of Enterobacter in 2024 resistant to Zosyn              - hx of Klebsiella               - Meropenem and Vanc until cultures return. Has hx of growing MDR bacteria so will wait for finalization before narrowing.      Heme/Coag:  unresectable colon cancer dx 2021. Follow with Dr. Kern here and also down in [place]. On active  chemotherapy with LONSURF, last dose 5/24              - DVT proph: Xarelto - resume   - hold LONSURF for now, too sick      Endocrine: no acute issues            Clinically Significant Risk Factors Present on Admission            # Hypercalcemia: corrected calcium is >10.1, will monitor as appropriate  # Hypomagnesemia: Lowest Mg = 1.5 mg/dL in last 2 days, will replace as needed   # Hypoalbuminemia: Lowest albumin = 3.1 g/dL at 5/30/2025  6:32 PM, will monitor as appropriate  # Drug Induced Coagulation Defect: home medication list includes an anticoagulant medication      # Circulatory Shock: required vasopressors within past 24 hours       # Anemia: based on hgb <11               # Anemia: based on hgb <11             Dispo: stable for transfer out of ICU; [facility] consulted. Our service will sign off.     Droa Mosqueda, CNP  [facility] Pulmonary/Critical Care     _______________________________________________________________    HPI: 64 year old man with hx of metastatic colon cancer, colostomy, chronic pain, ureter obstruction from mass effect s/p right nephrostomy tube. Presented to our ER this morning with urinary urgency, dysuria, chills.  Has a history of urinary infections with previous right sided nephrostomy tube - removed 11/2024. Labs showed acute kidney injury, elevated procalcitonin. He was febrile with rigors, tachycardic, hypotensive. He was fluid resuscitated with  3L crystalloid and started on norepi. CT chest/abd/pelvis showed Left hydroureteronephrosis secondary architectural distortion/tissue retraction secondary to bowel mass. He was admitted to ICU for further management.  Taken to OR for ureteral stent placement by Dr. Phillips.     ROS: feeling better; wants to go home. No pain currently     Events over last 24-hours: ureteral stent placement by Dr. Phillips last evening; urine was reported as clear.     Objective:     Vitals:    05/31/25 0839 05/31/25 0900 05/31/25 0915 05/31/25 0930   BP: 98/63 90/55 (!) 86/53 89/56   BP Location:       Pulse: 79 79 79 74   Resp: 19 23 15 16   Temp:       TempSrc:       SpO2: 99% 98% 98% 97%   Weight:       Height:            I/O:   Intake/Output Summary (Last 24 hours) at 5/31/2025 1007  Last data filed at 5/31/2025 0800  Gross per 24 hour   Intake 3071.7 ml   Output 1920 ml   Net 1151.7 ml     Wt Readings from Last 3 Encounters:   05/31/25 68.8 kg (151 lb 11.2 oz)   04/24/24 79.4 kg (175 lb)   05/08/23 79.4 kg (175 lb)      Weight change:     Physical Exam:  Gen: awake and alert  HEENMT: AT/NC  NEURO: grossly nonfocal  CARDIOVASCULAR: RRR S1S2  PULMONARY: unlabored on RA  GASTROINTESTINAL: soft ntnd; ileostomy with liquid stool  INTEGUMENT: visible skin intact   PSYCH: calm    Labs:   Recent Labs   Lab 05/31/25  0415 05/30/25  1832    137   CO2 17* 18*   BUN 40.2* 40.8*   ALKPHOS  --  67   ALT  --  27   AST  --  37       Recent Labs   Lab 05/31/25 0415   WBC 10.6   HGB 8.7*   HCT 26.3*          Micro:   5/30 Blood x 2 - Gram negative bacilli; E.coli by Verigene   5/30 urine - pending     Imaging: all imaging personalized reviewed         Dora Mosqueda, CNP  [facility] Pulmonary/Critical Care

## 2025-05-31 NOTE — PLAN OF CARE
Goal Outcome Evaluation:      Plan of Care Reviewed With: patient, spouse    Overall Patient Progress: no changeOverall Patient Progress: no change    Outcome Evaluation: Fever on and off    [facility] - ICU    RN Progress Note:            Pertinent Assessments:      Please refer to flowsheet rows for full assessment     See note below           Key Events - This Shift:       At beginning of writer's shift, pt with a fever of 102.9. Tylenol given, ice packs applied, & bedside fan turned on. Temperature now back to WNL. BP soft & urine more pink tinged after stopping LR at 1800. Will continue to monitor.     RN Managed Protocols Ordered:  Yes  Protocols:Potassium, Magnesium, and Phosphorus  PRN'S:N/A  Protocols Status: Reviewed with Oncoming RN                Barriers to Discharge / Downgrade:     Already downgraded         Point of Contact Update: YES-OR-NO: Yes    Name:Giselle  Phone Number:At bedside  Summary of Conversation: Pt with fever on & off and poor appetite.

## 2025-05-31 NOTE — PLAN OF CARE
Goal Outcome Evaluation:       [facility] - ICU    RN Progress Note:alert and oriented, VSS, afebrile, up in chair.  A lot of output fr om ileostomy, garcias in place.  Receiving vancomycin. Family has been updated and were at bedside.             Pertinent Assessments:      Please refer to flowsheet rows for full assessment     VSS, remains off of levophed.  Afebrile, pain control with oxycodone and fentanyl patch           Key Events - This Shift:       Patient up in chair, assist of 1     RN Managed Protocols Ordered:  yes  Protocols:kt, mag, phos  PRN'S:none needed  Protocols Status: recheck in am                Barriers to Discharge / Downgrade:     Currently downgraded to MS         Point of Contact Update: YES-OR-NO: Yes  If No, reason:   Name:Giselle  Phone Number:bedside  Summary of Conversation: updated with progress and plan of care

## 2025-05-31 NOTE — PLAN OF CARE
Goal Outcome Evaluation:      Plan of Care Reviewed With: patient    Overall Patient Progress: improvingOverall Patient Progress: improving    Outcome Evaluation: Pt more alert & fever is decreasing    [facility] - ICU    RN Progress Note:            Pertinent Assessments:      Please refer to flowsheet rows for full assessment     See note below           Key Events - This Shift:       Pt arrived to ICU from ED around 1645. Currently, pt alert but with intermittent confusion. On low dose Levo to maintain MAP>65. Taken to OR & had a left ureteral stent placed. Pt returned from OR at 2045. Still needs an US of left kidney. US staff aware & will perform US when they are available. On coming RN updated on pt's progress.       RN Managed Protocols Ordered:  Yes  Protocols:Potassium, Magnesium, and Phosphorus  PRN'S:N/A  Protocols Status: Reviewed with Oncoming RN                Barriers to Discharge / Downgrade:     Levo         Point of Contact Update: YES-OR-NO: Yes    Name:Pt's spouse, dtr, & son  Phone Number:At bedside  Summary of Conversation: Pt's progress

## 2025-05-31 NOTE — ANESTHESIA POSTPROCEDURE EVALUATION
Patient: Rich Wolff    Procedure: Procedure(s):  CYSTOSCOPY, LEFT RETROGRADE PYELOGRAM, WITH URETERAL STENT INSERTION       Anesthesia Type:  MAC    Note:  Disposition: Inpatient; ICU   Postop Pain Control: Uneventful            Sign Out: Well controlled pain   PONV: No   Neuro/Psych: Uneventful            Sign Out: Acceptable/Baseline neuro status   Airway/Respiratory: Uneventful            Sign Out: Acceptable/Baseline resp. status   CV/Hemodynamics: Uneventful            Sign Out: Acceptable CV status; No obvious hypovolemia; No obvious fluid overload   Other NRE: NONE   DID A NON-ROUTINE EVENT OCCUR? No           Last vitals:  Vitals Value Taken Time   /71 05/30/25 20:30   Temp 37.6  C (99.7  F) 05/30/25 20:08   Pulse 94 05/30/25 20:35   Resp 18 05/30/25 20:35   SpO2 99 % 05/30/25 20:35       Electronically Signed By: Lynn Koch MD  May 31, 2025  3:15 AM

## 2025-05-31 NOTE — PROGRESS NOTES
[facility] - ICU Admission Note       Dual Skin Assessment:     Patient transferred from ED to ICU.      Comprehensive skin inspection completed by myself and Rebeca Ivy RN.      Abnormal skin assessment findings: No      If yes above, LDA initiated for skin breakdown/non-blanchable erythema:  N/A     Provider notified: N/A     WOC consult order obtained: N/A

## 2025-05-31 NOTE — OP NOTE
Date of surgery: 5/30/2025    Location: [facility] [place]    Operating room: 1    Surgeon: Dr. Parag Phillips.     Assistant: None    Anesthesia: MAC    Preoperative diagnosis: Left ureteral obstruction, urinary tract infection, possible urosepsis    Postoperative diagnosis: Same    Procedure: Cystoscopy, left retrograde pyelogram, left ureteral stent placement    Operative indication: Rich Wolff is a 64 year old man, critically ill, with new left-sided hydroureteronephrosis secondary to a known unresectable mass associated with colon cancer.  He has fevers and what appears to be a urinary tract infection.  We discussed urgent placement of a stent in the left ureter and he wished to proceed    Operative findings: The stent was placed without difficulty.  There is mild obstruction of the ureter.    Operative procedure: The patient was brought to the operating room.  Monitored anesthetic care was administered.  The patient was placed in lithotomy position and prepared and draped in sterile fashion.  I introduced a 22 Moroccan cystoscope per urethra and into the bladder.  The urethra exhibits no strictures.  He appears to be status post prostatectomy.  The bladder is erythematous with edema in the mucosa consistent with infection.  Is difficult to identify the ureters but ultimately I was able to identify both ureters.  The difficulty is due to the edema.  The left ureteral orifice is cannulated with a Bentson wire and a 5 Moroccan open-ended ureteral catheter was advanced into the distal left ureter.  A retrograde pyelogram was obtained by injecting approximately 10 mL of 50% diluted contrast media.  There is medial deviation of the ureterIn the mid ureteral segment.  Above this level, there is fullness to the proximal ureter and the collecting system.  A Bentson wire was advanced into the kidney and a 6 Moroccan by 26 cm double-J stent is placed.  The stent is seen to coil nicely in the kidney by fluoroscopy and in  the bladder by direct vision.  The scope was removed and an 18 Bulgarian catheter was placed per urethra.  The bladder was drained.    Drains: Catheter per urethra double-J stent in the left ureter    Specimens: None    Estimated blood loss: None    Complications: None    Paarg Phillips MD

## 2025-05-31 NOTE — PROGRESS NOTES
Patient did have an episode of rigors that were just starting.  Temp at that time was 99.4, orally.  Demerol given.

## 2025-06-01 LAB
ABO + RH BLD: NORMAL
ALBUMIN SERPL BCG-MCNC: 2.9 G/DL (ref 3.5–5.2)
ALP SERPL-CCNC: 57 U/L (ref 40–150)
ALT SERPL W P-5'-P-CCNC: 16 U/L (ref 0–70)
ANION GAP SERPL CALCULATED.3IONS-SCNC: 9 MMOL/L (ref 7–15)
AST SERPL W P-5'-P-CCNC: 34 U/L (ref 0–45)
BILIRUB DIRECT SERPL-MCNC: 0.22 MG/DL (ref 0–0.3)
BILIRUB SERPL-MCNC: 0.6 MG/DL
BLD GP AB SCN SERPL QL: NEGATIVE
BLD PROD TYP BPU: NORMAL
BLD PROD TYP BPU: NORMAL
BLOOD COMPONENT TYPE: NORMAL
BLOOD COMPONENT TYPE: NORMAL
BUN SERPL-MCNC: 40.7 MG/DL (ref 8–23)
CALCIUM SERPL-MCNC: 7.6 MG/DL (ref 8.8–10.4)
CHLORIDE SERPL-SCNC: 105 MMOL/L (ref 98–107)
CODING SYSTEM: NORMAL
CODING SYSTEM: NORMAL
CREAT SERPL-MCNC: 3.3 MG/DL (ref 0.67–1.17)
CROSSMATCH: NORMAL
CROSSMATCH: NORMAL
DAT IGG-SP REAG RBC QL: NORMAL
DAT POLY: NORMAL
EGFRCR SERPLBLD CKD-EPI 2021: 20 ML/MIN/1.73M2
ERYTHROCYTE [DISTWIDTH] IN BLOOD BY AUTOMATED COUNT: 17.7 % (ref 10–15)
ERYTHROCYTE [DISTWIDTH] IN BLOOD BY AUTOMATED COUNT: 17.8 % (ref 10–15)
GLUCOSE SERPL-MCNC: 87 MG/DL (ref 70–99)
HAPTOGLOB SERPL-MCNC: 194 MG/DL (ref 30–200)
HCO3 SERPL-SCNC: 21 MMOL/L (ref 22–29)
HCT VFR BLD AUTO: 19.2 % (ref 40–53)
HCT VFR BLD AUTO: 20.4 % (ref 40–53)
HGB BLD-MCNC: 6.2 G/DL (ref 13.3–17.7)
HGB BLD-MCNC: 6.5 G/DL (ref 13.3–17.7)
HGB BLD-MCNC: 7.3 G/DL (ref 13.3–17.7)
HOLD SPECIMEN: NORMAL
HOLD SPECIMEN: NORMAL
ISSUE DATE AND TIME: NORMAL
ISSUE DATE AND TIME: NORMAL
LDH SERPL L TO P-CCNC: 187 U/L (ref 0–250)
MAGNESIUM SERPL-MCNC: 2.3 MG/DL (ref 1.7–2.3)
MCH RBC QN AUTO: 34 PG (ref 26.5–33)
MCH RBC QN AUTO: 34.4 PG (ref 26.5–33)
MCHC RBC AUTO-ENTMCNC: 31.9 G/DL (ref 31.5–36.5)
MCHC RBC AUTO-ENTMCNC: 32.3 G/DL (ref 31.5–36.5)
MCV RBC AUTO: 104 FL (ref 78–100)
MCV RBC AUTO: 107 FL (ref 78–100)
MCV RBC AUTO: 107 FL (ref 78–100)
PHOSPHATE SERPL-MCNC: 4.1 MG/DL (ref 2.5–4.5)
PLATELET # BLD AUTO: 92 10E3/UL (ref 150–450)
PLATELET # BLD AUTO: 96 10E3/UL (ref 150–450)
POTASSIUM SERPL-SCNC: 3.9 MMOL/L (ref 3.4–5.3)
PROT SERPL-MCNC: 4.9 G/DL (ref 6.4–8.3)
RBC # BLD AUTO: 1.8 10E6/UL (ref 4.4–5.9)
RBC # BLD AUTO: 1.91 10E6/UL (ref 4.4–5.9)
RETICS # AUTO: 0.03 10E6/UL (ref 0.03–0.1)
RETICS/RBC NFR AUTO: 1.5 % (ref 0.5–2)
SODIUM SERPL-SCNC: 135 MMOL/L (ref 135–145)
SPECIMEN EXP DATE BLD: NORMAL
UNIT ABO/RH: NORMAL
UNIT ABO/RH: NORMAL
UNIT NUMBER: NORMAL
UNIT NUMBER: NORMAL
UNIT STATUS: NORMAL
UNIT STATUS: NORMAL
UNIT TYPE ISBT: 600
UNIT TYPE ISBT: 600
WBC # BLD AUTO: 4.8 10E3/UL (ref 4–11)
WBC # BLD AUTO: 5.4 10E3/UL (ref 4–11)

## 2025-06-01 PROCEDURE — 86923 COMPATIBILITY TEST ELECTRIC: CPT | Performed by: HOSPITALIST

## 2025-06-01 PROCEDURE — 258N000003 HC RX IP 258 OP 636: Performed by: INTERNAL MEDICINE

## 2025-06-01 PROCEDURE — 36415 COLL VENOUS BLD VENIPUNCTURE: CPT | Performed by: INTERNAL MEDICINE

## 2025-06-01 PROCEDURE — 250N000013 HC RX MED GY IP 250 OP 250 PS 637: Performed by: HOSPITALIST

## 2025-06-01 PROCEDURE — 85045 AUTOMATED RETICULOCYTE COUNT: CPT | Performed by: INTERNAL MEDICINE

## 2025-06-01 PROCEDURE — 250N000013 HC RX MED GY IP 250 OP 250 PS 637: Performed by: NURSE PRACTITIONER

## 2025-06-01 PROCEDURE — 86901 BLOOD TYPING SEROLOGIC RH(D): CPT | Performed by: INTERNAL MEDICINE

## 2025-06-01 PROCEDURE — 85027 COMPLETE CBC AUTOMATED: CPT | Performed by: NURSE PRACTITIONER

## 2025-06-01 PROCEDURE — P9016 RBC LEUKOCYTES REDUCED: HCPCS | Performed by: HOSPITALIST

## 2025-06-01 PROCEDURE — 83010 ASSAY OF HAPTOGLOBIN QUANT: CPT | Performed by: INTERNAL MEDICINE

## 2025-06-01 PROCEDURE — 83735 ASSAY OF MAGNESIUM: CPT | Performed by: NURSE PRACTITIONER

## 2025-06-01 PROCEDURE — 36415 COLL VENOUS BLD VENIPUNCTURE: CPT | Performed by: HOSPITALIST

## 2025-06-01 PROCEDURE — 250N000011 HC RX IP 250 OP 636: Mod: JZ | Performed by: NURSE PRACTITIONER

## 2025-06-01 PROCEDURE — 84460 ALANINE AMINO (ALT) (SGPT): CPT | Performed by: INTERNAL MEDICINE

## 2025-06-01 PROCEDURE — 80048 BASIC METABOLIC PNL TOTAL CA: CPT | Performed by: NURSE PRACTITIONER

## 2025-06-01 PROCEDURE — 86901 BLOOD TYPING SEROLOGIC RH(D): CPT | Performed by: HOSPITALIST

## 2025-06-01 PROCEDURE — 87040 BLOOD CULTURE FOR BACTERIA: CPT | Performed by: INTERNAL MEDICINE

## 2025-06-01 PROCEDURE — 86900 BLOOD TYPING SEROLOGIC ABO: CPT | Performed by: HOSPITALIST

## 2025-06-01 PROCEDURE — 86900 BLOOD TYPING SEROLOGIC ABO: CPT | Performed by: INTERNAL MEDICINE

## 2025-06-01 PROCEDURE — 99232 SBSQ HOSP IP/OBS MODERATE 35: CPT | Performed by: INTERNAL MEDICINE

## 2025-06-01 PROCEDURE — 86870 RBC ANTIBODY IDENTIFICATION: CPT | Performed by: INTERNAL MEDICINE

## 2025-06-01 PROCEDURE — 86880 COOMBS TEST DIRECT: CPT | Performed by: INTERNAL MEDICINE

## 2025-06-01 PROCEDURE — 120N000004 HC R&B MS OVERFLOW

## 2025-06-01 PROCEDURE — 86923 COMPATIBILITY TEST ELECTRIC: CPT | Performed by: INTERNAL MEDICINE

## 2025-06-01 PROCEDURE — 85041 AUTOMATED RBC COUNT: CPT | Performed by: HOSPITALIST

## 2025-06-01 PROCEDURE — 250N000011 HC RX IP 250 OP 636: Performed by: INTERNAL MEDICINE

## 2025-06-01 PROCEDURE — 85014 HEMATOCRIT: CPT | Performed by: HOSPITALIST

## 2025-06-01 PROCEDURE — 84100 ASSAY OF PHOSPHORUS: CPT | Performed by: NURSE PRACTITIONER

## 2025-06-01 PROCEDURE — 250N000011 HC RX IP 250 OP 636: Performed by: HOSPITALIST

## 2025-06-01 PROCEDURE — P9045 ALBUMIN (HUMAN), 5%, 250 ML: HCPCS | Performed by: HOSPITALIST

## 2025-06-01 PROCEDURE — 83615 LACTATE (LD) (LDH) ENZYME: CPT | Performed by: INTERNAL MEDICINE

## 2025-06-01 PROCEDURE — P9045 ALBUMIN (HUMAN), 5%, 250 ML: HCPCS | Performed by: INTERNAL MEDICINE

## 2025-06-01 PROCEDURE — 85018 HEMOGLOBIN: CPT | Performed by: INTERNAL MEDICINE

## 2025-06-01 PROCEDURE — P9016 RBC LEUKOCYTES REDUCED: HCPCS | Performed by: INTERNAL MEDICINE

## 2025-06-01 PROCEDURE — 86860 RBC ANTIBODY ELUTION: CPT | Performed by: INTERNAL MEDICINE

## 2025-06-01 RX ORDER — CEFTRIAXONE 2 G/1
2 INJECTION, POWDER, FOR SOLUTION INTRAMUSCULAR; INTRAVENOUS EVERY 24 HOURS
Status: DISCONTINUED | OUTPATIENT
Start: 2025-06-01 | End: 2025-06-04 | Stop reason: HOSPADM

## 2025-06-01 RX ORDER — MIDODRINE HYDROCHLORIDE 5 MG/1
5 TABLET ORAL ONCE
Status: COMPLETED | OUTPATIENT
Start: 2025-06-01 | End: 2025-06-01

## 2025-06-01 RX ADMIN — SERTRALINE HYDROCHLORIDE 50 MG: 50 TABLET ORAL at 22:22

## 2025-06-01 RX ADMIN — ACETAMINOPHEN 650 MG: 325 TABLET ORAL at 20:02

## 2025-06-01 RX ADMIN — MIDODRINE HYDROCHLORIDE 5 MG: 5 TABLET ORAL at 01:29

## 2025-06-01 RX ADMIN — MEROPENEM 500 MG: 500 INJECTION, POWDER, FOR SOLUTION INTRAVENOUS at 05:27

## 2025-06-01 RX ADMIN — ALBUMIN HUMAN 25 G: 0.05 INJECTION, SOLUTION INTRAVENOUS at 11:52

## 2025-06-01 RX ADMIN — ALBUMIN HUMAN 25 G: 0.05 INJECTION, SOLUTION INTRAVENOUS at 01:29

## 2025-06-01 RX ADMIN — Medication 1 TABLET: at 08:31

## 2025-06-01 RX ADMIN — ROPINIROLE HYDROCHLORIDE 0.25 MG: 0.25 TABLET, FILM COATED ORAL at 22:22

## 2025-06-01 RX ADMIN — Medication 50 MCG: at 08:31

## 2025-06-01 RX ADMIN — CEFTRIAXONE SODIUM 2 G: 2 INJECTION, POWDER, FOR SOLUTION INTRAMUSCULAR; INTRAVENOUS at 13:17

## 2025-06-01 RX ADMIN — FERROUS SULFATE TAB 325 MG (65 MG ELEMENTAL FE) 325 MG: 325 (65 FE) TAB at 22:22

## 2025-06-01 RX ADMIN — TRAZODONE HYDROCHLORIDE 50 MG: 50 TABLET ORAL at 22:22

## 2025-06-01 RX ADMIN — SODIUM CHLORIDE, SODIUM LACTATE, POTASSIUM CHLORIDE, AND CALCIUM CHLORIDE: .6; .31; .03; .02 INJECTION, SOLUTION INTRAVENOUS at 02:35

## 2025-06-01 RX ADMIN — FERROUS SULFATE TAB 325 MG (65 MG ELEMENTAL FE) 650 MG: 325 (65 FE) TAB at 08:31

## 2025-06-01 ASSESSMENT — ACTIVITIES OF DAILY LIVING (ADL)
ADLS_ACUITY_SCORE: 50
ADLS_ACUITY_SCORE: 52
ADLS_ACUITY_SCORE: 50
ADLS_ACUITY_SCORE: 60
ADLS_ACUITY_SCORE: 50
ADLS_ACUITY_SCORE: 52
ADLS_ACUITY_SCORE: 50
ADLS_ACUITY_SCORE: 52
ADLS_ACUITY_SCORE: 50
ADLS_ACUITY_SCORE: 60
ADLS_ACUITY_SCORE: 52
ADLS_ACUITY_SCORE: 50
ADLS_ACUITY_SCORE: 60
ADLS_ACUITY_SCORE: 50
ADLS_ACUITY_SCORE: 52

## 2025-06-01 NOTE — PLAN OF CARE
"  Problem: Adult Inpatient Plan of Care  Goal: Plan of Care Review  Description: The Plan of Care Review/Shift note should be completed every shift.  The Outcome Evaluation is a brief statement about your assessment that the patient is improving, declining, or no change.  This information will be displayed automatically on your shift  note.  Outcome: Progressing     Problem: Adult Inpatient Plan of Care  Goal: Patient-Specific Goal (Individualized)  Description: You can add care plan individualizations to a care plan. Examples of Individualization might be:  \"Parent requests to be called daily at 9am for status\", \"I have a hard time hearing out of my right ear\", or \"Do not touch me to wake me up as it startles  me\".  Outcome: Progressing   Goal Outcome Evaluation:         Patient alert and oriented this shift. Denies any pain at this time. Adkins and ileostomy in place. 1 unit PRBC given this am. Albumin given for BP 88/59. Patient tolerating regular diet. Afebrile.                   "

## 2025-06-01 NOTE — PROGRESS NOTES
Cross cover    Nurse reported low bp 83/53, ivf .    Chart reviewed. Plan to restart ivf. If still low, need to restart vasopressor and/or reconsult Intensivist.

## 2025-06-01 NOTE — PROGRESS NOTES
[facility] Progress Note - Hospitalist Service    Date of Admission:  5/30/2025    Assessment & Plan      Unresectable colon cancer with tumor infiltration involving all retroperitoneal structures (distal aorta, vena cava, bilateral common iliac arteries and veins, bilateral ureters, bladder, sigmoid colon and possibly sacrum)   - Dx'd 2021  - Follows haley/ Kimo and Dr. Kern    Hx of ureteral obstruction s/p Right nephrostomy tube placement in 2021, removed 11/2024. Now with CT evidence of Left hydroureteronephrosis secondary to colon cancer, s/p left ureteral stent placement 5/30/25  - IV CTX  - Garcias  - Urology assistance appreciated    CHAYO on CKD III  Hypomagnesemia  - MAP 65 or above  - garcias  - IV CTX  - labs a.m.     Septic shock due to acute E. Coli pyelonephritis and E. Coli bacteremia  - hx of Enterobacter in 2024 resistant to Zosyn  - hx of Klebsiella    - FQ resistant E. Coli on UC/BC 5/30/25  - TTE (5/21/25): no mass or thrombus within the right atrium, normal LV size and function with EF 50%.  - Off Vasopressors  - Stop Vanc/Meropenem  - Start IV CTX 2g q24  - IV albumin x 1 for hypotension  - transfuse RBC               Chronic pain - malignancy related  - Continue 50mcg/hr fentanyl patch - currently in place. Change out Q72h - due to change today.   - PRN oxycodone per home regimen  - hold gabapentin due to renal injury     Anemia and thnrombocytopenia  - ? Sepsis mediated  - Transfuse RBC  - hemolysis labs  - labs a.m.        Diet: Regular Diet Adult    DVT Prophylaxis: DOAC - hold  Garcias Catheter: PRESENT, indication: Surgical procedure, Acute retention or obstruction  Lines: PRESENT      Port a Cath Single Lumen Left Chest wall-Site Assessment: WDL      Cardiac Monitoring: None  Code Status: Full Code      Clinically Significant Risk Factors               # Hypoalbuminemia: Lowest albumin = 3.1 g/dL at 5/30/2025  6:32 PM, will monitor as appropriate   #  "Thrombocytopenia: Lowest platelets = 92 in last 2 days, will monitor for bleeding              # Overweight: Estimated body mass index is 25.16 kg/m  as calculated from the following:    Height as of this encounter: 1.69 m (5' 6.54\").    Weight as of this encounter: 71.8 kg (158 lb 6.4 oz)., PRESENT ON ADMISSION            Social Drivers of Health    Stress: Stress Concern Present (2/23/2021)    Received from [facility] [place] of Occupational Health - Occupational Stress Questionnaire     Feeling of Stress : Rather much          Disposition Plan     Medically Ready for Discharge: Anticipated in 2-4 Days             Zack El DO, DO  Hospitalist Service  [facility]  Securely message with [employer] (more info)  Text page via [place] Paging/Directory   ______________________________________________________________________    Interval History     Less fever, no cp, abd pain, melena, hematochezia.    Physical Exam   Vital Signs: Temp: 98  F (36.7  C) Temp src: Oral BP: 104/64 Pulse: 62   Resp: 18 SpO2: 96 % O2 Device: None (Room air)    Weight: 158 lbs 6.4 oz    Gen nad  Eyes anicteric  Cv rrr. No edema  Lungs decreased bases, non-labored   garcias yellow urine  Neuro non-focal  Abd bs+, no cvat on left    Lab/imaging reviewed    [facility]  "

## 2025-06-01 NOTE — PLAN OF CARE
[facility] - ICU    RN Progress Note:            Pertinent Assessments:      Please refer to flowsheet rows for full assessment     - Alert and oriented but not with the time. Denies of pain. Ileostomy in place and intact.        Key Events - This Shift:     - BP with MAP <65. LR 500ml given bolus with minimal effect. Hospitalist ordered to give albumin 25g and Midodrine x1. MAP of 70's. Maintenance LR at 100ml/hr.  - Hgb of 6.5. Notified Dr. Cortez. Blood consent completed. Ordered to give 1 unit of PRBC when type and cross is complete.     RN Managed Protocols Ordered:  Yes  Protocols:Potassium, Magnesium, and Phosphorus  PRN'S: none  Protocols Status: Endorsed to Oncoming RN          Barriers to Discharge / Downgrade:     - Med surgical status. Waiting for bed available.    Problem: Pain Acute  Goal: Optimal Pain Control and Function  Outcome: Progressing  Intervention: Optimize Psychosocial Wellbeing  Intervention: Prevent or Manage Pain    Problem: Comorbidity Management  Goal: Blood Pressure in Desired Range  Outcome: Progressing  Intervention: Maintain Blood Pressure Management

## 2025-06-01 NOTE — PROGRESS NOTES
Place of Service:  [facility]     Reason for follow up: left ureteral obstruction secondary to colon cancer, UTI, s/p left ureteral stent insertion    SUBJECTIVE:  Events: no acute events overnight    Patient doing well. He denies abdominal or flank pain. Denies fevers, chills, N/V. Tolerating garcias. Yellow output.    Vitally stable, afebrile. Last febrile at 102 on 5/31  WBC 5.4  Creatinine 3.30 from 3.4 on admission.  UC positive for E. Coli.  BC positive for same.    OBJECTIVE:  PHYSICAL EXAM:  Temp: 97.9  F (36.6  C) Temp src: Oral BP: 98/59 Pulse: 66   Resp: 20 SpO2: 97 % O2 Device: None (Room air)    General: NAD, alert, cooperative  Head: normocephalic, without abnormality / atraumatic  Abdomen: soft, non tender, non distended. no suprapubic fullness, no suprapubic tenderness. no CVA tenderness, colostomy with feces and gas  Genitourinary: garcias darining yellow urine.   Psychological: alert and oriented, answers questions appropriately    LABS:  Creatinine   Date Value Ref Range Status   06/01/2025 3.30 (H) 0.67 - 1.17 mg/dL Final   12/23/2020 1.33 (H) 0.66 - 1.25 mg/dL Final     WBC   Date Value Ref Range Status   12/24/2020 5.4 4.0 - 11.0 10e9/L Final     WBC Count   Date Value Ref Range Status   06/01/2025 4.8 4.0 - 11.0 10e3/uL Final     Hemoglobin   Date Value Ref Range Status   06/01/2025 6.2 (LL) 13.3 - 17.7 g/dL Final   12/24/2020 7.7 (L) 13.3 - 17.7 g/dL Final   ]  Platelet Count   Date Value Ref Range Status   06/01/2025 96 (L) 150 - 450 10e3/uL Final   12/24/2020 424 150 - 450 10e9/L Final       UA:  UA RESULTS:  Recent Labs   Lab Test 05/30/25  1157 10/28/21  1754 11/04/20  1736   COLOR Yellow   < > Yellow   APPEARANCE Hazy*   < > Clear   URINEGLC Negative   < > Negative   URINEBILI Negative   < > Negative   URINEKETONE Negative   < > 100 mg/dL*   SG 1.020   < > 1.020   UBLD Large*   < > Negative   URINEPH 6.0   < > 6.0   PROTEIN 300*   < > Negative   UROBILINOGEN  --   --  <2.0 E.U./dL    NITRITE Positive*   < > Negative   LEUKEST Moderate*   < > Negative   RBCU 10*   < >  --    WBCU 25*   < >  --     < > = values in this interval not displayed.         Cultures:  Urine   Culture >100,000 CFU/mL Escherichia coli Abnormal           Blood  Culture Positive on the 1st day of incubation Abnormal    Gram negative bacilli Panic    1 of 2 bottles          Lab Results: personally reviewed.     ASSESSMENT/PLAN:  Rich Wolff is being seen by [facility] for left ureteral obstruction secondary to colon cancer, UTI, s/p left ureteral stent insertion.    - 64 year old critically ill male presents for high fevers, found to have new left hydroureteronephrosis secondary to known unresectable mass associated with colon cancer, with positive UTI, s/p left ureteral stent insertion 5/30/25.  - UC and BC positive for E. Coli. Continue broad spectrum antibiotics. Will need extended course of antibiotics.  - Creatinine slightly improved, remains elevated, continue to trend and consider repeat imaging if not trending in right direction.   - Will need ureteral stent exchanges every 3 months for malignant obstruction due to an unresectable mass.  - We will follow.    Tom Monroy PA-C  [facility]   [phone]

## 2025-06-02 LAB
ALBUMIN SERPL BCG-MCNC: 2.9 G/DL (ref 3.5–5.2)
ANION GAP SERPL CALCULATED.3IONS-SCNC: 7 MMOL/L (ref 7–15)
BACTERIA SPEC CULT: ABNORMAL
BASOPHILS # BLD AUTO: 0 10E3/UL (ref 0–0.2)
BASOPHILS NFR BLD AUTO: 0 %
BUN SERPL-MCNC: 43.7 MG/DL (ref 8–23)
CALCIUM SERPL-MCNC: 8.2 MG/DL (ref 8.8–10.4)
CHLORIDE SERPL-SCNC: 103 MMOL/L (ref 98–107)
CORTIS SERPL-MCNC: 5.2 UG/DL
CREAT SERPL-MCNC: 3.19 MG/DL (ref 0.67–1.17)
EGFRCR SERPLBLD CKD-EPI 2021: 21 ML/MIN/1.73M2
EOSINOPHIL # BLD AUTO: 0.1 10E3/UL (ref 0–0.7)
EOSINOPHIL NFR BLD AUTO: 2 %
ERYTHROCYTE [DISTWIDTH] IN BLOOD BY AUTOMATED COUNT: 19.9 % (ref 10–15)
GLUCOSE SERPL-MCNC: 98 MG/DL (ref 70–99)
HCO3 SERPL-SCNC: 23 MMOL/L (ref 22–29)
HCT VFR BLD AUTO: 25.1 % (ref 40–53)
HGB BLD-MCNC: 8.4 G/DL (ref 13.3–17.7)
IMM GRANULOCYTES # BLD: 0 10E3/UL
IMM GRANULOCYTES NFR BLD: 0 %
LYMPHOCYTES # BLD AUTO: 0.3 10E3/UL (ref 0.8–5.3)
LYMPHOCYTES NFR BLD AUTO: 6 %
MAGNESIUM SERPL-MCNC: 2.4 MG/DL (ref 1.7–2.3)
MCH RBC QN AUTO: 33.5 PG (ref 26.5–33)
MCHC RBC AUTO-ENTMCNC: 33.5 G/DL (ref 31.5–36.5)
MCV RBC AUTO: 100 FL (ref 78–100)
MONOCYTES # BLD AUTO: 0.5 10E3/UL (ref 0–1.3)
MONOCYTES NFR BLD AUTO: 9 %
NEUTROPHILS # BLD AUTO: 4 10E3/UL (ref 1.6–8.3)
NEUTROPHILS NFR BLD AUTO: 82 %
NRBC # BLD AUTO: 0 10E3/UL
NRBC BLD AUTO-RTO: 0 /100
PHOSPHATE SERPL-MCNC: 3.7 MG/DL (ref 2.5–4.5)
PLATELET # BLD AUTO: 91 10E3/UL (ref 150–450)
POTASSIUM SERPL-SCNC: 4.1 MMOL/L (ref 3.4–5.3)
RBC # BLD AUTO: 2.51 10E6/UL (ref 4.4–5.9)
SODIUM SERPL-SCNC: 133 MMOL/L (ref 135–145)
WBC # BLD AUTO: 4.8 10E3/UL (ref 4–11)

## 2025-06-02 PROCEDURE — 80069 RENAL FUNCTION PANEL: CPT | Performed by: INTERNAL MEDICINE

## 2025-06-02 PROCEDURE — 99232 SBSQ HOSP IP/OBS MODERATE 35: CPT | Performed by: INTERNAL MEDICINE

## 2025-06-02 PROCEDURE — 999N000197 HC STATISTIC WOC PT EDUCATION, 0-15 MIN

## 2025-06-02 PROCEDURE — 250N000011 HC RX IP 250 OP 636: Performed by: INTERNAL MEDICINE

## 2025-06-02 PROCEDURE — 250N000013 HC RX MED GY IP 250 OP 250 PS 637: Performed by: NURSE PRACTITIONER

## 2025-06-02 PROCEDURE — 82533 TOTAL CORTISOL: CPT | Performed by: INTERNAL MEDICINE

## 2025-06-02 PROCEDURE — 83735 ASSAY OF MAGNESIUM: CPT | Performed by: NURSE PRACTITIONER

## 2025-06-02 PROCEDURE — 85025 COMPLETE CBC W/AUTO DIFF WBC: CPT | Performed by: INTERNAL MEDICINE

## 2025-06-02 PROCEDURE — 999N000111 HC STATISTIC OT IP EVAL DEFER

## 2025-06-02 PROCEDURE — 120N000004 HC R&B MS OVERFLOW

## 2025-06-02 RX ADMIN — CEFTRIAXONE SODIUM 2 G: 2 INJECTION, POWDER, FOR SOLUTION INTRAMUSCULAR; INTRAVENOUS at 13:05

## 2025-06-02 RX ADMIN — Medication 1 TABLET: at 08:26

## 2025-06-02 RX ADMIN — ROPINIROLE HYDROCHLORIDE 0.25 MG: 0.25 TABLET, FILM COATED ORAL at 20:48

## 2025-06-02 RX ADMIN — FERROUS SULFATE TAB 325 MG (65 MG ELEMENTAL FE) 325 MG: 325 (65 FE) TAB at 20:48

## 2025-06-02 RX ADMIN — FERROUS SULFATE TAB 325 MG (65 MG ELEMENTAL FE) 650 MG: 325 (65 FE) TAB at 08:26

## 2025-06-02 RX ADMIN — TRAZODONE HYDROCHLORIDE 50 MG: 50 TABLET ORAL at 20:49

## 2025-06-02 RX ADMIN — SERTRALINE HYDROCHLORIDE 50 MG: 50 TABLET ORAL at 20:49

## 2025-06-02 RX ADMIN — Medication 50 MCG: at 08:26

## 2025-06-02 ASSESSMENT — ACTIVITIES OF DAILY LIVING (ADL)
ADLS_ACUITY_SCORE: 60
ADLS_ACUITY_SCORE: 59
ADLS_ACUITY_SCORE: 50
ADLS_ACUITY_SCORE: 60
ADLS_ACUITY_SCORE: 60
ADLS_ACUITY_SCORE: 59
ADLS_ACUITY_SCORE: 59
ADLS_ACUITY_SCORE: 60
ADLS_ACUITY_SCORE: 59
ADLS_ACUITY_SCORE: 60
ADLS_ACUITY_SCORE: 60
DEPENDENT_IADLS:: INDEPENDENT
ADLS_ACUITY_SCORE: 59
ADLS_ACUITY_SCORE: 59
ADLS_ACUITY_SCORE: 60
ADLS_ACUITY_SCORE: 59
ADLS_ACUITY_SCORE: 59
ADLS_ACUITY_SCORE: 60
ADLS_ACUITY_SCORE: 59
ADLS_ACUITY_SCORE: 50

## 2025-06-02 NOTE — PLAN OF CARE
Goal Outcome Evaluation:      Plan of Care Reviewed With: patient    [facility] - ICU    RN Progress Note:            Pertinent Assessments:      Please refer to flowsheet rows for full assessment     Pt Aox4 with no complaints of pain.  Did report hiccups that lasted over an hour this morning.  Eats about 25-50% of meals.  Adkins and ileostomy in place.  Able to ambulate in hallway with only SBA.  No SOB, dizziness.               Key Events - This Shift:       No events.  Asking when he can discharge home.  1-2 days per primary MD.     RN Managed Protocols Ordered:  Yes  Protocols:Potassium, Magnesium, and Phosphorus  PRN'S:  Protocols Status: Endorsed to Oncoming RN                Barriers to Discharge / Downgrade:     Med/surg status

## 2025-06-02 NOTE — PLAN OF CARE
Goal Outcome Evaluation:      Plan of Care Reviewed With: patient    Overall Patient Progress: improvingOverall Patient Progress: improving    Outcome Evaluation: afebrile, Completed one unit PRBC at 01:00. Repeat hgb this morning is 8.4. Fluactuating Blood Pressure .Lowest MAP overnight was 63 mmhg. Davidson with lowest HR at 47.        [facility] - ICU    RN Progress Note:            Pertinent Assessments:      Please refer to flowsheet rows for full assessment                Key Events - This Shift:       Increase Ileostomy output.     RN Managed Protocols Ordered:  Yes  Protocols:Potassium, Magnesium, and Phosphorus  PRN'S:  Protocols Status: Reviewed with Oncoming RN                Barriers to Discharge / Downgrade:     Low Hgb, fluctuating BP.           Problem: Sepsis/Septic Shock  Goal: Absence of Bleeding  Outcome: Progressing     Problem: Comorbidity Management  Goal: Blood Pressure in Desired Range  Outcome: Progressing  Intervention: Maintain Blood Pressure Management  Recent Flowsheet Documentation  Taken 6/2/2025 0400 by Angelika Ruvalcaba, RN  Medication Review/Management:   medications reviewed   high-risk medications identified  Taken 6/2/2025 0000 by Angelika Ruvalcaba, RN  Medication Review/Management:   medications reviewed   high-risk medications identified

## 2025-06-02 NOTE — PROGRESS NOTES
[facility] Progress Note - Hospitalist Service    Date of Admission:  5/30/2025    Assessment & Plan      Unresectable colon cancer with tumor infiltration involving all retroperitoneal structures (distal aorta, vena cava, bilateral common iliac arteries and veins, bilateral ureters, bladder, sigmoid colon and possibly sacrum)   - Dx'd 2021  - Follows haley/ Kimo and Dr. Kern    Hx of ureteral obstruction s/p Right nephrostomy tube placement in 2021, removed 11/2024. Now with CT evidence of Left hydroureteronephrosis secondary to colon cancer, s/p left ureteral stent placement 5/30/25  - IV CTX  - Garcias  - Urology assistance appreciated    CHAYO on CKD III  Hypomagnesemia  - Cr improved today  - MAP 65 or above  - garcias  - IV CTX  - labs a.m.     Septic shock due to acute E. Coli pyelonephritis and E. Coli bacteremia  - hx of Enterobacter in 2024 resistant to Zosyn  - hx of Klebsiella    - FQ resistant E. Coli on UC/BC 5/30/25  - TTE (5/21/25): no mass or thrombus within the right atrium, normal LV size and function with EF 50%.  - Off Vasopressors  - Stopped Vanc/Meropenem  - Start IV CTX 2g q24  - IV albumin x 1 for hypotension  - transfuse RBC               Chronic pain - malignancy related  - Continue 50mcg/hr fentanyl patch - currently in place. Change out Q72h - due to change today.   - PRN oxycodone per home regimen  - hold gabapentin due to renal injury     Anemia and thrombocytopenia  - ? Sepsis mediated  - Transfused RBC 2U PRBC on 6/1. Hgb 6.1 -> 8.3.  - hemolysis labs neg  - labs a.m.          Diet: Regular Diet Adult    DVT Prophylaxis: Pneumatic Compression Devices  Garcias Catheter: PRESENT, indication: Acute retention or obstruction  Lines: PRESENT      Port a Cath Single Lumen Left Chest wall-Site Assessment: WDL      Cardiac Monitoring: None  Code Status: Full Code      Clinically Significant Risk Factors         # Hyponatremia: Lowest Na = 133 mmol/L in last 2 days,  "will monitor as appropriate       # Hypoalbuminemia: Lowest albumin = 2.9 g/dL at 6/2/2025  4:23 AM, will monitor as appropriate   # Thrombocytopenia: Lowest platelets = 91 in last 2 days, will monitor for bleeding              # Overweight: Estimated body mass index is 27.94 kg/m  as calculated from the following:    Height as of this encounter: 1.69 m (5' 6.54\").    Weight as of this encounter: 79.8 kg (175 lb 14.8 oz)., PRESENT ON ADMISSION            Social Drivers of Health    Stress: Stress Concern Present (2/23/2021)    Received from [facility] of Occupational Health - Occupational Stress Questionnaire     Feeling of Stress : Rather much          Disposition Plan     Medically Ready for Discharge: 1-2 days             Zack El DO, DO  Hospitalist Service  [facility]  Securely message with [employer] (more info)  Text page via [employer] Paging/Directory   ______________________________________________________________________    Interval History   Afebrile. BP's better. On regular diet.    Physical Exam   Vital Signs: Temp: 98.2  F (36.8  C) Temp src: Oral BP: 110/64 Pulse: 60   Resp: 17 SpO2: 99 % O2 Device: (P) None (Room air)    Weight: 175 lbs 14.83 oz    Gen nad  Eyes anicteric  Cv rrr. No edema  Lungs decreased bases, non-labored   garcias yellow urine  Neuro non-focal  Abd bs+, no cvat on left     Lab/imaging reviewed  "

## 2025-06-02 NOTE — CONSULTS
Care Management Initial Consult    General Information  Assessment completed with: Patient,    Type of CM/SW Visit: Initial Assessment    Primary Care Provider verified and updated as needed: Yes   Readmission within the last 30 days: no previous admission in last 30 days      Reason for Consult: discharge planning  Advance Care Planning: Advance Care Planning Reviewed: other (see comments) (pt reports he has one, spouse can bring in a copy)          Communication Assessment  Patient's communication style: spoken language (English or Bilingual)    Hearing Difficulty or Deaf: no   Wear Glasses or Blind: other (see comments) (cheaters)    Cognitive  Cognitive/Neuro/Behavioral: WDL  Level of Consciousness: alert  Arousal Level: opens eyes spontaneously  Orientation: oriented x 4  Mood/Behavior: calm, cooperative  Best Language: 0 - No aphasia  Speech: clear    Living Environment:   People in home: spouse     Current living Arrangements: house      Able to return to prior arrangements: yes       Family/Social Support:  Care provided by: self  Provides care for: no one  Marital Status:   Support system: Wife  Giselle       Description of Support System: Supportive, Involved    Support Assessment: Adequate family and caregiver support, Adequate social supports    Current Resources:   Patient receiving home care services: No        Community Resources: None  Equipment currently used at home: none  Supplies currently used at home: Other (ostomy supplies)    Employment/Financial:  Employment Status:          Financial Concerns: none           Does the patient's insurance plan have a 3 day qualifying hospital stay waiver?  Yes     Which insurance plan 3 day waiver is available? Alternative insurance waiver    Will the waiver be used for post-acute placement? Undetermined at this time    Lifestyle & Psychosocial Needs:  Social Drivers of Health     Food Insecurity: Low Risk  (5/31/2025)    Food Insecurity     Within the  past 12 months, did you worry that your food would run out before you got money to buy more?: No     Within the past 12 months, did the food you bought just not last and you didn t have money to get more?: No   Depression: Not on file   Housing Stability: Low Risk  (5/31/2025)    Housing Stability     Do you have housing? : Yes     Are you worried about losing your housing?: No   Tobacco Use: Low Risk  (5/21/2025)    Received from [facility]    Patient History     Smoking Tobacco Use: Never     Smokeless Tobacco Use: Never     Passive Exposure: Never   Financial Resource Strain: Low Risk  (5/31/2025)    Financial Resource Strain     Within the past 12 months, have you or your family members you live with been unable to get utilities (heat, electricity) when it was really needed?: No   Alcohol Use: Not At Risk (11/9/2020)    Received from [facility]    AUDIT-C     Frequency of Alcohol Consumption: Never     Average Number of Drinks: Not on file     Frequency of Binge Drinking: Never   Transportation Needs: Low Risk  (5/31/2025)    Transportation Needs     Within the past 12 months, has lack of transportation kept you from medical appointments, getting your medicines, non-medical meetings or appointments, work, or from getting things that you need?: No   Physical Activity: Sufficiently Active (2/23/2021)    Received from [facility]    Exercise Vital Sign     Days of Exercise per Week: 6 days     Minutes of Exercise per Session: 30 min   Interpersonal Safety: Low Risk  (5/31/2025)    Interpersonal Safety     Do you feel physically and emotionally safe where you currently live?: Yes     Within the past 12 months, have you been hit, slapped, kicked or otherwise physically hurt by someone?: No     Within the past 12 months, have you been humiliated or emotionally abused in other ways by your partner or ex-partner?: No   Stress: Stress Concern Present (2/23/2021)    Received from [facility]    [employer] [place] [employer]  Occupational Health - Occupational Stress Questionnaire     Feeling of Stress : Rather much   Social Connections: Moderately Integrated (2/23/2021)    Received from [facility]    Social Connection and Isolation Panel [NHANES]     Frequency of Communication with Friends and Family: More than three times a week     Frequency of Social Gatherings with Friends and Family: Twice a week     Attends Pentecostalism Services: 1 to 4 times per year     Active Member of Clubs or Organizations: No     Attends Club or Organization Meetings: Never     Marital Status:    Health Literacy: Not on file       Functional Status:  Prior to admission patient needed assistance:   Dependent ADLs:: Independent  Dependent IADLs:: Independent       Mental Health Status:  Mental Health Status: No Current Concerns       Chemical Dependency Status:  Chemical Dependency Status: No Current Concerns             Values/Beliefs:  Spiritual, Cultural Beliefs, Pentecostalism Practices, Values that affect care:                 Discussed  Partnership in Safe Discharge Planning  document with patient/family: No    Additional Information:  CM met with patient in patient's room.   CM introduced self and identified role.  Verified demographics on facesheet.    Patient lives with his wife in a house.  He is independent at baseline.  Follows with the [facility] for his cancer treatment.  Patient has an ileostomy and receives his pouches from [facility] [facility].  Denies home care or community resources.    Patient denies CM needs at this time.    Next Steps: CM will continue to monitor medical progression, follow treatment team recommendations, and aid in discharge planning.      Vanita Faulkner RN

## 2025-06-02 NOTE — PROGRESS NOTES
Physical Therapy: Orders received. Chart reviewed and discussed with care team.? Physical Therapy not indicated due to patient has no mobility concerns per RN and OT.? Defer discharge recommendations to care team.? Will complete orders.      Melly Kaur, PT, DPT

## 2025-06-02 NOTE — CONSULTS
[facility] Nurse Inpatient Assessment     Consulted for: ileostomy    Summary: Patient has had ostomy since November 2020.  Patient stated he has supplies and did not need anything else from [facility] team at this time.    [facility] nurse follow-up plan: signing off    MIGUEL ANGEL DelgadoN, RN, PHN, HNB-BC, CWOCN  Pager no longer is use, please contact through [employer] group: [facility]

## 2025-06-02 NOTE — PROGRESS NOTES
Place of Service:  [facility]     Reason for follow up: Left ureteral obstruction secondary to colon cancer, UTI, s/p left ureteral stent insertion    SUBJECTIVE:  Events: T100.8 last natalie, afebrile since. Pt denies abdominal or flank pain, fevers, chills, N/V. Tolerating garcias. Yellow output.      OBJECTIVE:  PHYSICAL EXAM:  Temp: 98.7  F (37.1  C) Temp src: Oral BP: 104/64 Pulse: 66   Resp: 17 SpO2: 99 % O2 Device: None (Room air)    General: NAD, alert, cooperative  Head: normocephalic, without abnormality / atraumatic  Abdomen: soft, non tender, non distended. No suprapubic fullness, no suprapubic tenderness. No bilateral CVA tenderness, colostomy with feces and gas  Genitourinary: garcias darining yellow urine.   Psychological: alert and oriented, answers questions appropriately.     LABS:  Creatinine   Date Value Ref Range Status   06/02/2025 3.19 (H) 0.67 - 1.17 mg/dL Final   12/23/2020 1.33 (H) 0.66 - 1.25 mg/dL Final     WBC   Date Value Ref Range Status   12/24/2020 5.4 4.0 - 11.0 10e9/L Final     WBC Count   Date Value Ref Range Status   06/02/2025 4.8 4.0 - 11.0 10e3/uL Final     Hemoglobin   Date Value Ref Range Status   06/02/2025 8.4 (L) 13.3 - 17.7 g/dL Final   12/24/2020 7.7 (L) 13.3 - 17.7 g/dL Final     Platelet Count   Date Value Ref Range Status   06/02/2025 91 (L) 150 - 450 10e3/uL Final   12/24/2020 424 150 - 450 10e9/L Final       UA:  UA RESULTS:  Recent Labs   Lab Test 05/30/25  1157 10/28/21  1754 11/04/20  1736   COLOR Yellow   < > Yellow   APPEARANCE Hazy*   < > Clear   URINEGLC Negative   < > Negative   URINEBILI Negative   < > Negative   URINEKETONE Negative   < > 100 mg/dL*   SG 1.020   < > 1.020   UBLD Large*   < > Negative   URINEPH 6.0   < > 6.0   PROTEIN 300*   < > Negative   UROBILINOGEN  --   --  <2.0 E.U./dL   NITRITE Positive*   < > Negative   LEUKEST Moderate*   < > Negative   RBCU 10*   < >  --    WBCU 25*   < >  --     < > = values in this interval not displayed.      Cultures:  Urine 5/30:   Culture >100,000 CFU/mL Escherichia coli      Susceptibility     Escherichia coli     CRISTHIAN     Ampicillin 8 ug/mL Susceptible     Ampicillin/ Sulbactam 4 ug/mL Susceptible     Cefazolin <=1 ug/mL Susceptible     Cefepime <=0.12 ug/mL Susceptible     Ceftazidime <=0.5 ug/mL Susceptible     Ceftriaxone <=0.25 ug/mL Susceptible     Ciprofloxacin >=4 ug/mL Resistant     Gentamicin <=1 ug/mL Susceptible     Levofloxacin 2 ug/mL Resistant     Nitrofurantoin <=16 ug/mL Susceptible     Piperacillin/Tazobactam <=4 ug/mL Susceptible     Trimethoprim/Sulfamethoxazole <=1/19 ug/mL Susceptible        Blood 5/30:   Culture Positive on the 1st day of incubation   Gram negative bacilli Panic    1 of 2 bottles      Susceptibility     Escherichia coli     CRISTHIAN     Ampicillin 8 ug/mL Susceptible     Ampicillin/ Sulbactam <=2 ug/mL Susceptible     Cefepime <=0.12 ug/mL Susceptible     Ceftazidime <=0.5 ug/mL Susceptible     Ceftriaxone <=0.25 ug/mL Susceptible     Ciprofloxacin >=4 ug/mL Resistant     Gentamicin <=1 ug/mL Susceptible     Levofloxacin 2 ug/mL Resistant     Meropenem <=0.25 ug/mL Susceptible     Piperacillin/Tazobactam <=4 ug/mL Susceptible     Trimethoprim/Sulfamethoxazole <=1/19 ug/mL Susceptible        Lab Results: personally reviewed.     ASSESSMENT/PLAN:  Rich Wolff is being seen by [facility] for left ureteral obstruction secondary to colon cancer, UTI, s/p left ureteral stent insertion.    - 64 year old critically ill male admitted 5/30 for high fevers, found to have new left hydroureteronephrosis secondary to known unresectable mass associated with colon cancer, with E.coli UTI/bacteremia, s/p Cysto with left ureteral stent insertion 5/30/25 by Dr. Phillips.  - Hx of prostate CA s/p radical prostatectomy 9/1/15 (Dr. Velazco). Pt reports some recent difficulty emptying bladder. Maintain gracias for now, TOV with PVR checks prior to discharge.   - UC and BC positive for E.  Coli. Continue broad spectrum antibiotics. Will need extended course of antibiotics.  - Creatinine slowly improving, 3.19 today. Avoid nephrotoxins and monitor.   - Will need ureteral stent exchanges every 3 months for malignant obstruction due to an unresectable mass. [facility] to arrange.   - Will continue to follow.     Barrett Kumar, APRN, CNP  [facility]   [phone]

## 2025-06-02 NOTE — PLAN OF CARE
Occupational Therapy Discharge Summary    Occupational Therapy: Orders received. Chart reviewed and discussed with care team.? Occupational Therapy not indicated at this time per discussion w/ RN and pt no skilled OT needs at this time. OT will sign off please re-order if indicated.? Defer discharge recommendations to interdisciplinary team.? Will complete orders.       IVANNA Lenz, OTR/L, 6/2/2025, 2:31 PM

## 2025-06-03 LAB
ALBUMIN SERPL BCG-MCNC: 2.9 G/DL (ref 3.5–5.2)
ANION GAP SERPL CALCULATED.3IONS-SCNC: 6 MMOL/L (ref 7–15)
BASOPHILS # BLD AUTO: 0 10E3/UL (ref 0–0.2)
BASOPHILS NFR BLD AUTO: 0 %
BUN SERPL-MCNC: 39.1 MG/DL (ref 8–23)
CALCIUM SERPL-MCNC: 8.5 MG/DL (ref 8.8–10.4)
CHLORIDE SERPL-SCNC: 108 MMOL/L (ref 98–107)
CREAT SERPL-MCNC: 3.02 MG/DL (ref 0.67–1.17)
EGFRCR SERPLBLD CKD-EPI 2021: 22 ML/MIN/1.73M2
EOSINOPHIL # BLD AUTO: 0.1 10E3/UL (ref 0–0.7)
EOSINOPHIL NFR BLD AUTO: 3 %
ERYTHROCYTE [DISTWIDTH] IN BLOOD BY AUTOMATED COUNT: 20.3 % (ref 10–15)
GLUCOSE SERPL-MCNC: 94 MG/DL (ref 70–99)
HCO3 SERPL-SCNC: 21 MMOL/L (ref 22–29)
HCT VFR BLD AUTO: 25.6 % (ref 40–53)
HGB BLD-MCNC: 8.5 G/DL (ref 13.3–17.7)
IMM GRANULOCYTES # BLD: 0 10E3/UL
IMM GRANULOCYTES NFR BLD: 1 %
LYMPHOCYTES # BLD AUTO: 0.3 10E3/UL (ref 0.8–5.3)
LYMPHOCYTES NFR BLD AUTO: 7 %
MAGNESIUM SERPL-MCNC: 2.1 MG/DL (ref 1.7–2.3)
MCH RBC QN AUTO: 33.2 PG (ref 26.5–33)
MCHC RBC AUTO-ENTMCNC: 33.2 G/DL (ref 31.5–36.5)
MCV RBC AUTO: 100 FL (ref 78–100)
MONOCYTES # BLD AUTO: 0.5 10E3/UL (ref 0–1.3)
MONOCYTES NFR BLD AUTO: 15 %
NEUTROPHILS # BLD AUTO: 2.7 10E3/UL (ref 1.6–8.3)
NEUTROPHILS NFR BLD AUTO: 75 %
NRBC # BLD AUTO: 0 10E3/UL
NRBC BLD AUTO-RTO: 1 /100
PHOSPHATE SERPL-MCNC: 3.4 MG/DL (ref 2.5–4.5)
PLATELET # BLD AUTO: 106 10E3/UL (ref 150–450)
POTASSIUM SERPL-SCNC: 3.9 MMOL/L (ref 3.4–5.3)
RBC # BLD AUTO: 2.56 10E6/UL (ref 4.4–5.9)
SCANNED LAB RESULT: NORMAL
SODIUM SERPL-SCNC: 135 MMOL/L (ref 135–145)
WBC # BLD AUTO: 3.6 10E3/UL (ref 4–11)

## 2025-06-03 PROCEDURE — 250N000013 HC RX MED GY IP 250 OP 250 PS 637: Performed by: INTERNAL MEDICINE

## 2025-06-03 PROCEDURE — 83735 ASSAY OF MAGNESIUM: CPT | Performed by: INTERNAL MEDICINE

## 2025-06-03 PROCEDURE — 120N000001 HC R&B MED SURG/OB

## 2025-06-03 PROCEDURE — 250N000013 HC RX MED GY IP 250 OP 250 PS 637: Performed by: NURSE PRACTITIONER

## 2025-06-03 PROCEDURE — 99233 SBSQ HOSP IP/OBS HIGH 50: CPT | Performed by: INTERNAL MEDICINE

## 2025-06-03 PROCEDURE — 82435 ASSAY OF BLOOD CHLORIDE: CPT | Performed by: INTERNAL MEDICINE

## 2025-06-03 PROCEDURE — 82947 ASSAY GLUCOSE BLOOD QUANT: CPT | Performed by: INTERNAL MEDICINE

## 2025-06-03 PROCEDURE — G0545 PR INHRENT VISIT TO INPT/OBS W CNFRM/SUSPCT INFCT DIS BY INFCT DIS SPCIALST: HCPCS | Performed by: INTERNAL MEDICINE

## 2025-06-03 PROCEDURE — 99222 1ST HOSP IP/OBS MODERATE 55: CPT | Performed by: INTERNAL MEDICINE

## 2025-06-03 PROCEDURE — 85025 COMPLETE CBC W/AUTO DIFF WBC: CPT | Performed by: INTERNAL MEDICINE

## 2025-06-03 PROCEDURE — 250N000011 HC RX IP 250 OP 636: Performed by: INTERNAL MEDICINE

## 2025-06-03 RX ADMIN — RIVAROXABAN 20 MG: 10 TABLET, FILM COATED ORAL at 16:47

## 2025-06-03 RX ADMIN — TRAZODONE HYDROCHLORIDE 50 MG: 50 TABLET ORAL at 20:41

## 2025-06-03 RX ADMIN — Medication 1 TABLET: at 08:27

## 2025-06-03 RX ADMIN — ROPINIROLE HYDROCHLORIDE 0.25 MG: 0.25 TABLET, FILM COATED ORAL at 20:41

## 2025-06-03 RX ADMIN — FERROUS SULFATE TAB 325 MG (65 MG ELEMENTAL FE) 650 MG: 325 (65 FE) TAB at 08:27

## 2025-06-03 RX ADMIN — Medication 50 MCG: at 08:27

## 2025-06-03 RX ADMIN — CEFTRIAXONE SODIUM 2 G: 2 INJECTION, POWDER, FOR SOLUTION INTRAMUSCULAR; INTRAVENOUS at 13:30

## 2025-06-03 RX ADMIN — SERTRALINE HYDROCHLORIDE 50 MG: 50 TABLET ORAL at 20:40

## 2025-06-03 RX ADMIN — FERROUS SULFATE TAB 325 MG (65 MG ELEMENTAL FE) 325 MG: 325 (65 FE) TAB at 20:40

## 2025-06-03 RX ADMIN — FENTANYL 1 PATCH: 50 PATCH TRANSDERMAL at 10:30

## 2025-06-03 ASSESSMENT — ACTIVITIES OF DAILY LIVING (ADL)
ADLS_ACUITY_SCORE: 50
ADLS_ACUITY_SCORE: 46
ADLS_ACUITY_SCORE: 50

## 2025-06-03 NOTE — PROGRESS NOTES
Place of Service:  [facility]     Reason for follow up: Left ureteral obstruction secondary to colon cancer, UTI, s/p left ureteral stent insertion    SUBJECTIVE:  Events: No acute events overnight. Last fever 6/1 evening. Patient denies abdominal and bilateral flank pain, fever, chills, N/V. Tolerating garcias, yellow output.      OBJECTIVE:  PHYSICAL EXAM:  Temp: 98  F (36.7  C) Temp src: Oral BP: 132/69 Pulse: 66   Resp: 20 SpO2: 100 % O2 Device: None (Room air)    General: NAD, alert, cooperative  Head: normocephalic, without abnormality / atraumatic  Abdomen: soft, non tender, non distended. No suprapubic fullness, no suprapubic tenderness. No bilateral CVA tenderness, colostomy with feces and gas  Genitourinary: garcias draining yellow urine.   Psychological: alert and oriented, answers questions appropriately.     LABS:  Creatinine   Date Value Ref Range Status   06/03/2025 3.02 (H) 0.67 - 1.17 mg/dL Final   12/23/2020 1.33 (H) 0.66 - 1.25 mg/dL Final     WBC   Date Value Ref Range Status   12/24/2020 5.4 4.0 - 11.0 10e9/L Final     WBC Count   Date Value Ref Range Status   06/03/2025 3.6 (L) 4.0 - 11.0 10e3/uL Final     Hemoglobin   Date Value Ref Range Status   06/03/2025 8.5 (L) 13.3 - 17.7 g/dL Final   12/24/2020 7.7 (L) 13.3 - 17.7 g/dL Final     Platelet Count   Date Value Ref Range Status   06/03/2025 106 (L) 150 - 450 10e3/uL Final   12/24/2020 424 150 - 450 10e9/L Final       UA:  UA RESULTS:  Recent Labs   Lab Test 05/30/25  1157 10/28/21  1754 11/04/20  1736   COLOR Yellow   < > Yellow   APPEARANCE Hazy*   < > Clear   URINEGLC Negative   < > Negative   URINEBILI Negative   < > Negative   URINEKETONE Negative   < > 100 mg/dL*   SG 1.020   < > 1.020   UBLD Large*   < > Negative   URINEPH 6.0   < > 6.0   PROTEIN 300*   < > Negative   UROBILINOGEN  --   --  <2.0 E.U./dL   NITRITE Positive*   < > Negative   LEUKEST Moderate*   < > Negative   RBCU 10*   < >  --    WBCU 25*   < >  --     < > =  values in this interval not displayed.     Cultures:  Urine 5/30:   Culture >100,000 CFU/mL Escherichia coli      Susceptibility     Escherichia coli     CRISTHIAN     Ampicillin 8 ug/mL Susceptible     Ampicillin/ Sulbactam 4 ug/mL Susceptible     Cefazolin <=1 ug/mL Susceptible     Cefepime <=0.12 ug/mL Susceptible     Ceftazidime <=0.5 ug/mL Susceptible     Ceftriaxone <=0.25 ug/mL Susceptible     Ciprofloxacin >=4 ug/mL Resistant     Gentamicin <=1 ug/mL Susceptible     Levofloxacin 2 ug/mL Resistant     Nitrofurantoin <=16 ug/mL Susceptible     Piperacillin/Tazobactam <=4 ug/mL Susceptible     Trimethoprim/Sulfamethoxazole <=1/19 ug/mL Susceptible        Blood 5/30: E.coli  Susceptibility   Escherichia coli     CRISTHIAN     Ampicillin 8 ug/mL Susceptible     Ampicillin/ Sulbactam <=2 ug/mL Susceptible     Cefepime <=0.12 ug/mL Susceptible     Ceftazidime <=0.5 ug/mL Susceptible     Ceftriaxone <=0.25 ug/mL Susceptible     Ciprofloxacin >=4 ug/mL Resistant     Gentamicin <=1 ug/mL Susceptible     Levofloxacin 2 ug/mL Resistant     Meropenem <=0.25 ug/mL Susceptible     Piperacillin/Tazobactam <=4 ug/mL Susceptible     Trimethoprim/Sulfamethoxazole <=1/19 ug/mL Susceptible        Lab Results: personally reviewed.     ASSESSMENT/PLAN:  Rich Wolff is being seen by [facility] for left ureteral obstruction secondary to colon cancer, UTI, s/p left ureteral stent insertion.    - 64 year old critically ill male admitted 5/30 for high fevers, found to have new left hydroureteronephrosis secondary to known unresectable mass associated with colon cancer, with E.coli UTI/bacteremia and CHAYO, s/p Cysto with left ureteral stent insertion 5/30/25 by Dr. Phillips.  - Hx of prostate CA s/p radical prostatectomy 9/1/15 (Dr. Velazco). Pt reports some recent difficulty emptying bladder. Will attempt TOV with PVR checks on 6/4/25 after recovered from MONIKA.   - UC and BC positive for E. Coli. Continue broad spectrum antibiotics.  Recommend completing 10-14 day course.   - Creatinine slowly improving, 3.02 today. Avoid nephrotoxins and monitor.   - Message sent to [facility] to arrange for ureteral stent exchange in 3 months for malignant obstruction due to an unresectable mass. [facility] contact info and stent discharge instructions added to discharge orders.   - Will watch for 6/4 TOV results, then [facility] will sign off. Please re-consult with any new or worsening urologic concerns.      Barrett Kumar, APRN, CNP  [facility]   [phone]

## 2025-06-03 NOTE — PROGRESS NOTES
[facility]    PROGRESS NOTE - Hospitalist Service    ASSESSMENT AND PLAN     Active Problems:    Colostomy status (H)    Hypomagnesemia    On antineoplastic chemotherapy    Acute kidney injury superimposed on CKD    Sepsis with acute renal failure without septic shock, due to unspecified organism, unspecified acute renal failure type (H)    Rich Wolff is a 64 year old male with tumor infiltration involving all retroperitoneal structures (distal aorta, vena cava, bilateral common iliac arteries and veins, bilateral ureters, bladder, sigmoid colon and possibly sacrum)   - Dx'd 2021  - Follows w/ [facility] and Dr. Kern     Hx of ureteral obstruction s/p Right nephrostomy tube placement in 2021, removed 11/2024. Now with CT evidence of Left hydroureteronephrosis secondary to colon cancer, s/p left ureteral stent placement 5/30/25  - IV CTX-urology recommending 10 to 14-day course antibiotics.  Appreciate ID recommendations due to complication with bacteremia.  - Adkins-planning trial of void tomorrow 6/4 following MONIKA  - Urology assistance appreciated.  Urology will follow up in 3 months for stent exchange.     CHAYO on CKD III  Hypomagnesemia  - Baseline creatinine appears to be 2.2-2.5.  Creatinine today has improved to 3.02.  Plan to continue to monitor while inpatient and follow-up as an outpatient for recheck.    - IV CTX     Septic shock due to acute E. Coli pyelonephritis and E. Coli bacteremia-septic shock resolved.  - hx of Enterobacter in 2024 resistant to Zosyn  - hx of Klebsiella    - E. Coli on UC/BC 5/30/25  - TTE (5/21/25): no mass or thrombus within the right atrium, normal LV size and function with EF 50%.  - Stopped Vanc/Meropenem  - Start IV CTX 2g [id number] ID final recommendations  - IV albumin x 1 for hypotension  - transfuse RBC               Chronic pain - malignancy related  - Continue 50mcg/hr fentanyl patch - currently in place. Change out Q72h - due to change  "today.   - PRN oxycodone per home regimen  - hold gabapentin due to renal injury      Anemia and thrombocytopenia  - ? Sepsis mediated  - Transfused RBC 2U PRBC on 6/1. Hgb 6.1 -> 8.3.  - hemolysis labs neg  -Xarelto on hold due to anemia.      History of PE  Resuming Xarelto    Concern for RA thrombus  Hematology had ordered MONIKA to be completed 6/4 as an outpatient to rule out right atrial thrombus.  MONIKA ordered for 6/4.  Patient will likely be able to discharge following this procedure      Barriers to discharge: MONIKA, improvement in CHAYO, final ID recs    Anticipated length of stay: 1 more day    Medically Ready for Discharge: Anticipated Tomorrow    Clinically Significant Risk Factors         # Hyponatremia: Lowest Na = 133 mmol/L in last 2 days, will monitor as appropriate  # Hyperchloremia: Highest Cl = 108 mmol/L in last 2 days, will monitor as appropriate          # Hypoalbuminemia: Lowest albumin = 2.9 g/dL at 6/3/2025  6:16 AM, will monitor as appropriate   # Thrombocytopenia: Lowest platelets = 91 in last 2 days, will monitor for bleeding              # Overweight: Estimated body mass index is 27.94 kg/m  as calculated from the following:    Height as of this encounter: 1.69 m (5' 6.54\").    Weight as of this encounter: 79.8 kg (175 lb 14.8 oz).      # Financial/Environmental Concerns: none             Subjective:  Patient resting in bed.  Wife at bedside.  No other questions or concerns.    PHYSICAL EXAM  Temp:  [98  F (36.7  C)-98.7  F (37.1  C)] 98  F (36.7  C)  Pulse:  [64-72] 66  Resp:  [16-20] 20  BP: (104-132)/(64-72) 132/69  SpO2:  [97 %-100 %] 100 %  Wt Readings from Last 1 Encounters:   06/02/25 79.8 kg (175 lb 14.8 oz)       Intake/Output Summary (Last 24 hours) at 6/3/2025 1402  Last data filed at 6/3/2025 0800  Gross per 24 hour   Intake 360 ml   Output 2975 ml   Net -2615 ml      Body mass index is 27.94 kg/m .    GENRL: Alert and answering questions appropriately. Not in acute distress. Lying " "in bed   CHEST: Breathing easily   EXTRM: No pedal edema  NEURO: No involuntary movements. Normal mentation  PSYCH: Normal affect and mood.   INTGM: No skin rash    Medical Decision Making       55 MINUTES SPENT BY ME on the date of service doing chart review, history, exam, documentation & further activities per the note.      PERTINENT LABS/IMAGING:  Results for orders placed or performed during the hospital encounter of 05/30/25   CT Chest Abdomen Pelvis w/o Contrast    Impression    IMPRESSION:    1.  A irregular soft tissue mass associated with the ileocecal valve has increased in size from 5/8/2023 consistent with history of unresectable bowel malignancy. No findings to suggest new distant metastases.  2.  Left hydroureteronephrosis secondary architectural distortion/tissue retraction secondary to #1.  3.  Radiopaque 2-3 mm calculus in the urinary bladder adjacent to the right ureterovesicular junction. No right hydroureteronephrosis or other radiopaque renal or ureteral calculi.     US Renal Complete Non-Vascular    Impression    IMPRESSION:  1.  Adkins catheter in place with decompressed urinary bladder.  2.  Resolution of previously noted left hydronephrosis.     Most Recent 3 CBC's:  Recent Labs   Lab Test 06/03/25  0616 06/02/25  0423 06/01/25  1148 06/01/25  0626   WBC 3.6* 4.8  --  4.8   HGB 8.5* 8.4* 7.3* 6.2*    100 104* 107*   * 91*  --  96*     Most Recent 3 BMP's:  Recent Labs   Lab Test 06/03/25  0616 06/02/25  0423 06/01/25  0413    133* 135   POTASSIUM 3.9 4.1 3.9   CHLORIDE 108* 103 105   CO2 21* 23 21*   BUN 39.1* 43.7* 40.7*   CR 3.02* 3.19* 3.30*   ANIONGAP 6* 7 9   AARON 8.5* 8.2* 7.6*   GLC 94 98 87     Most Recent 2 LFT's:  Recent Labs   Lab Test 06/01/25  1020 05/30/25  1832   AST 34 37   ALT 16 27   ALKPHOS 57 67   BILITOTAL 0.6 0.9       No results for input(s): \"CHOL\", \"HDL\", \"LDL\", \"TRIG\", \"CHOLHDLRATIO\" in the last 27577 hours.  No results for input(s): \"LDL\" in the " "last 74238 hours.  Recent Labs   Lab Test 06/03/25  0616      POTASSIUM 3.9   CHLORIDE 108*   CO2 21*   GLC 94   BUN 39.1*   CR 3.02*   GFRESTIMATED 22*   AARON 8.5*     Recent Labs   Lab Test 05/30/25  1108   A1C 4.8     Recent Labs   Lab Test 06/03/25  0616 06/02/25  0423 06/01/25  1148   HGB 8.5* 8.4* 7.3*     Recent Labs   Lab Test 12/21/20 2032   TROPONINI <0.01     Recent Labs   Lab Test 05/08/23  0629 12/21/20 2032   BNP  --  34   NTBNPI 523  --      No results for input(s): \"TSH\" in the last 68053 hours.  Recent Labs   Lab Test 05/08/23  0629 08/04/21  0700 12/21/20 2032   INR 1.31* 1.33* 1.25*       Patricia Gonzalez, DO  Hospitalist Service  [facility]      "

## 2025-06-03 NOTE — PROGRESS NOTES
Non-Invasive Heart Care    Pt had outpt order for MONIKA to assess for R atrium thrombus. Since pt is currently here in the hospital, will plan to do tomorrow AM (approx 0800). Please make sure pt is NPO except medication after midnight and that pt has patent IV in place. Plan discussed with pt's RN, Christin, and heart care will touch base with the floor in the AM to confirm plan.    Zuleyma Mahoney RN

## 2025-06-03 NOTE — PLAN OF CARE
Vitally stable, denies pain, good output from ostomy and garcias.  Alert and oriented, able to make needs known.    Goal Outcome Evaluation:  Problem: UTI (Urinary Tract Infection)  Goal: Improved Infection Symptoms  Outcome: Progressing  Problem: Sepsis/Septic Shock  Goal: Optimal Coping  Outcome: Progressing  Goal: Absence of Bleeding  Outcome: Progressing  Goal: Absence of Infection Signs and Symptoms  Outcome: Progressing  Intervention: Initiate Sepsis Management  Recent Flowsheet Documentation  Taken 6/3/2025 0100 by Destiny Ch, RN  Infection Prevention:   rest/sleep promoted   single patient room provided  Intervention: Promote Recovery  Recent Flowsheet Documentation  Taken 6/3/2025 0100 by Destiny Ch, RN  Activity Management: standing at bedside

## 2025-06-03 NOTE — CONSULTS
Consultation - Infectious Disease  Rich GOGO Wolff,  1960, MRN [id number]    Admitting Dx: Colostomy status (H) [Z93.3]  Hypomagnesemia [E83.42]  Acute pyelonephritis [N10]  Hydronephrosis of left kidney [N13.30]  On antineoplastic chemotherapy [Z79.69]  Acute kidney injury superimposed on CKD [N17.9, N18.9]  Sepsis with acute renal failure without septic shock, due to unspecified organism, unspecified acute renal failure type (H) [A41.9, R65.20, N17.9]    PCP: Maycol Worrell, [phone]   Code status:  Full Code       Extended Emergency Contact Information  Primary Emergency Contact: Giselle Wolff  Home Phone: [phone]  Mobile Phone: [phone]  Relation: Spouse       ASSESSMENT   E coli UTI/bacteremia. Presented  with UTI symptoms and found to have ongoing L hydronephrosis, underwent L ureteral stent placement . Improved.   CHAYO slightly improved  Metastatic colon cancer, on treatment with Avastin and Lonsurf. Intra-abdominal mass obstructing L-sided urinary tract on admission, now s/p stent as above. Recent PET positive in this area.   Question of right atrium mass seen on PET. TTE without thrombus.     Active Problems:    Colostomy status (H)    Hypomagnesemia    On antineoplastic chemotherapy    Acute kidney injury superimposed on CKD    Sepsis with acute renal failure without septic shock, due to unspecified organism, unspecified acute renal failure type (H)       PLAN   Ceftriaxone while here, then plan amoxicillin 1000mg BID through .    Dose selection based on CrCl currently of high 20s, if creatinine improves, baseline is 2.5 range so this should still be good dose in this range.   Please call if further questions.     Arley Briones MD  [facility]  Contact via [facility] or [facility] [phone]  ______________________________________________________________________        Reason For Consult: Antibiotic recommendations     HPI     We have been requested by Dr. Gonzalez to evaluate Rich Wolff. He has a history of metastatic colon cancer, colostomy, chronic pain, ureter obstruction from mass effect having been managed previously with right nephrostomy tube over years, but not since November 2024. Presented to ER on 5/30 with urinary urgency, dysuria, chills.  Has a history of urinary infections and had been on antibiotic past couple of weeks through [facility], states he had urine testing, cannot remember name of the antibiotic (maybe ciprofloxacin?).     CT chest/abd/pelvis showed Left hydroureteronephrosis secondary architectural distortion/tissue retraction secondary to bowel mass near ileocecal valve. He was admitted to ICU for further management.  Underwent cystoscopy with placement of Left ureteral stent on 5/30. Blood and urine cultures with E coli.     He had PET CT at [facility] 5/20/25  1.  Slightly increased size and FDG uptake within the cecal mass since 3/17/2025. No findings for new FDG avid metastatic disease.   2.  New mild to moderate left hydroureteronephrosis. Recommend evaluation for possible obstruction.   3.  New FDG activity within the right atrium. This can be seen with a thrombus. Recommend echocardiography for further evaluation.     Plan for MONIKA today to assess for thrombus right atrium.     Per [facility] 5/21/25  We would continue with TAS-102 for chemotherapy treatment without bevacizumab until appropriate anticoagulation given his recent thrombosis as TAS-102 controlling overall disease. Plus Lonsurf PO.     Feels better. Adkins in place. Temps better. No significant pain.        Medical History  Past Medical History:   Diagnosis Date    Colon cancer (H)     Hyperlipidemia     Hypertension     Prostate cancer (H) 09/01/2015    T1c. Silvana's 6 (3+3).  Confined to prostate.  Multifocal bilateral comprising 2% of the gland.PSA:5.8.    Surgical History  He  has a past surgical history that includes IR Chest  Port Placement > 5 Yrs of Age (11/24/2020); IR Nephrostomy Tube Change Right (4/8/2021); Surgery Scrotal / Testicular; Pr Lap,Prostatectomy,Radical,W/Nerve Spare,Incl Robotic (Bilateral, 09/01/2015); Prostate Biopsy (06/08/2015); Esophagoscopy, gastroscopy, duodenoscopy (EGD), combined (11/04/2020); Colonoscopy W/ Biopsies (11/04/2020); Colonoscopy w/wo Brush **Performed** (N/A, 11/4/2020); Pr Esophagogastroduodenoscopy Transoral Diagnostic (N/A, 11/4/2020); Bowel Resection; Colostomy; Nephrostomy; Ir Port Placement >5 Years (11/24/2020); Ir Nephrostomy Tube Change Right (4/8/2021); IR Nephrostomy Tube Change Right (8/16/2021); IR Nephrostomy Tube Change Right (10/29/2021); IR Nephrostomy Tube Change Right (10/29/2021); IR Nephrostomy Tube Change Right (12/27/2021); IR Nephrostomy Tube Change Right (2/28/2022); IR Site Check Evaluation (4/11/2022); IR Nephrostomy Tube Change Right (5/9/2022); IR Nephrostomy Tube Change Right (7/5/2022); IR Nephrostomy Tube Change Right (9/26/2022); IR Nephrostomy Tube Change Right (10/25/2022); IR Chest Port Replacement Same Site Right (10/27/2022); IR Nephrostomy Tube Change Right (12/28/2022); IR Nephrostomy Tube Change Right (2/28/2023); IR Nephrostomy Tube Change Right (4/27/2023); IR Nephrostomy Tube Change Right (6/27/2023); IR Nephrostomy Tube Change Right (8/30/2023); IR Nephrostomy Tube Change Right (10/25/2023); IR Nephrostomy Tube Change Right (12/27/2023); IR Nephrostomy Tube Change Right (2/28/2024); IR Nephrostomy Tube Change Right (4/24/2024); IR Nephrostomy Tube Change Right (6/26/2024); IR Nephrostomy Tube Change Right (8/28/2024); IR Nephrostomy Tube Change Right (10/30/2024); and Combined Cystoscopy, Insert Stent Ureter(s) (Left, 5/30/2025).   Social History  Reviewed, and he  reports that he has never smoked. He has never used smokeless tobacco. He reports that he does not currently use alcohol. He reports that he does not use drugs.   Allergies  Allergies    Allergen Reactions    Bee Venom Hives    Family History  family history includes Breast Cancer in his mother; No Known Problems in his daughter, sister, and son; Osteoporosis in his mother; Prostate Cancer (age of onset: 70.00) in his father; Valvular heart disease in his father.     Psychosocial Needs  Social History     Social History Narrative    Not on file     Additional psychosocial needs reviewed per nursing assessment.     Lives with his wife    Prior to Admission Medications   Medications Prior to Admission   Medication Sig Dispense Refill Last Dose/Taking    acetaminophen (TYLENOL) 500 MG tablet Take 1,000 mg by mouth every 6 hours as needed for pain.   Past Week    calcium carbonate-vitamin D (CALTRATE) 600-10 MG-MCG per tablet Take 1 tablet by mouth daily.   5/29/2025 Morning    fentaNYL (DURAGESIC) 50 mcg/hr 72 hr patch Place 1 patch onto the skin every 72 hours.   5/28/2025 Morning    ferrous sulfate (FE TABS) 325 (65 Fe) MG EC tablet Take 650 mg by mouth every morning.   5/29/2025 Morning    ferrous sulfate (FEROSUL) 325 (65 Fe) MG tablet Take 325 mg by mouth every evening.   5/29/2025 Evening    gabapentin (NEURONTIN) 300 MG capsule Take 300 mg by mouth at bedtime.   5/28/2025 Bedtime    LONSURF 15-6.14 MG tablet Take 2 tablets by mouth See Admin Instructions. Take 2 tablets by mouth twice daily on days 1-5 and 15-19 of each 28 day cycle.   5/24/2025 Evening    Melatonin 10 MG TABS tablet Take 10 mg by mouth nightly as needed for sleep   Taking As Needed    Multiple Vitamin (MULTIVITAMIN PO) Multivitamin powder: take 1 scoop daily   5/29/2025 Morning    oxyCODONE (ROXICODONE) 5 MG tablet Take 5 mg by mouth every 4 hours as needed for severe pain    5/29/2025 Morning    rOPINIRole (REQUIP) 0.25 MG tablet Take 0.25 mg by mouth at bedtime.   5/28/2025 Bedtime    sertraline (ZOLOFT) 50 MG tablet Take 50 mg by mouth At Bedtime    5/28/2025 Bedtime    tamsulosin (FLOMAX) 0.4 MG capsule Take 0.4 mg by  mouth daily   5/29/2025 Morning    traZODone (DESYREL) 50 MG tablet Take 1 tablet by mouth at bedtime.   5/28/2025 Bedtime    vitamin D3 (CHOLECALCIFEROL) 50 mcg (2000 units) tablet Take 1 tablet by mouth daily   5/29/2025 Morning    XARELTO ANTICOAGULANT 20 MG TABS tablet Take 20 mg by mouth daily   5/29/2025 Morning          Anti-infectives: ceftriaxone 6/1-    Prior:  Meropenem 5/30-6/1  Vancomycin 5/30-31  Pip/tazo 5/30  Cefazolin 5/30    Cultures:   Susceptibility data from last 90 days.  Collected Specimen Info Organism Ampicillin Ampicillin/Sulbactam Cefazolin Cefepime Ceftazidime Ceftriaxone Ciprofloxacin Gentamicin Levofloxacin Meropenem Nitrofurantoin Piperacillin/Tazobactam Trimethoprim/Sulfamethoxazole    05/30/25 Urine, Midstream Escherichia coli  S  S  S  S  S  S  R  S  R   S  S  S   05/30/25 Peripheral blood (BC) from Arm, Left Escherichia coli  S  S   S  S  S  R  S  R  S   S  S   05/30/25 Peripheral blood (BC) from Arm, Right Escherichia coli                  Imaging: reviewed images          Review of Systems:  All other systems negative in detail except what is noted above. Physical Exam:  Temp:  [98.2  F (36.8  C)-98.7  F (37.1  C)] 98.4  F (36.9  C)  Pulse:  [64-72] 69  Resp:  [16-18] 18  BP: (104-119)/(64-72) 110/67  SpO2:  [97 %-99 %] 98 %    GENERAL:  In no acute distress, is alert and oriented to person, place, time.  EYES: No conjunctival injection, pupils equally reactive to light, extra-ocular movement intact  HEAD, EARS, NOSE, MOUTH, AND THROAT: Nontraumatic, mouth without oral ulcers.   RESPIRATORY: Clear breath sounds to auscultation bilaterally.   CARDIOVASCULAR: Regular rate and rhythm, normal S1 and S2, no murmurs, rubs, or gallops.  ABDOMEN: Soft, nontender, +colostomy.  Normal bowel sounds.  MUSCULOSKELETAL: No synovitis.  SKIN/HAIR/NAILS: No rashes, no signs of peripheral emboli.  NEUROLOGIC: Grossly intact.  Port L upper chest accessed, not inflamed.        Pertinent Labs          Recent Labs   Lab 06/03/25  0616 06/02/25  0423 06/01/25  0413    133* 135   CO2 21* 23 21*   BUN 39.1* 43.7* 40.7*       Lab Results   Component Value Date    ALT 16 06/01/2025    AST 34 06/01/2025    ALKPHOS 57 06/01/2025          Pertinent Radiology  Radiology Results: Reviewed  US Renal Complete Non-Vascular  Result Date: 5/31/2025  EXAM: US RENAL COMPLETE NON-VASCULAR LOCATION: [facility] DATE: 5/31/2025 INDICATION: left hydronephrosis COMPARISON: CT 05/30/2025 TECHNIQUE: Routine Bilateral Renal and Bladder Ultrasound. FINDINGS: RIGHT KIDNEY: 9.6 cm. No hydronephrosis or suspicious masses. Renal cortical scarring. Benign cyst requiring no dedicated follow-up. LEFT KIDNEY: 9.9 cm. Normal without hydronephrosis or masses. BLADDER: Adkins catheter in place with decompressed urinary bladder.     IMPRESSION: 1.  Adkins catheter in place with decompressed urinary bladder. 2.  Resolution of previously noted left hydronephrosis.    XR Surgery ANÍBAL Fluoro L/T 5 Min  Result Date: 5/30/2025  This exam was marked as non-reportable because it will not be read by a radiologist or a [place] non-radiologist provider.     CT Chest Abdomen Pelvis w/o Contrast  Result Date: 5/30/2025  EXAM: CT CHEST ABDOMEN PELVIS W/O CONTRAST LOCATION: [facility] DATE: 5/30/2025 INDICATION: Sepsis, likely urosepsis, history of hydronephrosis, concern for possible septic stone; clinical history includes unresectable colon carcinoma COMPARISON: CT of the chest 1/16/2025 and CT of the chest, abdomen, and pelvis 5/8/2023 TECHNIQUE: CT scan of the chest, abdomen, and pelvis was performed without IV contrast. Multiplanar reformats were obtained. Dose reduction techniques were used. CONTRAST: None. FINDINGS: LUNGS AND PLEURA: Symmetric normal lung inflation. Limited foci of subsegmental atelectasis are present in both posterior costophrenic sulci, right slightly greater than left. There are no  airspace opacities. No thickened intralobular septa or interstitial lung abnormalities. Trachea and central airways are patent. No bronchial wall thickening. Subtle cylindrical bronchiectasis of subsegmental airways in the peripheral lung bases sparing the subpleural regions. No pleural space abnormality. MEDIASTINUM: Cardiac chambers are normal in size. No pericardial effusion. Normal caliber thoracic aorta. Conventional arch anatomy. The left jugular approach chest port catheter terminates in the right atrium. No enlarged mediastinal or hilar lymph nodes. Esophagus is normal. Thyroid gland is normal. CORONARY ARTERY CALCIFICATION: Moderate patchy calcifications proximal left coronary arteries. HEPATOBILIARY: Focal calcification at the junction of the gallbladder and cystic duct and subtle peripheral calcification around a larger stone in the body of the gallbladder. No gallbladder distention or wall thickening. Liver is normal in size. Smooth liver capsule. No bile duct enlargement. PANCREAS: Normal. SPLEEN: Normal. ADRENAL GLANDS: Normal. KIDNEYS/BLADDER: There is a 2 to 3 mm radiopaque calculus in the urinary bladder near the right ureteral orifice (series 3, image 256). The right kidney is normal in size. No right hydroureteronephrosis. No radiopaque right renal or ureteral calculi. The  left kidney is mildly enlarged and there is left hydroureteronephrosis. No radiopaque left renal or ureteral calculi. The course of the left ureter is retracted medially secondary to retractile mass associated with the bowel in the central pelvis. BOWEL: Stomach is nondistended.. Unchanged calcifications near the serosa the junction of the third and fourth portions of the duodenum. Proximal small bowel is normal. There is a loop ileostomy in the right lower quadrant. Increased size of the soft tissue mass with ill-defined borders inseparable from the ileocecal valve compared to 5/8/2023 measuring 3 x 6.2 cm (series 3, image  221). Minimal fecal material in the ascending, transverse, and descending colon. A few noninflamed diverticula arise from  the hepatic flexure of the colon. LYMPH NODES: No mandeep hepatis, aortocaval or iliac chain adenopathy. VASCULATURE: Mild aortoiliac atheromatous calcifications. PELVIC ORGANS: Status post prostatectomy. MUSCULOSKELETAL: Moderate compression deformities of L4 and L5, similar to 2023. There are subtle superior endplate deformities of T11 and T12 and a mild compression deformity of T7. These vertebral findings are unchanged from 2023. No aggressive or destructive bone lesions.     IMPRESSION: 1.  A irregular soft tissue mass associated with the ileocecal valve has increased in size from 5/8/2023 consistent with history of unresectable bowel malignancy. No findings to suggest new distant metastases. 2.  Left hydroureteronephrosis secondary architectural distortion/tissue retraction secondary to #1. 3.  Radiopaque 2-3 mm calculus in the urinary bladder adjacent to the right ureterovesicular junction. No right hydroureteronephrosis or other radiopaque renal or ureteral calculi.     ECHO Congenital Transthoracic (TTE)  Result Date: 5/26/2025  For the complete report, see the Order-Level Documents. Hemodynamics Heart Rate: 72 BPM Blood Pressure: 114 / 84 mmHg ECG: Normal sinus rhythm Final Impressions 1. BEDSIDE ECHOCARDIOGRAM (in Emergency Dept): 2. No intracardiac mass or thrombus, within the limitations of transthoracic echocardiography. 3. Normal left ventricular size; estimated ejection fraction 50%. 4. Borderline generalized left ventricular hypokinesis. 5. Mildly thickened aortic valve; trivial aortic valve regurgitation. 6. Mild-moderate mitral valve regurgitation; normal left atrial size. 7. Normal right ventricular size; mildly reduced systolic function. 8. Mild tricuspid valve regurgitation; normal right atrial size. 9. Tip of central venous catheter noted in right atrium; no thrombus on  catheter tip. 10. No abnormal structure identified in right atrium to account for increased FDG uptake noted on PET scan. 11. Inferior vena cava not well visualized.    Echo performed in the Emergency Department. LEFT VENTRICLE:Normal left ventricular chamber size. Normal left ventricular geometry. Calculated 2-D monoplane volumetric left ventricular ejection fraction 47% without the use of ultrasound enhancing agent. Mild generalized left ventricular hypokinesis. Grade 1/3 left ventricular diastolic dysfunction, consistent with low to normal left ventricular filling pressure. RIGHT VENTRICLE:Normal right ventricular chamber size. Mildly reduced right ventricular systolic function. Unable to estimate right ventricular systolic pressure due to the inability to estimate right atrial pressure. ATRIA:Normal left atrial size. Normal right atrial size. CARDIAC VALVES:Trileaflet aortic valve. Mildly thickened aortic valve. Trivial aortic valve regurgitation. Normal mitral valve. Mild-moderate mitral valve regurgitation. Mitral regurgitation ERO (PISA) 0.28 cm2. Mitral regurgitant volume (PISA) 49 ml. Normal pulmonary valve. Normal pulmonary valve systolic velocities. Trivial pulmonary valve regurgitation. Normal tricuspid valve. Mild tricuspid valve regurgitation. OTHER ECHO FINDINGS:Inferior vena cava not well visualized. Normal mid ascending aorta diameter of 33 mm. Abdominal aorta not visualized. Imaging inadequate for detection of atrial level shunt by color flow imaging. No intracardiac mass or thrombus, but the left atrial appendage cannot be visualized adequately with transthoracic echo to exclude thrombus in this location. Tiny anterior pericardial effusion. For the complete report, see the Order-Level Documents.    CT Chest Angiogram Acute Chest Pain with IV Contrast (ED only)  Result Date: 5/21/2025  EXAM:  CT CHEST ANGIOGRAM ACUTE CHEST PAIN WITH IV CONTRAST (ED ONLY) Including 3D image post-processing with or  without AI assistance. COMPARISON:  Chest CT 11/15/2021 FDG PET/CT 12/16/2024      FINDINGS:     No acute pulmonary embolism. Normal caliber main pulmonary artery. Left IJ Port-A-Cath terminates in the right atrium. No intracardiac thrombus, including in the right atrium. Multifocal coronary artery atherosclerosis. Small pericardial effusion. Bibasilar atelectasis. Calcified pulmonary granuloma left lower lobe. No pneumothorax or pleural effusion. No thoracic lymphadenopathy. Chronic superior endplate compression fracture T12 with approximately 20% height loss, new since 11/15/2021 but unchanged since 12/16/2024. Chronic T7 and T11 superior endplate compression fractures. Cholelithiasis.     1.  No acute pulmonary embolism. 2.  No intracardiac thrombus, including in the right atrium.    PET Oncology (Eyes to Thighs)  Result Date: 5/20/2025  EXAM:  PET CT SKULL TO THIGH FDG Serum glucose at time of F-18 FDG injection was 79 mg/dL. Patient followed standard dietary/fasting requirements for this exam. RADIOPHARMACEUTICAL/MEDS: Route: intravenous fludeoxyglucose F 18 injection USP (F-18 FDG),9.94 millicurie TECHNIQUE:  F-18 FDG PET/CT scan was performed from the orbits through the thighs with low dose, non-contrast, free-breathing CT images for attenuation correction and anatomic localization (AC/AL), with imaging beginning at approximately 60 minutes after  radiotracer injection. COMPARISON:  FDG PET CT 3/17/2025 and 12/16/2024. INDICATION:  History of inoperable right colon carcinoma status post diverting ileostomy. Continuing chemotherapy.  Subsequent treatment strategy. The patient reports no recent vaccinations. FINDINGS:  Since 3/17/2025, the intensely FDG avid mass centered in the cecum has slightly increased in FDG avidity with SUV max currently measuring 27.4, previously 22.8. The mass demonstrates slightly increased extent, especially inferiorly. No suspicious FDG avid lymphadenopathy, peritoneal nodules, or  hepatic lesions. No other suspicious foci of activity. Similar diffuse esophageal and gastric activity. There is increased FDG uptake seen in the right atrium extending from the tip the catheter. This was not present on the previous scan including represent FDG uptake within a new right atrial thrombus. Incidental findings on low-dose noncontrast CT: Left subclavian Port-A-Cath with tip in the right atrium. Old bilateral rib fractures. New mild to moderate left hydroureteronephrosis and stable mild right pelvocaliectasis and ureterectasis. Right lower quadrant diverting ileostomy. Scattered colonic diverticula. Vascular calcifications including the coronary arteries. Prostatectomy.    1.  Slightly increased size and FDG uptake within the cecal mass since 3/17/2025. No findings for new FDG avid metastatic disease. 2.  New mild to moderate left hydroureteronephrosis. Recommend evaluation for possible obstruction. 3.  New FDG activity within the right atrium. This can be seen with a thrombus. Recommend echocardiography for further evaluation.

## 2025-06-04 ENCOUNTER — HOSPITAL ENCOUNTER (OUTPATIENT)
Dept: CARDIOLOGY | Facility: HOSPITAL | Age: 65
Discharge: HOME | End: 2025-06-04
Attending: NURSE PRACTITIONER
Payer: COMMERCIAL

## 2025-06-04 VITALS
RESPIRATION RATE: 16 BRPM | TEMPERATURE: 97.2 F | SYSTOLIC BLOOD PRESSURE: 122 MMHG | DIASTOLIC BLOOD PRESSURE: 58 MMHG | OXYGEN SATURATION: 97 % | HEART RATE: 62 BPM

## 2025-06-04 VITALS
TEMPERATURE: 97.8 F | DIASTOLIC BLOOD PRESSURE: 73 MMHG | RESPIRATION RATE: 17 BRPM | HEIGHT: 67 IN | WEIGHT: 147.93 LBS | SYSTOLIC BLOOD PRESSURE: 108 MMHG | OXYGEN SATURATION: 99 % | BODY MASS INDEX: 23.22 KG/M2 | HEART RATE: 62 BPM

## 2025-06-04 LAB
ANION GAP SERPL CALCULATED.3IONS-SCNC: 12 MMOL/L (ref 7–15)
BACTERIA SPEC CULT: NO GROWTH
BASOPHILS # BLD AUTO: 0 10E3/UL (ref 0–0.2)
BASOPHILS NFR BLD AUTO: 0 %
BUN SERPL-MCNC: 34.7 MG/DL (ref 8–23)
CALCIUM SERPL-MCNC: 9.3 MG/DL (ref 8.8–10.4)
CHLORIDE SERPL-SCNC: 106 MMOL/L (ref 98–107)
CREAT SERPL-MCNC: 2.75 MG/DL (ref 0.67–1.17)
EGFRCR SERPLBLD CKD-EPI 2021: 25 ML/MIN/1.73M2
EOSINOPHIL # BLD AUTO: 0.1 10E3/UL (ref 0–0.7)
EOSINOPHIL NFR BLD AUTO: 3 %
ERYTHROCYTE [DISTWIDTH] IN BLOOD BY AUTOMATED COUNT: 19.9 % (ref 10–15)
GLUCOSE SERPL-MCNC: 95 MG/DL (ref 70–99)
HCO3 SERPL-SCNC: 20 MMOL/L (ref 22–29)
HCT VFR BLD AUTO: 30.6 % (ref 40–53)
HGB BLD-MCNC: 10.2 G/DL (ref 13.3–17.7)
IMM GRANULOCYTES # BLD: 0 10E3/UL
IMM GRANULOCYTES NFR BLD: 1 %
LYMPHOCYTES # BLD AUTO: 0.5 10E3/UL (ref 0.8–5.3)
LYMPHOCYTES NFR BLD AUTO: 15 %
MAGNESIUM SERPL-MCNC: 2.1 MG/DL (ref 1.7–2.3)
MCH RBC QN AUTO: 33.1 PG (ref 26.5–33)
MCHC RBC AUTO-ENTMCNC: 33.3 G/DL (ref 31.5–36.5)
MCV RBC AUTO: 99 FL (ref 78–100)
MONOCYTES # BLD AUTO: 0.4 10E3/UL (ref 0–1.3)
MONOCYTES NFR BLD AUTO: 14 %
NEUTROPHILS # BLD AUTO: 2.1 10E3/UL (ref 1.6–8.3)
NEUTROPHILS NFR BLD AUTO: 67 %
NRBC # BLD AUTO: 0 10E3/UL
NRBC BLD AUTO-RTO: 0 /100
PHOSPHATE SERPL-MCNC: 3.4 MG/DL (ref 2.5–4.5)
PLATELET # BLD AUTO: 126 10E3/UL (ref 150–450)
POTASSIUM SERPL-SCNC: 4.1 MMOL/L (ref 3.4–5.3)
RBC # BLD AUTO: 3.08 10E6/UL (ref 4.4–5.9)
SODIUM SERPL-SCNC: 138 MMOL/L (ref 135–145)
WBC # BLD AUTO: 3.1 10E3/UL (ref 4–11)

## 2025-06-04 PROCEDURE — 99152 MOD SED SAME PHYS/QHP 5/>YRS: CPT | Performed by: INTERNAL MEDICINE

## 2025-06-04 PROCEDURE — 85025 COMPLETE CBC W/AUTO DIFF WBC: CPT | Performed by: INTERNAL MEDICINE

## 2025-06-04 PROCEDURE — 93312 ECHO TRANSESOPHAGEAL: CPT | Mod: 26 | Performed by: INTERNAL MEDICINE

## 2025-06-04 PROCEDURE — 250N000009 HC RX 250: Performed by: INTERNAL MEDICINE

## 2025-06-04 PROCEDURE — 250N000011 HC RX IP 250 OP 636: Performed by: INTERNAL MEDICINE

## 2025-06-04 PROCEDURE — 80048 BASIC METABOLIC PNL TOTAL CA: CPT | Performed by: NURSE PRACTITIONER

## 2025-06-04 PROCEDURE — 99239 HOSP IP/OBS DSCHRG MGMT >30: CPT | Performed by: INTERNAL MEDICINE

## 2025-06-04 PROCEDURE — 93325 DOPPLER ECHO COLOR FLOW MAPG: CPT | Mod: 26 | Performed by: INTERNAL MEDICINE

## 2025-06-04 PROCEDURE — 93320 DOPPLER ECHO COMPLETE: CPT | Mod: 26 | Performed by: INTERNAL MEDICINE

## 2025-06-04 PROCEDURE — 250N000013 HC RX MED GY IP 250 OP 250 PS 637: Performed by: NURSE PRACTITIONER

## 2025-06-04 PROCEDURE — 84100 ASSAY OF PHOSPHORUS: CPT | Performed by: NURSE PRACTITIONER

## 2025-06-04 PROCEDURE — 83735 ASSAY OF MAGNESIUM: CPT | Performed by: NURSE PRACTITIONER

## 2025-06-04 PROCEDURE — 93312 ECHO TRANSESOPHAGEAL: CPT

## 2025-06-04 RX ORDER — SODIUM CHLORIDE 9 MG/ML
30 INJECTION, SOLUTION INTRAVENOUS CONTINUOUS
OUTPATIENT
Start: 2025-06-04

## 2025-06-04 RX ORDER — LIDOCAINE HYDROCHLORIDE 20 MG/ML
SOLUTION OROPHARYNGEAL
Status: COMPLETED | OUTPATIENT
Start: 2025-06-04 | End: 2025-06-04

## 2025-06-04 RX ORDER — AMOXICILLIN 500 MG/1
1000 CAPSULE ORAL 2 TIMES DAILY
Qty: 32 CAPSULE | Refills: 0 | Status: SHIPPED | OUTPATIENT
Start: 2025-06-04 | End: 2025-06-12

## 2025-06-04 RX ORDER — HEPARIN SODIUM (PORCINE) LOCK FLUSH IV SOLN 100 UNIT/ML 100 UNIT/ML
5-10 SOLUTION INTRAVENOUS
Status: DISCONTINUED | OUTPATIENT
Start: 2025-06-04 | End: 2025-06-04 | Stop reason: HOSPADM

## 2025-06-04 RX ADMIN — LIDOCAINE HYDROCHLORIDE 15 ML: 20 SOLUTION ORAL; TOPICAL at 08:38

## 2025-06-04 RX ADMIN — FERROUS SULFATE TAB 325 MG (65 MG ELEMENTAL FE) 650 MG: 325 (65 FE) TAB at 09:59

## 2025-06-04 RX ADMIN — Medication 50 MCG: at 10:00

## 2025-06-04 RX ADMIN — Medication 5 ML: at 13:01

## 2025-06-04 RX ADMIN — Medication 1 TABLET: at 10:00

## 2025-06-04 RX ADMIN — CEFTRIAXONE SODIUM 2 G: 2 INJECTION, POWDER, FOR SOLUTION INTRAMUSCULAR; INTRAVENOUS at 12:30

## 2025-06-04 RX ADMIN — MIDAZOLAM HYDROCHLORIDE 2 MG: 1 INJECTION, SOLUTION INTRAMUSCULAR; INTRAVENOUS at 08:40

## 2025-06-04 RX ADMIN — TOPICAL ANESTHETIC 0.5 ML: 200 SPRAY DENTAL; PERIODONTAL at 08:38

## 2025-06-04 ASSESSMENT — ACTIVITIES OF DAILY LIVING (ADL)
ADLS_ACUITY_SCORE: 46
ADLS_ACUITY_SCORE: 49
ADLS_ACUITY_SCORE: 46
ADLS_ACUITY_SCORE: 49
ADLS_ACUITY_SCORE: 46

## 2025-06-04 NOTE — PLAN OF CARE
Problem: Adult Inpatient Plan of Care  Goal: Optimal Comfort and Wellbeing  Outcome: Progressing   Goal Outcome Evaluation:  Pt had an uneventful shift.  Slept off/on through interruptions.  Denied pain.  Adkins patent though urine is tea colored.  Empties ileostomy independently. Has been npo for MONIKA this am.

## 2025-06-04 NOTE — DISCHARGE SUMMARY
[facility]  Hospitalist Discharge Summary      Date of Admission:  5/30/2025  Date of Discharge:  6/4/2025  Discharging Provider: Patricia Gonzalez DO  Discharge Service: Hospitalist Service    Discharge Diagnoses   Left hydronephrosis secondary to colon cancer  Left ureteral stent placement  UTI  E. coli bacteremia  CHAYO on CKD 3  Hypomagnesemia  Septic shock  Pyelonephritis  Chronic pain     Clinically Significant Risk Factors          Follow-ups Needed After Discharge   Follow-up Appointments       Follow Up      [facility] will call you to arrange outpatient routine outpatient cystoscopy with ureteral stent exchange in 3 months. You may call to confirm appointment(s) as needed: [phone]        Hospital Follow-up with Existing Primary Care Provider (PCP)          Schedule Primary Care visit within: 3-5 Days (Urgent)               Unresulted Labs Ordered in the Past 30 Days of this Admission       Date and Time Order Name Status Description    6/1/2025  9:30 AM Blood Culture Peripheral blood (BC) Hand, Right Preliminary     6/1/2025  9:30 AM Blood Culture Peripheral blood (BC) Arm, Left Preliminary     5/30/2025  1:23 PM Blood Culture Line (BC) Portacath Preliminary         These results will be followed up by [facility]    Discharge Disposition   Discharged to home  Condition at discharge: Stable    Hospital Course   Patient is a 64-year-old male with a history of colon cancer who presented with septic shock secondary to bacteremia, UTI and pyelonephritis.  Had left-sided stent placed for hydroureteronephrosis with improvement in kidney function.  CHAYO slowly improving and will need follow-up with PCP for outpatient BMP.  Was evaluated by ID who recommended extended course of antibiotics with amoxicillin at discharge.  Had improvement of his symptoms and was discharged.    Of note patient underwent MONIKA while in the hospital as this had been ordered by oncology as an outpatient and was due  6/4.    Consultations This Hospital Stay   UROLOGY IP CONSULT  PHARMACY TO DOSE VANCO  WOUND OSTOMY CONTINENCE NURSE  IP CONSULT  CARE MANAGEMENT / SOCIAL WORK IP CONSULT  HOSPITALIST IP CONSULT  PHYSICAL THERAPY ADULT IP CONSULT  OCCUPATIONAL THERAPY ADULT IP CONSULT  INFECTIOUS DISEASES IP CONSULT    Code Status   Full Code    Time Spent on this Encounter   IPatricia DO, personally saw the patient today and spent greater than 30 minutes discharging this patient.       Patricia Gonzalez DO  [address] [address]  Phone: [phone]  Fax: [phone]  ______________________________________________________________________    Physical Exam   Vital Signs: Temp: 97.8  F (36.6  C) Temp src: Oral BP: 108/73 Pulse: 62   Resp: 17 SpO2: 99 % O2 Device: None (Room air)    Weight: 147 lbs 14.86 oz         Primary Care Physician   Maycol Worrell    Discharge Orders      Basic metabolic panel  (Ca, Cl, CO2, Creat, Gluc, K, Na, BUN)     Primary Care - Care Coordination Referral      Reason for your hospital stay    S/p cystoscopy with left ureteral stent placement.     Discharge Instructions    - While you were in the hospital you had left ureteral stent(s) placed.  - A ureteral stent, is a small hollow drainage tube that since inside the ureter. One tip of the stent curls in the kidney and the other in the bladder to keep the stent in place.  - You may notice stent irritation in the form of: urinary frequency, urinary urgency, burning when you urinate, an ache in the back or pelvis.    What you can do to help with these symptoms:  - Minimize activity  - Take a warm bath  - Take pain medications as prescribed  - There are some prescription medications that may help such as flomax, pyridium, detrol and ditropan     Follow Up    [facility] will call you to arrange outpatient routine outpatient cystoscopy with ureteral stent exchange in 3 months.  You may call to confirm appointment(s) as needed: [phone]     Activity    Your activity upon discharge: activity as tolerated     Diet    Follow this diet upon discharge: Current Diet:Orders Placed This Encounter      Regular Diet Adult     Hospital Follow-up with Existing Primary Care Provider (PCP)            Significant Results and Procedures   Most Recent 3 CBC's:  Recent Labs   Lab Test 06/04/25  0634 06/03/25  0616 06/02/25  0423   WBC 3.1* 3.6* 4.8   HGB 10.2* 8.5* 8.4*   MCV 99 100 100   * 106* 91*     Most Recent 3 BMP's:  Recent Labs   Lab Test 06/04/25  0634 06/03/25  0616 06/02/25  0423    135 133*   POTASSIUM 4.1 3.9 4.1   CHLORIDE 106 108* 103   CO2 20* 21* 23   BUN 34.7* 39.1* 43.7*   CR 2.75* 3.02* 3.19*   ANIONGAP 12 6* 7   AARON 9.3 8.5* 8.2*   GLC 95 94 98     Most Recent 2 LFT's:  Recent Labs   Lab Test 06/01/25  1020 05/30/25  1832   AST 34 37   ALT 16 27   ALKPHOS 57 67   BILITOTAL 0.6 0.9   ,   Results for orders placed or performed during the hospital encounter of 05/30/25   CT Chest Abdomen Pelvis w/o Contrast    Narrative    EXAM: CT CHEST ABDOMEN PELVIS W/O CONTRAST  LOCATION: [facility]  DATE: 5/30/2025    INDICATION: Sepsis, likely urosepsis, history of hydronephrosis, concern for possible septic stone; clinical history includes unresectable colon carcinoma  COMPARISON: CT of the chest 1/16/2025 and CT of the chest, abdomen, and pelvis 5/8/2023  TECHNIQUE: CT scan of the chest, abdomen, and pelvis was performed without IV contrast. Multiplanar reformats were obtained. Dose reduction techniques were used.   CONTRAST: None.    FINDINGS:   LUNGS AND PLEURA: Symmetric normal lung inflation. Limited foci of subsegmental atelectasis are present in both posterior costophrenic sulci, right slightly greater than left. There are no airspace opacities. No thickened intralobular septa or   interstitial lung abnormalities. Trachea and central airways are  patent. No bronchial wall thickening. Subtle cylindrical bronchiectasis of subsegmental airways in the peripheral lung bases sparing the subpleural regions. No pleural space abnormality.    MEDIASTINUM: Cardiac chambers are normal in size. No pericardial effusion. Normal caliber thoracic aorta. Conventional arch anatomy. The left jugular approach chest port catheter terminates in the right atrium. No enlarged mediastinal or hilar lymph   nodes. Esophagus is normal. Thyroid gland is normal.    CORONARY ARTERY CALCIFICATION: Moderate patchy calcifications proximal left coronary arteries.    HEPATOBILIARY: Focal calcification at the junction of the gallbladder and cystic duct and subtle peripheral calcification around a larger stone in the body of the gallbladder. No gallbladder distention or wall thickening. Liver is normal in size. Smooth   liver capsule. No bile duct enlargement.    PANCREAS: Normal.    SPLEEN: Normal.    ADRENAL GLANDS: Normal.    KIDNEYS/BLADDER: There is a 2 to 3 mm radiopaque calculus in the urinary bladder near the right ureteral orifice (series 3, image 256). The right kidney is normal in size. No right hydroureteronephrosis. No radiopaque right renal or ureteral calculi. The   left kidney is mildly enlarged and there is left hydroureteronephrosis. No radiopaque left renal or ureteral calculi. The course of the left ureter is retracted medially secondary to retractile mass associated with the bowel in the central pelvis.    BOWEL: Stomach is nondistended.. Unchanged calcifications near the serosa the junction of the third and fourth portions of the duodenum. Proximal small bowel is normal. There is a loop ileostomy in the right lower quadrant. Increased size of the soft   tissue mass with ill-defined borders inseparable from the ileocecal valve compared to 5/8/2023 measuring 3 x 6.2 cm (series 3, image 221). Minimal fecal material in the ascending, transverse, and descending colon. A few  noninflamed diverticula arise from   the hepatic flexure of the colon.    LYMPH NODES: No mandeep hepatis, aortocaval or iliac chain adenopathy.    VASCULATURE: Mild aortoiliac atheromatous calcifications.    PELVIC ORGANS: Status post prostatectomy.    MUSCULOSKELETAL: Moderate compression deformities of L4 and L5, similar to 2023. There are subtle superior endplate deformities of T11 and T12 and a mild compression deformity of T7. These vertebral findings are unchanged from 2023. No aggressive or   destructive bone lesions.      Impression    IMPRESSION:    1.  A irregular soft tissue mass associated with the ileocecal valve has increased in size from 5/8/2023 consistent with history of unresectable bowel malignancy. No findings to suggest new distant metastases.  2.  Left hydroureteronephrosis secondary architectural distortion/tissue retraction secondary to #1.  3.  Radiopaque 2-3 mm calculus in the urinary bladder adjacent to the right ureterovesicular junction. No right hydroureteronephrosis or other radiopaque renal or ureteral calculi.     US Renal Complete Non-Vascular    Narrative    EXAM: US RENAL COMPLETE NON-VASCULAR  LOCATION: [facility]  DATE: 5/31/2025    INDICATION: left hydronephrosis  COMPARISON: CT 05/30/2025  TECHNIQUE: Routine Bilateral Renal and Bladder Ultrasound.    FINDINGS:    RIGHT KIDNEY: 9.6 cm. No hydronephrosis or suspicious masses. Renal cortical scarring. Benign cyst requiring no dedicated follow-up.    LEFT KIDNEY: 9.9 cm. Normal without hydronephrosis or masses.     BLADDER: Adkins catheter in place with decompressed urinary bladder.      Impression    IMPRESSION:  1.  Adkins catheter in place with decompressed urinary bladder.  2.  Resolution of previously noted left hydronephrosis.   XR Surgery ANÍBAL Fluoro L/T 5 Min    Narrative    This exam was marked as non-reportable because it will not be read by a   radiologist or a [place] non-radiologist  provider.         Echocardiogram Complete *Canceled*    Narrative    The following orders were created for panel order Echocardiogram Complete.  Procedure                               Abnormality         Status                     ---------                               -----------         ------                       Please view results for these tests on the individual orders.   Echocardiogram Complete *Canceled*    Narrative    The following orders were created for panel order Echocardiogram Complete.  Procedure                               Abnormality         Status                     ---------                               -----------         ------                       Please view results for these tests on the individual orders.   Echocardiogram MONIKA    Narrative    [id number]  [facility]  [id number]  [id number]^SUZAN^MINNA^[place] [address]     Name: ARTUR GOULD  MRN: [id number]  : 1960  Study Date: 2025 08:29 AM  Age: 64 yrs  Gender: Male  Patient Location: [facility]  Reason For Study: Cardiac Thrombus  Ordering Physician: MINNA MARES  Referring Physician: ESTEPHANIA GIBSON  Performed By: /ACE     BSA: 1.8 m2  Height: 66 in  Weight: 147 lb  HR: 85  ______________________________________________________________________________  Procedure  Transesophageal Echocardiogram with two-dimensional, color and spectral  Doppler.  ______________________________________________________________________________  Interpretation Summary     TRANSESOPHAGEAL ECHOCARDIOGRAM     1. Normal left ventricular size and systolic performance. The visually  estimated ejection fraction is 55%.  2. There is trace aortic insufficiency.  3. There is mild-moderate, to perhaps moderate mitral insufficiency.  4. Normal right ventricular size and systolic performance.  5. No intracardiac mass or thrombus is detected (specifically, no thrombus  is  detected within the right atrium).  6. There is a central venous catheter tip noted in the right atrium without  evidence of associated thrombus.  7. No valvular vegetation is identified on the study.  8. Echo contrast examination was performed using agitated crystalloid as  contrast agent. The right heart opacity was good and the left heart was well  visualized. There was no evidence of right-to-left shunting during spontaneous  respiration or following release of Valsalva.  ______________________________________________________________________________  Left ventricle:  Normal left ventricular size and systolic performance. The visually estimated  ejection fraction is 55%. There is normal regional wall motion. Left  ventricular wall thickness is normal.     Right ventricle:  Normal right ventricular size and systolic performance.     Left atrium:  There is borderline left atrial enlargement. The atrial septum is intact.     Right atrium:  The right atrium is of normal size.     Aortic valve:  The aortic valve is comprised of three cusps. There is no significant aortic  stenosis. There is trace aortic insufficiency.     Mitral valve:  The mitral valve appears morphologically normal. There is mild-moderate, to  perhaps moderate mitral insufficiency.     Tricuspid valve:  The tricuspid valve is grossly morphologically normal. There is mild tricuspid  insufficiency.     Pulmonic valve:  The pulmonic valve is grossly morphologically normal. There is mild pulmonic  insufficiency.     Thoracic aorta:  The aortic root and proximal ascending aorta are of normal dimension. There is  mild aortic atherosclerosis. No complex or mobile atheromata are identified.     Pericardium:  There is no significant pericardial effusion.  ______________________________________________________________________________  The study was performed using a multiplane adult transducer. 2-D, colorflow  Doppler, and spectral Doppler analysis was  performed. Informed consent was  obtained prior to the procedure. The patient was assessed upon my arrival to  the procedure room. The patient was felt to be an appropriate candidate for  the procedure and planned sedation. Sedation: versed 2.0 mg iv. Topical  anesthesia was performed with viscous lidocaine and benzocaine spray. The  transducer was introduced without difficulty. Heart rate, respiratory rate,  oxygen saturations, blood pressure, and response to care were monitored  throughout the procedure with nursing assistance. There were no complications  of the procedure. Total sedation time: 15 minutes of continuous bedside 1:1  monitoring.  ______________________________________________________________________________  MONIKA  Versed (2mg) was given intravenously. 15mL of viscous Lidocaine and 0.5mL of  Benzocaine. I determined this patient to be an appropriate candidate for the  planned sedation and procedure and have reassessed the patient immediately  prior to sedation and procedure. Total sedation time: 15 minutes of continuous  bedside 1:1 monitoring.  ______________________________________________________________________________  Report approved by: Pedrito Dunham MD on 06/04/2025 10:04 AM     ______________________________________________________________________________          Discharge Medications   Current Discharge Medication List        START taking these medications    Details   amoxicillin (AMOXIL) 500 MG capsule Take 2 capsules (1,000 mg) by mouth 2 times daily for 8 days.  Qty: 32 capsule, Refills: 0    Associated Diagnoses: Sepsis with acute renal failure without septic shock, due to unspecified organism, unspecified acute renal failure type (H)           CONTINUE these medications which have NOT CHANGED    Details   acetaminophen (TYLENOL) 500 MG tablet Take 1,000 mg by mouth every 6 hours as needed for pain.      calcium carbonate-vitamin D (CALTRATE) 600-10 MG-MCG per tablet Take 1 tablet  by mouth daily.      fentaNYL (DURAGESIC) 50 mcg/hr 72 hr patch Place 1 patch onto the skin every 72 hours.      ferrous sulfate (FE TABS) 325 (65 Fe) MG EC tablet Take 650 mg by mouth every morning.      ferrous sulfate (FEROSUL) 325 (65 Fe) MG tablet Take 325 mg by mouth every evening.      gabapentin (NEURONTIN) 300 MG capsule Take 300 mg by mouth at bedtime.      LONSURF 15-6.14 MG tablet Take 2 tablets by mouth See Admin Instructions. Take 2 tablets by mouth twice daily on days 1-5 and 15-19 of each 28 day cycle.      Melatonin 10 MG TABS tablet Take 10 mg by mouth nightly as needed for sleep      Multiple Vitamin (MULTIVITAMIN PO) Multivitamin powder: take 1 scoop daily      oxyCODONE (ROXICODONE) 5 MG tablet Take 5 mg by mouth every 4 hours as needed for severe pain       rOPINIRole (REQUIP) 0.25 MG tablet Take 0.25 mg by mouth at bedtime.      sertraline (ZOLOFT) 50 MG tablet Take 50 mg by mouth At Bedtime       tamsulosin (FLOMAX) 0.4 MG capsule Take 0.4 mg by mouth daily      traZODone (DESYREL) 50 MG tablet Take 1 tablet by mouth at bedtime.      vitamin D3 (CHOLECALCIFEROL) 50 mcg (2000 units) tablet Take 1 tablet by mouth daily      XARELTO ANTICOAGULANT 20 MG TABS tablet Take 20 mg by mouth daily           Allergies   Allergies   Allergen Reactions    Bee Venom Hives

## 2025-06-04 NOTE — PLAN OF CARE
PRIMARY DIAGNOSIS: PYELONEPHRITIS  OUTPATIENT/OBSERVATION GOALS TO BE MET BEFORE DISCHARGE:  Diagnostic test and consults (if applicable) complete: Yes    Vitals signs stable or return to baseline: Yes    Tolerate oral intake to maintain hydration: Yes    Pain status: Pain free.    Tolerating oral antibiotics or has plans for home infusion setup:  No    Return to near baseline physical activity: Yes    Discharge Planner Nurse   Safe discharge environment identified: No  Barriers to discharge: Yes        Entered by: Maryam Rubin RN 06/03/2025 10:17 PM     Please review provider order for any additional goals.   Nurse to notify provider when observation goals have been met and patient is ready for discharge.Goal Outcome Evaluation:                    Urinary output-400ml, input-100ml. Denies any complaints at this time.VSS.NPO at midnight due to aprox 0800 MONIKA tomorrow.

## 2025-06-04 NOTE — PROGRESS NOTES
[facility]    Chart reviewed. Pt had TOV this AM, PVR 0 ml. Discharged prior to [facility] seeing patient. Maintain plan for 3 month stent exchange.     Barrett Kumar, CNP

## 2025-06-04 NOTE — PRE-PROCEDURE
GENERAL PRE-PROCEDURE:   Procedure:  Transesophageal echocardiogram  Date/Time:  6/4/2025 8:33 AM    Written consent obtained?: Yes    Risks and benefits: Risks, benefits and alternatives were discussed    Consent given by:  Patient  Patient states understanding of procedure being performed: Yes    Patient's understanding of procedure matches consent: Yes    Procedure consent matches procedure scheduled: Yes    Expected level of sedation:  Moderate  Appropriately NPO:  Yes  Mallampati  :  Grade 1- soft palate, uvula, tonsillar pillars, and posterior pharyngeal wall visible  Lungs:  Lungs clear with good breath sounds bilaterally  Heart:  Normal heart sounds and rate  History & Physical reviewed:  History and physical reviewed and no updates needed  Statement of review:  I have reviewed the lab findings, diagnostic data, medications, and the plan for sedation

## 2025-06-04 NOTE — PROGRESS NOTES
Transesophageal Echocardiogram    Patient tolerated procedure fairly well. VSS.  Pt received a total of Versed 2mg IV for sedation and no fentanyl IV given as pt already has fentanyl patch in place on abdomen. Pt was occasionally retching and PRN oral suctioning done. Pt NPO until 0954 and no hot foods/liquids til 1454 this afternoon. Pt transferred back to room 423 ar 0920 with RN. Report given to SHIRA Cope at bedside.    Results pending cardiology interpretation.

## 2025-06-05 LAB
BACTERIA SPEC CULT: NORMAL
BACTERIA SPEC CULT: NORMAL

## 2025-06-06 LAB
BACTERIA SPEC CULT: NO GROWTH
BACTERIA SPEC CULT: NO GROWTH

## 2025-06-07 ENCOUNTER — NURSE TRIAGE (OUTPATIENT)
Dept: NURSING | Facility: CLINIC | Age: 65
End: 2025-06-07
Payer: COMMERCIAL

## 2025-06-07 NOTE — TELEPHONE ENCOUNTER
Nurse Triage SBAR    Is this a 2nd Level Triage? NO    Situation: Left lower back pain.    Background: Left ureteral stent placed on 5/30. Left lower back pain began this morning.     Assessment: Left lower back pain. Patient took oxycodone with 2 tylenol for pain. Rates his back pain a 5.    Protocol Recommended Disposition:   Call PCP Now, See PCP Within 24 Hours    Recommendation: Consult with on call provider.  Transferred to [facility] at [phone]. Patient also given [facility] phone number.         Does the patient meet one of the following criteria for ADS visit consideration? No  Reason for Disposition   High-risk adult (e.g., history of cancer, HIV, or IV drug use)   Nursing judgment or information in reference     Patient had procedure with [facility]. No AVS noted from procedure. Patient referred to on call for further instructions.    Additional Information   Negative: Passed out (i.e., lost consciousness, collapsed and was not responding)   Negative: Shock suspected (e.g., cold/pale/clammy skin, too weak to stand, low BP, rapid pulse)   Negative: Sounds like a life-threatening emergency to the triager   Negative: Major injury to the back (e.g., MVA, fall > 10 feet or 3 meters, penetrating injury, etc.)   Negative: Followed a tailbone injury   Negative: [1] Pain in the upper back over the ribs (rib cage) AND [2] radiates (travels, goes) into chest   Negative: [1] Pain in the upper back over the ribs (rib cage) AND [2] worsened by coughing (or clearly increases with breathing)   Negative: Back pain during pregnancy   Negative: Pain mainly in flank (i.e., in the side, over the lower ribs or just below the ribs)   Negative: [1] SEVERE back pain (e.g., excruciating) AND [2] sudden onset AND [3] age > 60 years   Negative: [1] Unable to urinate (or only a few drops) > 4 hours AND [2] bladder feels very full (e.g., palpable bladder or strong urge to urinate)   Negative: [1] Loss of bladder or  bowel control (urine or bowel incontinence; wetting self, leaking stool) AND [2] new-onset   Negative: Numbness in groin or rectal area (i.e., loss of sensation)   Negative: [1] SEVERE abdominal pain AND [2] present > 1 hour   Negative: [1] Abdominal pain AND [2] age > 60 years   Negative: Weakness of a leg or foot (e.g., unable to bear weight, dragging foot)   Negative: Unable to walk   Negative: Patient sounds very sick or weak to the triager   Negative: [1] SEVERE back pain (e.g., excruciating, unable to do any normal activities) AND [2] not improved 2 hours after pain medicine   Negative: [1] Pain radiates into the thigh or further down the leg AND [2] both legs   Negative: [1] Fever > 100.0 F (37.8 C) AND [2] flank pain (i.e., in side, below ribs and above hip)   Negative: [1] Pain or burning with passing urine (urination) AND [2] flank pain (i.e., in side, below ribs and above hip)   Negative: Numbness in a leg or foot (i.e., loss of sensation)   Negative: [1] Numbness in an arm or hand (i.e., loss of sensation) AND [2] upper back pain    Protocols used: Back Pain-A-, No Guideline [id number]

## 2025-06-18 ENCOUNTER — LAB REQUISITION (OUTPATIENT)
Dept: LAB | Facility: CLINIC | Age: 65
End: 2025-06-18
Payer: COMMERCIAL

## 2025-06-18 DIAGNOSIS — R10.9 UNSPECIFIED ABDOMINAL PAIN: ICD-10-CM

## 2025-06-18 PROCEDURE — 87088 URINE BACTERIA CULTURE: CPT | Mod: ORL | Performed by: FAMILY MEDICINE

## 2025-06-21 LAB
BACTERIA UR CULT: ABNORMAL
BACTERIA UR CULT: ABNORMAL

## 2025-06-25 ENCOUNTER — HOSPITAL ENCOUNTER (INPATIENT)
Facility: HOSPITAL | Age: 65
DRG: 659 | End: 2025-06-25
Attending: EMERGENCY MEDICINE | Admitting: INTERNAL MEDICINE
Payer: COMMERCIAL

## 2025-06-25 ENCOUNTER — APPOINTMENT (OUTPATIENT)
Dept: CT IMAGING | Facility: HOSPITAL | Age: 65
DRG: 659 | End: 2025-06-25
Attending: EMERGENCY MEDICINE
Payer: COMMERCIAL

## 2025-06-25 DIAGNOSIS — N17.9 ACUTE KIDNEY INJURY: ICD-10-CM

## 2025-06-25 DIAGNOSIS — R31.9 HEMATURIA, UNSPECIFIED TYPE: ICD-10-CM

## 2025-06-25 DIAGNOSIS — R50.9 FEVER, UNSPECIFIED FEVER CAUSE: ICD-10-CM

## 2025-06-25 DIAGNOSIS — N39.0 PSEUDOMONAS URINARY TRACT INFECTION: Primary | ICD-10-CM

## 2025-06-25 DIAGNOSIS — N30.91 HEMORRHAGIC CYSTITIS: ICD-10-CM

## 2025-06-25 DIAGNOSIS — B96.5 PSEUDOMONAS URINARY TRACT INFECTION: Primary | ICD-10-CM

## 2025-06-25 DIAGNOSIS — R10.9 LEFT FLANK PAIN: ICD-10-CM

## 2025-06-25 PROBLEM — C18.0 MALIGNANT NEOPLASM OF CECUM (H): Status: ACTIVE | Noted: 2020-11-09

## 2025-06-25 PROBLEM — N18.32 STAGE 3B CHRONIC KIDNEY DISEASE (H): Chronic | Status: ACTIVE | Noted: 2021-07-30

## 2025-06-25 PROBLEM — Z93.2 ILEOSTOMY STATUS (H): Status: ACTIVE | Noted: 2021-08-04

## 2025-06-25 PROBLEM — C78.7 MALIGNANT NEOPLASM METASTATIC TO LIVER (H): Chronic | Status: ACTIVE | Noted: 2021-04-26

## 2025-06-25 PROBLEM — D50.9 IRON DEFICIENCY ANEMIA: Chronic | Status: ACTIVE | Noted: 2021-07-30

## 2025-06-25 PROBLEM — Z93.3 COLOSTOMY IN PLACE (H): Chronic | Status: ACTIVE | Noted: 2021-08-04

## 2025-06-25 PROBLEM — N30.01 ACUTE CYSTITIS WITH HEMATURIA: Status: ACTIVE | Noted: 2021-08-04

## 2025-06-25 PROBLEM — E78.5 HYPERLIPIDEMIA: Chronic | Status: ACTIVE | Noted: 2021-04-19

## 2025-06-25 PROBLEM — C18.0 MALIGNANT NEOPLASM OF CECUM (H): Chronic | Status: ACTIVE | Noted: 2020-11-09

## 2025-06-25 PROBLEM — Z93.3 COLOSTOMY IN PLACE (H): Status: ACTIVE | Noted: 2021-08-04

## 2025-06-25 PROBLEM — D84.821 IMMUNOSUPPRESSED DUE TO CHEMOTHERAPY: Chronic | Status: ACTIVE | Noted: 2025-05-30

## 2025-06-25 PROBLEM — N18.30 STAGE 3 CHRONIC KIDNEY DISEASE (H): Chronic | Status: ACTIVE | Noted: 2021-07-30

## 2025-06-25 PROBLEM — T45.1X5A IMMUNOSUPPRESSED DUE TO CHEMOTHERAPY: Chronic | Status: ACTIVE | Noted: 2025-05-30

## 2025-06-25 PROBLEM — Z79.69 ON ANTINEOPLASTIC CHEMOTHERAPY: Chronic | Status: ACTIVE | Noted: 2025-05-30

## 2025-06-25 PROBLEM — C18.9 MALIGNANT NEOPLASM OF COLON (H): Chronic | Status: ACTIVE | Noted: 2021-03-10

## 2025-06-25 PROBLEM — N18.9 ACUTE KIDNEY INJURY SUPERIMPOSED ON CKD: Chronic | Status: ACTIVE | Noted: 2025-05-30

## 2025-06-25 LAB
ALBUMIN UR-MCNC: 70 MG/DL
ANION GAP SERPL CALCULATED.3IONS-SCNC: 11 MMOL/L (ref 7–15)
APPEARANCE UR: ABNORMAL
BILIRUB UR QL STRIP: ABNORMAL
BUN SERPL-MCNC: 32.2 MG/DL (ref 8–23)
CALCIUM SERPL-MCNC: 9.6 MG/DL (ref 8.8–10.4)
CHLORIDE SERPL-SCNC: 108 MMOL/L (ref 98–107)
COLOR UR AUTO: ABNORMAL
CREAT SERPL-MCNC: 2.61 MG/DL (ref 0.67–1.17)
CREAT SERPL-MCNC: 2.65 MG/DL (ref 0.67–1.17)
CRP SERPL-MCNC: 18.6 MG/L
EGFRCR SERPLBLD CKD-EPI 2021: 26 ML/MIN/1.73M2
EGFRCR SERPLBLD CKD-EPI 2021: 27 ML/MIN/1.73M2
ERYTHROCYTE [DISTWIDTH] IN BLOOD BY AUTOMATED COUNT: 18.6 % (ref 10–15)
GLUCOSE SERPL-MCNC: 95 MG/DL (ref 70–99)
GLUCOSE UR STRIP-MCNC: NEGATIVE MG/DL
HCO3 SERPL-SCNC: 20 MMOL/L (ref 22–29)
HCT VFR BLD AUTO: 30 % (ref 40–53)
HGB BLD-MCNC: 9.2 G/DL (ref 13.3–17.7)
HGB UR QL STRIP: ABNORMAL
KETONES UR STRIP-MCNC: NEGATIVE MG/DL
LEUKOCYTE ESTERASE UR QL STRIP: ABNORMAL
MCH RBC QN AUTO: 32.2 PG (ref 26.5–33)
MCHC RBC AUTO-ENTMCNC: 30.7 G/DL (ref 31.5–36.5)
MCV RBC AUTO: 105 FL (ref 78–100)
NITRATE UR QL: NEGATIVE
PH UR STRIP: 6 [PH] (ref 5–7)
PLATELET # BLD AUTO: 317 10E3/UL (ref 150–450)
POTASSIUM SERPL-SCNC: 5.3 MMOL/L (ref 3.4–5.3)
RBC # BLD AUTO: 2.86 10E6/UL (ref 4.4–5.9)
RBC URINE: >182 /HPF
SODIUM SERPL-SCNC: 139 MMOL/L (ref 135–145)
SP GR UR STRIP: 1.02 (ref 1–1.03)
UROBILINOGEN UR STRIP-MCNC: 2 MG/DL
WBC # BLD AUTO: 6.3 10E3/UL (ref 4–11)
WBC URINE: 0 /HPF

## 2025-06-25 PROCEDURE — 96365 THER/PROPH/DIAG IV INF INIT: CPT

## 2025-06-25 PROCEDURE — 96376 TX/PRO/DX INJ SAME DRUG ADON: CPT

## 2025-06-25 PROCEDURE — 87186 SC STD MICRODIL/AGAR DIL: CPT | Performed by: EMERGENCY MEDICINE

## 2025-06-25 PROCEDURE — 96361 HYDRATE IV INFUSION ADD-ON: CPT

## 2025-06-25 PROCEDURE — 96374 THER/PROPH/DIAG INJ IV PUSH: CPT | Mod: 59

## 2025-06-25 PROCEDURE — 74176 CT ABD & PELVIS W/O CONTRAST: CPT

## 2025-06-25 PROCEDURE — 36415 COLL VENOUS BLD VENIPUNCTURE: CPT | Performed by: EMERGENCY MEDICINE

## 2025-06-25 PROCEDURE — 258N000003 HC RX IP 258 OP 636: Performed by: EMERGENCY MEDICINE

## 2025-06-25 PROCEDURE — 36415 COLL VENOUS BLD VENIPUNCTURE: CPT | Performed by: INTERNAL MEDICINE

## 2025-06-25 PROCEDURE — 82565 ASSAY OF CREATININE: CPT | Performed by: INTERNAL MEDICINE

## 2025-06-25 PROCEDURE — G0378 HOSPITAL OBSERVATION PER HR: HCPCS

## 2025-06-25 PROCEDURE — 96375 TX/PRO/DX INJ NEW DRUG ADDON: CPT

## 2025-06-25 PROCEDURE — 86140 C-REACTIVE PROTEIN: CPT | Performed by: EMERGENCY MEDICINE

## 2025-06-25 PROCEDURE — 250N000011 HC RX IP 250 OP 636: Mod: JW | Performed by: INTERNAL MEDICINE

## 2025-06-25 PROCEDURE — 250N000013 HC RX MED GY IP 250 OP 250 PS 637: Performed by: INTERNAL MEDICINE

## 2025-06-25 PROCEDURE — 80048 BASIC METABOLIC PNL TOTAL CA: CPT | Performed by: EMERGENCY MEDICINE

## 2025-06-25 PROCEDURE — 250N000011 HC RX IP 250 OP 636: Performed by: EMERGENCY MEDICINE

## 2025-06-25 PROCEDURE — 85018 HEMOGLOBIN: CPT | Performed by: EMERGENCY MEDICINE

## 2025-06-25 PROCEDURE — 99222 1ST HOSP IP/OBS MODERATE 55: CPT | Mod: AI | Performed by: INTERNAL MEDICINE

## 2025-06-25 PROCEDURE — 250N000011 HC RX IP 250 OP 636: Performed by: INTERNAL MEDICINE

## 2025-06-25 PROCEDURE — 81001 URINALYSIS AUTO W/SCOPE: CPT | Performed by: EMERGENCY MEDICINE

## 2025-06-25 PROCEDURE — 99285 EMERGENCY DEPT VISIT HI MDM: CPT | Mod: 25

## 2025-06-25 RX ORDER — FERROUS SULFATE 325(65) MG
650 TABLET ORAL EVERY MORNING
Status: DISCONTINUED | OUTPATIENT
Start: 2025-06-25 | End: 2025-06-29 | Stop reason: HOSPADM

## 2025-06-25 RX ORDER — OXYCODONE HYDROCHLORIDE 5 MG/1
5 TABLET ORAL EVERY 4 HOURS PRN
Refills: 0 | Status: DISCONTINUED | OUTPATIENT
Start: 2025-06-25 | End: 2025-06-29 | Stop reason: HOSPADM

## 2025-06-25 RX ORDER — POLYETHYLENE GLYCOL 3350 17 G/17G
17 POWDER, FOR SOLUTION ORAL 2 TIMES DAILY PRN
Status: DISCONTINUED | OUTPATIENT
Start: 2025-06-25 | End: 2025-06-29 | Stop reason: HOSPADM

## 2025-06-25 RX ORDER — PIPERACILLIN SODIUM, TAZOBACTAM SODIUM 3; .375 G/15ML; G/15ML
3.38 INJECTION, POWDER, LYOPHILIZED, FOR SOLUTION INTRAVENOUS ONCE
Status: DISCONTINUED | OUTPATIENT
Start: 2025-06-25 | End: 2025-06-25 | Stop reason: DRUGHIGH

## 2025-06-25 RX ORDER — NALOXONE HYDROCHLORIDE 0.4 MG/ML
0.2 INJECTION, SOLUTION INTRAMUSCULAR; INTRAVENOUS; SUBCUTANEOUS
Status: DISCONTINUED | OUTPATIENT
Start: 2025-06-25 | End: 2025-06-29 | Stop reason: HOSPADM

## 2025-06-25 RX ORDER — NALOXONE HYDROCHLORIDE 0.4 MG/ML
0.4 INJECTION, SOLUTION INTRAMUSCULAR; INTRAVENOUS; SUBCUTANEOUS
Status: DISCONTINUED | OUTPATIENT
Start: 2025-06-25 | End: 2025-06-29 | Stop reason: HOSPADM

## 2025-06-25 RX ORDER — ONDANSETRON 2 MG/ML
4 INJECTION INTRAMUSCULAR; INTRAVENOUS EVERY 6 HOURS PRN
Status: DISCONTINUED | OUTPATIENT
Start: 2025-06-25 | End: 2025-06-29 | Stop reason: HOSPADM

## 2025-06-25 RX ORDER — OXYCODONE HYDROCHLORIDE 5 MG/1
10 TABLET ORAL EVERY 4 HOURS PRN
Refills: 0 | Status: DISCONTINUED | OUTPATIENT
Start: 2025-06-25 | End: 2025-06-29 | Stop reason: HOSPADM

## 2025-06-25 RX ORDER — PROCHLORPERAZINE MALEATE 10 MG
10 TABLET ORAL EVERY 6 HOURS PRN
Status: DISCONTINUED | OUTPATIENT
Start: 2025-06-25 | End: 2025-06-29 | Stop reason: HOSPADM

## 2025-06-25 RX ORDER — HYDROMORPHONE HYDROCHLORIDE 1 MG/ML
0.5 INJECTION, SOLUTION INTRAMUSCULAR; INTRAVENOUS; SUBCUTANEOUS ONCE
Refills: 0 | Status: COMPLETED | OUTPATIENT
Start: 2025-06-25 | End: 2025-06-25

## 2025-06-25 RX ORDER — GABAPENTIN 300 MG/1
300 CAPSULE ORAL AT BEDTIME
Status: DISCONTINUED | OUTPATIENT
Start: 2025-06-25 | End: 2025-06-29 | Stop reason: HOSPADM

## 2025-06-25 RX ORDER — VITAMIN B COMPLEX
50 TABLET ORAL DAILY
Status: DISCONTINUED | OUTPATIENT
Start: 2025-06-25 | End: 2025-06-29 | Stop reason: HOSPADM

## 2025-06-25 RX ORDER — PIPERACILLIN SODIUM, TAZOBACTAM SODIUM 3; .375 G/15ML; G/15ML
3.38 INJECTION, POWDER, LYOPHILIZED, FOR SOLUTION INTRAVENOUS EVERY 6 HOURS
Status: DISCONTINUED | OUTPATIENT
Start: 2025-06-25 | End: 2025-06-26

## 2025-06-25 RX ORDER — ONDANSETRON 2 MG/ML
4 INJECTION INTRAMUSCULAR; INTRAVENOUS ONCE
Status: COMPLETED | OUTPATIENT
Start: 2025-06-25 | End: 2025-06-25

## 2025-06-25 RX ORDER — TAMSULOSIN HYDROCHLORIDE 0.4 MG/1
0.4 CAPSULE ORAL DAILY
Status: DISCONTINUED | OUTPATIENT
Start: 2025-06-25 | End: 2025-06-29 | Stop reason: HOSPADM

## 2025-06-25 RX ORDER — TRAZODONE HYDROCHLORIDE 50 MG/1
50 TABLET ORAL AT BEDTIME
Status: DISCONTINUED | OUTPATIENT
Start: 2025-06-25 | End: 2025-06-29 | Stop reason: HOSPADM

## 2025-06-25 RX ORDER — FENTANYL 50 UG/1
50 PATCH TRANSDERMAL
Refills: 0 | Status: DISCONTINUED | OUTPATIENT
Start: 2025-06-27 | End: 2025-06-29 | Stop reason: HOSPADM

## 2025-06-25 RX ORDER — FERROUS SULFATE 325(65) MG
325 TABLET ORAL EVERY EVENING
Status: DISCONTINUED | OUTPATIENT
Start: 2025-06-25 | End: 2025-06-29 | Stop reason: HOSPADM

## 2025-06-25 RX ORDER — AMOXICILLIN 250 MG
2 CAPSULE ORAL 2 TIMES DAILY PRN
Status: DISCONTINUED | OUTPATIENT
Start: 2025-06-25 | End: 2025-06-29 | Stop reason: HOSPADM

## 2025-06-25 RX ORDER — AMOXICILLIN 250 MG
1 CAPSULE ORAL 2 TIMES DAILY PRN
Status: DISCONTINUED | OUTPATIENT
Start: 2025-06-25 | End: 2025-06-29 | Stop reason: HOSPADM

## 2025-06-25 RX ORDER — ONDANSETRON 4 MG/1
4 TABLET, ORALLY DISINTEGRATING ORAL EVERY 6 HOURS PRN
Status: DISCONTINUED | OUTPATIENT
Start: 2025-06-25 | End: 2025-06-29 | Stop reason: HOSPADM

## 2025-06-25 RX ORDER — ROPINIROLE 0.25 MG/1
0.25 TABLET, FILM COATED ORAL AT BEDTIME
Status: DISCONTINUED | OUTPATIENT
Start: 2025-06-25 | End: 2025-06-29 | Stop reason: HOSPADM

## 2025-06-25 RX ORDER — HYDROMORPHONE HYDROCHLORIDE 1 MG/ML
0.3 INJECTION, SOLUTION INTRAMUSCULAR; INTRAVENOUS; SUBCUTANEOUS
Status: DISCONTINUED | OUTPATIENT
Start: 2025-06-25 | End: 2025-06-29 | Stop reason: HOSPADM

## 2025-06-25 RX ORDER — PIPERACILLIN SODIUM, TAZOBACTAM SODIUM 2; .25 G/10ML; G/10ML
2.25 INJECTION, POWDER, LYOPHILIZED, FOR SOLUTION INTRAVENOUS ONCE
Status: COMPLETED | OUTPATIENT
Start: 2025-06-25 | End: 2025-06-25

## 2025-06-25 RX ORDER — ACETAMINOPHEN 500 MG
1000 TABLET ORAL EVERY 6 HOURS PRN
Status: DISCONTINUED | OUTPATIENT
Start: 2025-06-25 | End: 2025-06-29 | Stop reason: HOSPADM

## 2025-06-25 RX ADMIN — FERROUS SULFATE TAB 325 MG (65 MG ELEMENTAL FE) 650 MG: 325 (65 FE) TAB at 07:47

## 2025-06-25 RX ADMIN — Medication 50 MCG: at 07:47

## 2025-06-25 RX ADMIN — OXYCODONE HYDROCHLORIDE 5 MG: 5 TABLET ORAL at 06:16

## 2025-06-25 RX ADMIN — SERTRALINE HYDROCHLORIDE 50 MG: 50 TABLET ORAL at 21:11

## 2025-06-25 RX ADMIN — RIVAROXABAN 20 MG: 10 TABLET, FILM COATED ORAL at 07:53

## 2025-06-25 RX ADMIN — TAMSULOSIN HYDROCHLORIDE 0.4 MG: 0.4 CAPSULE ORAL at 07:47

## 2025-06-25 RX ADMIN — FERROUS SULFATE TAB 325 MG (65 MG ELEMENTAL FE) 325 MG: 325 (65 FE) TAB at 21:11

## 2025-06-25 RX ADMIN — HYDROMORPHONE HYDROCHLORIDE 0.5 MG: 1 INJECTION, SOLUTION INTRAMUSCULAR; INTRAVENOUS; SUBCUTANEOUS at 02:17

## 2025-06-25 RX ADMIN — ROPINIROLE HYDROCHLORIDE 0.25 MG: 0.25 TABLET, FILM COATED ORAL at 21:11

## 2025-06-25 RX ADMIN — PIPERACILLIN AND TAZOBACTAM 3.38 G: 3; .375 INJECTION, POWDER, FOR SOLUTION INTRAVENOUS at 10:44

## 2025-06-25 RX ADMIN — PIPERACILLIN AND TAZOBACTAM 3.38 G: 3; .375 INJECTION, POWDER, FOR SOLUTION INTRAVENOUS at 16:56

## 2025-06-25 RX ADMIN — ONDANSETRON 4 MG: 2 INJECTION, SOLUTION INTRAMUSCULAR; INTRAVENOUS at 02:16

## 2025-06-25 RX ADMIN — ACETAMINOPHEN 1000 MG: 500 TABLET ORAL at 15:09

## 2025-06-25 RX ADMIN — TRAZODONE HYDROCHLORIDE 50 MG: 50 TABLET ORAL at 21:11

## 2025-06-25 RX ADMIN — GABAPENTIN 300 MG: 300 CAPSULE ORAL at 21:11

## 2025-06-25 RX ADMIN — HYDROMORPHONE HYDROCHLORIDE 0.3 MG: 1 INJECTION, SOLUTION INTRAMUSCULAR; INTRAVENOUS; SUBCUTANEOUS at 06:17

## 2025-06-25 RX ADMIN — PIPERACILLIN AND TAZOBACTAM 3.38 G: 3; .375 INJECTION, POWDER, FOR SOLUTION INTRAVENOUS at 23:59

## 2025-06-25 RX ADMIN — Medication 1 TABLET: at 07:53

## 2025-06-25 RX ADMIN — SODIUM CHLORIDE 1000 ML: 0.9 INJECTION, SOLUTION INTRAVENOUS at 02:18

## 2025-06-25 RX ADMIN — PIPERACILLIN AND TAZOBACTAM 2.25 G: 2; .25 INJECTION, POWDER, FOR SOLUTION INTRAVENOUS at 05:21

## 2025-06-25 ASSESSMENT — ACTIVITIES OF DAILY LIVING (ADL)
ADLS_ACUITY_SCORE: 59
ADLS_ACUITY_SCORE: 62
ADLS_ACUITY_SCORE: 63
ADLS_ACUITY_SCORE: 59
ADLS_ACUITY_SCORE: 62
ADLS_ACUITY_SCORE: 56
ADLS_ACUITY_SCORE: 62
ADLS_ACUITY_SCORE: 59
ADLS_ACUITY_SCORE: 62
ADLS_ACUITY_SCORE: 56
ADLS_ACUITY_SCORE: 62
ADLS_ACUITY_SCORE: 62
ADLS_ACUITY_SCORE: 59
ADLS_ACUITY_SCORE: 63
DEPENDENT_IADLS:: INDEPENDENT
ADLS_ACUITY_SCORE: 63

## 2025-06-25 ASSESSMENT — COLUMBIA-SUICIDE SEVERITY RATING SCALE - C-SSRS
6. HAVE YOU EVER DONE ANYTHING, STARTED TO DO ANYTHING, OR PREPARED TO DO ANYTHING TO END YOUR LIFE?: NO
2. HAVE YOU ACTUALLY HAD ANY THOUGHTS OF KILLING YOURSELF IN THE PAST MONTH?: NO
1. IN THE PAST MONTH, HAVE YOU WISHED YOU WERE DEAD OR WISHED YOU COULD GO TO SLEEP AND NOT WAKE UP?: NO

## 2025-06-25 NOTE — PLAN OF CARE
Goal Outcome Evaluation:      Plan of Care Reviewed With: patient    Overall Patient Progress: improving    SUBJECTIVE:  A/o on RA and IND. Ileostomy on right side (self managed) and possible adjustment of stent tomorrow per urology. NPO [facility]. Temp of 102.8 tylenol given; see MAR and MD aware.    Report given to SST3 RN.    Juarez Doan RN

## 2025-06-25 NOTE — PLAN OF CARE
"  Problem: Adult Inpatient Plan of Care  Goal: Plan of Care Review  Description: The Plan of Care Review/Shift note should be completed every shift.  The Outcome Evaluation is a brief statement about your assessment that the patient is improving, declining, or no change.  This information will be displayed automatically on your shift  note.  Outcome: Progressing  Flowsheets (Taken 6/25/2025 1058)  Plan of Care Reviewed With: patient  Goal: Patient-Specific Goal (Individualized)  Description: You can add care plan individualizations to a care plan. Examples of Individualization might be:  \"Parent requests to be called daily at 9am for status\", \"I have a hard time hearing out of my right ear\", or \"Do not touch me to wake me up as it startles  me\".  Outcome: Progressing  Goal: Absence of Hospital-Acquired Illness or Injury  Outcome: Progressing  Intervention: Prevent Skin Injury  Recent Flowsheet Documentation  Taken 6/25/2025 0800 by Sanna Palacios RN  Body Position: position changed independently  Goal: Optimal Comfort and Wellbeing  Outcome: Progressing  Goal: Readiness for Transition of Care  Outcome: Progressing   Goal Outcome Evaluation:      Plan of Care Reviewed With: patient           Pt is alert and oriented x4. Pt is med complaint. Pt denies pain. Pt's v/s is stable. Pt is up independently. No complain. Continue to monitor pt.          "

## 2025-06-25 NOTE — H&P
[facility]    History and Physical - Hospitalist Service       Date of Admission:  6/25/2025    Assessment & Plan      Rich Wolff is a 64 year old male admitted on 6/25/2025. He presents with 1 day of flank pain and hematuria, found to have recurrent UTI this time with pseudomonas. Started IV Zosyn. Urology consulted, may require stent exchange if symptoms fail to improve.    Problem-based Assessment & Plan:  Acute cystitis with hematuria, secondary to pseudomonas UTI as evidence by recent outpatient urine culture result  Starting IV Zosyn, renally dosed, q6h for treatment  Will continue IV antibiotic treatment to see if this improves flank pain and associated symptoms. If not improving, per Urology, may require ureteral stent exchange.  [facility] consulted.  PRN IV/PO pain medication ordered for adequate pain control. This include opioid pain medications. Monitor for signs of toxicity, and utilize PRN naloxone if signs of respiratory depression or opioid toxicity noted.   Monitor I/Os  CHAYO superimposed on CKD3b, improving still from recent hospitalization  Monitor I/Os, renally dose medications to avoid nephrotoxicity.   Monitor daily BMP   Colon cancer, metastatic to liver, unresectable, actively on chemotherapy  Immunosuppressed 2/2 chemotherapy regimen, increasing risk for infections as evidence above.        Observation Goals: -diagnostic tests and consults completed and resulted, -vital signs normal or at patient baseline, -adequate pain control on oral analgesics, -tolerating oral antibiotics or has plans for home infusion setup, -infection is improving, Nurse to notify provider when observation goals have been met and patient is ready for discharge.  Diet: Regular Diet Adult  DVT Prophylaxis: DOAC  Adkins Catheter: Not present  Lines: PRESENT             Cardiac Monitoring: None  Code Status: Full Code    Clinically Significant Risk Factors Present on Admission           # Hyperchloremia: Highest Cl = 108 mmol/L in last 2 days, will monitor as appropriate             # Drug Induced Coagulation Defect: home medication list includes an anticoagulant medication         # Anemia: based on hgb <11           # Financial/Environmental Concerns:           Disposition Plan     Medically Ready for Discharge: Anticipated Tomorrow pending improvement in symptoms and Urology recommendations.            Alejandro Hutson MD  Hospitalist Service  [facility]  Securely message with Interactive Project (more info)  Text page via [email or web address] Paging/Directory     ______________________________________________________________________    Chief Complaint   Left flank pain + gross hematuria    History is obtained from the patient, electronic health record, and emergency department physician    History of Present Illness   Rich Wolff is a 64 year old male who presents with 1 day of left flank pain and gross hematuria. Denies fevers, chills, SOB, CP.    Patient had a recent hospitalization for pyelonephritis + E. Coli bacteremia + CHAYO + severe sepsis, s/p left sided ureteral stent placement and extended antibiotics course with amoxacillin      Patient reports that he was seen the in Urology clinic the other day for a urine test, which appears included a urine culture, collected 6/18/25, later resulted 6/21/25 + for pseudomonas, however, patient was not instructed at the time any new treatment.     Personally review the following laboratory studies during this current encounter ordered by the ED provider:  BMP  Na 139  K 5.3  Cl 108  CO2 20  AG 11  Cr 2.65 (baseline ~2.2, however, improving from recent CHAYO)  Glucose 95  CBC  WBC 6.3  Hgb 9.2 (improving from recent admission)  Plt 317  UA negative for nitrite, 0 WBC, however no comment on bacteria presence. +LE, +RBC, +blood.    Personally reviewed the following imaging studies as ordered by the ED provider this ED encounter:  Ct A/P shows  bladder wall thickening. There is a left ureteral stent in place, with improvement in hydroureteronephrosis compared to prior images.   ECG: (not obtained)    Reviewed the following ED provider orders/treatments:  IV Dilaudid  IV Zofran  IV NS 1L  IV Zosyn    Prior inpatient/outpatient encounters personally reviewed within Epic:  Discharge Summary from hospital admission 5/30/2025 - 6/4/2025 at [facility], personally reviewed provider documentation.   [facility] 5/21/25, personally reviewed provider documentation.       Other records personally reviewed within Epic:  none      Past Medical History    Past Medical History:   Diagnosis Date    Colon cancer (H)     Hyperlipidemia     Hypertension     Prostate cancer (H) 09/01/2015    T1c. Silvana's 6 (3+3).  Confined to prostate.  Multifocal bilateral comprising 2% of the gland.PSA:5.8.       Past Surgical History   Past Surgical History:   Procedure Laterality Date    BOWEL RESECTION      COLONOSCOPY W/ BIOPSIES  11/04/2020    COLOSTOMY      COMBINED CYSTOSCOPY, INSERT STENT URETER(S) Left 5/30/2025    Procedure: CYSTOSCOPY, LEFT RETROGRADE PYELOGRAM, WITH LEFT URETERAL STENT INSERTION;  Surgeon: Parag Phillips MD;  [place] [facility] [place]    ESOPHAGOSCOPY, GASTROSCOPY, DUODENOSCOPY (EGD), COMBINED  11/04/2020    IR CHEST PORT PLACEMENT > 5 YRS OF AGE  11/24/2020    IR CHEST PORT REPLACEMENT SAME SITE RIGHT  10/27/2022    IR NEPHROSTOMY TUBE CHANGE RIGHT  4/8/2021    IR NEPHROSTOMY TUBE CHANGE RIGHT  4/8/2021    IR NEPHROSTOMY TUBE CHANGE RIGHT  8/16/2021    IR NEPHROSTOMY TUBE CHANGE RIGHT  10/29/2021    IR NEPHROSTOMY TUBE CHANGE RIGHT  10/29/2021    IR NEPHROSTOMY TUBE CHANGE RIGHT  12/27/2021    IR NEPHROSTOMY TUBE CHANGE RIGHT  2/28/2022    IR NEPHROSTOMY TUBE CHANGE RIGHT  5/9/2022    IR NEPHROSTOMY TUBE CHANGE RIGHT  7/5/2022    IR NEPHROSTOMY TUBE CHANGE RIGHT  9/26/2022    IR NEPHROSTOMY TUBE CHANGE RIGHT   10/25/2022    IR NEPHROSTOMY TUBE CHANGE RIGHT  12/28/2022    IR NEPHROSTOMY TUBE CHANGE RIGHT  2/28/2023    IR NEPHROSTOMY TUBE CHANGE RIGHT  4/27/2023    IR NEPHROSTOMY TUBE CHANGE RIGHT  6/27/2023    IR NEPHROSTOMY TUBE CHANGE RIGHT  8/30/2023    IR NEPHROSTOMY TUBE CHANGE RIGHT  10/25/2023    IR NEPHROSTOMY TUBE CHANGE RIGHT  12/27/2023    IR NEPHROSTOMY TUBE CHANGE RIGHT  2/28/2024    IR NEPHROSTOMY TUBE CHANGE RIGHT  4/24/2024    IR NEPHROSTOMY TUBE CHANGE RIGHT  6/26/2024    IR NEPHROSTOMY TUBE CHANGE RIGHT  8/28/2024    IR NEPHROSTOMY TUBE CHANGE RIGHT  10/30/2024    IR PORT PLACEMENT >5 YEARS  11/24/2020    IR SITE CHECK/EVALUATION  4/11/2022    NEPHROSTOMY      DC ESOPHAGOGASTRODUODENOSCOPY TRANSORAL DIAGNOSTIC N/A 11/4/2020    Procedure: ESOPHAGOGASTRODUODENOSCOPY (EGD);  Surgeon: Paul Masters MD;  Location: [facility];  Service: Gastroenterology    DC LAP,PROSTATECTOMY,RADICAL,W/NERVE SPARE,INCL ROBOTIC Bilateral 09/01/2015    Procedure: DAVINCI RADICAL RETROPUBIC PROSTATECTOMY BILATERAL PELVIC LYMPH NODE DISSECTION;  Surgeon: Juan C Velazco MD;  Location: [facility];  Service: Urology    PROSTATE BIOPSY  06/08/2015    Ultrasound-guided transrectal biopsy.    SURGERY SCROTAL / TESTICULAR      for undescended testes, removed due to atrophy with vasectomy    ZZHC COLONOSCOPY W/WO BRUSH/WASH N/A 11/4/2020    Procedure: COLONOSCOPY WITH BIOPSY;  Surgeon: Paul Masters MD;  Location: [facility];  Service: Gastroenterology       Prior to Admission Medications   Prior to Admission Medications   Prescriptions Last Dose Informant Patient Reported? Taking?   LONSURF 15-6.14 MG tablet 6/24/2025 Morning Self Yes Yes   Sig: Take 2 tablets by mouth See Admin Instructions. Take 2 tablets by mouth twice daily on days 1-5 and 15-19 of each 28 day cycle.   Multiple Vitamin (MULTIVITAMIN PO) 6/24/2025 Morning Self Yes Yes   Sig: Multivitamin powder: take 1 scoop daily   XARELTO  ANTICOAGULANT 20 MG TABS tablet 6/24/2025 Morning Self Yes Yes   Sig: Take 20 mg by mouth daily   acetaminophen (TYLENOL) 500 MG tablet  Self Yes Yes   Sig: Take 1,000 mg by mouth every 6 hours as needed for pain.   calcium carbonate-vitamin D (CALTRATE) 600-10 MG-MCG per tablet 6/24/2025 Morning Self Yes Yes   Sig: Take 1 tablet by mouth daily.   fentaNYL (DURAGESIC) 50 mcg/hr 72 hr patch 6/24/2025 Evening Self Yes Yes   Sig: Place 1 patch onto the skin every 72 hours.   ferrous sulfate (FE TABS) 325 (65 Fe) MG EC tablet 6/24/2025 Morning Self Yes Yes   Sig: Take 650 mg by mouth every morning.   ferrous sulfate (FEROSUL) 325 (65 Fe) MG tablet 6/24/2025 Evening Self Yes Yes   Sig: Take 325 mg by mouth every evening.   gabapentin (NEURONTIN) 300 MG capsule 6/24/2025 Bedtime Self Yes Yes   Sig: Take 300 mg by mouth at bedtime.   oxyCODONE (ROXICODONE) 5 MG tablet  Self Yes Yes   Sig: Take 5 mg by mouth every 4 hours as needed for severe pain    rOPINIRole (REQUIP) 0.25 MG tablet 6/24/2025 Bedtime Self Yes Yes   Sig: Take 0.25 mg by mouth at bedtime.   sertraline (ZOLOFT) 50 MG tablet 6/24/2025 Bedtime Self Yes Yes   Sig: Take 50 mg by mouth At Bedtime    tamsulosin (FLOMAX) 0.4 MG capsule 6/24/2025 Morning Self Yes Yes   Sig: Take 0.4 mg by mouth daily   traZODone (DESYREL) 50 MG tablet 6/24/2025 Bedtime Self Yes Yes   Sig: Take 1 tablet by mouth at bedtime.   vitamin D3 (CHOLECALCIFEROL) 50 mcg (2000 units) tablet 6/24/2025 Morning Self Yes Yes   Sig: Take 1 tablet by mouth daily      Facility-Administered Medications: None           Physical Exam   Vital Signs: Temp: 97.6  F (36.4  C) Temp src: Oral BP: 125/73 Pulse: 70   Resp: 16 SpO2: 100 % O2 Device: None (Room air)    Weight: 0 lbs 0 oz    Physical Exam  Constitutional:       General: He is not in acute distress.     Appearance: Normal appearance. He is not ill-appearing, toxic-appearing or diaphoretic.   Eyes:      General: No scleral icterus.      Conjunctiva/sclera: Conjunctivae normal.   Cardiovascular:      Rate and Rhythm: Normal rate and regular rhythm.   Pulmonary:      Effort: Pulmonary effort is normal. No respiratory distress.   Abdominal:      General: Abdomen is flat. There is no distension.   Neurological:      General: No focal deficit present.      Mental Status: He is oriented to person, place, and time. Mental status is at baseline.   Psychiatric:         Mood and Affect: Mood normal.         Behavior: Behavior normal.           Medical Decision Making       60 MINUTES SPENT BY ME on the date of service doing chart review, history, exam, documentation & further activities per the note.      Data     I have personally reviewed the following data over the past 24 hrs:    6.3  \   9.2 (L)   / 317     139 108 (H) 32.2 (H) /  95   5.3 20 (L) 2.65 (H) \     Procal: N/A CRP: 18.60 (H) Lactic Acid: N/A         Imaging results reviewed over the past 24 hrs:   Recent Results (from the past 24 hours)   CT Abdomen Pelvis w/o Contrast    Narrative    EXAM: CT ABDOMEN AND PELVIS WITHOUT CONTRAST - RENAL STONE PROTOCOL  LOCATION: [facility]  DATE/TIME: 06/25/2025 3:39 AM CDT    INDICATION: History of unresectable colon cancer. Recent stent placement. Left flank pain and hematuria.  COMPARISON: 05/30/2025 - CT chest, abdomen and pelvis.    TECHNIQUE: CT scan of the abdomen and pelvis was performed without oral or IV contrast. Multiplanar reformats were obtained. Dose reduction techniques were used.  CONTRAST: None.    FINDINGS:    LOWER CHEST: Unremarkable.    HEPATOBILIARY: A few gallstones in a nondistended gallbladder.    SPLEEN: Unremarkable.    PANCREAS: Unremarkable.    ADRENAL GLANDS: Unremarkable.    KIDNEYS/BLADDER: Moderate diffuse right renal atrophy. Interval placement of a left ureteral stent with proximal portion curled back on itself within the renal pelvis with its proximal pigtail in the proximal ureter. The distal  pigtail is in the urinary   bladder lumen. No visualized renal or ureteral calculi bilaterally. Slight dilatation of the left intrarenal collecting system. No ureteral dilatation. No visualized bladder calculi. Moderate diffuse wall thickening of a nondistended urinary bladder.   Haziness is present in the perivesical fat.    BOWEL: The stomach, small and large bowel are normal in caliber. Prior bowel surgery with right lower quadrant ostomy. An irregular-shaped soft tissue attenuation mass is present in the right lower quadrant, abutting the cecum, ileum, and distal sigmoid   colon (for example, series 3 image 130). This measures up to 5.9 x 3.0 cm in axial dimensions.    LYMPH NODES: Unremarkable.    PELVIC ORGANS: No acute findings.    MUSCULOSKELETAL: No acute findings.    OTHER: Atherosclerotic calcification in the abdominal aorta.      Impression    IMPRESSION:   1.  Moderate diffuse wall thickening of a nondistended urinary bladder. Additionally, there is haziness in the perivesical fat. These findings could relate to cystitis. Recommend clinical correlation.  2.  Interval placement of a left ureteral stent since 5/30/2025. The proximal portion of the stent is curled back upon itself within the left renal pelvis with its proximal pigtail in the proximal left ureter. There is residual mild dilatation of the   left intrarenal collecting system, decreased in caliber since the prior study.   3.  6 cm irregular-shaped soft tissue mass in the right hemipelvis that likely relates to the known unresectable colon neoplasm, not convincingly changed.  4.  No other acute abnormality identified in the abdomen or pelvis.

## 2025-06-25 NOTE — ED TRIAGE NOTES
Pt had a kidney stent placed 2 weeks ago at [facility]. He reports pain in the area starting at 2100. He reports bloody urine the last 2 times he urinated.

## 2025-06-25 NOTE — CONSULTS
[facility]ULT[facility]    Type of Consult: inpatient  Place of Service: [facility]   Reason for consult: Recent left ureteral stent, here with recurrent UTI with pseudomonas + flank pain   Request for consult by: Alejandro Hutson MD     History of present illness:   Rich Wolff is a 64 year old male with hx of left hydronephrosis 2/2 ureteral obstruction from colon CA and managed with left ureteral stent placed 5/25/25 (), HLD, HTN, prostate CA s/p prostatectomy; Admitted 6/25 for left flank pain and recent dx Pseudomonas urinary tract infection. Urology is being consulted for left flank pain and pseudomonas UTI. History obtained through patient and chart review.     By [place] chart review, patient first reported some left flank pain on 6/13 that wasn't present immediately after the stent was placed. Pt was advised to get UA/UC at his Oncology appt. Pt had UA/UC checked at PCP (Lacho) on 6/18 (resulted 6/21): pseudomonas x 2 species. Antibiotics were not initiated prior to admission.     Pt c/o significant left flank pain prior to admission, resolved with pain medication in ED. Reports voiding without difficulty.  Denies fever, chills, N/V, abdominal pain. IV zosyn initiated in ED.     CT abdomen pelvis 6/25/2025 showed moderate diffuse wall thickening of the nondistended urinary bladder, haziness in the perivesical fat, left ureteral stent with a proximal portion noted in the left renal pelvis and possibly curling back on itself, mild dilatation of the left intrarenal collecting system (decreased from CT 5/30/2025), moderate diffuse right renal atrophy.    Creatinine 2.65 (baseline 3 weeks ago: 2.75-3.40).     Past Medical History:  Past Medical History:   Diagnosis Date    Colon cancer (H)     Hyperlipidemia     Hypertension     Prostate cancer (H) 09/01/2015    T1c. Eastman's 6 (3+3).  Confined to prostate.  Multifocal bilateral comprising 2% of the gland.PSA:5.8.       Past  Surgical History:  Past Surgical History:   Procedure Laterality Date    BOWEL RESECTION      COLONOSCOPY W/ BIOPSIES  11/04/2020    COLOSTOMY      COMBINED CYSTOSCOPY, INSERT STENT URETER(S) Left 5/30/2025    Procedure: CYSTOSCOPY, LEFT RETROGRADE PYELOGRAM, WITH LEFT URETERAL STENT INSERTION;  Surgeon: Parag Phillips MD;  Location: [facility] [place]    ESOPHAGOSCOPY, GASTROSCOPY, DUODENOSCOPY (EGD), COMBINED  11/04/2020    IR CHEST PORT PLACEMENT > 5 YRS OF AGE  11/24/2020    IR CHEST PORT REPLACEMENT SAME SITE RIGHT  10/27/2022    IR NEPHROSTOMY TUBE CHANGE RIGHT  4/8/2021    IR NEPHROSTOMY TUBE CHANGE RIGHT  4/8/2021    IR NEPHROSTOMY TUBE CHANGE RIGHT  8/16/2021    IR NEPHROSTOMY TUBE CHANGE RIGHT  10/29/2021    IR NEPHROSTOMY TUBE CHANGE RIGHT  10/29/2021    IR NEPHROSTOMY TUBE CHANGE RIGHT  12/27/2021    IR NEPHROSTOMY TUBE CHANGE RIGHT  2/28/2022    IR NEPHROSTOMY TUBE CHANGE RIGHT  5/9/2022    IR NEPHROSTOMY TUBE CHANGE RIGHT  7/5/2022    IR NEPHROSTOMY TUBE CHANGE RIGHT  9/26/2022    IR NEPHROSTOMY TUBE CHANGE RIGHT  10/25/2022    IR NEPHROSTOMY TUBE CHANGE RIGHT  12/28/2022    IR NEPHROSTOMY TUBE CHANGE RIGHT  2/28/2023    IR NEPHROSTOMY TUBE CHANGE RIGHT  4/27/2023    IR NEPHROSTOMY TUBE CHANGE RIGHT  6/27/2023    IR NEPHROSTOMY TUBE CHANGE RIGHT  8/30/2023    IR NEPHROSTOMY TUBE CHANGE RIGHT  10/25/2023    IR NEPHROSTOMY TUBE CHANGE RIGHT  12/27/2023    IR NEPHROSTOMY TUBE CHANGE RIGHT  2/28/2024    IR NEPHROSTOMY TUBE CHANGE RIGHT  4/24/2024    IR NEPHROSTOMY TUBE CHANGE RIGHT  6/26/2024    IR NEPHROSTOMY TUBE CHANGE RIGHT  8/28/2024    IR NEPHROSTOMY TUBE CHANGE RIGHT  10/30/2024    IR PORT PLACEMENT >5 YEARS  11/24/2020    IR SITE CHECK/EVALUATION  4/11/2022    NEPHROSTOMY      AZ ESOPHAGOGASTRODUODENOSCOPY TRANSORAL DIAGNOSTIC N/A 11/4/2020    Procedure: ESOPHAGOGASTRODUODENOSCOPY (EGD);  Surgeon: Paul Masters MD;  Location: [facility];  Service: Gastroenterology    AZ  LAP,PROSTATECTOMY,RADICAL,W/NERVE SPARE,INCL ROBOTIC Bilateral 09/01/2015    Procedure: DAVINCI RADICAL RETROPUBIC PROSTATECTOMY BILATERAL PELVIC LYMPH NODE DISSECTION;  Surgeon: Juan C Velazco MD;  Location: [facility] [place];  Service: Urology    PROSTATE BIOPSY  06/08/2015    Ultrasound-guided transrectal biopsy.    SURGERY SCROTAL / TESTICULAR      for undescended testes, removed due to atrophy with vasectomy    ZZHC COLONOSCOPY W/WO BRUSH/WASH N/A 11/4/2020    Procedure: COLONOSCOPY WITH BIOPSY;  Surgeon: Paul Masters MD;  Location: [facility];  Service: Gastroenterology       Social History:  Social History     Socioeconomic History    Marital status:      Spouse name: Not on file    Number of children: Not on file    Years of education: Not on file    Highest education level: Not on file   Occupational History    Not on file   Tobacco Use    Smoking status: Never    Smokeless tobacco: Never   Substance and Sexual Activity    Alcohol use: Not Currently     Comment: Alcoholic Drinks/day: rarely    Drug use: No    Sexual activity: Not Currently   Other Topics Concern    Parent/sibling w/ CABG, MI or angioplasty before 65F 55M? Not Asked   Social History Narrative    Not on file     Social Drivers of Health     Financial Resource Strain: Low Risk  (5/31/2025)    Financial Resource Strain     Within the past 12 months, have you or your family members you live with been unable to get utilities (heat, electricity) when it was really needed?: No   Food Insecurity: Low Risk  (5/31/2025)    Food Insecurity     Within the past 12 months, did you worry that your food would run out before you got money to buy more?: No     Within the past 12 months, did the food you bought just not last and you didn t have money to get more?: No   Transportation Needs: Low Risk  (5/31/2025)    Transportation Needs     Within the past 12 months, has lack of transportation kept you from medical appointments,  getting your medicines, non-medical meetings or appointments, work, or from getting things that you need?: No   Physical Activity: Sufficiently Active (2/23/2021)    Received from [facility]    Exercise Vital Sign     Days of Exercise per Week: 6 days     Minutes of Exercise per Session: 30 min   Stress: Stress Concern Present (2/23/2021)    Received from [facility]    [employer] [place] [employer] - Occupational Stress Questionnaire     Feeling of Stress : Rather much   Social Connections: Moderately Integrated (2/23/2021)    Received from [facility]    Social Connection and Isolation Panel [NHANES]     Frequency of Communication with Friends and Family: More than three times a week     Frequency of Social Gatherings with Friends and Family: Twice a week     Attends Christian Services: 1 to 4 times per year     Active Member of Clubs or Organizations: No     Attends Club or Organization Meetings: Never     Marital Status:    Interpersonal Safety: Low Risk  (5/31/2025)    Interpersonal Safety     Do you feel physically and emotionally safe where you currently live?: Yes     Within the past 12 months, have you been hit, slapped, kicked or otherwise physically hurt by someone?: No     Within the past 12 months, have you been humiliated or emotionally abused in other ways by your partner or ex-partner?: No   Housing Stability: Low Risk  (5/31/2025)    Housing Stability     Do you have housing? : Yes     Are you worried about losing your housing?: No       Medications:  Current Facility-Administered Medications   Medication Dose Route Frequency Provider Last Rate Last Admin    acetaminophen (TYLENOL) tablet 1,000 mg  1,000 mg Oral Q6H PRN Alejandro Hutson MD        calcium carbonate-vitamin D (OSCAL) 500-5 MG-MCG per tablet 1 tablet  1 tablet Oral Daily Alejandro Hutson MD   1 tablet at 06/25/25 0753    [START ON 6/27/2025] fentaNYL (DURAGESIC) 50 mcg/hr 72 hr patch 1 patch  50 mcg Transdermal  Q72H Alejandro Hutson MD        And    fentaNYL (DURAGESIC) Patch in Place   Transdermal Q8H LIZETT Alejandro Hutson MD        ferrous sulfate (FEROSUL) tablet 325 mg  325 mg Oral QPM Alejandro Hutson MD        ferrous sulfate (FEROSUL) tablet 650 mg  650 mg Oral QAM Alejandro Hutson MD   650 mg at 06/25/25 0747    gabapentin (NEURONTIN) capsule 300 mg  300 mg Oral At Bedtime Alejandro Hutson MD        HYDROmorphone (DILAUDID) injection 0.6 mg  0.6 mg Intravenous Q2H PRN Alejandro Hutson MD        HYDROmorphone (PF) (DILAUDID) injection 0.3 mg  0.3 mg Intravenous Q2H PRN Alejandro Hutson MD   0.3 mg at 06/25/25 0617    melatonin tablet 5 mg  5 mg Oral At Bedtime PRN Alejandro Hutson MD        naloxone (NARCAN) injection 0.2 mg  0.2 mg Intravenous Q2 Min PRN Alejandro Hutson MD        Or    naloxone (NARCAN) injection 0.4 mg  0.4 mg Intravenous Q2 Min PRN Alejandro Hutson MD        Or    naloxone (NARCAN) injection 0.2 mg  0.2 mg Intramuscular Q2 Min PRN Alejandro Hutson MD        Or    naloxone (NARCAN) injection 0.4 mg  0.4 mg Intramuscular Q2 Min PRN Alejandro Hutson MD        ondansetron (ZOFRAN ODT) ODT tab 4 mg  4 mg Oral Q6H PRN Alejandro Hutson MD        Or    ondansetron (ZOFRAN) injection 4 mg  4 mg Intravenous Q6H PRN Alejandro Hutson MD        oxyCODONE (ROXICODONE) tablet 10 mg  10 mg Oral Q4H PRN Alejandro Hutson MD        oxyCODONE (ROXICODONE) tablet 5 mg  5 mg Oral Q4H PRN Alejandro Hutson MD   5 mg at 06/25/25 0616    Patient is already receiving anticoagulation with heparin, enoxaparin (LOVENOX), warfarin (COUMADIN)  or other anticoagulant medication   Does not apply Continuous PRN Alejandro Hutson MD        piperacillin-tazobactam (ZOSYN) 3.375 g vial to attach to  mL bag  3.375 g Intravenous Q6H Yoan Powell MD   3.375 g at 06/25/25 1044    polyethylene glycol (MIRALAX) Packet 17 g  17 g Oral BID PRN Alejandro Hutson MD        prochlorperazine (COMPAZINE)  injection 10 mg  10 mg Intravenous Q6H PRN Alejandro Hutson MD        Or    prochlorperazine (COMPAZINE) tablet 10 mg  10 mg Oral Q6H PRN Alejandro Hutson MD        rivaroxaban ANTICOAGULANT (XARELTO) tablet 20 mg  20 mg Oral Daily Alejandro Hutson MD   20 mg at 06/25/25 0753    rOPINIRole (REQUIP) tablet 0.25 mg  0.25 mg Oral At Bedtime Alejandro Hutson MD        senna-docusate (SENOKOT-S/PERICOLACE) 8.6-50 MG per tablet 1 tablet  1 tablet Oral BID PRN Alejandro Hutson MD        Or    senna-docusate (SENOKOT-S/PERICOLACE) 8.6-50 MG per tablet 2 tablet  2 tablet Oral BID PRN Alejandro Hutson MD        sertraline (ZOLOFT) tablet 50 mg  50 mg Oral At Bedtime Alejandro Hutson MD        tamsulosin (FLOMAX) capsule 0.4 mg  0.4 mg Oral Daily Alejandro Hutson MD   0.4 mg at 06/25/25 0747    traZODone (DESYREL) tablet 50 mg  50 mg Oral At Bedtime Alejandro Hutson MD        Vitamin D3 (CHOLECALCIFEROL) tablet 50 mcg  50 mcg Oral Daily Alejandro Hutson MD   50 mcg at 06/25/25 0747     Current Outpatient Medications   Medication Sig Dispense Refill    acetaminophen (TYLENOL) 500 MG tablet Take 1,000 mg by mouth every 6 hours as needed for pain.      calcium carbonate-vitamin D (CALTRATE) 600-10 MG-MCG per tablet Take 1 tablet by mouth daily.      fentaNYL (DURAGESIC) 50 mcg/hr 72 hr patch Place 1 patch onto the skin every 72 hours.      ferrous sulfate (FE TABS) 325 (65 Fe) MG EC tablet Take 650 mg by mouth every morning.      ferrous sulfate (FEROSUL) 325 (65 Fe) MG tablet Take 325 mg by mouth every evening.      gabapentin (NEURONTIN) 300 MG capsule Take 300 mg by mouth at bedtime.      LONSURF 15-6.14 MG tablet Take 2 tablets by mouth See Admin Instructions. Take 2 tablets by mouth twice daily on days 1-5 and 15-19 of each 28 day cycle.      Multiple Vitamin (MULTIVITAMIN PO) Multivitamin powder: take 1 scoop daily      oxyCODONE (ROXICODONE) 5 MG tablet Take 5 mg by mouth every 4 hours as needed for  severe pain       rOPINIRole (REQUIP) 0.25 MG tablet Take 0.25 mg by mouth at bedtime.      sertraline (ZOLOFT) 50 MG tablet Take 50 mg by mouth At Bedtime       tamsulosin (FLOMAX) 0.4 MG capsule Take 0.4 mg by mouth daily      traZODone (DESYREL) 50 MG tablet Take 1 tablet by mouth at bedtime.      vitamin D3 (CHOLECALCIFEROL) 50 mcg (2000 units) tablet Take 1 tablet by mouth daily      XARELTO ANTICOAGULANT 20 MG TABS tablet Take 20 mg by mouth daily         Allergies:   Allergies   Allergen Reactions    Bee Venom Hives       Review of Systems:   A full 12 point review of systems was taken and is negative aside from what is noted above in the HPI    Physical Exam:  Temp:  [97.6  F (36.4  C)-98.3  F (36.8  C)] 98.3  F (36.8  C)  Pulse:  [] 78  Resp:  [16-18] 18  BP: (109-143)/(66-78) 126/67  SpO2:  [98 %-100 %] 100 %  General: NAD, alert, cooperative  Head: normocephalic, without abnormality / atraumatic  Abdomen: soft, non tender, non distended. no suprapubic fullness/tenderness. No bilateral CVA tenderness noted.   Geniturinary: Penis and scrotum without erythema, edema, tenderness.   Skin: no rashes or lesions  Musculoskeletal: moves all extremities equally.   Psychological: alert and oriented, answers questions appropriately      Labs:   Creatinine   Date Value Ref Range Status   06/25/2025 2.65 (H) 0.67 - 1.17 mg/dL Final   12/23/2020 1.33 (H) 0.66 - 1.25 mg/dL Final       Lab Results   Component Value Date    WBC 6.3 06/25/2025    WBC 5.4 12/24/2020     Lab Results   Component Value Date    HGB 9.2 06/25/2025    HGB 7.7 12/24/2020     Lab Results   Component Value Date     06/25/2025     12/24/2020       UA RESULTS:  Recent Labs   Lab Test 06/25/25  0223 10/28/21  1754 11/04/20  1736   COLOR Red*   < > Yellow   APPEARANCE Bloody*   < > Clear   URINEGLC Negative   < > Negative   URINEBILI 1.0 mg/dL*   < > Negative   URINEKETONE Negative   < > 100 mg/dL*   SG 1.025   < > 1.020   UBLD >1.0  mg/dL*   < > Negative   URINEPH 6.0   < > 6.0   PROTEIN 70*   < > Negative   UROBILINOGEN  --   --  <2.0 E.U./dL   NITRITE Negative   < > Negative   LEUKEST 250 [place]/uL*   < > Negative   RBCU >182*   < >  --    WBCU 0   < >  --     < > = values in this interval not displayed.     Urine Culture 6/18/25:   >100,000 CFU/mL Pseudomonas [id number],000-100,000 CFU/mL Pseudomonas aeruginosa   Susceptibility     Pseudomonas aeruginosa (1) Pseudomonas aeruginosa (2)     CRISTHIAN CRISTHIAN     Cefepime 2 ug/mL Susceptible 2 ug/mL Susceptible     Ceftazidime 2 ug/mL Susceptible 2 ug/mL Susceptible     Ciprofloxacin 0.12 ug/mL Susceptible 0.12 ug/mL Susceptible     Levofloxacin 0.25 ug/mL Susceptible 0.5 ug/mL Susceptible     Meropenem <=0.25 ug/mL Susceptible <=0.25 ug/mL Susceptible     Piperacillin/Tazobactam 8 ug/mL Susceptible 8 ug/mL Susceptible        Lab Results: personally reviewed     Imaging:  EXAM: CT ABDOMEN AND PELVIS WITHOUT CONTRAST - RENAL STONE PROTOCOL  LOCATION: [facility]  DATE/TIME: 06/25/2025 3:39 AM CDT     INDICATION: History of unresectable colon cancer. Recent stent placement. Left flank pain and hematuria.  COMPARISON: 05/30/2025 - CT chest, abdomen and pelvis.     TECHNIQUE: CT scan of the abdomen and pelvis was performed without oral or IV contrast. Multiplanar reformats were obtained. Dose reduction techniques were used.  CONTRAST: None.     FINDINGS:     LOWER CHEST: Unremarkable.     HEPATOBILIARY: A few gallstones in a nondistended gallbladder.     SPLEEN: Unremarkable.     PANCREAS: Unremarkable.     ADRENAL GLANDS: Unremarkable.     KIDNEYS/BLADDER: Moderate diffuse right renal atrophy. Interval placement of a left ureteral stent with proximal portion curled back on itself within the renal pelvis with its proximal pigtail in the proximal ureter. The distal pigtail is in the urinary   bladder lumen. No visualized renal or ureteral calculi bilaterally. Slight dilatation of the  left intrarenal collecting system. No ureteral dilatation. No visualized bladder calculi. Moderate diffuse wall thickening of a nondistended urinary bladder.   Haziness is present in the perivesical fat.     BOWEL: The stomach, small and large bowel are normal in caliber. Prior bowel surgery with right lower quadrant ostomy. An irregular-shaped soft tissue attenuation mass is present in the right lower quadrant, abutting the cecum, ileum, and distal sigmoid   colon (for example, series 3 image 130). This measures up to 5.9 x 3.0 cm in axial dimensions.     LYMPH NODES: Unremarkable.     PELVIC ORGANS: No acute findings.     MUSCULOSKELETAL: No acute findings.     OTHER: Atherosclerotic calcification in the abdominal aorta.                                                                      IMPRESSION:   1.  Moderate diffuse wall thickening of a nondistended urinary bladder. Additionally, there is haziness in the perivesical fat. These findings could relate to cystitis. Recommend clinical correlation.  2.  Interval placement of a left ureteral stent since 5/30/2025. The proximal portion of the stent is curled back upon itself within the left renal pelvis with its proximal pigtail in the proximal left ureter. There is residual mild dilatation of the   left intrarenal collecting system, decreased in caliber since the prior study.   3.  6 cm irregular-shaped soft tissue mass in the right hemipelvis that likely relates to the known unresectable colon neoplasm, not convincingly changed.  4.  No other acute abnormality identified in the abdomen or pelvis.    I have personally reviewed the imaging reports above.     Assessment / Plan : Rich Wolff is being seen by [facility] for left flank pain, complicated UTI, chronic left hydronephrosis secondary to ureteral obstruction from colon cancer and managed with left ureteral stent    - Imaging reviewed with Dr. Phillips, stent appears in appropriate position with  minimal hydro.  Suspect left flank pain is renal colic secondary to the stent and possibly exacerbated by complicated UTI.  Pain has now improved with medications.  If recurs off pain medications, may consider options of Adkins catheter to improve renal colic versus considering percutaneous neph tube placement with stent removal.  - WBC 6.3. Remains afebrile. Urine culture 6/18/2025 (Entira): >100k pseudomonas, 50-100k pseudomonas. IV Zosyn initiated 6/25. Recommend completing 10 day antibiotic course.   - Check PVR to assure not retaining.   - Creatinine 2.65 (baseline 3 weeks ago: 2.75-3.40). Monitor.   - Old records reviewed - [facility], [facility], [facility]  - Will continue to follow.     This case was discussed with:  Dr Parag Phillips     Thank you for consulting [facility] regarding this patient's care. Please contact us with questions or concerns.     Barrett Kumar, GRACE, CNP  [facility]   [phone]

## 2025-06-25 NOTE — ED PROVIDER NOTES
EMERGENCY DEPARTMENT ENCOUNTER      NAME: Rich Wolff  AGE: 64 year old male  YOB: 1960  EVALUATION DATE & TIME: 6/25/2025  1:38 AM    ED PROVIDER: Alyse Clark MD    Chief Complaint   Patient presents with    Flank Pain       FINAL IMPRESSION  1. Left flank pain    2. Hemorrhagic cystitis    3. Hematuria, unspecified type        MEDICAL DECISION MAKING   Rich Wolff is a 64 year old male who presents with wife for evaluation of hematuria and flank pain.  Records reviewed.  Patient has a history of colon cancer, ureteral obstruction and right nephrostomy tube placement in 2021, removed in 11/20/2024, ileostomy status, CKD, and immunosuppression with chemotherapy.  He follows with urology and and was recently admitted for septic shock secondary to bacteremia, UTI, and pyelonephritis.  He had a left-sided stent placement for hydroureteronephrosis with improvement in kidney function.  Plan is for this to be exchanged in 3 months, as patient has an unresectable tumor related to colon cancer.  He was evaluated by ID who recommended extended course of antibiotics with amoxicillin and was discharged on 6/4/2025 in stable condition.  Records reviewed.  Patient had a urine culture on 6/18/2025 that grew out Pseudomonas that is pansensitive.    Today, he presents with complaints of left-sided flank pain and hematuria that began yesterday.  Notes that he was seen in 2 days ago and had a UA that apparently showed some abnormal findings.  He reached out to [facility] but did not hear back from the clinic.  He has not had any associated fever, nausea, vomiting, diarrhea, constipation.    Considered a broad differential COVID not limited to cystitis, pyelonephritis, ureterolithiasis, malfunction of stent, perforation, progression of metastatic disease, electrolyte derangement, kidney injury, anemia, coagulopathy.  Discussed options for workup and management with patient.  We have agreed on  plan for labs, UA, CT abdomen/pelvis, and management of symptoms with IV fluids and IV analgesic/antiemetic.  Will also plan to reach out to [facility] once results return.      ED Course as of 06/25/25 0536   Wed Jun 25, 2025   0238 CBC (+ platelets, no diff)(!)  CBC reassuring. No evidence of leukocytosis to suggest systemic infectious/inflammatory process. No acute anemia. PLTs wnl.  Hemoglobin appears to be at baseline.   0314 UA with Microscopic reflex to Culture(!)  UA with large amount of blood but no clear evidence of infection as well as +LE. No WBCs. Recent urine culture did grow out pseudomonas so will plan to treat.    0314 Basic metabolic panel(!)  BMP with creatinine of 2.65, appears to be at or slightly improved from baseline.  No other significant electrolyte derangement or acidosis.   0314 CRP inflammation(!)  Elevated but of unclear significance.   0410 CT Abdomen Pelvis w/o Contrast  CT showed moderate wall thickening of nondistended urinary bladder with haziness and perivesical fat, poss related to cystitis.  Also with left ureteral stent placement with the proximal portion of stent curled back upon itself and left renal pelvis and proximal pigtail in proximal left ureter with residual mild dilatation of the intrarenal collecting system which has decreased in caliber.     Workup was notable for the above. I rechecked the patient multiple times and reviewed results.  He had improvement in pain after initial interventions.  I discussed the case and results with Dr. Camarillo with [facility].  He agreed with plan for admission to treat UTI and notes that if pain continues despite treatment of infection, patient may need to have his stent exchanged a bit sooner.  I updated the patient with these recommendations that he was comfortable with plan.    Discussed case with hospitalist who agreed to facilitate admission.      Additional Considerations in MDM  History:  Supplemental history  from: Wife  External Record(s) reviewed: Admission 5/30/2025, surgical procedure 5/30/2025, PCP 6/18/2025, urine culture 6/18/2025    Work Up:  Chart documentation includes differential diagnoses considered and any EKGs or imaging independently interpreted as specified above.   In additional to work up documented, I considered additional advanced imaging and laboratory workup but deferred after shared decision making conversation with patient/family    External Consultation(s):  Discussion of management with another provider as documented above and in ED course     Chronic Illness(es):  Care impacted by chronic illness(es): Malignancy, PE and chronic anticoagulation, CKD, ileostomy status    Disposition considerations: Admit.    MIPS: Not Applicable       Sepsis/STEMI/Stroke: None        ED COURSE  1:46 AM I met with the patient, obtained history, performed an initial exam, and discussed options and plan for diagnostics and treatment here in the ED.  3:39 AM I rechecked the patient.  4:43 AM I spoke Dr. Arley Camarillo, Urology specialist.  4:47 AM I updated the patient.  4:58 AM I rechecked the patient.  5:26 AM I spoke with [facility] Hospitalist, regarding plan for admission. The patient is accepted for admission.    MEDICATIONS GIVEN IN THE ED  Medications   piperacillin-tazobactam (ZOSYN) 2.25 g vial to attach to  ml bag (2.25 g Intravenous $New Bag 6/25/25 0521)   sodium chloride 0.9% BOLUS 1,000 mL (0 mLs Intravenous Stopped 6/25/25 0512)   HYDROmorphone (PF) (DILAUDID) injection 0.5 mg (0.5 mg Intravenous $Given 6/25/25 0217)   ondansetron (ZOFRAN) injection 4 mg (4 mg Intravenous $Given 6/25/25 0216)       NEW PRESCRIPTIONS STARTED AT TODAY'S VISIT  New Prescriptions    No medications on file          =================================================================    HPI:    Use of : N/A     Eagle Nestakiko Wolff is a 64 year old male who presents with significant left-sided flank  "pain.    The patient reports that he had a left-sided kidney stent placed 2 weeks ago at . He's romeo experiencing severe left-sided flank pain. He had a urine culture last week and the results came back Monday (06/23/2025) and was informed that \"bad proteins and an infection\" was found in his urine. He notes that this urine testing was done at his primary care provider clinic as recommended by his urologist at [facility]. He attempted to call [facility] yesterday (06/24/2025), but he as not able to reach anyone and did not get a call back, his message was forwarded to someone else, but no one get back to him. He started to urinate blood tonight. He endorses pain with urination. No fevers or chills. Over the course of the last week or so, he felt like he has had urine frequency. He did have an episode of vomiting prior to his arrival to the ED today (06/25/2025).    He indicates that he feels most of the pain in his left flank and reports that this pain radiates down to his left hip.    He took Tylenol and oxycodone. He also wears a fentanyl patch for 3 days, per the patient's wife. No additional complaints or concerns reported at this time.      RELEVANT HISTORY, MEDICATIONS, & ALLERGIES   Past medical history, surgical history, family history, medications, and allergies reviewed and pertinent noted in HPI.    REVIEW OF SYSTEMS:  A complete review of systems was performed with pertinent positives and negatives noted in the HPI.     PHYSICAL EXAM:    Vitals: /73   Pulse 70   Temp 97.6  F (36.4  C) (Oral)   Resp 16   SpO2 100%    General: Alert and interactive, comfortable appearing.  HENT: Atraumatic. Full AROM of neck. MMM.  Cardiovascular: Regular rate and rhythm.   Chest/Pulmonary: Normal work of breathing. Speaking in complete sentences. Lungs CTAB. No chest wall tenderness or deformities.  Abdomen: Soft, nondistended. Nontender without guarding or rebound. Ileostomy in place.   Back: " TTP L flank.   Extremities: Normal AROM of all major joints.  Skin: Warm and dry. Normal skin color.   Neuro: Speech clear. CNs grossly intact. Moves all extremities spontaneously.   Psych: Normal affect/mood, cooperative, memory appropriate.      LAB  Labs Ordered and Resulted from Time of ED Arrival to Time of ED Departure   CBC WITH PLATELETS - Abnormal       Result Value    WBC Count 6.3      RBC Count 2.86 (*)     Hemoglobin 9.2 (*)     Hematocrit 30.0 (*)      (*)     MCH 32.2      MCHC 30.7 (*)     RDW 18.6 (*)     Platelet Count 317     BASIC METABOLIC PANEL - Abnormal    Sodium 139      Potassium 5.3      Chloride 108 (*)     Carbon Dioxide (CO2) 20 (*)     Anion Gap 11      Urea Nitrogen 32.2 (*)     Creatinine 2.65 (*)     GFR Estimate 26 (*)     Calcium 9.6      Glucose 95     CRP INFLAMMATION - Abnormal    CRP Inflammation 18.60 (*)    ROUTINE UA WITH MICROSCOPIC REFLEX TO CULTURE - Abnormal    Color Urine Red (*)     Appearance Urine Bloody (*)     Glucose Urine Negative      Bilirubin Urine 1.0 mg/dL (*)     Ketones Urine Negative      Specific Gravity Urine 1.025      Blood Urine >1.0 mg/dL (*)     pH Urine 6.0      Protein Albumin Urine 70 (*)     Urobilinogen Urine 2.0 (*)     Nitrite Urine Negative      Leukocyte Esterase Urine 250 Marlon/uL (*)     RBC Urine >182 (*)     WBC Urine 0     URINE CULTURE       RADIOLOGY  CT Abdomen Pelvis w/o Contrast   Final Result   IMPRESSION:    1.  Moderate diffuse wall thickening of a nondistended urinary bladder. Additionally, there is haziness in the perivesical fat. These findings could relate to cystitis. Recommend clinical correlation.   2.  Interval placement of a left ureteral stent since 5/30/2025. The proximal portion of the stent is curled back upon itself within the left renal pelvis with its proximal pigtail in the proximal left ureter. There is residual mild dilatation of the    left intrarenal collecting system, decreased in caliber since the  prior study.    3.  6 cm irregular-shaped soft tissue mass in the right hemipelvis that likely relates to the known unresectable colon neoplasm, not convincingly changed.   4.  No other acute abnormality identified in the abdomen or pelvis.                  I, Mily Zapata, am serving as a scribe to document services personally performed by Dr. Alyse Clark based on my observation and the provider's statements to me. I, Alyse Clark MD attest that Mily Zapata is acting in a scribe capacity, has observed my performance of the services and has documented them in accordance with my direction.    Alyse Clark M.D.  Emergency Medicine  [facility] EMERGENCY DEPARTMENT  [address] [place] [id number] [phone]  Dept: [phone]      Alyse Clark MD  06/25/25 05

## 2025-06-25 NOTE — CONSULTS
Care Management Initial Consult    General Information  Assessment completed with: Patient,    Type of CM/SW Visit: Initial Assessment    Primary Care Provider verified and updated as needed: Yes   Readmission within the last 30 days: current reason for admission unrelated to previous admission          Communication Assessment  Patient's communication style: spoken language (English or Bilingual)        Cognitive  Cognitive/Neuro/Behavioral: WDL                      Living Environment:   People in home: spouse     Current living Arrangements: house      Able to return to prior arrangements: yes     Family/Social Support:  Care provided by: self  Provides care for: no one  Marital Status:   Support system: Wife, Children          Description of Support System: Involved       Current Resources:   Patient receiving home care services: No      Community Resources: None  Equipment currently used at home: none  Supplies currently used at home: None    Employment/Financial:  Employment Status: retired        Financial Concerns:   no concern reported      Does the patient's insurance plan have a 3 day qualifying hospital stay waiver?  No    Lifestyle & Psychosocial Needs:  Social Drivers of Health     Food Insecurity: Low Risk  (5/31/2025)    Food Insecurity     Within the past 12 months, did you worry that your food would run out before you got money to buy more?: No     Within the past 12 months, did the food you bought just not last and you didn t have money to get more?: No   Depression: Not on file   Housing Stability: Low Risk  (5/31/2025)    Housing Stability     Do you have housing? : Yes     Are you worried about losing your housing?: No   Tobacco Use: Low Risk  (5/21/2025)    Received from [facility]    Patient History     Smoking Tobacco Use: Never     Smokeless Tobacco Use: Never     Passive Exposure: Never   Financial Resource Strain: Low Risk  (5/31/2025)    Financial Resource Strain     Within the  past 12 months, have you or your family members you live with been unable to get utilities (heat, electricity) when it was really needed?: No   Alcohol Use: Not At Risk (11/9/2020)    Received from [facility]    AUDIT-C     Frequency of Alcohol Consumption: Never     Average Number of Drinks: Not on file     Frequency of Binge Drinking: Never   Transportation Needs: Low Risk  (5/31/2025)    Transportation Needs     Within the past 12 months, has lack of transportation kept you from medical appointments, getting your medicines, non-medical meetings or appointments, work, or from getting things that you need?: No   Physical Activity: Sufficiently Active (2/23/2021)    Received from [facility]    Exercise Vital Sign     Days of Exercise per Week: 6 days     Minutes of Exercise per Session: 30 min   Interpersonal Safety: Low Risk  (5/31/2025)    Interpersonal Safety     Do you feel physically and emotionally safe where you currently live?: Yes     Within the past 12 months, have you been hit, slapped, kicked or otherwise physically hurt by someone?: No     Within the past 12 months, have you been humiliated or emotionally abused in other ways by your partner or ex-partner?: No   Stress: Stress Concern Present (2/23/2021)    Received from [facility]    [employer] - Occupational Stress Questionnaire     Feeling of Stress : Rather much   Social Connections: Moderately Integrated (2/23/2021)    Received from [facility]    Social Connection and Isolation Panel [NHANES]     Frequency of Communication with Friends and Family: More than three times a week     Frequency of Social Gatherings with Friends and Family: Twice a week     Attends Taoist Services: 1 to 4 times per year     Active Member of Clubs or Organizations: No     Attends Club or Organization Meetings: Never     Marital Status:    Health Literacy: Not on file       Functional Status:  Prior to admission patient needed  assistance:   Dependent ADLs:: Independent  Dependent IADLs:: Independent       Mental Health Status:  Mental Health Status: No Current Concerns       Chemical Dependency Status:  Chemical Dependency Status: No Current Concerns           Values/Beliefs:  Spiritual, Cultural Beliefs, Sikhism Practices, Values that affect care: no             Discussed  Partnership in Safe Discharge Planning  document with patient/family: No    Additional Information:The chart was reviewed, and the writer met with the patient at the bedside for the initial assessment. The patient resides in a house home with his spouse, family member. He is independent with ADL and IADLs. The patient is not receiving any formal in-home services at this time and has declined the need for such services. The patient reports having a strong support system, including wife and two children, who are available to assist following hospital discharge if needed. The patient anticipates being discharged home with no additional needs. However,he has indicated that his spouse can provide transportation upon discharge.     Reviewed Medicare guidelines regarding observation status billing. Provided a copy of the Medicare Outpatient Observation Notice (MOON) brochure for their reference. SUAREZ faxed to [phone].     Next Steps: CM to follow-up on medical progression, discharge recommendations, and coordinate the final discharge plan.     MARSHA Douglas

## 2025-06-25 NOTE — MEDICATION SCRIBE - ADMISSION MEDICATION HISTORY
Medication Scribe Admission Medication History    Admission medication history is complete. The information provided in this note is only as accurate as the sources available at the time of the update.    Information Source(s): Patient via in-person    Pertinent Information: PTA med list updated per patient.  Patient states that he have Fentanyl patch placed yesterday evening.  Patient states that he takes Lonsurf  2 tablet twice daily for 5 days and off for 5 days (while on infusion) and patient reports that he started taking on 6/23/2025.    Changes made to PTA medication list:  Added: None  Deleted: Melatonin (per patient)  Changed: None    Allergies reviewed with patient and updates made in EHR: yes    Medication History Completed By: Yanique Mcgill 6/25/2025 5:31 AM    PTA Med List   Medication Sig Last Dose/Taking    acetaminophen (TYLENOL) 500 MG tablet Take 1,000 mg by mouth every 6 hours as needed for pain. Taking As Needed    calcium carbonate-vitamin D (CALTRATE) 600-10 MG-MCG per tablet Take 1 tablet by mouth daily. 6/24/2025 Morning    fentaNYL (DURAGESIC) 50 mcg/hr 72 hr patch Place 1 patch onto the skin every 72 hours. 6/24/2025 Evening    ferrous sulfate (FE TABS) 325 (65 Fe) MG EC tablet Take 650 mg by mouth every morning. 6/24/2025 Morning    ferrous sulfate (FEROSUL) 325 (65 Fe) MG tablet Take 325 mg by mouth every evening. 6/24/2025 Evening    gabapentin (NEURONTIN) 300 MG capsule Take 300 mg by mouth at bedtime. 6/24/2025 Bedtime    LONSURF 15-6.14 MG tablet Take 2 tablets by mouth See Admin Instructions. Take 2 tablets by mouth twice daily on days 1-5 and 15-19 of each 28 day cycle. 6/24/2025 Morning    Multiple Vitamin (MULTIVITAMIN PO) Multivitamin powder: take 1 scoop daily 6/24/2025 Morning    oxyCODONE (ROXICODONE) 5 MG tablet Take 5 mg by mouth every 4 hours as needed for severe pain  Taking As Needed    rOPINIRole (REQUIP) 0.25 MG tablet Take 0.25 mg by mouth at bedtime. 6/24/2025  Bedtime    sertraline (ZOLOFT) 50 MG tablet Take 50 mg by mouth At Bedtime  6/24/2025 Bedtime    tamsulosin (FLOMAX) 0.4 MG capsule Take 0.4 mg by mouth daily 6/24/2025 Morning    traZODone (DESYREL) 50 MG tablet Take 1 tablet by mouth at bedtime. 6/24/2025 Bedtime    vitamin D3 (CHOLECALCIFEROL) 50 mcg (2000 units) tablet Take 1 tablet by mouth daily 6/24/2025 Morning    XARELTO ANTICOAGULANT 20 MG TABS tablet Take 20 mg by mouth daily 6/24/2025 Morning

## 2025-06-26 ENCOUNTER — ANESTHESIA (OUTPATIENT)
Dept: SURGERY | Facility: HOSPITAL | Age: 65
End: 2025-06-26
Payer: COMMERCIAL

## 2025-06-26 ENCOUNTER — APPOINTMENT (OUTPATIENT)
Dept: RADIOLOGY | Facility: HOSPITAL | Age: 65
DRG: 659 | End: 2025-06-26
Attending: UROLOGY
Payer: COMMERCIAL

## 2025-06-26 ENCOUNTER — ANESTHESIA EVENT (OUTPATIENT)
Dept: SURGERY | Facility: HOSPITAL | Age: 65
End: 2025-06-26
Payer: COMMERCIAL

## 2025-06-26 VITALS
RESPIRATION RATE: 18 BRPM | DIASTOLIC BLOOD PRESSURE: 64 MMHG | SYSTOLIC BLOOD PRESSURE: 109 MMHG | OXYGEN SATURATION: 99 % | TEMPERATURE: 98.8 F | BODY MASS INDEX: 23.04 KG/M2 | WEIGHT: 145.06 LBS | HEART RATE: 85 BPM

## 2025-06-26 PROBLEM — R31.9 HEMATURIA, UNSPECIFIED TYPE: Status: ACTIVE | Noted: 2025-06-26

## 2025-06-26 PROBLEM — N17.9 ACUTE KIDNEY INJURY: Status: ACTIVE | Noted: 2021-08-04

## 2025-06-26 PROBLEM — N30.91 HEMORRHAGIC CYSTITIS: Status: ACTIVE | Noted: 2025-06-26

## 2025-06-26 PROBLEM — R50.9 FEVER, UNSPECIFIED FEVER CAUSE: Status: ACTIVE | Noted: 2025-06-26

## 2025-06-26 PROBLEM — R65.20 SEVERE SEPSIS (H): Status: ACTIVE | Noted: 2025-06-26

## 2025-06-26 PROBLEM — A41.9 SEVERE SEPSIS (H): Status: ACTIVE | Noted: 2025-06-26

## 2025-06-26 PROBLEM — R10.9 LEFT FLANK PAIN: Status: ACTIVE | Noted: 2025-06-26

## 2025-06-26 LAB
ABO + RH BLD: NORMAL
ANION GAP SERPL CALCULATED.3IONS-SCNC: 7 MMOL/L (ref 7–15)
BACTERIA UR CULT: NORMAL
BASOPHILS # BLD AUTO: 0 10E3/UL (ref 0–0.2)
BASOPHILS NFR BLD AUTO: 0 %
BLD GP AB SCN SERPL QL: NEGATIVE
BUN SERPL-MCNC: 34.8 MG/DL (ref 8–23)
CALCIUM SERPL-MCNC: 8.9 MG/DL (ref 8.8–10.4)
CHLORIDE SERPL-SCNC: 106 MMOL/L (ref 98–107)
CREAT SERPL-MCNC: 3.21 MG/DL (ref 0.67–1.17)
EGFRCR SERPLBLD CKD-EPI 2021: 21 ML/MIN/1.73M2
EOSINOPHIL # BLD AUTO: 0.2 10E3/UL (ref 0–0.7)
EOSINOPHIL NFR BLD AUTO: 2 %
ERYTHROCYTE [DISTWIDTH] IN BLOOD BY AUTOMATED COUNT: 18.4 % (ref 10–15)
GLUCOSE SERPL-MCNC: 91 MG/DL (ref 70–99)
HCO3 SERPL-SCNC: 20 MMOL/L (ref 22–29)
HCT VFR BLD AUTO: 24.3 % (ref 40–53)
HGB BLD-MCNC: 7.5 G/DL (ref 13.3–17.7)
HGB BLD-MCNC: 8.4 G/DL (ref 13.3–17.7)
IMM GRANULOCYTES # BLD: 0.1 10E3/UL
IMM GRANULOCYTES NFR BLD: 1 %
LACTATE SERPL-SCNC: 1.3 MMOL/L (ref 0.7–2)
LYMPHOCYTES # BLD AUTO: 0.6 10E3/UL (ref 0.8–5.3)
LYMPHOCYTES NFR BLD AUTO: 8 %
MCH RBC QN AUTO: 32.5 PG (ref 26.5–33)
MCHC RBC AUTO-ENTMCNC: 30.9 G/DL (ref 31.5–36.5)
MCV RBC AUTO: 104 FL (ref 78–100)
MCV RBC AUTO: 105 FL (ref 78–100)
MONOCYTES # BLD AUTO: 0.6 10E3/UL (ref 0–1.3)
MONOCYTES NFR BLD AUTO: 9 %
NEUTROPHILS # BLD AUTO: 5.2 10E3/UL (ref 1.6–8.3)
NEUTROPHILS NFR BLD AUTO: 79 %
NRBC # BLD AUTO: 0 10E3/UL
NRBC BLD AUTO-RTO: 0 /100
PLATELET # BLD AUTO: 234 10E3/UL (ref 150–450)
POTASSIUM SERPL-SCNC: 5.3 MMOL/L (ref 3.4–5.3)
RBC # BLD AUTO: 2.31 10E6/UL (ref 4.4–5.9)
SODIUM SERPL-SCNC: 133 MMOL/L (ref 135–145)
SPECIMEN EXP DATE BLD: NORMAL
WBC # BLD AUTO: 6.6 10E3/UL (ref 4–11)

## 2025-06-26 PROCEDURE — 999N000141 HC STATISTIC PRE-PROCEDURE NURSING ASSESSMENT: Performed by: UROLOGY

## 2025-06-26 PROCEDURE — 999N000180 XR SURGERY CARM FLUORO LESS THAN 5 MIN

## 2025-06-26 PROCEDURE — 86900 BLOOD TYPING SEROLOGIC ABO: CPT

## 2025-06-26 PROCEDURE — 87040 BLOOD CULTURE FOR BACTERIA: CPT

## 2025-06-26 PROCEDURE — 96375 TX/PRO/DX INJ NEW DRUG ADDON: CPT

## 2025-06-26 PROCEDURE — 80048 BASIC METABOLIC PNL TOTAL CA: CPT | Performed by: INTERNAL MEDICINE

## 2025-06-26 PROCEDURE — 85025 COMPLETE CBC W/AUTO DIFF WBC: CPT | Performed by: INTERNAL MEDICINE

## 2025-06-26 PROCEDURE — G0378 HOSPITAL OBSERVATION PER HR: HCPCS

## 2025-06-26 PROCEDURE — 250N000009 HC RX 250: Performed by: NURSE ANESTHETIST, CERTIFIED REGISTERED

## 2025-06-26 PROCEDURE — 250N000011 HC RX IP 250 OP 636

## 2025-06-26 PROCEDURE — 250N000011 HC RX IP 250 OP 636: Performed by: NURSE PRACTITIONER

## 2025-06-26 PROCEDURE — 99232 SBSQ HOSP IP/OBS MODERATE 35: CPT | Mod: FS | Performed by: INTERNAL MEDICINE

## 2025-06-26 PROCEDURE — 360N000075 HC SURGERY LEVEL 2, PER MIN: Performed by: UROLOGY

## 2025-06-26 PROCEDURE — 99207 PR APP CREDIT; MD BILLING SHARED VISIT: CPT | Mod: FS

## 2025-06-26 PROCEDURE — 0TP98DZ REMOVAL OF INTRALUMINAL DEVICE FROM URETER, VIA NATURAL OR ARTIFICIAL OPENING ENDOSCOPIC: ICD-10-PCS | Performed by: UROLOGY

## 2025-06-26 PROCEDURE — 710N000009 HC RECOVERY PHASE 1, LEVEL 1, PER MIN: Performed by: UROLOGY

## 2025-06-26 PROCEDURE — 370N000017 HC ANESTHESIA TECHNICAL FEE, PER MIN: Performed by: UROLOGY

## 2025-06-26 PROCEDURE — 255N000002 HC RX 255 OP 636: Performed by: UROLOGY

## 2025-06-26 PROCEDURE — 85018 HEMOGLOBIN: CPT

## 2025-06-26 PROCEDURE — 36415 COLL VENOUS BLD VENIPUNCTURE: CPT

## 2025-06-26 PROCEDURE — 36415 COLL VENOUS BLD VENIPUNCTURE: CPT | Performed by: INTERNAL MEDICINE

## 2025-06-26 PROCEDURE — 250N000011 HC RX IP 250 OP 636: Performed by: INTERNAL MEDICINE

## 2025-06-26 PROCEDURE — 272N000001 HC OR GENERAL SUPPLY STERILE: Performed by: UROLOGY

## 2025-06-26 PROCEDURE — 258N000003 HC RX IP 258 OP 636: Performed by: UROLOGY

## 2025-06-26 PROCEDURE — 258N000003 HC RX IP 258 OP 636: Performed by: ANESTHESIOLOGY

## 2025-06-26 PROCEDURE — 0T778DZ DILATION OF LEFT URETER WITH INTRALUMINAL DEVICE, VIA NATURAL OR ARTIFICIAL OPENING ENDOSCOPIC: ICD-10-PCS | Performed by: UROLOGY

## 2025-06-26 PROCEDURE — 250N000013 HC RX MED GY IP 250 OP 250 PS 637: Performed by: UROLOGY

## 2025-06-26 PROCEDURE — 250N000011 HC RX IP 250 OP 636: Performed by: NURSE ANESTHETIST, CERTIFIED REGISTERED

## 2025-06-26 PROCEDURE — C2617 STENT, NON-COR, TEM W/O DEL: HCPCS | Performed by: UROLOGY

## 2025-06-26 PROCEDURE — 86923 COMPATIBILITY TEST ELECTRIC: CPT

## 2025-06-26 PROCEDURE — 250N000025 HC SEVOFLURANE, PER MIN: Performed by: UROLOGY

## 2025-06-26 PROCEDURE — 96376 TX/PRO/DX INJ SAME DRUG ADON: CPT

## 2025-06-26 PROCEDURE — 83605 ASSAY OF LACTIC ACID: CPT

## 2025-06-26 PROCEDURE — 258N000003 HC RX IP 258 OP 636

## 2025-06-26 PROCEDURE — 258N000001 HC RX 258: Performed by: UROLOGY

## 2025-06-26 PROCEDURE — 120N000001 HC R&B MED SURG/OB

## 2025-06-26 PROCEDURE — 250N000013 HC RX MED GY IP 250 OP 250 PS 637: Performed by: INTERNAL MEDICINE

## 2025-06-26 PROCEDURE — 258N000003 HC RX IP 258 OP 636: Performed by: NURSE ANESTHETIST, CERTIFIED REGISTERED

## 2025-06-26 DEVICE — URETERAL STENT
Type: IMPLANTABLE DEVICE | Site: URETER | Status: FUNCTIONAL
Brand: PERCUFLEX™ PLUS

## 2025-06-26 RX ORDER — CEFAZOLIN SODIUM/WATER 2 G/20 ML
2 SYRINGE (ML) INTRAVENOUS SEE ADMIN INSTRUCTIONS
Status: DISCONTINUED | OUTPATIENT
Start: 2025-06-26 | End: 2025-06-27

## 2025-06-26 RX ORDER — LIDOCAINE 40 MG/G
CREAM TOPICAL
Status: DISCONTINUED | OUTPATIENT
Start: 2025-06-26 | End: 2025-06-27

## 2025-06-26 RX ORDER — DEXAMETHASONE SODIUM PHOSPHATE 10 MG/ML
INJECTION, SOLUTION INTRAMUSCULAR; INTRAVENOUS PRN
Status: DISCONTINUED | OUTPATIENT
Start: 2025-06-26 | End: 2025-06-26

## 2025-06-26 RX ORDER — ONDANSETRON 4 MG/1
4 TABLET, ORALLY DISINTEGRATING ORAL EVERY 30 MIN PRN
Status: DISCONTINUED | OUTPATIENT
Start: 2025-06-26 | End: 2025-06-26

## 2025-06-26 RX ORDER — FENTANYL CITRATE 50 UG/ML
INJECTION, SOLUTION INTRAMUSCULAR; INTRAVENOUS PRN
Status: DISCONTINUED | OUTPATIENT
Start: 2025-06-26 | End: 2025-06-26

## 2025-06-26 RX ORDER — FENTANYL CITRATE 50 UG/ML
50 INJECTION, SOLUTION INTRAMUSCULAR; INTRAVENOUS EVERY 5 MIN PRN
Status: DISCONTINUED | OUTPATIENT
Start: 2025-06-26 | End: 2025-06-26

## 2025-06-26 RX ORDER — NALOXONE HYDROCHLORIDE 0.4 MG/ML
0.1 INJECTION, SOLUTION INTRAMUSCULAR; INTRAVENOUS; SUBCUTANEOUS
Status: DISCONTINUED | OUTPATIENT
Start: 2025-06-26 | End: 2025-06-26

## 2025-06-26 RX ORDER — CEFAZOLIN SODIUM/WATER 2 G/20 ML
2 SYRINGE (ML) INTRAVENOUS
Status: COMPLETED | OUTPATIENT
Start: 2025-06-26 | End: 2025-06-26

## 2025-06-26 RX ORDER — FENTANYL CITRATE 50 UG/ML
25 INJECTION, SOLUTION INTRAMUSCULAR; INTRAVENOUS EVERY 5 MIN PRN
Status: DISCONTINUED | OUTPATIENT
Start: 2025-06-26 | End: 2025-06-26

## 2025-06-26 RX ORDER — SODIUM CHLORIDE, SODIUM LACTATE, POTASSIUM CHLORIDE, CALCIUM CHLORIDE 600; 310; 30; 20 MG/100ML; MG/100ML; MG/100ML; MG/100ML
INJECTION, SOLUTION INTRAVENOUS CONTINUOUS
Status: DISCONTINUED | OUTPATIENT
Start: 2025-06-26 | End: 2025-06-27

## 2025-06-26 RX ORDER — LIDOCAINE HYDROCHLORIDE 10 MG/ML
INJECTION, SOLUTION INFILTRATION; PERINEURAL PRN
Status: DISCONTINUED | OUTPATIENT
Start: 2025-06-26 | End: 2025-06-26

## 2025-06-26 RX ORDER — PROPOFOL 10 MG/ML
INJECTION, EMULSION INTRAVENOUS PRN
Status: DISCONTINUED | OUTPATIENT
Start: 2025-06-26 | End: 2025-06-26

## 2025-06-26 RX ORDER — CEFAZOLIN SODIUM 1 G/50ML
1750 SOLUTION INTRAVENOUS ONCE
Status: COMPLETED | OUTPATIENT
Start: 2025-06-26 | End: 2025-06-26

## 2025-06-26 RX ORDER — CEFEPIME HYDROCHLORIDE 2 G/1
2 INJECTION, POWDER, FOR SOLUTION INTRAVENOUS EVERY 8 HOURS
Status: DISCONTINUED | OUTPATIENT
Start: 2025-06-26 | End: 2025-06-26

## 2025-06-26 RX ORDER — EPHEDRINE SULFATE 50 MG/ML
INJECTION, SOLUTION INTRAMUSCULAR; INTRAVENOUS; SUBCUTANEOUS PRN
Status: DISCONTINUED | OUTPATIENT
Start: 2025-06-26 | End: 2025-06-26

## 2025-06-26 RX ORDER — CEFEPIME HYDROCHLORIDE 2 G/1
2 INJECTION, POWDER, FOR SOLUTION INTRAVENOUS EVERY 24 HOURS
Status: DISCONTINUED | OUTPATIENT
Start: 2025-06-26 | End: 2025-06-26

## 2025-06-26 RX ORDER — CEFEPIME HYDROCHLORIDE 2 G/1
2 INJECTION, POWDER, FOR SOLUTION INTRAVENOUS EVERY 24 HOURS
Status: DISCONTINUED | OUTPATIENT
Start: 2025-06-26 | End: 2025-06-29 | Stop reason: HOSPADM

## 2025-06-26 RX ORDER — SODIUM CHLORIDE, SODIUM LACTATE, POTASSIUM CHLORIDE, CALCIUM CHLORIDE 600; 310; 30; 20 MG/100ML; MG/100ML; MG/100ML; MG/100ML
INJECTION, SOLUTION INTRAVENOUS CONTINUOUS
Status: DISCONTINUED | OUTPATIENT
Start: 2025-06-26 | End: 2025-06-26

## 2025-06-26 RX ORDER — ONDANSETRON 2 MG/ML
4 INJECTION INTRAMUSCULAR; INTRAVENOUS EVERY 30 MIN PRN
Status: DISCONTINUED | OUTPATIENT
Start: 2025-06-26 | End: 2025-06-26

## 2025-06-26 RX ORDER — LIDOCAINE 40 MG/G
CREAM TOPICAL
Status: DISCONTINUED | OUTPATIENT
Start: 2025-06-26 | End: 2025-06-29 | Stop reason: HOSPADM

## 2025-06-26 RX ORDER — DEXAMETHASONE SODIUM PHOSPHATE 10 MG/ML
4 INJECTION, SOLUTION INTRAMUSCULAR; INTRAVENOUS
Status: DISCONTINUED | OUTPATIENT
Start: 2025-06-26 | End: 2025-06-26

## 2025-06-26 RX ORDER — ONDANSETRON 2 MG/ML
INJECTION INTRAMUSCULAR; INTRAVENOUS PRN
Status: DISCONTINUED | OUTPATIENT
Start: 2025-06-26 | End: 2025-06-26

## 2025-06-26 RX ADMIN — PHENYLEPHRINE HYDROCHLORIDE 200 MCG: 10 INJECTION INTRAVENOUS at 16:01

## 2025-06-26 RX ADMIN — FENTANYL CITRATE 50 MCG: 50 INJECTION, SOLUTION INTRAMUSCULAR; INTRAVENOUS at 15:40

## 2025-06-26 RX ADMIN — PROPOFOL 200 MG: 10 INJECTION, EMULSION INTRAVENOUS at 15:40

## 2025-06-26 RX ADMIN — TRAZODONE HYDROCHLORIDE 50 MG: 50 TABLET ORAL at 21:55

## 2025-06-26 RX ADMIN — ONDANSETRON 4 MG: 2 INJECTION INTRAMUSCULAR; INTRAVENOUS at 15:47

## 2025-06-26 RX ADMIN — OXYCODONE HYDROCHLORIDE 5 MG: 5 TABLET ORAL at 20:08

## 2025-06-26 RX ADMIN — SODIUM CHLORIDE 1000 ML: 0.9 INJECTION, SOLUTION INTRAVENOUS at 08:11

## 2025-06-26 RX ADMIN — CEFEPIME HYDROCHLORIDE 2 G: 2 INJECTION, POWDER, FOR SOLUTION INTRAVENOUS at 09:20

## 2025-06-26 RX ADMIN — SODIUM CHLORIDE, SODIUM LACTATE, POTASSIUM CHLORIDE, AND CALCIUM CHLORIDE: .6; .31; .03; .02 INJECTION, SOLUTION INTRAVENOUS at 13:56

## 2025-06-26 RX ADMIN — FENTANYL CITRATE 50 MCG: 50 INJECTION, SOLUTION INTRAMUSCULAR; INTRAVENOUS at 15:50

## 2025-06-26 RX ADMIN — SERTRALINE HYDROCHLORIDE 50 MG: 50 TABLET ORAL at 21:55

## 2025-06-26 RX ADMIN — Medication 1 TABLET: at 08:16

## 2025-06-26 RX ADMIN — PHENYLEPHRINE HYDROCHLORIDE 100 MCG: 10 INJECTION INTRAVENOUS at 15:48

## 2025-06-26 RX ADMIN — GABAPENTIN 300 MG: 300 CAPSULE ORAL at 21:54

## 2025-06-26 RX ADMIN — TAMSULOSIN HYDROCHLORIDE 0.4 MG: 0.4 CAPSULE ORAL at 08:16

## 2025-06-26 RX ADMIN — FERROUS SULFATE TAB 325 MG (65 MG ELEMENTAL FE) 650 MG: 325 (65 FE) TAB at 08:16

## 2025-06-26 RX ADMIN — FERROUS SULFATE TAB 325 MG (65 MG ELEMENTAL FE) 325 MG: 325 (65 FE) TAB at 20:08

## 2025-06-26 RX ADMIN — LIDOCAINE HYDROCHLORIDE 40 MG: 10 INJECTION, SOLUTION INFILTRATION; PERINEURAL at 15:40

## 2025-06-26 RX ADMIN — PHENYLEPHRINE HYDROCHLORIDE 100 MCG: 10 INJECTION INTRAVENOUS at 15:55

## 2025-06-26 RX ADMIN — ROPINIROLE HYDROCHLORIDE 0.25 MG: 0.25 TABLET, FILM COATED ORAL at 21:54

## 2025-06-26 RX ADMIN — PIPERACILLIN AND TAZOBACTAM 3.38 G: 3; .375 INJECTION, POWDER, FOR SOLUTION INTRAVENOUS at 04:29

## 2025-06-26 RX ADMIN — Medication 2 G: at 15:36

## 2025-06-26 RX ADMIN — Medication 10 MG: at 16:05

## 2025-06-26 RX ADMIN — Medication 50 MCG: at 08:16

## 2025-06-26 RX ADMIN — SODIUM CHLORIDE, SODIUM LACTATE, POTASSIUM CHLORIDE, AND CALCIUM CHLORIDE: .6; .31; .03; .02 INJECTION, SOLUTION INTRAVENOUS at 17:17

## 2025-06-26 RX ADMIN — DEXAMETHASONE SODIUM PHOSPHATE 10 MG: 10 INJECTION, SOLUTION INTRAMUSCULAR; INTRAVENOUS at 15:41

## 2025-06-26 RX ADMIN — SODIUM CHLORIDE 1750 MG: 0.9 INJECTION, SOLUTION INTRAVENOUS at 10:12

## 2025-06-26 ASSESSMENT — ACTIVITIES OF DAILY LIVING (ADL)
ADLS_ACUITY_SCORE: 52
WEAR_GLASSES_OR_BLIND: YES
CHANGE_IN_FUNCTIONAL_STATUS_SINCE_ONSET_OF_CURRENT_ILLNESS/INJURY: NO
ADLS_ACUITY_SCORE: 52
ADLS_ACUITY_SCORE: 63
WALKING_OR_CLIMBING_STAIRS_DIFFICULTY: NO
DRESSING/BATHING_DIFFICULTY: NO
ADLS_ACUITY_SCORE: 58
CONCENTRATING,_REMEMBERING_OR_MAKING_DECISIONS_DIFFICULTY: NO
DOING_ERRANDS_INDEPENDENTLY_DIFFICULTY: NO
ADLS_ACUITY_SCORE: 56
TOILETING_ISSUES: NO
ADLS_ACUITY_SCORE: 52
FALL_HISTORY_WITHIN_LAST_SIX_MONTHS: NO
ADLS_ACUITY_SCORE: 52
ADLS_ACUITY_SCORE: 56
ADLS_ACUITY_SCORE: 63
ADLS_ACUITY_SCORE: 63
ADLS_ACUITY_SCORE: 52
ADLS_ACUITY_SCORE: 58
ADLS_ACUITY_SCORE: 63
ADLS_ACUITY_SCORE: 52
ADLS_ACUITY_SCORE: 63
ADLS_ACUITY_SCORE: 52
ADLS_ACUITY_SCORE: 58
ADLS_ACUITY_SCORE: 56
DIFFICULTY_EATING/SWALLOWING: NO
ADLS_ACUITY_SCORE: 63
ADLS_ACUITY_SCORE: 63
ADLS_ACUITY_SCORE: 52

## 2025-06-26 NOTE — PROGRESS NOTES
[facility]    Medicine Progress Note - Hospitalist Service    Date of Admission:  6/25/2025    Assessment & Plan   Rich Wolff is a 64 year old male with PMH PE on xarelto, colon cancer, left ureteral obstruction s/p stent placement, UTI, was admitted on 6/25/2025 with acute cystitis after he presents with 1 day of flank pain and hematuria, found to have recurrent UTI this time with pseudomonas.    Sepsis Evaluation     I was called to see Rich Wolff due to abnormal lab values and fevers. He is known to have a UTI.     PHYSICAL EXAM  Vital Signs:  Temp: 98.8  F (37.1  C) Temp src: Oral BP: 98/59 Pulse: 91   Resp: 18 SpO2: 98 % O2 Device: None (Room air)      General: in no acute distress  Mental Status: AAOx4.     ASSESSMENT AND PLAN    The patient has signs of sepsis as evidenced by:  1. Presence of 2 SIRS criteria, suspected infection, AND  2. Organ dysfunction: Initial hypotension due to sepsis and CHAYO with Cr >2 or Urine output <0.5/kg/hr for more than 2 hours despite fluid resuscitation due to sepsis    Sepsis Care Initiation: Starting at 1505 AM on 06/25/25, until specified. Prior to this documentation, sepsis, severe sepsis, or septic shock was NOT thought to be a significant cause of illness. This order represents the first time infection was seriously considered to be affecting the patient.    Note: Due to a national blood culture bottle shortage, reduced blood cultures may have been drawn on this patient.    Lactic Acid Results:  Recent Labs   Lab Test 06/26/25  0819 05/30/25  1404 05/30/25  1108   LACT 1.3 1.1 3.3*      BMI Readings from Last 1 Encounters:   06/26/25 23.04 kg/m       Anti-infectives (From now, onward)      Start     Dose/Rate Route Frequency Ordered Stop    06/26/25 0900  ceFEPIme (MAXIPIME) 2 g vial to attach to  mL bag for ADULTS or NS 50 mL bag for PEDS         2 g  over 30 Minutes Intravenous EVERY 24 HOURS 06/26/25 0810      06/26/25  0830  vancomycin (VANCOCIN) 1,750 mg in sodium chloride 0.9 % 500 mL intermittent infusion         1,750 mg  over 2 Hours Intravenous ONCE 06/26/25 0807      06/26/25 0804  vancomycin place diaz - receiving intermittent dosing         1 each Intravenous SEE ADMIN INSTRUCTIONS 06/26/25 0807              3 Hour Bundle  Blood Cultures before IV Antibiotics: No, patient was already on broad spectrum antibiotics at sepsis time zero  Current antibiotic coverage requires additional antibiotics for pseudomonas source.  Total fluids given (ED +Pre-hospital):  The patient responded to a lesser volume of IV fluids. The initial volume ordered was 1000 mL.     Severe sepsis 2/2 acute cystitis with hematuria  Pseudomonas UTI  -- Abdominal CT-Moderate diffuse wall thickening of a nondistended urinary bladder. Additionally, there is haziness in the perivesical fat. These findings could relate to cystitis.  Interval placement of a left ureteral stent since 5/30/2025. The proximal portion of the stent is curled back upon itself within the left renal pelvis with its proximal pigtail in the proximal left ureter. There is residual mild dilatation of the left intrarenal collecting system, decreased in caliber since the prior study.   -- UA positive for leuk esterase and WBC. Nitrate negative. Await repeat urine culture  -- Spiked fevers last evening. Has been afebrile since 6/225 at 1700  -- Received 1 liter bolus in ER. Giving another liter bolus this a.m. 6/26  -- Lactic and WBC normal  -- Creatinine increased to 3.21 from 2.61  -- Soft BP's on and off  -- Was started on zosyn initially. Per sepsis protocol, switched to cefepime and vancomycin   -- Blood cultures drawn and pending   -- Urology consulted-Given new fever, creat bump and recent pseudomonas culture, recommend cysto/left retrograde pyelogram/left ureteral stent exchange vs PNT placement and stent removal. Discussed options with patient and he prefers stent exchange at  this time.   -- Tylenol, oxycodone, dilaudid as needed for pain     CHAYO on CKD 3b likely 2/2 sepsis  -- Creatinine-2.65-->2.61-->3.21  -- Received IVF's  -- Trend BMP    Hyponatremia likely 2/2 dehydration  -- Sodium-139-->133  -- Received 1 liter IV bolus  -- Trend BMP.     PE  -- PTA xarelto-on hold due to acute anemia and urology procedure     Colon cancer   -- PTA Lonsurf on hold while in hospital per oncology    Anemia-chronic-BIRDIE  -- Hemoglobin-9.2-->7.5-->8.4  -- No overt signs of bleeding  -- PTA xarelto held until after urology procedure  -- Trend CBC    Disposition: The patient Attending physician, Dr. Dasilva was notified. .      Anna Ivey NP  06/26/25, 9:17 AM         Observation Goals: -diagnostic tests and consults completed and resulted, -vital signs normal or at patient baseline, -adequate pain control on oral analgesics, -tolerating oral antibiotics or has plans for home infusion setup, -infection is improving, Nurse to notify provider when observation goals have been met and patient is ready for discharge.  Diet: NPO for Medical/Clinical Reasons Except for: Meds    DVT Prophylaxis: DOAC  Adkins Catheter: Not present  Lines: PRESENT             Cardiac Monitoring: None  Code Status: Full Code      Clinically Significant Risk Factors Present on Admission         # Hyponatremia: Lowest Na = 133 mmol/L in last 2 days, will monitor as appropriate  # Hyperchloremia: Highest Cl = 108 mmol/L in last 2 days, will monitor as appropriate           # Drug Induced Coagulation Defect: home medication list includes an anticoagulant medication         # Anemia: based on hgb <11           # Financial/Environmental Concerns:           Social Drivers of Health    Stress: Stress Concern Present (2/23/2021)    Received from [facility]    [employer] - Occupational Stress Questionnaire     Feeling of Stress : Rather much          Disposition Plan     Medically Ready for Discharge:  Anticipated Tomorrow           The patient's care was discussed with the Attending Physician, Dr. Dasilva, Patient, and Patient's Family.    Anna Ivey NP  Hospitalist Service  [facility]  Securely message with [employer] (more info)  Text page via [employer] Paging/Directory   ______________________________________________________________________    Interval History     Physical Exam   Vital Signs: Temp: 98.8  F (37.1  C) Temp src: Oral BP: 98/59 Pulse: 91   Resp: 18 SpO2: 98 % O2 Device: None (Room air)    Weight: 145 lbs 1 oz    Constitutional: awake, alert, cooperative, no apparent distress, and appears stated age  Respiratory: No increased work of breathing, good air exchange, clear to auscultation bilaterally, no crackles or wheezing  Cardiovascular: Normal apical impulse, regular rate and rhythm, normal S1 and S2, no S3 or S4, and no murmur noted  GI: No scars, normal bowel sounds, soft, non-distended, non-tender, no masses palpated, no hepatosplenomegally    Medical Decision Making       60 MINUTES SPENT BY ME on the date of service doing chart review, history, exam, documentation & further activities per the note.      Data     I have personally reviewed the following data over the past 24 hrs:    6.6  \   8.4 (L)   / 234     133 (L) 106 34.8 (H) /  91   5.3 20 (L) 3.21 (H) \     Procal: N/A CRP: N/A Lactic Acid: 1.3         Imaging results reviewed over the past 24 hrs:   No results found for this or any previous visit (from the past 24 hours).

## 2025-06-26 NOTE — PROGRESS NOTES
POST MIDNIGHT ADMISSION :             Rich Wolff is a 64 year old male admitted on 6/25/2025. He presents with 1 day of flank pain and hematuria, found to have recurrent UTI this time with pseudomonas. Started IV Zosyn. Urology consulted, may require stent exchange if symptoms fail to improve.       6/25 :       Spiking on and off fevers  Follow urine cultures  On iv zosyn  Monitor renal functions    Urology consulted : stent appears in appropriate position with minimal hydro.  Suspect left flank pain is renal colic secondary to the stent and possibly exacerbated by complicated UTI.  Pain has now improved with medications.  If recurs off pain medications, may consider options of Adkins catheter to improve renal colic versus considering percutaneous neph tube placement with stent removal.   Recommend completing 10 day antibiotic course. Check PVR to assure not retaining.         Not medically ready for discharge at this time.  Another ? 2 days      A/p :     Problem-based Assessment & Plan:  Acute cystitis with hematuria, secondary to pseudomonas UTI as evidence by recent outpatient urine culture result  Starting IV Zosyn, renally dosed, q6h for treatment  Will continue IV antibiotic treatment to see if this improves flank pain and associated symptoms. If not improving, per Urology, may require ureteral stent exchange.  [facility] consulted.  PRN IV/PO pain medication ordered for adequate pain control. This include opioid pain medications. Monitor for signs of toxicity, and utilize PRN naloxone if signs of respiratory depression or opioid toxicity noted.   Monitor I/Os  CHAYO superimposed on CKD3b, improving still from recent hospitalization  Monitor I/Os, renally dose medications to avoid nephrotoxicity.   Monitor daily BMP   Colon cancer, metastatic to liver, unresectable, actively on chemotherapy  Immunosuppressed 2/2 chemotherapy regimen, increasing risk for infections as evidence above.           GENERAL: The patient is not in any acute distressed. Awake and alert.  HEENT: Nonicteric sclerae, PERRLA, EOMI. Oropharynx clear. Moist mucous membranes. Conjunctivae appear well perfused.  HEART: Regular rate and rhythm without murmurs.  LUNGS: Clear to auscultation bilaterally. No wheezing or crackles.  ABDOMEN: Soft, positive bowel sounds, nontender.  SKIN: No rash, no excessive bruising, petechiae, or purpura.  EXTREMITIES : no rashes, no swelling in legs.  NEUROLOGIC: conscious and oriented, follows commands, no obvious focal deficits.  ROS: All other systems negative

## 2025-06-26 NOTE — ANESTHESIA PREPROCEDURE EVALUATION
Anesthesia Pre-Procedure Evaluation    Patient: Rich Wolff   MRN: [id number] : 1960          Procedure : Procedure(s):  CYSTOSCOPY, WITH RETROGRADE PYELOGRAM AND URETERAL STENT REPLACEMENT       Rich Wolff is a 64 year old male admitted on 2025. He presents with 1 day of flank pain and hematuria, found to have recurrent UTI this time with pseudomonas. Started IV Zosyn. Urology consulted, requires stent exchange.    Past Medical History:   Diagnosis Date    Colon cancer (H)     Hyperlipidemia     Hypertension     Prostate cancer (H) 2015    T1c. Thatcher's 6 (3+3).  Confined to prostate.  Multifocal bilateral comprising 2% of the gland.PSA:5.8.      Past Surgical History:   Procedure Laterality Date    BOWEL RESECTION      COLONOSCOPY W/ BIOPSIES  2020    COLOSTOMY      COMBINED CYSTOSCOPY, INSERT STENT URETER(S) Left 2025    Procedure: CYSTOSCOPY, LEFT RETROGRADE PYELOGRAM, WITH LEFT URETERAL STENT INSERTION;  Surgeon: Parag Phillips MD;  Location: [facility] OR    ESOPHAGOSCOPY, GASTROSCOPY, DUODENOSCOPY (EGD), COMBINED  2020    IR CHEST PORT PLACEMENT > 5 YRS OF AGE  2020    IR CHEST PORT REPLACEMENT SAME SITE RIGHT  10/27/2022    IR NEPHROSTOMY TUBE CHANGE RIGHT  2021    IR NEPHROSTOMY TUBE CHANGE RIGHT  2021    IR NEPHROSTOMY TUBE CHANGE RIGHT  2021    IR NEPHROSTOMY TUBE CHANGE RIGHT  10/29/2021    IR NEPHROSTOMY TUBE CHANGE RIGHT  10/29/2021    IR NEPHROSTOMY TUBE CHANGE RIGHT  2021    IR NEPHROSTOMY TUBE CHANGE RIGHT  2022    IR NEPHROSTOMY TUBE CHANGE RIGHT  2022    IR NEPHROSTOMY TUBE CHANGE RIGHT  2022    IR NEPHROSTOMY TUBE CHANGE RIGHT  2022    IR NEPHROSTOMY TUBE CHANGE RIGHT  10/25/2022    IR NEPHROSTOMY TUBE CHANGE RIGHT  2022    IR NEPHROSTOMY TUBE CHANGE RIGHT  2023    IR NEPHROSTOMY TUBE CHANGE RIGHT  2023    IR NEPHROSTOMY TUBE CHANGE RIGHT  2023    IR NEPHROSTOMY TUBE  CHANGE RIGHT  8/30/2023    IR NEPHROSTOMY TUBE CHANGE RIGHT  10/25/2023    IR NEPHROSTOMY TUBE CHANGE RIGHT  12/27/2023    IR NEPHROSTOMY TUBE CHANGE RIGHT  2/28/2024    IR NEPHROSTOMY TUBE CHANGE RIGHT  4/24/2024    IR NEPHROSTOMY TUBE CHANGE RIGHT  6/26/2024    IR NEPHROSTOMY TUBE CHANGE RIGHT  8/28/2024    IR NEPHROSTOMY TUBE CHANGE RIGHT  10/30/2024    IR PORT PLACEMENT >5 YEARS  11/24/2020    IR SITE CHECK/EVALUATION  4/11/2022    NEPHROSTOMY      DE ESOPHAGOGASTRODUODENOSCOPY TRANSORAL DIAGNOSTIC N/A 11/4/2020    Procedure: ESOPHAGOGASTRODUODENOSCOPY (EGD);  Surgeon: Paul Masters MD;  Location: [facility];  Service: Gastroenterology    DE LAP,PROSTATECTOMY,RADICAL,W/NERVE SPARE,INCL ROBOTIC Bilateral 09/01/2015    Procedure: DAVINCI RADICAL RETROPUBIC PROSTATECTOMY BILATERAL PELVIC LYMPH NODE DISSECTION;  Surgeon: Juan C Velazco MD;  Location: [facility];  Service: Urology    PROSTATE BIOPSY  06/08/2015    Ultrasound-guided transrectal biopsy.    SURGERY SCROTAL / TESTICULAR      for undescended testes, removed due to atrophy with vasectomy    ZZHC COLONOSCOPY W/WO BRUSH/WASH N/A 11/4/2020    Procedure: COLONOSCOPY WITH BIOPSY;  Surgeon: Paul Masters MD;  Location: [facility];  Service: Gastroenterology      Allergies   Allergen Reactions    Bee Venom Hives      Social History     Tobacco Use    Smoking status: Never    Smokeless tobacco: Never   Substance Use Topics    Alcohol use: Not Currently     Comment: Alcoholic Drinks/day: rarely      Wt Readings from Last 1 Encounters:   06/26/25 65.8 kg (145 lb 1 oz)        Anesthesia Evaluation            ROS/MED HX  ENT/Pulmonary:       Neurologic:       Cardiovascular:     (+) Dyslipidemia hypertension- -   -  - -                                      METS/Exercise Tolerance:     Hematologic:       Musculoskeletal:       GI/Hepatic:     (+)             liver disease,       Renal/Genitourinary: Comment: UTI    (+) renal  "disease, type: ARF and CRI,            Endo:       Psychiatric/Substance Use:       Infectious Disease:       Malignancy:   (+) Malignancy, History of GI and Prostate.GI CA  Active status post Chemo.      Other:              Physical Exam  Airway  Mallampati: II  TM distance: >3 FB  Neck ROM: full    Cardiovascular - normal exam  Rhythm: regular  Rate: normal rate     Dental   (+) Edentulous  Comments: Upper and lower plates      Pulmonary - normal exam      Neurological - normal exam  He appears awake, alert and oriented x3.    Other Findings       OUTSIDE LABS:  CBC:   Lab Results   Component Value Date    WBC 6.6 06/26/2025    WBC 6.3 06/25/2025    HGB 8.4 (L) 06/26/2025    HGB 7.5 (L) 06/26/2025    HCT 24.3 (L) 06/26/2025    HCT 30.0 (L) 06/25/2025     06/26/2025     06/25/2025     BMP:   Lab Results   Component Value Date     (L) 06/26/2025     06/25/2025    POTASSIUM 5.3 06/26/2025    POTASSIUM 5.3 06/25/2025    CHLORIDE 106 06/26/2025    CHLORIDE 108 (H) 06/25/2025    CO2 20 (L) 06/26/2025    CO2 20 (L) 06/25/2025    BUN 34.8 (H) 06/26/2025    BUN 32.2 (H) 06/25/2025    CR 3.21 (H) 06/26/2025    CR 2.61 (H) 06/25/2025    GLC 91 06/26/2025    GLC 95 06/25/2025     COAGS:   Lab Results   Component Value Date    PTT 33 05/08/2023    INR 1.31 (H) 05/08/2023     POC: No results found for: \"BGM\", \"HCG\", \"HCGS\"  HEPATIC:   Lab Results   Component Value Date    ALBUMIN 2.9 (L) 06/03/2025    PROTTOTAL 4.9 (L) 06/01/2025    ALT 16 06/01/2025    AST 34 06/01/2025    ALKPHOS 57 06/01/2025    BILITOTAL 0.6 06/01/2025     OTHER:   Lab Results   Component Value Date    LACT 1.3 06/26/2025    A1C 4.8 05/30/2025    AARON 8.9 06/26/2025    PHOS 3.4 06/04/2025    MAG 2.1 06/04/2025    LIPASE 23 05/30/2025       Anesthesia Plan    ASA Status:  3       Anesthesia Type: General.  Airway: supraglottic airway.  Maintenance: Inhalation.   Techniques and Equipment:     - Airway:  Planned airway equipment " includes supraglottic airway.     - Monitoring Plan: standard ASA monitoring     Consents    Anesthesia Plan(s) and associated risks, benefits, and realistic alternatives discussed. Questions answered and patient/representative(s) expressed understanding.     - Discussed:     - Discussed with:  Patient        - Pt is DNR/DNI Status: no DNR     Blood Consent:      - Discussed with: not discussed.     Postoperative Care         Comments:                   Sarah Wachter, MD    I have reviewed the pertinent notes and labs in the chart from the past 30 days and (re)examined the patient.  Any updates or changes from those notes are reflected in this note.    Clinically Significant Risk Factors Present on Admission         # Hyponatremia: Lowest Na = 133 mmol/L in last 2 days, will monitor as appropriate  # Hyperchloremia: Highest Cl = 108 mmol/L in last 2 days, will monitor as appropriate           # Drug Induced Coagulation Defect: home medication list includes an anticoagulant medication         # Anemia: based on hgb <11           # Financial/Environmental Concerns:

## 2025-06-26 NOTE — PLAN OF CARE
Problem: Adult Inpatient Plan of Care  Goal: Plan of Care Review  Description: The Plan of Care Review/Shift note should be completed every shift.  The Outcome Evaluation is a brief statement about your assessment that the patient is improving, declining, or no change.  This information will be displayed automatically on your shift  note.  Outcome: Progressing  Flowsheets (Taken 6/26/2025 6623)  Outcome Evaluation: pt up to urinate independently  Plan of Care Reviewed With: patient  Overall Patient Progress: improving  Goal: Absence of Hospital-Acquired Illness or Injury  Outcome: Progressing  Intervention: Identify and Manage Fall Risk  Recent Flowsheet Documentation  Taken 6/26/2025 0355 by Arley Ferguson RN  Safety Promotion/Fall Prevention: safety round/check completed  Taken 6/25/2025 2311 by Arley Ferguson RN  Safety Promotion/Fall Prevention: safety round/check completed  Taken 6/25/2025 1946 by Arley Ferguson RN  Safety Promotion/Fall Prevention: safety round/check completed  Intervention: Prevent Skin Injury  Recent Flowsheet Documentation  Taken 6/26/2025 0355 by Arley Ferguson RN  Body Position: position changed independently  Taken 6/25/2025 2311 by Arley Ferguson RN  Body Position: position changed independently  Taken 6/25/2025 1946 by Arley Ferguson RN  Body Position: position changed independently  Intervention: Prevent Infection  Recent Flowsheet Documentation  Taken 6/26/2025 0355 by Arley Ferguson RN  Infection Prevention: hand hygiene promoted  Taken 6/25/2025 2311 by Arley Ferguson RN  Infection Prevention: hand hygiene promoted  Taken 6/25/2025 1946 by Arley Ferguson RN  Infection Prevention: hand hygiene promoted  Goal: Optimal Comfort and Wellbeing  Outcome: Progressing  Intervention: Monitor Pain and Promote Comfort  Recent Flowsheet Documentation  Taken 6/25/2025 2113 by Arley Ferguson RN  Pain Management Interventions:   medication (see  MAR)   emotional support  PRIMARY DIAGNOSIS: ACUTE RENAL COLIC    OUTPATIENT/OBSERVATION GOALS TO BE MET BEFORE DISCHARGE  1. Pain Status: Pain free.    2. Tolerating adequate PO diet: Yes    3. Surgical Intervention planned: No    4. Cleared by consultants (if involved): No    5. Return to near baseline physical activity: Yes    Discharge Planner Nurse   Safe discharge environment identified: Yes  Barriers to discharge: Yes       Entered by: Arley Ferguson RN 06/26/2025 5:36 AM     Please review provider order for any additional goals.   Nurse to notify provider when observation goals have been met and patient is ready for discharge.   Goal Outcome Evaluation:pt continues to have pink urine. Less pain and discomfort with urination. Up independently to the bathroom. Port a cath not accessed.       Plan of Care Reviewed With: patient    Overall Patient Progress: improvingOverall Patient Progress: improving    Outcome Evaluation: pt up to urinate independently

## 2025-06-26 NOTE — ANESTHESIA CARE TRANSFER NOTE
Patient: Rich Wolff    Procedure: Procedure(s):  CYSTOSCOPY, WITH RETROGRADE PYELOGRAM AND URETERAL STENT REPLACEMENT       Diagnosis: Pseudomonas urinary tract infection [N39.0, B96.5]  Fever, unspecified fever cause [R50.9]  Acute kidney injury [N17.9]  Diagnosis Additional Information: No value filed.    Anesthesia Type:   General     Note:    Oropharynx: oropharynx clear of all foreign objects and spontaneously breathing  Level of Consciousness: awake  Oxygen Supplementation: face mask  Level of Supplemental Oxygen (L/min / FiO2): 6  Independent Airway: airway patency satisfactory and stable  Dentition: dentition unchanged  Vital Signs Stable: post-procedure vital signs reviewed and stable  Report to RN Given: handoff report given  Patient transferred to: PACU    Handoff Report: Identifed the Patient, Identified the Reponsible Provider, Reviewed the pertinent medical history, Discussed the surgical course, Reviewed Intra-OP anesthesia mangement and issues during anesthesia, Set expectations for post-procedure period and Allowed opportunity for questions and acknowledgement of understanding      Vitals:  Vitals Value Taken Time   /62 06/26/25   1623   Temp 98.6 06/26/25   1623   Pulse 88 06/26/25   1623   Resp 16 06/26/25   1623   SpO2 99 06/26/25   1623       Electronically Signed By: GRACE Cuello CRNA  June 26, 2025  4:23 PM

## 2025-06-26 NOTE — PLAN OF CARE
PRIMARY DIAGNOSIS: ACUTE RENAL COLIC    OUTPATIENT/OBSERVATION GOALS TO BE MET BEFORE DISCHARGE  1. Pain Status: Pain free.    2. Tolerating adequate PO diet: Yes    3. Surgical Intervention planned: No    4. Cleared by consultants (if involved): No    5. Return to near baseline physical activity: Yes    Discharge Planner Nurse   Safe discharge environment identified: Yes  Barriers to discharge: Yes       Entered by: Arley Ferguson RN 06/26/2025 3:51 AM     Please review provider order for any additional goals.   Nurse to notify provider when observation goals have been met and patient is ready for discharge.Goal Outcome Evaluation: pt is alert and oriented. Denies pain or discomfort expect during urination. Urine still pink. Up independently to the bathroom.

## 2025-06-26 NOTE — PLAN OF CARE
PRIMARY DIAGNOSIS: ACUTE RENAL COLIC    OUTPATIENT/OBSERVATION GOALS TO BE MET BEFORE DISCHARGE  1. Pain Status: Pain free.    2. Tolerating adequate PO diet: Yes    3. Surgical Intervention planned: N/A    4. Cleared by consultants (if involved): No    5. Return to near baseline physical activity: Yes    Discharge Planner Nurse   Safe discharge environment identified: Yes  Barriers to discharge: Yes       Entered by: Arley Ferguson RN 06/26/2025 5:33 AM     Please review provider order for any additional goals.   Nurse to notify provider when observation goals have been met and patient is ready for discharge.Goal Outcome Evaluation: pt is alert and oriented. Denies pain is constant but instead while he is voiding. Urine continues to be pink. Ileostomy covered pouch attached.Port a cath not access. Pt using PIV.

## 2025-06-26 NOTE — OP NOTE
Date of surgery: 6/26/2025    Location: [facility] [place]    Operating room:     Surgeon: Dr. Parag Phillips.     Assistant: None    Anesthesia: General    Preoperative diagnosis: Left ureteral obstruction, malfunctioning stent urinary tract infection    Postoperative diagnosis: Same    Procedure: Cystoscopy, left retrograde pyelogram, left ureteral stent exchange    Operative indication: Rich Wolff is a 64 year old male who has unresectable colon cancer obstructing the left ureter.  A stent was placed last month.  He has developed worsening urgency and frequency of urination.  A urine culture grew out two different Pseudomonas aeruginosa species.  CT scan shows improvement in hydronephrosis but not resolution.  There is also question about the stent looping around on itself.  He presents now for stent exchange.    Operative findings: There is flocculent matter on the stent terminus within the bladder.  When I placed the new stent, there was drainage of flocculent matter as well.    Operative procedure: The patient was brought to the operating room.  General anesthesia was administered.  The patient was placed in lithotomy position and prepared and draped in sterile fashion.  I introduced a 22 [place] cystoscope per urethra and into the bladder.  There are no urethral strictures.  The urine within the bladder is cloudy.  I drained and filled the bladder several times.  There is flocculent matter on the stent.  I grabbed the stent and brought it to the urethral meatus.  I passed a Moduslyson wire into the kidney.  I discarded the stent.  I inserted a 5 [place]se open-ended ureteral catheter over the wire and into the kidney.  I performed a retrograde pyelogram by injecting approximately 10 mL of 50% diluted contrast media.  There is no extravasation.  There are no filling defects.  The wire was repositioned and a 6 [place]se by 24 cm double-J stent is placed.  It coils into the lower pole with some redundancy.  I  therefore pulled it back and reinserted the wire.  I filled the collecting system with contrast again.  I repositioned the stent so that the coil is in the renal pelvis.  After removing the wire, the stent is seen to be in good position in the kidney as well as in the bladder by fluoroscopy.  The bladder was emptied and the procedure terminated.      Of note, in the time it took to reinsert the scope and reposition the stent, flocculent material had appeared on the distal stent terminus within the bladder suggesting infection within the kidney.    Drains: Double-J stent in the left ureter    Specimens: None    Estimated blood loss: None    Complications: None    Parag Phillips MD

## 2025-06-26 NOTE — PROGRESS NOTES
Place of Service:  [facility]     Reason for follow up: Left flank pain, complicated UTI, chronic left hydronephrosis secondary to ureteral obstruction from colon cancer and managed with left ureteral stent     SUBJECTIVE:  Events: T102.8 on 6/25 afternoon, afebrile this AM. WBC 6.6 (on chemo). LA 1.3. BC pending. No flank/abdominal pain today. Pt c/o urinary urgency/frequency and mild dysuria. States he experienced mild urinary urgency/frequency after stent was placed but significantly worsened with infection.     OBJECTIVE:  PHYSICAL EXAM:  Temp: 98.8  F (37.1  C) Temp src: Oral BP: 98/59 Pulse: 91   Resp: 18 SpO2: 98 % O2 Device: None (Room air)    General: NAD, alert, cooperative  Head: normocephalic, without abnormality / atraumatic  Abdomen: soft, non tender, non distended. no suprapubic fullness, no suprapubic tenderness. No bilateral CVA tenderness.   Genitourinary: deferred  Musculoskeletal: moves all four extremities equally.   Psychological: alert and oriented, answers questions appropriately    LABS:  Creatinine   Date Value Ref Range Status   06/26/2025 3.21 (H) 0.67 - 1.17 mg/dL Final   12/23/2020 1.33 (H) 0.66 - 1.25 mg/dL Final     WBC   Date Value Ref Range Status   12/24/2020 5.4 4.0 - 11.0 10e9/L Final     WBC Count   Date Value Ref Range Status   06/26/2025 6.6 4.0 - 11.0 10e3/uL Final     Hemoglobin   Date Value Ref Range Status   06/26/2025 8.4 (L) 13.3 - 17.7 g/dL Final   12/24/2020 7.7 (L) 13.3 - 17.7 g/dL Final     Platelet Count   Date Value Ref Range Status   06/26/2025 234 150 - 450 10e3/uL Final   12/24/2020 424 150 - 450 10e9/L Final       UA:  UA RESULTS:  Recent Labs   Lab Test 06/25/25  0223 10/28/21  1754 11/04/20  1736   COLOR Red*   < > Yellow   APPEARANCE Bloody*   < > Clear   URINEGLC Negative   < > Negative   URINEBILI 1.0 mg/dL*   < > Negative   URINEKETONE Negative   < > 100 mg/dL*   SG 1.025   < > 1.020   UBLD >1.0 mg/dL*   < > Negative   URINEPH 6.0   < > 6.0    PROTEIN 70*   < > Negative   UROBILINOGEN  --   --  <2.0 E.U./dL   NITRITE Negative   < > Negative   LEUKEST 250 Marlon/uL*   < > Negative   RBCU >182*   < >  --    WBCU 0   < >  --     < > = values in this interval not displayed.     CULTURES  Urine Culture 6/25/25: pending   Urine Culture 6/18/25:   >100,000 CFU/mL Pseudomonas [id number],000-100,000 CFU/mL Pseudomonas aeruginosa   Susceptibility                Pseudomonas aeruginosa (1) Pseudomonas aeruginosa (2)       CRISTHIAN CRISTHIAN       Cefepime 2 ug/mL Susceptible 2 ug/mL Susceptible       Ceftazidime 2 ug/mL Susceptible 2 ug/mL Susceptible       Ciprofloxacin 0.12 ug/mL Susceptible 0.12 ug/mL Susceptible       Levofloxacin 0.25 ug/mL Susceptible 0.5 ug/mL Susceptible       Meropenem <=0.25 ug/mL Susceptible <=0.25 ug/mL Susceptible       Piperacillin/Tazobactam 8 ug/mL Susceptible 8 ug/mL Susceptible         Blood Culture 6/25: pending       Lab Results: personally reviewed.     ASSESSMENT/PLAN:  Rich Wolff is being seen by [facility] for complicated UTI, chronic left hydronephrosis secondary to ureteral obstruction from colon cancer and managed with left ureteral stent     - Left flank/abdominal pain resolved today.  Worsened urgency/frequency with infection. Febrile last evening. 6/25 BC and UC pending. UC 6/18: pseudomonas.WBC 6.6 (on chemo). Creatinine 3.21 today, 2.61 on 6/25 and 2.75 on 6/4/25.  Given new fever, creat bump and recent pseudomonas culture, recommend cysto/left retrograde pyelogram/left ureteral stent exchange vs PNT placement and stent removal. Discussed options with patient and he prefers stent exchange at this time. Arranging for this afternoon with Dr. Phillips.   - PVR pending.   - Keep NPO for procedure.     This case was discussed with:  Dr Parag Kumar, APRN, CNP  [facility]   [phone]

## 2025-06-26 NOTE — PHARMACY-VANCOMYCIN DOSING SERVICE
Pharmacy Vancomycin Initial Note  Date of Service 2025  Patient's  1960  64 year old, male    Indication: Sepsis and Urinary Tract Infection    Current estimated CrCl = Estimated Creatinine Clearance: 21.6 mL/min (A) (based on SCr of 3.21 mg/dL (H)).    Creatinine for last 3 days  2025:  2:15 AM Creatinine 2.65 mg/dL;  3:57 PM Creatinine 2.61 mg/dL  2025:  6:34 AM Creatinine 3.21 mg/dL    Recent Vancomycin Level(s) for last 3 days  No results found for requested labs within last 3 days.      Vancomycin IV Administrations (past 72 hours)        No vancomycin orders with administrations in past 72 hours.                    Nephrotoxins and other renal medications (From now, onward)      Start     Dose/Rate Route Frequency Ordered Stop    25 0830  vancomycin (VANCOCIN) 1,750 mg in sodium chloride 0.9 % 500 mL intermittent infusion         1,750 mg  over 2 Hours Intravenous ONCE 25 0807      25 0804  vancomycin place diaz - receiving intermittent dosing         1 each Intravenous SEE ADMIN INSTRUCTIONS 25 0807              Contrast Orders - past 72 hours (72h ago, onward)      None                Plan:  Load vancomycin 1750 mg (~25 mg/kg) IV once followed by intermittent dosing given poor baseline renal function and currently worsening.  Vancomycin monitoring method: trough  Vancomycin therapeutic monitoring goal: 15-20 mg/L  Pharmacy will check vancomycin levels as appropriate in 1-3 Days.    Serum creatinine levels will be ordered daily for the first week of therapy and at least twice weekly for subsequent weeks.      Tong Weeks, [facility]

## 2025-06-26 NOTE — PROGRESS NOTES
Patient admitted to room 03 at approximately 0700 via cart from emergency room.  Reason for Admission: bloody urine with pain.   Report received from: Asiya SILVA  Patient was accompanied by Self.  Discharge transportation provided by:  Patient ambulated/transferred:  independently. self.  Patient is alert and orientated x 3.  Outpatient Observation education provided to: (patient, family, friend)  MDRO Education done if applicable (MRSA, VRE, etc)  Safety risks were identified during admission:  none.   Yellow risk/fall band applied:  No  Home meds sent home: Yes  Home meds sent to pharmacy:No IF YES add 1/2 sheet laminated page reminder to chart/clipboard   Detailed Belongings: see note

## 2025-06-26 NOTE — ANESTHESIA POSTPROCEDURE EVALUATION
Patient: Rich Wolff    Procedure: Procedure(s):  CYSTOSCOPY, WITH RETROGRADE PYELOGRAM AND URETERAL STENT REPLACEMENT       Anesthesia Type:  General    Note:  Disposition: Outpatient   Postop Pain Control: Uneventful            Sign Out: Well controlled pain   PONV: No   Neuro/Psych: Uneventful            Sign Out: Acceptable/Baseline neuro status   Airway/Respiratory: Uneventful            Sign Out: Acceptable/Baseline resp. status   CV/Hemodynamics: Uneventful            Sign Out: Acceptable CV status; No obvious hypovolemia; No obvious fluid overload   Other NRE: NONE   DID A NON-ROUTINE EVENT OCCUR? No           Last vitals:  Vitals Value Taken Time   /58 06/26/25 16:45   Temp 36.4  C (97.6  F) 06/26/25 16:45   Pulse 86 06/26/25 16:54   Resp 41 06/26/25 16:54   SpO2 100 % 06/26/25 16:54   Vitals shown include unfiled device data.    Electronically Signed By: Wesly Mitchell MD  June 26, 2025  5:03 PM

## 2025-06-26 NOTE — ANESTHESIA PROCEDURE NOTES
Airway       Patient location during procedure: OR  Staff -        CRNA: Oscar Call APRN CRNA       Performed By: CRNA  Consent for Airway        Urgency: elective  Indications and Patient Condition       Indications for airway management: tobin-procedural       Induction type:intravenous       Mask difficulty assessment: 0 - not attempted    Final Airway Details       Final airway type: supraglottic airway    Supraglottic Airway Details        Type: LMA       Brand: I-Gel       LMA size: 5    Post intubation assessment        Placement verified by: capnometry, equal breath sounds and chest rise        Number of attempts at approach: 1       Number of other approaches attempted: 0       Secured with: commercial tube diaz and tape       Ease of procedure: easy       Dentition: Intact and Unchanged

## 2025-06-27 LAB
ANION GAP SERPL CALCULATED.3IONS-SCNC: 9 MMOL/L (ref 7–15)
BUN SERPL-MCNC: 30.3 MG/DL (ref 8–23)
CALCIUM SERPL-MCNC: 9 MG/DL (ref 8.8–10.4)
CHLORIDE SERPL-SCNC: 110 MMOL/L (ref 98–107)
CREAT SERPL-MCNC: 2.83 MG/DL (ref 0.67–1.17)
EGFRCR SERPLBLD CKD-EPI 2021: 24 ML/MIN/1.73M2
ERYTHROCYTE [DISTWIDTH] IN BLOOD BY AUTOMATED COUNT: 19 % (ref 10–15)
GLUCOSE SERPL-MCNC: 89 MG/DL (ref 70–99)
HCO3 SERPL-SCNC: 19 MMOL/L (ref 22–29)
HCT VFR BLD AUTO: 22.6 % (ref 40–53)
HGB BLD-MCNC: 7 G/DL (ref 13.3–17.7)
HGB BLD-MCNC: 7.4 G/DL (ref 13.3–17.7)
MCH RBC QN AUTO: 32.7 PG (ref 26.5–33)
MCHC RBC AUTO-ENTMCNC: 31 G/DL (ref 31.5–36.5)
MCV RBC AUTO: 106 FL (ref 78–100)
MCV RBC AUTO: 106 FL (ref 78–100)
PLATELET # BLD AUTO: 224 10E3/UL (ref 150–450)
POTASSIUM SERPL-SCNC: 5 MMOL/L (ref 3.4–5.3)
RBC # BLD AUTO: 2.14 10E6/UL (ref 4.4–5.9)
SODIUM SERPL-SCNC: 138 MMOL/L (ref 135–145)
WBC # BLD AUTO: 4.4 10E3/UL (ref 4–11)

## 2025-06-27 PROCEDURE — 85018 HEMOGLOBIN: CPT

## 2025-06-27 PROCEDURE — 99418 PROLNG IP/OBS E/M EA 15 MIN: CPT | Mod: FS | Performed by: HOSPITALIST

## 2025-06-27 PROCEDURE — 36415 COLL VENOUS BLD VENIPUNCTURE: CPT | Performed by: UROLOGY

## 2025-06-27 PROCEDURE — 250N000011 HC RX IP 250 OP 636: Performed by: UROLOGY

## 2025-06-27 PROCEDURE — 120N000001 HC R&B MED SURG/OB

## 2025-06-27 PROCEDURE — 258N000003 HC RX IP 258 OP 636: Performed by: UROLOGY

## 2025-06-27 PROCEDURE — 85014 HEMATOCRIT: CPT | Performed by: UROLOGY

## 2025-06-27 PROCEDURE — 99207 PR APP CREDIT; MD BILLING SHARED VISIT: CPT | Mod: FS

## 2025-06-27 PROCEDURE — 250N000013 HC RX MED GY IP 250 OP 250 PS 637: Performed by: UROLOGY

## 2025-06-27 PROCEDURE — 99233 SBSQ HOSP IP/OBS HIGH 50: CPT | Mod: FS | Performed by: HOSPITALIST

## 2025-06-27 PROCEDURE — 80048 BASIC METABOLIC PNL TOTAL CA: CPT | Performed by: UROLOGY

## 2025-06-27 PROCEDURE — 36415 COLL VENOUS BLD VENIPUNCTURE: CPT

## 2025-06-27 RX ORDER — CEFPODOXIME PROXETIL 200 MG/1
200 TABLET, FILM COATED ORAL DAILY
Status: DISCONTINUED | OUTPATIENT
Start: 2025-06-27 | End: 2025-06-27

## 2025-06-27 RX ADMIN — Medication 1 TABLET: at 08:23

## 2025-06-27 RX ADMIN — FERROUS SULFATE TAB 325 MG (65 MG ELEMENTAL FE) 650 MG: 325 (65 FE) TAB at 08:23

## 2025-06-27 RX ADMIN — TRAZODONE HYDROCHLORIDE 50 MG: 50 TABLET ORAL at 21:43

## 2025-06-27 RX ADMIN — GABAPENTIN 300 MG: 300 CAPSULE ORAL at 21:42

## 2025-06-27 RX ADMIN — ROPINIROLE HYDROCHLORIDE 0.25 MG: 0.25 TABLET, FILM COATED ORAL at 21:43

## 2025-06-27 RX ADMIN — SERTRALINE HYDROCHLORIDE 50 MG: 50 TABLET ORAL at 21:43

## 2025-06-27 RX ADMIN — Medication 50 MCG: at 08:23

## 2025-06-27 RX ADMIN — CEFEPIME HYDROCHLORIDE 2 G: 2 INJECTION, POWDER, FOR SOLUTION INTRAVENOUS at 08:23

## 2025-06-27 RX ADMIN — FENTANYL 1 PATCH: 50 PATCH TRANSDERMAL at 20:04

## 2025-06-27 RX ADMIN — SODIUM CHLORIDE, SODIUM LACTATE, POTASSIUM CHLORIDE, AND CALCIUM CHLORIDE: .6; .31; .03; .02 INJECTION, SOLUTION INTRAVENOUS at 03:13

## 2025-06-27 RX ADMIN — TAMSULOSIN HYDROCHLORIDE 0.4 MG: 0.4 CAPSULE ORAL at 08:23

## 2025-06-27 RX ADMIN — FERROUS SULFATE TAB 325 MG (65 MG ELEMENTAL FE) 325 MG: 325 (65 FE) TAB at 20:04

## 2025-06-27 RX ADMIN — OXYCODONE HYDROCHLORIDE 5 MG: 5 TABLET ORAL at 11:17

## 2025-06-27 ASSESSMENT — ACTIVITIES OF DAILY LIVING (ADL)
ADLS_ACUITY_SCORE: 45
ADLS_ACUITY_SCORE: 46
ADLS_ACUITY_SCORE: 45
ADLS_ACUITY_SCORE: 46
ADLS_ACUITY_SCORE: 46
ADLS_ACUITY_SCORE: 45
ADLS_ACUITY_SCORE: 46
ADLS_ACUITY_SCORE: 46
ADLS_ACUITY_SCORE: 45
ADLS_ACUITY_SCORE: 46
ADLS_ACUITY_SCORE: 45
ADLS_ACUITY_SCORE: 46
ADLS_ACUITY_SCORE: 46
ADLS_ACUITY_SCORE: 45

## 2025-06-27 NOTE — PLAN OF CARE
A&O. VSS on RA. Ambulated x1. Voided x2. Ostomy pink. Fentanyl patch in place. Tolerating reg diet. Incentive encouraged.

## 2025-06-27 NOTE — PLAN OF CARE
Problem: Pain Acute  Goal: Optimal Pain Control and Function  Intervention: Prevent or Manage Pain  Recent Flowsheet Documentation  Taken 6/27/2025 0500 by Osiris Frances RN  Medication Review/Management: medications reviewed  Taken 6/26/2025 1954 by Osiris Frances RN  Medication Review/Management: medications reviewed   Goal Outcome Evaluation:      Plan of Care Reviewed With: patient    Overall Patient Progress: improvingOverall Patient Progress: improving    Pt admitted due to recurrent UTI with pseudomonas. Pt had urethral stent placement POD1.  Fentanyl patch in place. Complains of back pain ressolved with PRN Oxy 5mg. Voiding spontaneous. VSS in RA. L PIV running LR 100ml/hr. No N/V noted. Able to ambulate to bathroom SBA. Ostomy intact.     Osiris Frances RN

## 2025-06-27 NOTE — PROGRESS NOTES
Care Management Follow Up    Length of Stay (days): 1    Expected Discharge Date: 06/28/2025    Anticipated Discharge Plan:  Home    Transportation: Confirmed spouse    PT Recommendations:  NA  OT Recommendations:   NA     Barriers to Discharge: medical stability, anemia, cultures pending, labile BP    Prior Living Situation: house with spouse    Discussed  Partnership in Safe Discharge Planning  document with patient/family: No     Handoff Completed: Yes, [facility] PCP: Internal handoff referral completed    Patient/Spokesperson Updated: No    Additional Information:  Plan is home with family support when medically cleared for discharge. Patient is independent with activities of daily living and instrumental activities of daily living (IADLs) at baseline.     Next Steps:  No care management needs identified at this time but CM will continue to monitor progression of care, review team recommendations and provide discharge planning assist as needed.      Alyse Aguiar RN

## 2025-06-27 NOTE — PHARMACY-VANCOMYCIN DOSING SERVICE
Pharmacy Vancomycin Note  Date of Service 2025  Patient's  1960   64 year old, male    Indication: Sepsis and Urinary Tract Infection  Day of Therapy: 2  Current vancomycin regimen: intermittent dosing  Current vancomycin monitoring method: Trough (Method 1 = dosing nomogram)  Current vancomycin therapeutic monitoring goal: 15-20 mg/L    Current estimated CrCl = Estimated Creatinine Clearance: 24.5 mL/min (A) (based on SCr of 2.83 mg/dL (H)).    Creatinine for last 3 days  2025:  2:15 AM Creatinine 2.65 mg/dL;  3:57 PM Creatinine 2.61 mg/dL  2025:  6:34 AM Creatinine 3.21 mg/dL  2025:  6:52 AM Creatinine 2.83 mg/dL    Recent Vancomycin Levels (past 3 days)  No results found for requested labs within last 3 days.    Vancomycin IV Administrations (past 72 hours)                     vancomycin (VANCOCIN) 1,750 mg in sodium chloride 0.9 % 500 mL intermittent infusion (mg) 1,750 mg Given 25 1012                    Nephrotoxins and other renal medications (From now, onward)      Start     Dose/Rate Route Frequency Ordered Stop    25 1200  vancomycin (VANCOCIN) 750 mg in 0.9% NaCl 250 mL intermittent infusion         750 mg  over 60 Minutes Intravenous EVERY 24 HOURS 25 0810                 Contrast Orders - past 72 hours (72h ago, onward)      Start     Dose/Rate Route Frequency Stop    25 1609  iohexol (OMNIPAQUE) 300 mg/mL injection  Status:  Discontinued           PRN 25 1622            Interpretation of current regimen:  Renal function improved, will start maintenance regimen.     Has serum creatinine changed greater than 50% in last 72 hours: No    Urine output: unable to determine    Renal Function: Improving    InsightRX Prediction of Planned New Vancomycin Regimen  Loading dose: 1750 mg  Regimen: 750 mg IV every 24 hours.  Start time: 12:00 on 2025  Exposure target: AUC24 (range) 400-600 mg/L.hr   AUC24,ss: 592 mg/L.hr  Probability of AUC24 >  400: 85 %  Ctrough,ss: 20.5 mg/L  Probability of Ctrough,ss > 20: 52 %  Probability of nephrotoxicity (Lodise ROCIO 2009): 18 %    Plan:  Start vancomycin 750 mg IV q24h.  Vancomycin monitoring method: AUC  Vancomycin therapeutic monitoring goal: 400-600 mg*h/L  Pharmacy will check vancomycin levels as appropriate in 1-3 Days.  Serum creatinine levels will be ordered daily for the first week of therapy and at least twice weekly for subsequent weeks.    NISH JARAMILLO RP

## 2025-06-27 NOTE — PROGRESS NOTES
Place of Service:  [facility]     Reason for follow up: Left flank pain, complicated UTI, chronic left hydronephrosis secondary to ureteral obstruction from colon cancer and managed with left ureteral stent,     SUBJECTIVE:  Events: status post left ureteral stent exchange  Patient reports feeling much better.  Less flank pain.  Occasional increased urinary urgency and frequency to me but not too bothered by this.  No fevers or chills.    OBJECTIVE:  PHYSICAL EXAM:  Temp: 98  F (36.7  C) Temp src: Oral BP: 90/55 Pulse: 85   Resp: 18 SpO2: 99 % O2 Device: None (Room air) Oxygen Delivery: 8 LPM  General: NAD, alert, cooperative  Head: normocephalic, without abnormality / atraumatic  Abdomen: soft, non tender, non distended. no suprapubic fullness, no suprapubic tenderness. No bilateral CVA tenderness.   Musculoskeletal: moves all four extremities equally.   Psychological: alert and oriented, answers questions appropriately    LABS:  Creatinine   Date Value Ref Range Status   06/27/2025 2.83 (H) 0.67 - 1.17 mg/dL Final   12/23/2020 1.33 (H) 0.66 - 1.25 mg/dL Final     WBC   Date Value Ref Range Status   12/24/2020 5.4 4.0 - 11.0 10e9/L Final     WBC Count   Date Value Ref Range Status   06/27/2025 4.4 4.0 - 11.0 10e3/uL Final     Hemoglobin   Date Value Ref Range Status   06/27/2025 7.4 (L) 13.3 - 17.7 g/dL Final   12/24/2020 7.7 (L) 13.3 - 17.7 g/dL Final     Platelet Count   Date Value Ref Range Status   06/27/2025 224 150 - 450 10e3/uL Final   12/24/2020 424 150 - 450 10e9/L Final       UA:  UA RESULTS:  Recent Labs   Lab Test 06/25/25  0223 10/28/21  1754 11/04/20  1736   COLOR Red*   < > Yellow   APPEARANCE Bloody*   < > Clear   URINEGLC Negative   < > Negative   URINEBILI 1.0 mg/dL*   < > Negative   URINEKETONE Negative   < > 100 mg/dL*   SG 1.025   < > 1.020   UBLD >1.0 mg/dL*   < > Negative   URINEPH 6.0   < > 6.0   PROTEIN 70*   < > Negative   UROBILINOGEN  --   --  <2.0 E.U./dL   NITRITE Negative    < > Negative   LEUKEST 250 Marlon/uL*   < > Negative   RBCU >182*   < >  --    WBCU 0   < >  --     < > = values in this interval not displayed.     CULTURES  Urine Culture 6/25/25: pending   Urine Culture 6/18/25:   >100,000 CFU/mL Pseudomonas [id number],000-100,000 CFU/mL Pseudomonas aeruginosa   Susceptibility                Pseudomonas aeruginosa (1) Pseudomonas aeruginosa (2)       CRISTHIAN CRISTHIAN       Cefepime 2 ug/mL Susceptible 2 ug/mL Susceptible       Ceftazidime 2 ug/mL Susceptible 2 ug/mL Susceptible       Ciprofloxacin 0.12 ug/mL Susceptible 0.12 ug/mL Susceptible       Levofloxacin 0.25 ug/mL Susceptible 0.5 ug/mL Susceptible       Meropenem <=0.25 ug/mL Susceptible <=0.25 ug/mL Susceptible       Piperacillin/Tazobactam 8 ug/mL Susceptible 8 ug/mL Susceptible         Blood Culture 6/25: pending       Lab Results: personally reviewed.     ASSESSMENT/PLAN:  Rich Wolff is being seen by [facility] for complicated UTI, chronic left hydronephrosis secondary to ureteral obstruction from colon cancer and managed with left ureteral stent     - POD#1 left ureteral stent stent exchange creatinin has improved, now 2.83.  Hemoglobin 7.4  - Vitally stable  - Urine culture positive for Pseudomonas and gram-negative bacilli.  Continue with antibiotics. Will need to discharge home with 7-10 days of oral antibiotics  - Supportive care according to primary team  - Will need to stent exchange in 2 to 3 months  - Email sent for follow-up with our clinic in 2 to 3 weeks    Angelina Ramirez PA-C   [facility]   [phone]

## 2025-06-27 NOTE — PROGRESS NOTES
Pt asked for his chemo med. Contacted Dr Santiago Dasilva,  Oncology confirmed to hold the med. Pt notified.    Osiris Frances RN

## 2025-06-27 NOTE — PLAN OF CARE
Problem: Adult Inpatient Plan of Care  Goal: Optimal Comfort and Wellbeing  Intervention: Monitor Pain and Promote Comfort  Recent Flowsheet Documentation  Taken 6/27/2025 1117 by Yue Mckeon RN  Pain Management Interventions: medication (see MAR)     Pt denies pain at rest. Has frequency and urgency with voiding. Occasional left flank pain. Gave oxycodone with relief. Up voiding with urinal. Empties his own ostomy bag. Ostomy bag was leaking, bag changed by patient. Hgb was 7.4 this morning, provider aware. Received cefepine IV this morning. VSS.

## 2025-06-27 NOTE — PROGRESS NOTES
[facility]    Medicine Progress Note - Hospitalist Service    Date of Admission:  6/25/2025    Assessment & Plan     Rich Wolff is a 64 year old male with PMH PE on xarelto, colon cancer, left ureteral obstruction s/p stent placement, UTI, was admitted on 6/25/2025 with acute cystitis after he presents with 1 day of flank pain and hematuria, found to have recurrent UTI this time with pseudomonas. No s/p left ureteral stent exchange.     Severe sepsis 2/2 acute cystitis with hematuria  Pseudomonas UTI  -- Abdominal CT-Moderate diffuse wall thickening of a nondistended urinary bladder. Additionally, there is haziness in the perivesical fat. These findings could relate to cystitis.  Interval placement of a left ureteral stent since 5/30/2025. The proximal portion of the stent is curled back upon itself within the left renal pelvis with its proximal pigtail in the proximal left ureter. There is residual mild dilatation of the left intrarenal collecting system, decreased in caliber since the prior study.   -- UA positive for leuk esterase and WBC. Nitrate negative.  -- Urine culture resulted Gram negative bacilli and Pseudomonas   -- Spiked fevers first evening inpatient. Has been afebrile since 6/25 at 1700  -- Received 1 liter bolus in ER. 1L bolus 6/26 and 100 ml/hr LR - now stopped   -- Lactic and WBC normal  -- Creatinine improving 2.61-->3.21-->2.83  -- Soft BP's on and off  -- Was started on zosyn initially. Per sepsis protocol on 6/26, switched to cefepime and vancomycin - Vancomycin stopped today 6/27  -- Blood cultures drawn and pending   -- Urology consulted-Given new fever, creat bump and recent pseudomonas culture, recommend cysto/left retrograde pyelogram/left ureteral stent exchange vs PNT placement and stent removal. Discussed options with patient and he prefers stent exchange at this time.   -- Tylenol, oxycodone, dilaudid as needed for pain      CHAYO on CKD 3b likely 2/2  sepsis  -- Creatinine-2.65-->2.61-->3.21-->2.83  -- Received IVF's  -- Trend BMP     Hyponatremia likely 2/2 dehydration  -- Sodium-139-->133-->138  -- 1 liter bolus in ER. 1L bolus 6/26 and 100 ml/hr LR - now stopped   -- Trend BMP.      PE  -- PTA xarelto-on hold due to acute anemia and urology procedure - Restart 6/28     Colon cancer   -- PTA Lonsurf on hold while in hospital per oncology     Anemia-chronic-BIRDIE  -- Hemoglobin-9.2-->7.5-->8.4-->7.4  -- Anemia is chronic with recent blood transfusion on 6/1   -- No overt signs of bleeding  -- PTA xarelto held until after urology procedure - Restart 6/28  -- Trend CBC        Diet: Regular Diet Adult    DVT Prophylaxis: DOAC  Adkins Catheter: Not present  Lines: PRESENT      Port a Cath Single Lumen Left Chest wall-Site Assessment: WDL (not accessed)      Cardiac Monitoring: None  Code Status: Full Code      Clinically Significant Risk Factors Present on Admission         # Hyponatremia: Lowest Na = 133 mmol/L in last 2 days, will monitor as appropriate  # Hyperchloremia: Highest Cl = 110 mmol/L in last 2 days, will monitor as appropriate           # Drug Induced Coagulation Defect: home medication list includes an anticoagulant medication         # Anemia: based on hgb <11           # Financial/Environmental Concerns:           Social Drivers of Health    Stress: Stress Concern Present (2/23/2021)    Received from [facility] - Occupational Stress Questionnaire     Feeling of Stress : Rather much          Disposition Plan     Medically Ready for Discharge: Anticipated Tomorrow      The patient's care was discussed with the Attending Physician, Dr. Peterson and Patient.    I was present with the advanced practice registered nurse, Jillian Baxter, who participated in the service and in the documentation of this note. I have verified the history and personally performed the physical exam and medical decision making. I agree  with the assessment and plan of care as documented in the note.       Anna Ivey  Hospitalist Service  [facility]  Securely message with [employer] (more info)  Text page via [employer] Paging/Directory   ______________________________________________________________________    Physical Exam   Vital Signs: Temp: 98  F (36.7  C) Temp src: Oral BP: 90/55 Pulse: 85   Resp: 18 SpO2: 99 % O2 Device: None (Room air) Oxygen Delivery: 8 LPM  Weight: 145 lbs 1 oz    Constitutional: awake, alert, cooperative, no apparent distress, and appears stated age  Respiratory: No increased work of breathing, good air exchange, clear to auscultation bilaterally, no crackles or wheezing  Cardiovascular: Normal apical impulse, regular rate and rhythm, normal S1 and S2, no S3 or S4, and no murmur noted  GI: No scars, normal bowel sounds, soft, non-distended, non-tender, no masses palpated, no hepatosplenomegally    Medical Decision Making       45 MINUTES SPENT BY ME on the date of service doing chart review, history, exam, documentation & further activities per the note.      Data   Imaging results reviewed over the past 24 hrs:   Recent Results (from the past 24 hours)   XR Surgery ANÍBAL Fluoro L/T 5 Min    Narrative    This exam was marked as non-reportable because it will not be read by a   radiologist or a [place] non-radiologist provider.

## 2025-06-28 LAB
ANION GAP SERPL CALCULATED.3IONS-SCNC: 8 MMOL/L (ref 7–15)
BACTERIA UR CULT: ABNORMAL
BACTERIA UR CULT: ABNORMAL
BLD PROD TYP BPU: NORMAL
BLOOD COMPONENT TYPE: NORMAL
BUN SERPL-MCNC: 26 MG/DL (ref 8–23)
CALCIUM SERPL-MCNC: 8.8 MG/DL (ref 8.8–10.4)
CHLORIDE SERPL-SCNC: 111 MMOL/L (ref 98–107)
CODING SYSTEM: NORMAL
CREAT SERPL-MCNC: 2.57 MG/DL (ref 0.67–1.17)
CROSSMATCH: NORMAL
EGFRCR SERPLBLD CKD-EPI 2021: 27 ML/MIN/1.73M2
ERYTHROCYTE [DISTWIDTH] IN BLOOD BY AUTOMATED COUNT: 18.8 % (ref 10–15)
ERYTHROCYTE [DISTWIDTH] IN BLOOD BY AUTOMATED COUNT: 18.9 % (ref 10–15)
GLUCOSE SERPL-MCNC: 89 MG/DL (ref 70–99)
HCO3 SERPL-SCNC: 19 MMOL/L (ref 22–29)
HCT VFR BLD AUTO: 21.9 % (ref 40–53)
HCT VFR BLD AUTO: 22.3 % (ref 40–53)
HGB BLD-MCNC: 6.9 G/DL (ref 13.3–17.7)
HGB BLD-MCNC: 7 G/DL (ref 13.3–17.7)
HGB BLD-MCNC: 8 G/DL (ref 13.3–17.7)
HGB BLD-MCNC: 8.6 G/DL (ref 13.3–17.7)
HOLD SPECIMEN: NORMAL
ISSUE DATE AND TIME: NORMAL
MCH RBC QN AUTO: 33 PG (ref 26.5–33)
MCH RBC QN AUTO: 33 PG (ref 26.5–33)
MCHC RBC AUTO-ENTMCNC: 31.4 G/DL (ref 31.5–36.5)
MCHC RBC AUTO-ENTMCNC: 31.5 G/DL (ref 31.5–36.5)
MCV RBC AUTO: 100 FL (ref 78–100)
MCV RBC AUTO: 101 FL (ref 78–100)
MCV RBC AUTO: 105 FL (ref 78–100)
MCV RBC AUTO: 105 FL (ref 78–100)
PLATELET # BLD AUTO: 219 10E3/UL (ref 150–450)
PLATELET # BLD AUTO: 229 10E3/UL (ref 150–450)
POTASSIUM SERPL-SCNC: 4.5 MMOL/L (ref 3.4–5.3)
RBC # BLD AUTO: 2.09 10E6/UL (ref 4.4–5.9)
RBC # BLD AUTO: 2.12 10E6/UL (ref 4.4–5.9)
SODIUM SERPL-SCNC: 138 MMOL/L (ref 135–145)
UNIT ABO/RH: NORMAL
UNIT NUMBER: NORMAL
UNIT STATUS: NORMAL
UNIT TYPE ISBT: 6200
WBC # BLD AUTO: 2.5 10E3/UL (ref 4–11)
WBC # BLD AUTO: 2.7 10E3/UL (ref 4–11)

## 2025-06-28 PROCEDURE — 99418 PROLNG IP/OBS E/M EA 15 MIN: CPT | Mod: FS | Performed by: HOSPITALIST

## 2025-06-28 PROCEDURE — 250N000011 HC RX IP 250 OP 636: Performed by: UROLOGY

## 2025-06-28 PROCEDURE — 99207 PR APP CREDIT; MD BILLING SHARED VISIT: CPT | Mod: FS

## 2025-06-28 PROCEDURE — 80048 BASIC METABOLIC PNL TOTAL CA: CPT

## 2025-06-28 PROCEDURE — 85014 HEMATOCRIT: CPT

## 2025-06-28 PROCEDURE — 85018 HEMOGLOBIN: CPT

## 2025-06-28 PROCEDURE — 250N000013 HC RX MED GY IP 250 OP 250 PS 637

## 2025-06-28 PROCEDURE — 120N000001 HC R&B MED SURG/OB

## 2025-06-28 PROCEDURE — 99233 SBSQ HOSP IP/OBS HIGH 50: CPT | Mod: FS | Performed by: HOSPITALIST

## 2025-06-28 PROCEDURE — 36415 COLL VENOUS BLD VENIPUNCTURE: CPT

## 2025-06-28 PROCEDURE — 85027 COMPLETE CBC AUTOMATED: CPT

## 2025-06-28 PROCEDURE — P9016 RBC LEUKOCYTES REDUCED: HCPCS

## 2025-06-28 PROCEDURE — 250N000013 HC RX MED GY IP 250 OP 250 PS 637: Performed by: UROLOGY

## 2025-06-28 RX ADMIN — ROPINIROLE HYDROCHLORIDE 0.25 MG: 0.25 TABLET, FILM COATED ORAL at 21:42

## 2025-06-28 RX ADMIN — FERROUS SULFATE TAB 325 MG (65 MG ELEMENTAL FE) 325 MG: 325 (65 FE) TAB at 20:35

## 2025-06-28 RX ADMIN — Medication 50 MCG: at 09:30

## 2025-06-28 RX ADMIN — FERROUS SULFATE TAB 325 MG (65 MG ELEMENTAL FE) 650 MG: 325 (65 FE) TAB at 09:30

## 2025-06-28 RX ADMIN — Medication 1 TABLET: at 09:30

## 2025-06-28 RX ADMIN — TRAZODONE HYDROCHLORIDE 50 MG: 50 TABLET ORAL at 21:42

## 2025-06-28 RX ADMIN — CEFEPIME HYDROCHLORIDE 2 G: 2 INJECTION, POWDER, FOR SOLUTION INTRAVENOUS at 10:20

## 2025-06-28 RX ADMIN — SERTRALINE HYDROCHLORIDE 50 MG: 50 TABLET ORAL at 21:42

## 2025-06-28 RX ADMIN — RIVAROXABAN 20 MG: 10 TABLET, FILM COATED ORAL at 09:31

## 2025-06-28 RX ADMIN — GABAPENTIN 300 MG: 300 CAPSULE ORAL at 21:42

## 2025-06-28 RX ADMIN — TAMSULOSIN HYDROCHLORIDE 0.4 MG: 0.4 CAPSULE ORAL at 09:30

## 2025-06-28 ASSESSMENT — ACTIVITIES OF DAILY LIVING (ADL)
ADLS_ACUITY_SCORE: 45

## 2025-06-28 NOTE — PROGRESS NOTES
Place of Service:  [facility]     Reason for follow up: Left flank pain, complicated UTI, chronic left hydronephrosis secondary to ureteral obstruction from colon cancer and managed with left ureteral stent,     SUBJECTIVE:  Events: downtrend in Hgb    Feels well this morning  Tolerating ureteral stent    OBJECTIVE:  PHYSICAL EXAM:  Temp: 97.5  F (36.4  C) Temp src: Oral BP: 103/56 Pulse: 68   Resp: 18 SpO2: 98 % O2 Device: None (Room air)    General: NAD, alert, cooperative  Head: normocephalic, without abnormality / atraumatic  Abdomen: soft, non tender, non distended. no suprapubic fullness, no suprapubic tenderness. No bilateral CVA tenderness.   Musculoskeletal: moves all four extremities equally.   Psychological: alert and oriented, answers questions appropriately    LABS:  Creatinine   Date Value Ref Range Status   06/28/2025 2.57 (H) 0.67 - 1.17 mg/dL Final   12/23/2020 1.33 (H) 0.66 - 1.25 mg/dL Final     WBC   Date Value Ref Range Status   12/24/2020 5.4 4.0 - 11.0 10e9/L Final     WBC Count   Date Value Ref Range Status   06/28/2025 2.5 (L) 4.0 - 11.0 10e3/uL Final     Hemoglobin   Date Value Ref Range Status   06/28/2025 6.9 (LL) 13.3 - 17.7 g/dL Final   12/24/2020 7.7 (L) 13.3 - 17.7 g/dL Final     Platelet Count   Date Value Ref Range Status   06/28/2025 229 150 - 450 10e3/uL Final   12/24/2020 424 150 - 450 10e9/L Final       UA:  UA RESULTS:  Recent Labs   Lab Test 06/25/25  0223 10/28/21  1754 11/04/20  1736   COLOR Red*   < > Yellow   APPEARANCE Bloody*   < > Clear   URINEGLC Negative   < > Negative   URINEBILI 1.0 mg/dL*   < > Negative   URINEKETONE Negative   < > 100 mg/dL*   SG 1.025   < > 1.020   UBLD >1.0 mg/dL*   < > Negative   URINEPH 6.0   < > 6.0   PROTEIN 70*   < > Negative   UROBILINOGEN  --   --  <2.0 E.U./dL   NITRITE Negative   < > Negative   LEUKEST 250 Marlon/uL*   < > Negative   RBCU >182*   < >  --    WBCU 0   < >  --     < > = values in this interval not displayed.      CULTURES  Blood Culture 6/26/25 - NGTD  Urine Culture 6/25/25: E coli, Pseudomonas   Urine Culture 6/18/25:   >100,000 CFU/mL Pseudomonas [id number],000-100,000 CFU/mL Pseudomonas aeruginosa   Susceptibility                Pseudomonas aeruginosa (1) Pseudomonas aeruginosa (2)       CRISTHIAN CRISTHIAN       Cefepime 2 ug/mL Susceptible 2 ug/mL Susceptible       Ceftazidime 2 ug/mL Susceptible 2 ug/mL Susceptible       Ciprofloxacin 0.12 ug/mL Susceptible 0.12 ug/mL Susceptible       Levofloxacin 0.25 ug/mL Susceptible 0.5 ug/mL Susceptible       Meropenem <=0.25 ug/mL Susceptible <=0.25 ug/mL Susceptible       Piperacillin/Tazobactam 8 ug/mL Susceptible 8 ug/mL Susceptible          Lab Results: personally reviewed.     ASSESSMENT/PLAN:  Rich Wolff is being seen by [facility] for complicated UTI, chronic left hydronephrosis secondary to ureteral obstruction from colon cancer and managed with left ureteral stent     - POD#2 left ureteral stent stent exchange   - Vitals stable, afebrile  -Downtrend in Hgb noted to 6.9. Unlikely  source but recommend racking urine (collect specimens from each void for Urology team to review), trend PVR via bladder scan to ensure emptying (and no clot retention), trend Hgb, transfusion per primary Medicine team  - Urine culture positive for Pseudomonas and E coli.  Continue with antibiotics. Will need to discharge home with 7-10 days of oral antibiotics based on sensitivities.  - Supportive care according to primary team  - Will need to stent exchange in 2 to 3 months  - Email sent for follow-up with our clinic in 2 to 3 weeks  - Urology will follow    Camacho Mora MD  [facility]  p) [phone]

## 2025-06-28 NOTE — PLAN OF CARE
Problem: Adult Inpatient Plan of Care  Goal: Optimal Comfort and Wellbeing  Outcome: Progressing     Problem: Pain Acute  Goal: Optimal Pain Control and Function  Outcome: Progressing     Problem: Sepsis/Septic Shock  Goal: Optimal Coping  Outcome: Progressing     Problem: Sensory Impairment  Goal: Optimal Sensory Management  Outcome: Progressing   Goal Outcome Evaluation:  Pt vitals stable excep for Bp soft 92/57, has minimal pain in the Right lower Abdominal quadrant, Fentanyl patch in place, Ostomy bag intact,  On regular diet and indep to the bathroom.

## 2025-06-28 NOTE — PLAN OF CARE
Problem: Adult Inpatient Plan of Care  Goal: Absence of Hospital-Acquired Illness or Injury  Intervention: Prevent Infection  Recent Flowsheet Documentation  Taken 6/28/2025 0900 by Yue Mckeon RN  Infection Prevention:   single patient room provided   hand hygiene promoted     Problem: Pain Acute  Goal: Optimal Pain Control and Function  Outcome: Progressing  Intervention: Prevent or Manage Pain  Recent Flowsheet Documentation  Taken 6/28/2025 0900 by Yue Mckeon RN  Medication Review/Management: medications reviewed     Problem: Sepsis/Septic Shock  Goal: Absence of Bleeding  Outcome: Progressing       Hgb 6.9, administered 1 unit of blood. No adverse reaction noted. Hgb after infusion was 8.6. Next hgb draw will be at 2200. Voiding. Using urinal at beside, instructed pt to call after using, for RN to bladder scan and also rack urine. Urine collected into specimen cup with date and time of void. See I+O flow sheet for urine output and PVR amounts.

## 2025-06-28 NOTE — PLAN OF CARE
PRIMARY DIAGNOSIS: Ureteral Stent placement  OUTPATIENT/OBSERVATION GOALS TO BE MET BEFORE DISCHARGE:  1. Stable vital signs Yes, except BP soft  2. Tolerating diet:Yes  3. Pain controlled with oral pain medications:  Yes  4. Positive bowel sounds:  Yes  5. Voiding without difficulty:  Yes  6. Able to ambulate:  Yes  7. Provider specific discharge goals met:  Yes    Discharge Planner Nurse   Safe discharge environment identified: Yes  Barriers to discharge: Yes       Entered by: Alessia Wen RN 06/27/2025 9:24 PM  Goal Outcome Evaluation:  Pt A&Ox4, VSS, denies pain, applied fentanyl patch on the LRQ of the abdomen. Ostomy bag intact.

## 2025-06-28 NOTE — PROGRESS NOTES
[facility]    Medicine Progress Note - Hospitalist Service    Date of Admission:  6/25/2025    Assessment & Plan   Rich Wolff is a 64 year old male with PMH PE on xarelto, colon cancer, left ureteral obstruction s/p stent placement, UTI, was admitted on 6/25/2025 with acute cystitis after he presents with 1 day of flank pain and hematuria, found to have recurrent UTI this time with pseudomonas.  Left flank pain, complicated UTI, chronic left hydronephrosis secondary to ureteral obstruction from colon cancer and managed with left ureteral stent, S/p left ureteral stent exchange on 6/27/2025. HGB trending down, 6.9 today on 6/28/2025. Patient denies bleeding no black tarry stools no blood in vomit. Patient states he would like to go home. Ordered to be 1 unit transfused.Trend hemoglobins every 8 hours. Patient likely discharge in the next few days hemoglobin stabilizes after unit of blood, and patient remains afebrile, with no new or worsening symptoms.       #Severe sepsis 2/2 acute cystitis with hematuria  #Pseudomonas and E.coli UTI associated with left ureteral stent  #POD#2 left ureteral stent exchange  -- Abdominal CT-Moderate diffuse wall thickening of a nondistended urinary bladder. Additionally, there is haziness in the perivesical fat. These findings could relate to cystitis.  Interval placement of a left ureteral stent since 5/30/2025. The proximal portion of the stent is curled back upon itself within the left renal pelvis with its proximal pigtail in the proximal left ureter. There is residual mild dilatation of the left intrarenal collecting system, decreased in caliber since the prior study.   -- UA positive for leuk esterase and WBC. Nitrate negative.  -- Urine culture resulted Gram negative bacilli and Pseudomonas   -- Spiked fevers first evening inpatient. Has been afebrile since 6/25 at 1700  -- Received 1 liter bolus in ER. 1L bolus 6/26 and 100 ml/hr LR - now  stopped   -- Lactic and WBC normal  -- Creatinine trending down 2.57 today  -- Soft BP's on and off  -- Was started on zosyn initially.  --- Per sepsis protocol on 6/26, switched to cefepime and vancomycin   - Vancomycin stopped today 6/27  -- Blood cultures drawn and pending   -- Tylenol, oxycodone, dilaudid as needed for pain   -Urology consult recommendation: POD#2 left ureteral stent stent exchange - Vitals stable, afebrile-Downtrend in Hgb noted to 6.9. Unlikely  source but recommend racking urine (collect specimens from each void for Urology team to review), trend PVR via bladder scan to ensure emptying (and no clot retention), trend Hgb, transfusion per primary Medicine team  - Urine culture positive for Pseudomonas and E coli.  Continue with antibiotics. Will need to discharge home with 7-10 days of oral antibiotics based on sensitivities.- Supportive care according to primary team- Will need to stent exchange in 2 to 3 months- Email sent for follow-up with our clinic in 2 to 3 weeks- Urology will follow    #CHAYO on CKD 3b likely 2/2 sepsis  -- Creatinine-2.65-->2.61-->3.21-->2.83--->2.5  -- Received IVF's  -- Trend BMP     #Hyponatremia likely 2/2 dehydration  -- Sodium-139-->133-->138  -- 1 liter bolus in ER. 1L bolus 6/26 and 100 ml/hr LR - now stopped   -- Trend BMP.      #PE  -- PTA xarelto-on hold due to acute anemia and urology procedure   - Restart 6/29 if hemoglobin stable after blood transfusion     #Colon cancer   -- PTA Lonsurf on hold while in hospital per oncology     #Anemia-chronic-BIRDIE  -- Hemoglobin-9.2-->7.5-->8.4-->7.4---> 6.9  -- Anemia is chronic with recent blood transfusion on 6/1   -Transfuse 1 unit PRBC on 6/28/2025 for hemoglobin 6.9  -- No overt signs of bleeding  -- PTA xarelto held  - Restart 6/29 if HGB stable after blood transfusion   -- Trend CBC in AM  --Trend HGB every 8 hours          Diet: Regular Diet Adult    DVT Prophylaxis: DOAC  Adkins Catheter: Not present  Lines:  PRESENT      Port a Cath Single Lumen Left Chest wall-Site Assessment: WDL      Cardiac Monitoring: None  Code Status: Full Code      Clinically Significant Risk Factors          # Hyperchloremia: Highest Cl = 111 mmol/L in last 2 days, will monitor as appropriate                             # Financial/Environmental Concerns:           Social Drivers of Health    Stress: Stress Concern Present (2/23/2021)    Received from [facility]    [employer] [place] [facility] [employer] - Occupational Stress Questionnaire     Feeling of Stress : Rather much          Disposition Plan     Medically Ready for Discharge: Anticipated in 2-4 Days           The patient's care was discussed with the Attending Physician, Dr. Peterson.    Viridiana White NP  Hospitalist Service  [facility]  Securely message with [employer] (more info)  Text page via [employer] Paging/Directory   ______________________________________________________________________    Interval History   HGB trending down, 6.9 today  Patient denies chest pain shortness of breath or dizziness  Denies nausea vomiting diarrhea  Patient denies bleeding no black tarry stools no blood in vomit  Patient states he would like to go home  1 unit transfused  Trend hemoglobins every 8 hours  Patient likely discharge tomorrow if hemoglobin stabilizes after unit of blood  Patient tolerating ureteral stent  Patient denies any pain    Physical Exam   Vital Signs: Temp: 97.5  F (36.4  C) Temp src: Oral BP: 96/62 Pulse: 63   Resp: 18 SpO2: 99 % O2 Device: None (Room air)    Weight: 145 lbs 1 oz    Constitutional: awake, alert, cooperative, no apparent distress, and appears stated age  Hematologic / Lymphatic: no cervical lymphadenopathy and no supraclavicular lymphadenopathy  Respiratory: No increased work of breathing, good air exchange, clear to auscultation bilaterally, no crackles or wheezing  Cardiovascular: Normal apical impulse, regular rate and rhythm, normal S1 and  S2, no S3 or S4, and no murmur noted  GI: No scars, normal bowel sounds, soft, non-distended, non-tender, no masses palpated, no hepatosplenomegally  Skin: no bruising or bleeding, normal skin color, texture, turgor, and no redness, warmth, or swelling  Musculoskeletal: There is no redness, warmth, or swelling of the joints.  Full range of motion noted.  Motor strength is 5 out of 5 all extremities bilaterally.  Tone is normal.  Neurologic: Awake, alert, oriented to name, place and time.  Cranial nerves II-XII are grossly intact.  Motor is 5 out of 5 bilaterally.  Cerebellar finger to nose, heel to shin intact.  Sensory is intact.  Babinski down going, Romberg negative, and gait is normal.  Neuropsychiatric: General: normal, calm, and normal eye contact    Medical Decision Making       50 MINUTES SPENT BY ME on the date of service doing chart review, history, exam, documentation & further activities per the note.      Data     I have personally reviewed the following data over the past 24 hrs:    2.5 (L)  \   6.9 (LL)   / 229     138 111 (H) 26.0 (H) /  89   4.5 19 (L) 2.57 (H) \       Imaging results reviewed over the past 24 hrs:   No results found for this or any previous visit (from the past 24 hours).

## 2025-06-28 NOTE — PROGRESS NOTES
Care Management Follow Up    Length of Stay (days): 2    Expected Discharge Date: 06/29/2025    Anticipated Discharge Plan:  Home    Transportation: Anticipate Family/friend/ Wife     PT Recommendations:    OT Recommendations:        Barriers to Discharge: medical stability/ hemoglobin low, needs unit of blood. Urology following.     Prior Living Situation: house with spouse    Discussed  Partnership in Safe Discharge Planning  document with patient/family: No     Handoff Completed: Yes, [facility] PCP: Internal handoff referral completed    Patient/Spokesperson Updated: Yes. Who? Pt     Additional Information:    Pt moving around Ind in hospital room. Pt declines any needs from CM. Plans to return home at discharge.       Next Steps: No further care management intervention anticipated at this time.  Please re-consult if further needs arise.  Care management signing off.        Mouna Baker RN

## 2025-06-29 VITALS
RESPIRATION RATE: 18 BRPM | WEIGHT: 149.3 LBS | BODY MASS INDEX: 23.71 KG/M2 | OXYGEN SATURATION: 100 % | TEMPERATURE: 97.7 F | HEART RATE: 58 BPM | SYSTOLIC BLOOD PRESSURE: 117 MMHG | DIASTOLIC BLOOD PRESSURE: 70 MMHG

## 2025-06-29 LAB
ANION GAP SERPL CALCULATED.3IONS-SCNC: 7 MMOL/L (ref 7–15)
BUN SERPL-MCNC: 23.8 MG/DL (ref 8–23)
CALCIUM SERPL-MCNC: 9 MG/DL (ref 8.8–10.4)
CHLORIDE SERPL-SCNC: 112 MMOL/L (ref 98–107)
CREAT SERPL-MCNC: 2.41 MG/DL (ref 0.67–1.17)
EGFRCR SERPLBLD CKD-EPI 2021: 29 ML/MIN/1.73M2
ERYTHROCYTE [DISTWIDTH] IN BLOOD BY AUTOMATED COUNT: 18.9 % (ref 10–15)
GLUCOSE SERPL-MCNC: 88 MG/DL (ref 70–99)
HCO3 SERPL-SCNC: 20 MMOL/L (ref 22–29)
HCT VFR BLD AUTO: 25.7 % (ref 40–53)
HGB BLD-MCNC: 8.2 G/DL (ref 13.3–17.7)
HGB BLD-MCNC: 8.2 G/DL (ref 13.3–17.7)
MCH RBC QN AUTO: 32.5 PG (ref 26.5–33)
MCHC RBC AUTO-ENTMCNC: 31.9 G/DL (ref 31.5–36.5)
MCV RBC AUTO: 102 FL (ref 78–100)
MCV RBC AUTO: 102 FL (ref 78–100)
PLATELET # BLD AUTO: 204 10E3/UL (ref 150–450)
POTASSIUM SERPL-SCNC: 4.5 MMOL/L (ref 3.4–5.3)
RBC # BLD AUTO: 2.52 10E6/UL (ref 4.4–5.9)
SODIUM SERPL-SCNC: 139 MMOL/L (ref 135–145)
WBC # BLD AUTO: 3 10E3/UL (ref 4–11)

## 2025-06-29 PROCEDURE — 85018 HEMOGLOBIN: CPT

## 2025-06-29 PROCEDURE — 99239 HOSP IP/OBS DSCHRG MGMT >30: CPT | Mod: FS

## 2025-06-29 PROCEDURE — 250N000011 HC RX IP 250 OP 636: Performed by: UROLOGY

## 2025-06-29 PROCEDURE — 250N000011 HC RX IP 250 OP 636

## 2025-06-29 PROCEDURE — 99207 PR APP CREDIT; MD BILLING SHARED VISIT: CPT | Mod: FS | Performed by: HOSPITALIST

## 2025-06-29 PROCEDURE — 85014 HEMATOCRIT: CPT

## 2025-06-29 PROCEDURE — 80048 BASIC METABOLIC PNL TOTAL CA: CPT

## 2025-06-29 PROCEDURE — 250N000013 HC RX MED GY IP 250 OP 250 PS 637: Performed by: UROLOGY

## 2025-06-29 RX ORDER — HEPARIN SODIUM (PORCINE) LOCK FLUSH IV SOLN 100 UNIT/ML 100 UNIT/ML
5-10 SOLUTION INTRAVENOUS
Status: DISCONTINUED | OUTPATIENT
Start: 2025-06-29 | End: 2025-06-29 | Stop reason: HOSPADM

## 2025-06-29 RX ORDER — CEFPODOXIME PROXETIL 200 MG/1
200 TABLET, FILM COATED ORAL DAILY
Qty: 10 TABLET | Refills: 0 | Status: SHIPPED | OUTPATIENT
Start: 2025-06-29 | End: 2025-07-09

## 2025-06-29 RX ORDER — CIPROFLOXACIN 500 MG/1
500 TABLET, FILM COATED ORAL DAILY
Qty: 10 TABLET | Refills: 0 | Status: SHIPPED | OUTPATIENT
Start: 2025-06-29 | End: 2025-07-09

## 2025-06-29 RX ADMIN — Medication 50 MCG: at 09:21

## 2025-06-29 RX ADMIN — Medication 1 TABLET: at 09:21

## 2025-06-29 RX ADMIN — HEPARIN 5 ML: 100 SYRINGE at 11:38

## 2025-06-29 RX ADMIN — CEFEPIME HYDROCHLORIDE 2 G: 2 INJECTION, POWDER, FOR SOLUTION INTRAVENOUS at 09:22

## 2025-06-29 RX ADMIN — FERROUS SULFATE TAB 325 MG (65 MG ELEMENTAL FE) 650 MG: 325 (65 FE) TAB at 09:20

## 2025-06-29 RX ADMIN — TAMSULOSIN HYDROCHLORIDE 0.4 MG: 0.4 CAPSULE ORAL at 09:21

## 2025-06-29 ASSESSMENT — ACTIVITIES OF DAILY LIVING (ADL)
ADLS_ACUITY_SCORE: 45

## 2025-06-29 NOTE — DISCHARGE SUMMARY
[facility]  Hospitalist Discharge Summary      Date of Admission:  6/25/2025  Date of Discharge:  6/29/2025  Discharging Provider: Viridiana White NP  Discharge Service: Hospitalist Service    Discharge Diagnoses   Complicated urinary tract infection, Pseudomonas and E. coli positive culture  Chronic left hydronephrosis  Left ureteral stent placement  Colon cancer  Anemia    Clinically Significant Risk Factors          Follow-ups Needed After Discharge   Follow-up Appointments       Follow Up      Follow up with Urology, within 2-3 weeks. to evaluate medication change.   Follow up with Urology for stent exchange 2-3 months        Hospital Follow-up with Existing Primary Care Provider (PCP)          Schedule Primary Care visit within: 7 Days   Recommended labs and Imaging (to be ordered by Primary Care Provider): BMP, CBC, follow-up kidney fuction and Hgb               Unresulted Labs Ordered in the Past 30 Days of this Admission       Date and Time Order Name Status Description    6/26/2025  7:52 AM Blood Culture Peripheral blood (BC) Hand, Left Preliminary     6/26/2025  7:52 AM Blood Culture Peripheral blood (BC) Arm, Left Preliminary             Discharge Disposition   Discharged to home  Condition at discharge: Stable    Hospital Course   Rich Wolff is a 64 year old male with PMH PE on xarelto, colon cancer, left ureteral obstruction s/p stent placement, UTI, was admitted on 6/25/2025 with acute cystitis after he presents with 1 day of flank pain and hematuria, found to have recurrent UTI this time with pseudomonas.  Left flank pain, complicated UTI, chronic left hydronephrosis secondary to ureteral obstruction from colon cancer and managed with left ureteral stent, S/p left ureteral stent exchange on 6/27/2025. HGB stable today after blood transfusion, last hemoglobin 8.2. Patient states he he feels fine and would like to go home.  Patient agrees with plan to discharge to home  and follow-up with urology and primary care doctor outpatient as.     #Severe sepsis 2/2 acute cystitis with hematuria  #Pseudomonas and E.coli UTI associated with left ureteral stent  #POD# 3 left ureteral stent exchange  -- Abdominal CT-Moderate diffuse wall thickening of a nondistended urinary bladder. Additionally, there is haziness in the perivesical fat. These findings could relate to cystitis.  Interval placement of a left ureteral stent since 5/30/2025. The proximal portion of the stent is curled back upon itself within the left renal pelvis with its proximal pigtail in the proximal left ureter. There is residual mild dilatation of the left intrarenal collecting system, decreased in caliber since the prior study.   -- UA positive for leuk esterase and WBC. Nitrate negative.  -- Urine culture resulted Gram negative bacilli and Pseudomonas   -- Spiked fevers first evening inpatient. Has been afebrile since 6/25 at 1700  -- Received 1 liter bolus in ER. 1L bolus 6/26 and 100 ml/hr LR - now stopped   -- Lactic and WBC normal  -- Creatinine trending down 2.57 today  -- Soft BP's on and off  -- Was started on zosyn initially.  --- Per sepsis protocol on 6/26, switched to cefepime and vancomycin   - Vancomycin stopped today 6/27  -- Blood cultures drawn and pending   -- Tylenol, oxycodone, dilaudid as needed for pain   -Urology consult recommendation:POD#3 left ureteral stent stent exchange - Vitals stable, afebrile-Downtrend in Hgb noted to 6.9 so given 1 U PRBCs with good response on 6/28/25 to Hgb of > 8. His racked urine specimens on 6/29/25 show raven urine with some brown sediment discoloration therefore any acute blood loss is not from the unlikely from the  system. Furthermore, PVRs have been low and acceptable.  - Urine culture positive for Pseudomonas and E coli.  Continue with antibiotics. Will need to discharge home with 7-10 days of oral antibiotics based on sensitivities.  - Supportive care  according to primary team- Will need to stent exchange in 2 to 3 months  - Email sent for follow-up with our clinic in 2 to 3 weeks- Ok to discharge from Urology perspective.- Urology will sign off. Please re-consult / page with any further questions or concerns.  Cefpodoxime and Cipro x 10 days prescribed for home      #CHAYO on CKD 3b likely 2/2 sepsis  -- Creatinine-2.65-->2.61-->3.21-->2.83--->2.5---> 2.4  Creatinine trending down  -- Received IVF's  -- Trend BMP in 1 week with primary care doctor     #Hyponatremia likely 2/2 dehydration  -- Sodium-139-->133-->138  -- 1 liter bolus in ER. 1L bolus 6/26 and 100 ml/hr LR - now stopped   -- Trend BMP in 1 week with primary care doctor     #PE  -- PTA xarelto-on hold due to acute anemia and urology procedure   - Restart on discharge, hemoglobin stable after blood transfusion     #Colon cancer   -- PTA Lonsurf on hold while in hospital per oncology     #Anemia-chronic-BIRDIE  -- Hemoglobin-9.2-->7.5-->8.4-->7.4---> 6.9  -- Anemia is chronic with recent blood transfusion on 6/1   -Transfuse 1 unit PRBC on 6/28/2025 for hemoglobin 6.9  -- No overt signs of bleeding  -- PTA xarelto held  - Restart 6/29 if HGB stable after blood transfusion   -- Trend CBC in AM  --Trend HGB every 8 hours   HGB trending down, 6.9 today on 6/28/2025. Patient denies bleeding no black tarry stools no blood in vomit. Patient states he would like to go home. Ordered to be 1 unit transfused.Trend hemoglobins every 8 hours. Patient likely discharge in the next few days hemoglobin stabilizes after unit of blood, and patient remains afebrile, with no new or worsening symptoms.         Consultations This Hospital Stay   UROLOGY IP CONSULT  CARE MANAGEMENT / SOCIAL WORK IP CONSULT  PHARMACY TO DOSE VANCO    Code Status   Full Code    Time Spent on this Encounter   I, Viridiana White NP, personally saw the patient today and spent greater than 30 minutes discharging this patient.       RENETTA Guillen  [facility] EXTENDED RECOVERY AND SHORT STAY  [address] [place] [address]  Phone: [phone]  Fax: [phone]  ______________________________________________________________________    Physical Exam   Vital Signs: Temp: 97.7  F (36.5  C) Temp src: Oral BP: 117/70 Pulse: 58   Resp: 18 SpO2: 100 % O2 Device: None (Room air)    Weight: 149 lbs 4.8 oz  Constitutional: awake, alert, cooperative, no apparent distress, and appears stated age  Hematologic / Lymphatic: no cervical lymphadenopathy and no supraclavicular lymphadenopathy  Respiratory: No increased work of breathing, good air exchange, clear to auscultation bilaterally, no crackles or wheezing  Cardiovascular: Normal apical impulse, regular rate and rhythm, normal S1 and S2, no S3 or S4, and no murmur noted  GI: No scars, normal bowel sounds, soft, non-distended, non-tender, no masses palpated, no hepatosplenomegally  Genitounirinary: Adkins:urine is raven in color with some mild maroon to brown sediment in the racked urine specimens   Skin: no bruising or bleeding, normal skin color, texture, turgor, and no redness, warmth, or swelling  Musculoskeletal: There is no redness, warmth, or swelling of the joints.    Neurologic: Awake, alert, oriented to name, place and time.   Neuropsychiatric: General: normal, calm, and normal eye contact       Primary Care Physician   Maycol Worrell    Discharge Orders      Primary Care - Care Coordination Referral      Reason for your hospital stay    Pseudomonas urinary tract infection     Activity    Your activity upon discharge: activity as tolerated     Tubes and Drains    Current Tubes and Drains:     CVC Line  Duration           Port a Cath Single Lumen Left Chest wall 1676 days        Drain  Duration           Ostomy Ileostomy RLQ 1648 days    Ureteral Drain/Stent Left ureter 7 fr <1 day              Follow Up    Follow up with Urology, within 2-3 weeks. to evaluate medication  change.   Follow up with Urology for stent exchange 2-3 months     Diet    Follow this diet upon discharge: Current Diet:Orders Placed This Encounter      Regular Diet Adult     Hospital Follow-up with Existing Primary Care Provider (PCP)            Significant Results and Procedures   Most Recent 3 CBC's:  Recent Labs   Lab Test 06/29/25  0640 06/28/25  2230 06/28/25  1354 06/28/25  0916 06/28/25  0700   WBC 3.0*  --   --  2.5* 2.7*   HGB 8.2*  8.2* 8.0* 8.6* 6.9* 7.0*   *  102* 101* 100 105* 105*     --   --  229 219     Most Recent 3 BMP's:  Recent Labs   Lab Test 06/29/25  0640 06/28/25  0700 06/27/25  0652    138 138   POTASSIUM 4.5 4.5 5.0   CHLORIDE 112* 111* 110*   CO2 20* 19* 19*   BUN 23.8* 26.0* 30.3*   CR 2.41* 2.57* 2.83*   ANIONGAP 7 8 9   AARON 9.0 8.8 9.0   GLC 88 89 89   ,   Results for orders placed or performed during the hospital encounter of 06/25/25   CT Abdomen Pelvis w/o Contrast    Narrative    EXAM: CT ABDOMEN AND PELVIS WITHOUT CONTRAST - RENAL STONE PROTOCOL  LOCATION: [facility]  DATE/TIME: 06/25/2025 3:39 AM CDT    INDICATION: History of unresectable colon cancer. Recent stent placement. Left flank pain and hematuria.  COMPARISON: 05/30/2025 - CT chest, abdomen and pelvis.    TECHNIQUE: CT scan of the abdomen and pelvis was performed without oral or IV contrast. Multiplanar reformats were obtained. Dose reduction techniques were used.  CONTRAST: None.    FINDINGS:    LOWER CHEST: Unremarkable.    HEPATOBILIARY: A few gallstones in a nondistended gallbladder.    SPLEEN: Unremarkable.    PANCREAS: Unremarkable.    ADRENAL GLANDS: Unremarkable.    KIDNEYS/BLADDER: Moderate diffuse right renal atrophy. Interval placement of a left ureteral stent with proximal portion curled back on itself within the renal pelvis with its proximal pigtail in the proximal ureter. The distal pigtail is in the urinary   bladder lumen. No visualized renal or ureteral  calculi bilaterally. Slight dilatation of the left intrarenal collecting system. No ureteral dilatation. No visualized bladder calculi. Moderate diffuse wall thickening of a nondistended urinary bladder.   Haziness is present in the perivesical fat.    BOWEL: The stomach, small and large bowel are normal in caliber. Prior bowel surgery with right lower quadrant ostomy. An irregular-shaped soft tissue attenuation mass is present in the right lower quadrant, abutting the cecum, ileum, and distal sigmoid   colon (for example, series 3 image 130). This measures up to 5.9 x 3.0 cm in axial dimensions.    LYMPH NODES: Unremarkable.    PELVIC ORGANS: No acute findings.    MUSCULOSKELETAL: No acute findings.    OTHER: Atherosclerotic calcification in the abdominal aorta.      Impression    IMPRESSION:   1.  Moderate diffuse wall thickening of a nondistended urinary bladder. Additionally, there is haziness in the perivesical fat. These findings could relate to cystitis. Recommend clinical correlation.  2.  Interval placement of a left ureteral stent since 5/30/2025. The proximal portion of the stent is curled back upon itself within the left renal pelvis with its proximal pigtail in the proximal left ureter. There is residual mild dilatation of the   left intrarenal collecting system, decreased in caliber since the prior study.   3.  6 cm irregular-shaped soft tissue mass in the right hemipelvis that likely relates to the known unresectable colon neoplasm, not convincingly changed.  4.  No other acute abnormality identified in the abdomen or pelvis.       XR Surgery ANÍBAL Fluoro L/T 5 Min    Narrative    This exam was marked as non-reportable because it will not be read by a   radiologist or a [place] non-radiologist provider.             Discharge Medications      Review of your medicines        START taking        Dose / Directions   cefpodoxime 200 MG tablet  Commonly known as: VANTIN  Used for: Pseudomonas urinary tract  infection      Dose: 200 mg  Take 1 tablet (200 mg) by mouth daily for 10 days.  Quantity: 10 tablet  Refills: 0     ciprofloxacin 500 MG tablet  Commonly known as: CIPRO  Used for: Pseudomonas urinary tract infection      Dose: 500 mg  Take 1 tablet (500 mg) by mouth daily for 10 days.  Quantity: 10 tablet  Refills: 0            CONTINUE these medicines which have NOT CHANGED        Dose / Directions   acetaminophen 500 MG tablet  Commonly known as: TYLENOL      Dose: 1,000 mg  Take 1,000 mg by mouth every 6 hours as needed for pain.  Refills: 0     calcium carbonate-vitamin D 600-10 MG-MCG per tablet  Commonly known as: CALTRATE      Dose: 1 tablet  Take 1 tablet by mouth daily.  Refills: 0     fentaNYL 50 mcg/hr 72 hr patch  Commonly known as: DURAGESIC      Dose: 1 patch  Place 1 patch onto the skin every 72 hours.  Refills: 0     * ferrous sulfate 325 (65 Fe) MG EC tablet  Commonly known as: FE TABS      Dose: 650 mg  Take 650 mg by mouth every morning.  Refills: 0     * ferrous sulfate 325 (65 Fe) MG tablet  Commonly known as: FEROSUL      Dose: 325 mg  Take 325 mg by mouth every evening.  Refills: 0     gabapentin 300 MG capsule  Commonly known as: NEURONTIN      Dose: 300 mg  Take 300 mg by mouth at bedtime.  Refills: 0     Lonsurf 15-6.14 MG tablet  Generic drug: trifluridine-tipiracil      Dose: 2 tablet  Take 2 tablets by mouth See Admin Instructions. Take 2 tablets by mouth twice daily on days 1-5 and 15-19 of each 28 day cycle.  Refills: 0     MULTIVITAMIN PO      Multivitamin powder: take 1 scoop daily  Refills: 0     oxyCODONE 5 MG tablet  Commonly known as: ROXICODONE      Dose: 5 mg  Take 5 mg by mouth every 4 hours as needed for severe pain  Refills: 0     rOPINIRole 0.25 MG tablet  Commonly known as: REQUIP      Dose: 0.25 mg  Take 0.25 mg by mouth at bedtime.  Refills: 0     sertraline 50 MG tablet  Commonly known as: ZOLOFT      Dose: 50 mg  Take 50 mg by mouth At Bedtime  Refills: 0      tamsulosin 0.4 MG capsule  Commonly known as: FLOMAX      Dose: 0.4 mg  Take 0.4 mg by mouth daily  Refills: 0     traZODone 50 MG tablet  Commonly known as: DESYREL      Dose: 1 tablet  Take 1 tablet by mouth at bedtime.  Refills: 0     vitamin D3 50 mcg (2000 units) tablet  Commonly known as: CHOLECALCIFEROL      Dose: 1 tablet  Take 1 tablet by mouth daily  Refills: 0     XARELTO ANTICOAGULANT 20 MG Tabs tablet  Generic drug: rivaroxaban ANTICOAGULANT      Dose: 20 mg  Take 20 mg by mouth daily  Refills: 0           * This list has 2 medication(s) that are the same as other medications prescribed for you. Read the directions carefully, and ask your doctor or other care provider to review them with you.                   Where to get your medicines        These medications were sent to [id number] [place] [facility] [address] AT [facility] [address]  [address], [facility] [address]      Phone: [phone]   cefpodoxime 200 MG tablet  ciprofloxacin 500 MG tablet       Allergies   Allergies   Allergen Reactions    Bee Venom Hives

## 2025-06-29 NOTE — PLAN OF CARE
Problem: Adult Inpatient Plan of Care  Goal: Plan of Care Review  Description: The Plan of Care Review/Shift note should be completed every shift.  The Outcome Evaluation is a brief statement about your assessment that the patient is improving, declining, or no change.  This information will be displayed automatically on your shift  note.  Outcome: Progressing  Problem: Pain Acute  Goal: Optimal Pain Control and Function  Outcome: Progressing     Problem: Sepsis/Septic Shock  Goal: Optimal Coping  Outcome: Progressing  Goal: Absence of Bleeding  Outcome: Progressing     Problem: Fall Injury Risk  Goal: Absence of Fall and Fall-Related Injury  Outcome: Progressing        Goal Outcome Evaluation:  Pt alert and oriented denied pain, Vitals stable, , voided 350 ml once, bladder scanned,sample collected, labelled placed on the sink. Ostomy bag intact, up indep, on regular diet.

## 2025-06-29 NOTE — PROGRESS NOTES
Place of Service:  [facility]     Reason for follow up: Left flank pain, complicated UTI, chronic left hydronephrosis secondary to ureteral obstruction from colon cancer and managed with left ureteral stent,     SUBJECTIVE:  Events: JULIÁN    Feels well this morning  Tolerating ureteral stent  Hgb with appropriate response to 1 U PRBCs    OBJECTIVE:  PHYSICAL EXAM:  Temp: 97.7  F (36.5  C) Temp src: Oral BP: 117/70 Pulse: 58   Resp: 18 SpO2: 100 % O2 Device: None (Room air)    General: NAD, alert, cooperative  Head: normocephalic, without abnormality / atraumatic  Abdomen: soft, non tender, non distended. no suprapubic fullness, no suprapubic tenderness. No bilateral CVA tenderness.   : urine is raven in color with some mild maroon to brown sediment in the racked urine specimens  Musculoskeletal: moves all four extremities equally.   Psychological: alert and oriented, answers questions appropriately    LABS:  Creatinine   Date Value Ref Range Status   06/29/2025 2.41 (H) 0.67 - 1.17 mg/dL Final   12/23/2020 1.33 (H) 0.66 - 1.25 mg/dL Final     WBC   Date Value Ref Range Status   12/24/2020 5.4 4.0 - 11.0 10e9/L Final     WBC Count   Date Value Ref Range Status   06/29/2025 3.0 (L) 4.0 - 11.0 10e3/uL Final     Hemoglobin   Date Value Ref Range Status   06/29/2025 8.2 (L) 13.3 - 17.7 g/dL Final   06/29/2025 8.2 (L) 13.3 - 17.7 g/dL Final   12/24/2020 7.7 (L) 13.3 - 17.7 g/dL Final     Platelet Count   Date Value Ref Range Status   06/29/2025 204 150 - 450 10e3/uL Final   12/24/2020 424 150 - 450 10e9/L Final       UA:  UA RESULTS:  Recent Labs   Lab Test 06/25/25  0223 10/28/21  1754 11/04/20  1736   COLOR Red*   < > Yellow   APPEARANCE Bloody*   < > Clear   URINEGLC Negative   < > Negative   URINEBILI 1.0 mg/dL*   < > Negative   URINEKETONE Negative   < > 100 mg/dL*   SG 1.025   < > 1.020   UBLD >1.0 mg/dL*   < > Negative   URINEPH 6.0   < > 6.0   PROTEIN 70*   < > Negative   UROBILINOGEN  --   --  <2.0  E.U./dL   NITRITE Negative   < > Negative   LEUKEST 250 Marlon/uL*   < > Negative   RBCU >182*   < >  --    WBCU 0   < >  --     < > = values in this interval not displayed.     CULTURES  Blood Culture 6/26/25 - NGTD  Urine Culture 6/25/25: E coli, Pseudomonas   Urine Culture 6/18/25:   >100,000 CFU/mL Pseudomonas [id number],000-100,000 CFU/mL Pseudomonas aeruginosa   Susceptibility                Pseudomonas aeruginosa (1) Pseudomonas aeruginosa (2)       CRISTHIAN CRISTHIAN       Cefepime 2 ug/mL Susceptible 2 ug/mL Susceptible       Ceftazidime 2 ug/mL Susceptible 2 ug/mL Susceptible       Ciprofloxacin 0.12 ug/mL Susceptible 0.12 ug/mL Susceptible       Levofloxacin 0.25 ug/mL Susceptible 0.5 ug/mL Susceptible       Meropenem <=0.25 ug/mL Susceptible <=0.25 ug/mL Susceptible       Piperacillin/Tazobactam 8 ug/mL Susceptible 8 ug/mL Susceptible          Lab Results: personally reviewed.     ASSESSMENT/PLAN:  Rich Wolff is being seen by [facility] for complicated UTI, chronic left hydronephrosis secondary to ureteral obstruction from colon cancer and managed with left ureteral stent     - POD#3 left ureteral stent stent exchange   - Vitals stable, afebrile  -Downtrend in Hgb noted to 6.9 so given 1 U PRBCs with good response on 6/28/25 to Hgb of > 8. His racked urine specimens on 6/29/25 show raven urine with some brown sediment discoloration therefore any acute blood loss is not from the unlikely from the  system. Furthermore, PVRs have been low and acceptable.  - Urine culture positive for Pseudomonas and E coli.  Continue with antibiotics. Will need to discharge home with 7-10 days of oral antibiotics based on sensitivities.  - Supportive care according to primary team  - Will need to stent exchange in 2 to 3 months  - Email sent for follow-up with our clinic in 2 to 3 weeks  - Ok to discharge from Urology perspective.  - Urology will sign off. Please re-consult / page with any further questions or  concerns.    Camacho Mora MD  [facility]  (p) [phone]

## 2025-06-29 NOTE — PLAN OF CARE
Problem: Pain Acute  Goal: Optimal Pain Control and Function  Outcome: Progressing     Slight burning with voiding, otherwise improved.  Voiding this morning. PVR last was 30ml. Urology seen pt this morning.     Problem: Sepsis/Septic Shock  Goal: Absence of Bleeding  Outcome: Progressing     No blood noted. Urine raven with brown sediments. VSS. Has IV cefepime this morning with no adverse reaction.

## 2025-06-29 NOTE — PLAN OF CARE
Patient discharged to home at 1210 via Private Car.  Accompanied by spouse and staff.  Discharge instructions were reviewed with patient and spouse, opportunity offered to ask questions.    Prescriptions e-prescribed to pharmacy.  Access discontinued: Yes  Care plan and education discontinued: Yes  Home meds retrieved from pharmacy: Yes  Belongings were sent home with patient/family: clothing; shoes; cellphone; ; 2 rings; 3 cards; black backpack; medication lonsurf; glasses.

## 2025-06-29 NOTE — PLAN OF CARE
"  Problem: Adult Inpatient Plan of Care  Goal: Patient-Specific Goal (Individualized)  Description: You can add care plan individualizations to a care plan. Examples of Individualization might be:  \"Parent requests to be called daily at 9am for status\", \"I have a hard time hearing out of my right ear\", or \"Do not touch me to wake me up as it startles  me\".  Outcome: Progressing     Problem: Pain Acute  Goal: Optimal Pain Control and Function  Outcome: Progressing  Intervention: Develop Pain Management Plan  Recent Flowsheet Documentation  Taken 6/28/2025 2142 by Imelda Gee, RN  Pain Management Interventions: medication (see MAR)  Intervention: Prevent or Manage Pain  Recent Flowsheet Documentation  Taken 6/28/2025 2030 by Imelda Gee RN  Medication Review/Management: medications reviewed   Goal Outcome Evaluation:      Plan of Care Reviewed With: patient      VSS. Patient alert and orientated x4, on room air. Scheduled medication given, patient with mild flank pain. Up independently to the bathroom. Voided twice this shift, bladder scanned and sample saved at bed side. Continue to monitor.               "

## 2025-07-01 LAB
BACTERIA SPEC CULT: NO GROWTH
BACTERIA SPEC CULT: NO GROWTH

## 2025-07-21 ENCOUNTER — APPOINTMENT (OUTPATIENT)
Dept: CT IMAGING | Facility: HOSPITAL | Age: 65
DRG: 663 | End: 2025-07-21
Payer: COMMERCIAL

## 2025-07-21 ENCOUNTER — HOSPITAL ENCOUNTER (INPATIENT)
Facility: HOSPITAL | Age: 65
LOS: 3 days | Discharge: HOME | End: 2025-07-24
Attending: EMERGENCY MEDICINE | Admitting: HOSPITALIST
Payer: COMMERCIAL

## 2025-07-21 DIAGNOSIS — D62 ANEMIA DUE TO BLOOD LOSS, ACUTE: ICD-10-CM

## 2025-07-21 DIAGNOSIS — E87.20 METABOLIC ACIDOSIS: ICD-10-CM

## 2025-07-21 DIAGNOSIS — R31.0 GROSS HEMATURIA: ICD-10-CM

## 2025-07-21 DIAGNOSIS — N17.9 ACUTE KIDNEY INJURY: ICD-10-CM

## 2025-07-21 DIAGNOSIS — Z86.711 HISTORY OF PULMONARY EMBOLISM: Primary | ICD-10-CM

## 2025-07-21 DIAGNOSIS — Z79.01 CHRONIC ANTICOAGULATION: ICD-10-CM

## 2025-07-21 DIAGNOSIS — Z86.718 HISTORY OF DEEP VENOUS THROMBOSIS: ICD-10-CM

## 2025-07-21 LAB
ABO + RH BLD: NORMAL
ALBUMIN UR-MCNC: ABNORMAL G/DL
ANION GAP SERPL CALCULATED.3IONS-SCNC: 10 MMOL/L (ref 7–15)
APPEARANCE UR: ABNORMAL
BACTERIA #/AREA URNS HPF: ABNORMAL /HPF
BASOPHILS # BLD AUTO: 0 10E3/UL (ref 0–0.2)
BASOPHILS NFR BLD AUTO: 0 %
BILIRUB UR QL STRIP: ABNORMAL
BLD GP AB SCN SERPL QL: NEGATIVE
BLD PROD TYP BPU: NORMAL
BLD PROD TYP BPU: NORMAL
BLOOD COMPONENT TYPE: NORMAL
BLOOD COMPONENT TYPE: NORMAL
BUN SERPL-MCNC: 35.7 MG/DL (ref 8–23)
CALCIUM SERPL-MCNC: 8.6 MG/DL (ref 8.8–10.4)
CHLORIDE SERPL-SCNC: 102 MMOL/L (ref 98–107)
CODING SYSTEM: NORMAL
CODING SYSTEM: NORMAL
COLOR UR AUTO: ABNORMAL
CREAT SERPL-MCNC: 5.04 MG/DL (ref 0.67–1.17)
CROSSMATCH: NORMAL
CROSSMATCH: NORMAL
EGFRCR SERPLBLD CKD-EPI 2021: 12 ML/MIN/1.73M2
EOSINOPHIL # BLD AUTO: 0.1 10E3/UL (ref 0–0.7)
EOSINOPHIL NFR BLD AUTO: 2 %
ERYTHROCYTE [DISTWIDTH] IN BLOOD BY AUTOMATED COUNT: 19.3 % (ref 10–15)
GLUCOSE SERPL-MCNC: 100 MG/DL (ref 70–99)
GLUCOSE UR STRIP-MCNC: ABNORMAL MG/DL
HCO3 SERPL-SCNC: 24 MMOL/L (ref 22–29)
HCT VFR BLD AUTO: 18.1 % (ref 40–53)
HGB BLD-MCNC: 5.7 G/DL (ref 13.3–17.7)
HGB BLD-MCNC: 6.7 G/DL (ref 13.3–17.7)
HGB UR QL STRIP: ABNORMAL
HOLD SPECIMEN: NORMAL
HOLD SPECIMEN: NORMAL
IMM GRANULOCYTES # BLD: 0.1 10E3/UL
IMM GRANULOCYTES NFR BLD: 1 %
ISSUE DATE AND TIME: NORMAL
ISSUE DATE AND TIME: NORMAL
KETONES UR STRIP-MCNC: ABNORMAL MG/DL
LACTATE SERPL-SCNC: 1.2 MMOL/L (ref 0.7–2)
LACTATE SERPL-SCNC: 3.5 MMOL/L (ref 0.7–2)
LEUKOCYTE ESTERASE UR QL STRIP: ABNORMAL
LYMPHOCYTES # BLD AUTO: 0.4 10E3/UL (ref 0.8–5.3)
LYMPHOCYTES NFR BLD AUTO: 6 %
MCH RBC QN AUTO: 32.2 PG (ref 26.5–33)
MCHC RBC AUTO-ENTMCNC: 31.5 G/DL (ref 31.5–36.5)
MCV RBC AUTO: 102 FL (ref 78–100)
MONOCYTES # BLD AUTO: 0.7 10E3/UL (ref 0–1.3)
MONOCYTES NFR BLD AUTO: 11 %
NEUTROPHILS # BLD AUTO: 4.9 10E3/UL (ref 1.6–8.3)
NEUTROPHILS NFR BLD AUTO: 79 %
NITRATE UR QL: ABNORMAL
NRBC # BLD AUTO: 0 10E3/UL
NRBC BLD AUTO-RTO: 0 /100
PH UR STRIP: ABNORMAL [PH]
PLATELET # BLD AUTO: 172 10E3/UL (ref 150–450)
POTASSIUM SERPL-SCNC: 4.4 MMOL/L (ref 3.4–5.3)
RBC # BLD AUTO: 1.77 10E6/UL (ref 4.4–5.9)
RBC URINE: >182 /HPF
SODIUM SERPL-SCNC: 136 MMOL/L (ref 135–145)
SP GR UR STRIP: 1.02 (ref 1–1.03)
SPECIMEN EXP DATE BLD: NORMAL
UNIT ABO/RH: NORMAL
UNIT ABO/RH: NORMAL
UNIT NUMBER: NORMAL
UNIT NUMBER: NORMAL
UNIT STATUS: NORMAL
UNIT STATUS: NORMAL
UNIT TYPE ISBT: 600
UNIT TYPE ISBT: 6200
UROBILINOGEN UR STRIP-MCNC: ABNORMAL MG/DL
WBC # BLD AUTO: 6.2 10E3/UL (ref 4–11)
WBC CLUMPS #/AREA URNS HPF: PRESENT /HPF
WBC URINE: >182 /HPF

## 2025-07-21 PROCEDURE — 258N000003 HC RX IP 258 OP 636

## 2025-07-21 PROCEDURE — 36430 TRANSFUSION BLD/BLD COMPNT: CPT

## 2025-07-21 PROCEDURE — 86923 COMPATIBILITY TEST ELECTRIC: CPT

## 2025-07-21 PROCEDURE — 86850 RBC ANTIBODY SCREEN: CPT

## 2025-07-21 PROCEDURE — 120N000001 HC R&B MED SURG/OB

## 2025-07-21 PROCEDURE — 82310 ASSAY OF CALCIUM: CPT

## 2025-07-21 PROCEDURE — 74176 CT ABD & PELVIS W/O CONTRAST: CPT

## 2025-07-21 PROCEDURE — 36415 COLL VENOUS BLD VENIPUNCTURE: CPT

## 2025-07-21 PROCEDURE — 250N000011 HC RX IP 250 OP 636

## 2025-07-21 PROCEDURE — 83605 ASSAY OF LACTIC ACID: CPT

## 2025-07-21 PROCEDURE — 81001 URINALYSIS AUTO W/SCOPE: CPT

## 2025-07-21 PROCEDURE — 51798 US URINE CAPACITY MEASURE: CPT

## 2025-07-21 PROCEDURE — 99285 EMERGENCY DEPT VISIT HI MDM: CPT | Mod: 25

## 2025-07-21 PROCEDURE — 250N000009 HC RX 250: Performed by: NURSE PRACTITIONER

## 2025-07-21 PROCEDURE — 99223 1ST HOSP IP/OBS HIGH 75: CPT | Performed by: HOSPITALIST

## 2025-07-21 PROCEDURE — 86923 COMPATIBILITY TEST ELECTRIC: CPT | Performed by: ANESTHESIOLOGY

## 2025-07-21 PROCEDURE — 87086 URINE CULTURE/COLONY COUNT: CPT

## 2025-07-21 PROCEDURE — 250N000013 HC RX MED GY IP 250 OP 250 PS 637: Performed by: INTERNAL MEDICINE

## 2025-07-21 PROCEDURE — 85025 COMPLETE CBC W/AUTO DIFF WBC: CPT

## 2025-07-21 PROCEDURE — 87040 BLOOD CULTURE FOR BACTERIA: CPT

## 2025-07-21 PROCEDURE — 258N000003 HC RX IP 258 OP 636: Performed by: HOSPITALIST

## 2025-07-21 PROCEDURE — 250N000013 HC RX MED GY IP 250 OP 250 PS 637: Performed by: HOSPITALIST

## 2025-07-21 PROCEDURE — P9016 RBC LEUKOCYTES REDUCED: HCPCS

## 2025-07-21 PROCEDURE — 86923 COMPATIBILITY TEST ELECTRIC: CPT | Performed by: HOSPITALIST

## 2025-07-21 PROCEDURE — 99222 1ST HOSP IP/OBS MODERATE 55: CPT | Performed by: INTERNAL MEDICINE

## 2025-07-21 RX ORDER — GABAPENTIN 100 MG/1
100 CAPSULE ORAL AT BEDTIME
Status: DISCONTINUED | OUTPATIENT
Start: 2025-07-21 | End: 2025-07-24

## 2025-07-21 RX ORDER — LIDOCAINE HYDROCHLORIDE 20 MG/ML
JELLY TOPICAL ONCE
Status: COMPLETED | OUTPATIENT
Start: 2025-07-21 | End: 2025-07-21

## 2025-07-21 RX ORDER — POLYETHYLENE GLYCOL 3350 17 G/17G
17 POWDER, FOR SOLUTION ORAL 2 TIMES DAILY PRN
Status: DISCONTINUED | OUTPATIENT
Start: 2025-07-21 | End: 2025-07-24 | Stop reason: HOSPADM

## 2025-07-21 RX ORDER — LIDOCAINE 40 MG/G
CREAM TOPICAL
Status: DISCONTINUED | OUTPATIENT
Start: 2025-07-21 | End: 2025-07-24 | Stop reason: HOSPADM

## 2025-07-21 RX ORDER — SODIUM BICARBONATE 650 MG/1
1300 TABLET ORAL 2 TIMES DAILY
Status: DISCONTINUED | OUTPATIENT
Start: 2025-07-21 | End: 2025-07-24 | Stop reason: HOSPADM

## 2025-07-21 RX ORDER — OXYBUTYNIN CHLORIDE 5 MG/1
5 TABLET ORAL 3 TIMES DAILY
Status: DISCONTINUED | OUTPATIENT
Start: 2025-07-21 | End: 2025-07-24 | Stop reason: HOSPADM

## 2025-07-21 RX ORDER — TAMSULOSIN HYDROCHLORIDE 0.4 MG/1
0.4 CAPSULE ORAL AT BEDTIME
Status: DISCONTINUED | OUTPATIENT
Start: 2025-07-21 | End: 2025-07-24 | Stop reason: HOSPADM

## 2025-07-21 RX ORDER — AMOXICILLIN 250 MG
2 CAPSULE ORAL 2 TIMES DAILY PRN
Status: DISCONTINUED | OUTPATIENT
Start: 2025-07-21 | End: 2025-07-24 | Stop reason: HOSPADM

## 2025-07-21 RX ORDER — OXYBUTYNIN CHLORIDE 5 MG/1
5 TABLET ORAL 3 TIMES DAILY
COMMUNITY

## 2025-07-21 RX ORDER — CEFEPIME HYDROCHLORIDE 2 G/1
2 INJECTION, POWDER, FOR SOLUTION INTRAVENOUS ONCE
Status: COMPLETED | OUTPATIENT
Start: 2025-07-21 | End: 2025-07-21

## 2025-07-21 RX ORDER — ROPINIROLE 0.25 MG/1
0.25 TABLET, FILM COATED ORAL AT BEDTIME
Status: DISCONTINUED | OUTPATIENT
Start: 2025-07-21 | End: 2025-07-24 | Stop reason: HOSPADM

## 2025-07-21 RX ORDER — OXYCODONE HYDROCHLORIDE 5 MG/1
5 TABLET ORAL EVERY 4 HOURS PRN
Status: DISCONTINUED | OUTPATIENT
Start: 2025-07-21 | End: 2025-07-24 | Stop reason: HOSPADM

## 2025-07-21 RX ORDER — SULFAMETHOXAZOLE AND TRIMETHOPRIM 800; 160 MG/1; MG/1
1 TABLET ORAL 2 TIMES DAILY
Status: ON HOLD | COMMUNITY
Start: 2025-07-18 | End: 2025-07-24

## 2025-07-21 RX ORDER — TRAZODONE HYDROCHLORIDE 50 MG/1
50 TABLET ORAL AT BEDTIME
Status: DISCONTINUED | OUTPATIENT
Start: 2025-07-21 | End: 2025-07-24 | Stop reason: HOSPADM

## 2025-07-21 RX ORDER — ONDANSETRON 4 MG/1
4 TABLET, ORALLY DISINTEGRATING ORAL EVERY 6 HOURS PRN
Status: DISCONTINUED | OUTPATIENT
Start: 2025-07-21 | End: 2025-07-24 | Stop reason: HOSPADM

## 2025-07-21 RX ORDER — ONDANSETRON 2 MG/ML
4 INJECTION INTRAMUSCULAR; INTRAVENOUS EVERY 6 HOURS PRN
Status: DISCONTINUED | OUTPATIENT
Start: 2025-07-21 | End: 2025-07-24 | Stop reason: HOSPADM

## 2025-07-21 RX ORDER — AMOXICILLIN 250 MG
1 CAPSULE ORAL 2 TIMES DAILY PRN
Status: DISCONTINUED | OUTPATIENT
Start: 2025-07-21 | End: 2025-07-24 | Stop reason: HOSPADM

## 2025-07-21 RX ORDER — SODIUM CHLORIDE 9 MG/ML
INJECTION, SOLUTION INTRAVENOUS CONTINUOUS
Status: DISCONTINUED | OUTPATIENT
Start: 2025-07-21 | End: 2025-07-23

## 2025-07-21 RX ORDER — CEFEPIME HYDROCHLORIDE 1 G/1
1 INJECTION, POWDER, FOR SOLUTION INTRAMUSCULAR; INTRAVENOUS EVERY 24 HOURS
Status: DISCONTINUED | OUTPATIENT
Start: 2025-07-22 | End: 2025-07-24 | Stop reason: HOSPADM

## 2025-07-21 RX ORDER — CALCIUM CARBONATE 500 MG/1
1000 TABLET, CHEWABLE ORAL 4 TIMES DAILY PRN
Status: DISCONTINUED | OUTPATIENT
Start: 2025-07-21 | End: 2025-07-24 | Stop reason: HOSPADM

## 2025-07-21 RX ORDER — ACETAMINOPHEN 325 MG/1
650 TABLET ORAL EVERY 4 HOURS PRN
Status: DISCONTINUED | OUTPATIENT
Start: 2025-07-21 | End: 2025-07-24 | Stop reason: HOSPADM

## 2025-07-21 RX ORDER — FENTANYL 50 UG/1
50 PATCH TRANSDERMAL
Status: DISCONTINUED | OUTPATIENT
Start: 2025-07-21 | End: 2025-07-24 | Stop reason: HOSPADM

## 2025-07-21 RX ORDER — LIDOCAINE 40 MG/G
CREAM TOPICAL
Status: DISCONTINUED | OUTPATIENT
Start: 2025-07-21 | End: 2025-07-24

## 2025-07-21 RX ADMIN — OXYBUTYNIN CHLORIDE 5 MG: 5 TABLET ORAL at 21:10

## 2025-07-21 RX ADMIN — TAMSULOSIN HYDROCHLORIDE 0.4 MG: 0.4 CAPSULE ORAL at 21:10

## 2025-07-21 RX ADMIN — SODIUM CHLORIDE, PRESERVATIVE FREE: 5 INJECTION INTRAVENOUS at 17:58

## 2025-07-21 RX ADMIN — SODIUM BICARBONATE 650 MG TABLET 1300 MG: at 21:10

## 2025-07-21 RX ADMIN — CEFEPIME HYDROCHLORIDE 2 G: 2 INJECTION, POWDER, FOR SOLUTION INTRAVENOUS at 17:21

## 2025-07-21 RX ADMIN — FENTANYL 1 PATCH: 50 PATCH TRANSDERMAL at 18:26

## 2025-07-21 RX ADMIN — ROPINIROLE HYDROCHLORIDE 0.25 MG: 0.25 TABLET, FILM COATED ORAL at 21:10

## 2025-07-21 RX ADMIN — TRAZODONE HYDROCHLORIDE 50 MG: 50 TABLET ORAL at 21:10

## 2025-07-21 RX ADMIN — LIDOCAINE HYDROCHLORIDE: 20 JELLY TOPICAL at 17:21

## 2025-07-21 RX ADMIN — SODIUM CHLORIDE 1000 ML: 0.9 INJECTION, SOLUTION INTRAVENOUS at 14:39

## 2025-07-21 RX ADMIN — SERTRALINE HYDROCHLORIDE 50 MG: 50 TABLET ORAL at 21:10

## 2025-07-21 RX ADMIN — GABAPENTIN 100 MG: 100 CAPSULE ORAL at 21:10

## 2025-07-21 ASSESSMENT — ACTIVITIES OF DAILY LIVING (ADL)
ADLS_ACUITY_SCORE: 60
ADLS_ACUITY_SCORE: 58
ADLS_ACUITY_SCORE: 58
ADLS_ACUITY_SCORE: 60
ADLS_ACUITY_SCORE: 58
ADLS_ACUITY_SCORE: 58
ADLS_ACUITY_SCORE: 60
ADLS_ACUITY_SCORE: 58
ADLS_ACUITY_SCORE: 60
ADLS_ACUITY_SCORE: 58

## 2025-07-21 NOTE — H&P
[facility]    History and Physical - Hospitalist Service       Date of Admission:  2025  Rich Wolff,  1960, MRN [id number]  PCP: Maycol Worrell    Assessment & Plan      Rich Wolff is a 64 year old male admitted on 2025. He has history of stage 4 colon cancer, neuropathy, previous DVT, malignant hydronephrosis with ureteral stent in place for 6 weeks, here for gross hematuria    #Gross hematuria  -ongoing x1 month but now worse with clots and new CHAYO  -ddx infection, irritation from stent, radiation cystitis, new metastasis  -Urology consult  -urology recommended Adkins placement, manual irrigation and if bleeding persists then CBI  -urine and blood cultures pending  -hold home Xarelto    #ABLA  #Chronic iron deficiency anemia  #Anemia of chronic disease  -ABLA from hematuria blood loss. Has some scant blood in ostomy bag he states this is from skin irritation when he does his bag change. Stool itself appears nonbloody.  -getting 2 units on admit for Hgb down to 5.7  -his Hgb is normally around 8  -minimal symptoms, this seems to be fairly chronic but does have ongoing blood loss  -trend Hgb- improving on recheck, repeat in AM  -transfuse for Hgb <7    #CHAYO on CKD4  -baseline Cr ~2.5, here 5.0  -likely prerenal related to anemia, also possible postrenal component with urinary obstruction from clots  -nephrology consulted, appreciate assistance  -IVF  -stop Bactrim  -trend Cr    #Frequent UTIs  -has had multiple recent UTIs, urine culture in  with E coli and Pseudomonas, now almost finished with a course of Bactrim for another UTI with Pseudomonas  -may need ureteral stent exchanged?  -IV cefepime  -Urology consulted as above    #NAGMA  -from CKD  -sodium bicarb replacement  -trend BMP    #Locally very advanced colon cancer  -diagnosed   -no known mets; recent PET scan with question of intracardiac met vs thrombus but nothing seen on MONIKA. No known  mets.  -has diverting colostomy  -has been on Lonsurf and Avastin. chemo on a hold for past 1 month  -follows with [facility] and locally Dr Kern  -hold on Onc consult for now but low threshold to consult    #Chronic cancer pain  -home Fentanyl patch, oxycodone for breakthrough    #Neuropathy  -home gabapentin, reduce dose due to CHAYO    #Insomnia, home trazodone  # RLS, home Requip  # Mood, home Zoloft  #Hx DVT, holding home Xarelto as above         DVT Prophylaxis: High risk. Pharmacologic prophylaxis contraindicated due to active bleeding. SCDs  Diet: Regular Diet Adult    Adkins Catheter: Not present  Lines: PRESENT             Cardiac Monitoring: None  Code Status: Full Code. Discussed and confirmed with patient    Clinically Significant Risk Factors Present on Admission                # Drug Induced Coagulation Defect: home medication list includes an anticoagulant medication   # Acute Kidney Injury, unspecified: based on a >150% or 0.3 mg/dL increase in last creatinine compared to past 90 day average, will monitor renal function       # Anemia: based on hgb <11  # Anemia: based on hgb <11           # Financial/Environmental Concerns:           Disposition Plan      Expected Discharge Date: 07/23/2025                The patient's care was discussed with the Patient.    Carlota Koch MD  Hospitalist Service  [facility]  Securely message with [employer] (more info)  Text page via [employer] Paging/Directory     ______________________________________________________________________    Chief Complaint   hematuria    History is obtained from the patient    History of Present Illness   Rich Wolff is a 64 year old male who is here for aforementioned symptoms.   Patient presents for evaluation of worsening gross hematuria over the last few weeks.  He was hospitalized about 6 weeks ago for hydronephrosis and UTI.  A few weeks after discharge, he developed some gross hematuria but it did not bother  him.  About 2 weeks ago the hematuria greatly increased and he began to pass clots.  Now he is passing a significant amount of clots and sometimes having difficulty with stream becoming blocked and having to bear down to get it going again.  He does endorse weakness and feeling dizzy when he stands up but this is not new and is unchanged.  His appetite has been poor but this is also chronic.  He has been on a break from his chemotherapy.  He denies any fever, chills, cough, abdominal pain, diarrhea, constipation.  He did have a scant amount of blood in his ostomy bag and he states this is likely from some bleeding that occurred when he cleaned the skin to replace the ostomy bag.  He denies having issues with hematuria in the past.  He does not think he has ever had a Adkins catheter.  He did have radiation therapy in the past.      Past Medical History    Past Medical History:   Diagnosis Date    Colon cancer (H)     Hyperlipidemia     Hypertension     Prostate cancer (H) 09/01/2015    T1c. Silvana's 6 (3+3).  Confined to prostate.  Multifocal bilateral comprising 2% of the gland.PSA:5.8.       Past Surgical History   Past Surgical History:   Procedure Laterality Date    BOWEL RESECTION      COLONOSCOPY W/ BIOPSIES  11/04/2020    COLOSTOMY      COMBINED CYSTOSCOPY, INSERT STENT URETER(S) Left 5/30/2025    Procedure: CYSTOSCOPY, LEFT RETROGRADE PYELOGRAM, WITH LEFT URETERAL STENT INSERTION;  Surgeon: Parag Phillips MD;  [place] [facility] [place]    COMBINED CYSTOSCOPY, RETROGRADES, EXCHANGE STENT URETER(S) Left 6/26/2025    Procedure: CYSTOSCOPY, WITH LEFT RETROGRADE PYELOGRAM AND LEFT URETERAL STENT REPLACEMENT;  Surgeon: Parag Phillips MD;  [place] [facility] [place]    ESOPHAGOSCOPY, GASTROSCOPY, DUODENOSCOPY (EGD), COMBINED  11/04/2020    IR CHEST PORT PLACEMENT > 5 YRS OF AGE  11/24/2020    IR CHEST PORT REPLACEMENT SAME SITE RIGHT  10/27/2022    IR NEPHROSTOMY TUBE CHANGE RIGHT  4/8/2021    IR  NEPHROSTOMY TUBE CHANGE RIGHT  4/8/2021    IR NEPHROSTOMY TUBE CHANGE RIGHT  8/16/2021    IR NEPHROSTOMY TUBE CHANGE RIGHT  10/29/2021    IR NEPHROSTOMY TUBE CHANGE RIGHT  10/29/2021    IR NEPHROSTOMY TUBE CHANGE RIGHT  12/27/2021    IR NEPHROSTOMY TUBE CHANGE RIGHT  2/28/2022    IR NEPHROSTOMY TUBE CHANGE RIGHT  5/9/2022    IR NEPHROSTOMY TUBE CHANGE RIGHT  7/5/2022    IR NEPHROSTOMY TUBE CHANGE RIGHT  9/26/2022    IR NEPHROSTOMY TUBE CHANGE RIGHT  10/25/2022    IR NEPHROSTOMY TUBE CHANGE RIGHT  12/28/2022    IR NEPHROSTOMY TUBE CHANGE RIGHT  2/28/2023    IR NEPHROSTOMY TUBE CHANGE RIGHT  4/27/2023    IR NEPHROSTOMY TUBE CHANGE RIGHT  6/27/2023    IR NEPHROSTOMY TUBE CHANGE RIGHT  8/30/2023    IR NEPHROSTOMY TUBE CHANGE RIGHT  10/25/2023    IR NEPHROSTOMY TUBE CHANGE RIGHT  12/27/2023    IR NEPHROSTOMY TUBE CHANGE RIGHT  2/28/2024    IR NEPHROSTOMY TUBE CHANGE RIGHT  4/24/2024    IR NEPHROSTOMY TUBE CHANGE RIGHT  6/26/2024    IR NEPHROSTOMY TUBE CHANGE RIGHT  8/28/2024    IR NEPHROSTOMY TUBE CHANGE RIGHT  10/30/2024    IR PORT PLACEMENT >5 YEARS  11/24/2020    IR SITE CHECK/EVALUATION  4/11/2022    NEPHROSTOMY      CT ESOPHAGOGASTRODUODENOSCOPY TRANSORAL DIAGNOSTIC N/A 11/4/2020    Procedure: ESOPHAGOGASTRODUODENOSCOPY (EGD);  Surgeon: Paul Masters MD;  Location: [facility];  Service: Gastroenterology    CT LAP,PROSTATECTOMY,RADICAL,W/NERVE SPARE,INCL ROBOTIC Bilateral 09/01/2015    Procedure: DAVINCI RADICAL RETROPUBIC PROSTATECTOMY BILATERAL PELVIC LYMPH NODE DISSECTION;  Surgeon: Juan C Velazco MD;  Location: [facility] [place];  Service: Urology    PROSTATE BIOPSY  06/08/2015    Ultrasound-guided transrectal biopsy.    SURGERY SCROTAL / TESTICULAR      for undescended testes, removed due to atrophy with vasectomy    ZZHC COLONOSCOPY W/WO BRUSH/WASH N/A 11/4/2020    Procedure: COLONOSCOPY WITH BIOPSY;  Surgeon: Paul Masters MD;  Location: [facility];  Service: Gastroenterology        Prior to Admission Medications   Prior to Admission Medications   Prescriptions Last Dose Informant Patient Reported? Taking?   BACTRIM -160 MG tablet 7/21/2025 Morning  Yes Yes   Sig: Take 1 tablet by mouth 2 times daily.   LONSURF 15-6.14 MG tablet  Self Yes Yes   Sig: Take 2 tablets by mouth See Admin Instructions. Take 2 tablets by mouth twice daily on days 1-5 and 15-19 of each 28 day cycle.   Multiple Vitamin (MULTIVITAMIN PO) 7/20/2025 Evening Self Yes Yes   Sig: Multivitamin powder: take 1 scoop daily   XARELTO ANTICOAGULANT 20 MG TABS tablet 7/21/2025 Morning Self Yes Yes   Sig: Take 20 mg by mouth daily   acetaminophen (TYLENOL) 500 MG tablet  Self Yes Yes   Sig: Take 1,000 mg by mouth every 6 hours as needed for pain.   calcium carbonate-vitamin D (CALTRATE) 600-10 MG-MCG per tablet 7/21/2025 Morning Self Yes Yes   Sig: Take 1 tablet by mouth daily.   fentaNYL (DURAGESIC) 50 mcg/hr 72 hr patch Past Week Self Yes Yes   Sig: Place 1 patch onto the skin every 72 hours.   ferrous sulfate (FE TABS) 325 (65 Fe) MG EC tablet 7/21/2025 Morning Self Yes Yes   Sig: Take 650 mg by mouth every morning.   ferrous sulfate (FEROSUL) 325 (65 Fe) MG tablet 7/20/2025 Evening Self Yes Yes   Sig: Take 325 mg by mouth every evening.   gabapentin (NEURONTIN) 300 MG capsule 7/20/2025 Bedtime Self Yes Yes   Sig: Take 300 mg by mouth at bedtime.   oxyBUTYnin (DITROPAN) 5 MG tablet 7/21/2025 Morning  Yes Yes   Sig: Take 5 mg by mouth 3 times daily.   oxyCODONE (ROXICODONE) 5 MG tablet  Self Yes Yes   Sig: Take 5 mg by mouth every 4 hours as needed for severe pain    rOPINIRole (REQUIP) 0.25 MG tablet 7/20/2025 Bedtime Self Yes Yes   Sig: Take 0.25 mg by mouth at bedtime.   sertraline (ZOLOFT) 50 MG tablet 7/20/2025 Bedtime Self Yes Yes   Sig: Take 50 mg by mouth At Bedtime    tamsulosin (FLOMAX) 0.4 MG capsule 7/20/2025 Bedtime Self Yes Yes   Sig: Take 0.4 mg by mouth daily   traZODone (DESYREL) 50 MG tablet 7/20/2025  Bedtime Self Yes Yes   Sig: Take 1 tablet by mouth at bedtime.   vitamin D3 (CHOLECALCIFEROL) 50 mcg (2000 units) tablet 7/21/2025 Morning Self Yes Yes   Sig: Take 1 tablet by mouth daily      Facility-Administered Medications: None        Social History   I have reviewed this patient's social history and updated it with pertinent information if needed.  Social History     Tobacco Use    Smoking status: Never    Smokeless tobacco: Never   Substance Use Topics    Alcohol use: Not Currently     Comment: Alcoholic Drinks/day: rarely    Drug use: No        Physical Exam   Vital Signs: Temp: 97.9  F (36.6  C) Temp src: Oral BP: 108/66 Pulse: 78   Resp: 18 SpO2: 100 % O2 Device: None (Room air)    Weight: 145 lbs 0 oz  General: in no apparent distress, non-toxic, and alert male sitting in bedside chair oriented x3  HEENT: Head normocephalic atraumatic, oral mucosa moist. Sclerae anicteric  CV: Regular rhythm, normal rate, no murmurs  Resp: No wheezes, no rales or rhonchi, no focal consolidations  GI: Belly soft, nondistended, nontender, bowel sounds present.  Ostomy bag with green liquid stool.  Very scant amount of red blood at the very superior portion of the bag.  Skin: Tan chronically ill-appearing  Extremities: No peripheral edema  Psych: Normal affect, mildly anxious mood  Neuro: Grossly normal    Medical Decision Making                 Data   Imaging results reviewed over the past 24 hrs:   Recent Results (from the past 24 hours)   CT Abdomen Pelvis w/o Contrast    Narrative    EXAM: CT ABDOMEN PELVIS W/O CONTRAST  LOCATION: [facility]  DATE: 7/21/2025    INDICATION: hematuria, Hgb of 5.7, CHAYO. chronic left hydronephrosis 2 2 colon CA s p left ureteral stent placement; please assess stent placement  COMPARISON: CT 6/25/2025 and 5/30/2025, CT chest 5/8/2023  TECHNIQUE: CT scan of the abdomen and pelvis was performed without IV contrast. Multiplanar reformats were obtained. Dose reduction  techniques were used.  CONTRAST: None.    FINDINGS:   LOWER CHEST: Enlarging small-to-moderate pericardial effusion. Bibasilar scarring and bronchiectasis. Stable benign 4 mm lingular nodule. No follow-up is indicated.    HEPATOBILIARY: Cholelithiasis. Normal unenhanced liver and bile ducts.    PANCREAS: Normal.    SPLEEN: Normal.    ADRENAL GLANDS: Normal.    KIDNEYS/BLADDER: Interval repositioning of the left ureteral stent with the upper pigtail loop in satisfactory position within the renal pelvis and lower pigtail loop in satisfactory position within the inferior bladder lumen. Interval slightly improved   mild left-sided hydronephrosis. Increased left peripelvic and periureteral edema. No urinary tract stone. There is a new irregular 2.8 x 4.7 cm soft tissue density mass in the dependent portion of the bladder lumen. Stable atrophic right kidney without   hydronephrosis.    BOWEL: Stable right lower quadrant diverting ileostomy. Stable irregular 5.2 cm infiltrative soft tissue mass centered at the ileal cecal junction. Colonic diverticulosis without evidence of acute diverticulitis. No free air or free fluid.    LYMPH NODES: Normal.    VASCULATURE: Mild aortic atherosclerosis.    PELVIC ORGANS: Prostatectomy. No pelvic free fluid.    MUSCULOSKELETAL: Osseous demineralization. No definite suspicious osseous lesion. Stable thoracolumbar vertebral body compression deformities.      Impression    IMPRESSION:   1.  Interval repositioning of the left-sided ureteral stent which is in satisfactory position. Slightly improved mild left-sided hydronephrosis.  2.  Progressive left peripelvic and periureteral edema which may reflect an infectious or inflammatory process. Recommend laboratory correlation.  3.  Newly visualized irregular 4.7 cm soft tissue density mass in the dependent portion of the bladder. This is likely a large blood clot. Underlying lesion is difficult to exclude. Imaging follow-up is recommended.  4.   Stable infiltrative mass centered at the ileal cecal junction.       Recent Results (from the past 12 hours)   UA with Microscopic reflex to Culture    Collection Time: 07/21/25 10:47 AM    Specimen: Urine, Midstream   Result Value Ref Range    Color Urine Red (A) Colorless, Straw, Light Yellow, Yellow    Appearance Urine Turbid (A) Clear    Glucose Urine      Bilirubin Urine      Ketones Urine      Specific Gravity Urine 1.018 1.001 - 1.030    Blood Urine      pH Urine      Protein Albumin Urine      Urobilinogen Urine      Nitrite Urine      Leukocyte Esterase Urine      Bacteria Urine Few (A) None Seen /HPF    WBC Clumps Urine Present (A) None Seen /HPF    RBC Urine >182 (H) <=2 /HPF    WBC Urine >182 (H) <=5 /HPF   Basic metabolic panel    Collection Time: 07/21/25 11:23 AM   Result Value Ref Range    Sodium 136 135 - 145 mmol/L    Potassium 4.4 3.4 - 5.3 mmol/L    Chloride 102 98 - 107 mmol/L    Carbon Dioxide (CO2) 24 22 - 29 mmol/L    Anion Gap 10 7 - 15 mmol/L    Urea Nitrogen 35.7 (H) 8.0 - 23.0 mg/dL    Creatinine 5.04 (H) 0.67 - 1.17 mg/dL    GFR Estimate 12 (L) >60 mL/min/1.73m2    Calcium 8.6 (L) 8.8 - 10.4 mg/dL    Glucose 100 (H) 70 - 99 mg/dL   CBC with platelets and differential    Collection Time: 07/21/25 11:23 AM   Result Value Ref Range    WBC Count 6.2 4.0 - 11.0 10e3/uL    RBC Count 1.77 (L) 4.40 - 5.90 10e6/uL    Hemoglobin 5.7 (LL) 13.3 - 17.7 g/dL    Hematocrit 18.1 (L) 40.0 - 53.0 %     (H) 78 - 100 fL    MCH 32.2 26.5 - 33.0 pg    MCHC 31.5 31.5 - 36.5 g/dL    RDW 19.3 (H) 10.0 - 15.0 %    Platelet Count 172 150 - 450 10e3/uL    % Neutrophils 79 %    % Lymphocytes 6 %    % Monocytes 11 %    % Eosinophils 2 %    % Basophils 0 %    % Immature Granulocytes 1 %    NRBCs per 100 WBC 0 <1 /100    Absolute Neutrophils 4.9 1.6 - 8.3 10e3/uL    Absolute Lymphocytes 0.4 (L) 0.8 - 5.3 10e3/uL    Absolute Monocytes 0.7 0.0 - 1.3 10e3/uL    Absolute Eosinophils 0.1 0.0 - 0.7 10e3/uL    Absolute  Basophils 0.0 0.0 - 0.2 10e3/uL    Absolute Immature Granulocytes 0.1 <=0.4 10e3/uL    Absolute NRBCs 0.0 10e3/uL   Adult Type and Screen    Collection Time: 07/21/25 11:23 AM   Result Value Ref Range    ABO/RH(D) A POS     Antibody Screen Negative Negative    SPECIMEN EXPIRATION DATE 7/24/2025 11:59:00 PM CDT    Prepare red blood cells (unit)    Collection Time: 07/21/25 12:25 PM   Result Value Ref Range    Blood Component Type Red Blood Cells     Product Code T5928C08     Unit Status Transfused     Unit Number [id number]     CROSSMATCH Compatible     CODING SYSTEM [id number]     ISSUE DATE AND TIME 7/21/2025  2:06:00 PM CDT     UNIT ABO/RH A-     UNIT TYPE ISBT 0600    Prepare red blood cells (unit)    Collection Time: 07/21/25 12:25 PM   Result Value Ref Range    Blood Component Type Red Blood Cells     Product Code R4219G69     Unit Status Ready for issue     Unit Number [id number]     CROSSMATCH Compatible     CODING SYSTEM [id number]    Lactic Acid Whole Blood with 1X Repeat in 2 HR when >2    Collection Time: 07/21/25 12:50 PM   Result Value Ref Range    Lactic Acid, Initial 3.5 (H) 0.7 - 2.0 mmol/L   Extra Blue Top Tube    Collection Time: 07/21/25 12:50 PM   Result Value Ref Range    Hold Specimen JIC    Extra Red Top Tube    Collection Time: 07/21/25 12:50 PM   Result Value Ref Range    Hold Specimen JIC    Lactic acid whole blood    Collection Time: 07/21/25  3:44 PM   Result Value Ref Range    Lactic Acid 1.2 0.7 - 2.0 mmol/L    Hemoglobin 6.7 (LL) 13.3 - 17.7 g/dL

## 2025-07-21 NOTE — MEDICATION SCRIBE - ADMISSION MEDICATION HISTORY
Medication Scribe Admission Medication History    Admission medication history is complete. The information provided in this note is only as accurate as the sources available at the time of the update.    Information Source(s): Patient and Patient's pharmacy via in-person    Pertinent Information: Patient able to verify med list, allergies and last dosing.     Fentanyl patch: currently on, Placed aprox 3 days ago, due for change  Trazodone: Patient states only taking 1 tablet at bedtime but dispense report shows 60 pills for 30 days  Oxybutynin: Patient states taking BID but dispense report shows 270 tablets for 90 days  Bactrim: Patient estimates course will be completed Wednesday morning    Changes made to PTA medication list:  Added: oxybutynin, bactrim  Deleted: None  Changed: None    Allergies reviewed with patient and updates made in EHR: yes    Medication History Completed By: Rafiq Becker 7/21/2025 1:25 PM    PTA Med List   Medication Sig Note Last Dose/Taking    acetaminophen (TYLENOL) 500 MG tablet Take 1,000 mg by mouth every 6 hours as needed for pain.  Taking As Needed    BACTRIM -160 MG tablet Take 1 tablet by mouth 2 times daily.  7/21/2025 Morning    calcium carbonate-vitamin D (CALTRATE) 600-10 MG-MCG per tablet Take 1 tablet by mouth daily.  7/21/2025 Morning    fentaNYL (DURAGESIC) 50 mcg/hr 72 hr patch Place 1 patch onto the skin every 72 hours.  Past Week    ferrous sulfate (FE TABS) 325 (65 Fe) MG EC tablet Take 650 mg by mouth every morning.  7/21/2025 Morning    ferrous sulfate (FEROSUL) 325 (65 Fe) MG tablet Take 325 mg by mouth every evening.  7/20/2025 Evening    gabapentin (NEURONTIN) 300 MG capsule Take 300 mg by mouth at bedtime.  7/20/2025 Bedtime    LONSURF 15-6.14 MG tablet Take 2 tablets by mouth See Admin Instructions. Take 2 tablets by mouth twice daily on days 1-5 and 15-19 of each 28 day cycle. 7/21/2025: Holding aprox 1 month Taking    Multiple Vitamin (MULTIVITAMIN  PO) Multivitamin powder: take 1 scoop daily  7/20/2025 Evening    oxyBUTYnin (DITROPAN) 5 MG tablet Take 5 mg by mouth 3 times daily.  7/21/2025 Morning    oxyCODONE (ROXICODONE) 5 MG tablet Take 5 mg by mouth every 4 hours as needed for severe pain   Taking As Needed    rOPINIRole (REQUIP) 0.25 MG tablet Take 0.25 mg by mouth at bedtime.  7/20/2025 Bedtime    sertraline (ZOLOFT) 50 MG tablet Take 50 mg by mouth At Bedtime   7/20/2025 Bedtime    tamsulosin (FLOMAX) 0.4 MG capsule Take 0.4 mg by mouth daily  7/20/2025 Bedtime    traZODone (DESYREL) 50 MG tablet Take 1 tablet by mouth at bedtime.  7/20/2025 Bedtime    vitamin D3 (CHOLECALCIFEROL) 50 mcg (2000 units) tablet Take 1 tablet by mouth daily  7/21/2025 Morning    XARELTO ANTICOAGULANT 20 MG TABS tablet Take 20 mg by mouth daily  7/21/2025 Morning

## 2025-07-21 NOTE — CONSULTS
[facility]  [facility]   Nephrology Consultation/Initial Inpatient Care    Rich Wolff   MRN: [id number]  : 1960   DOA: 2025     REASON FOR CONSULTATION: We are asked to see pt by Dr Sandoval for ARF/CKD    HISTORY OF PRESENT ILLNESS:64 year old male known to our group from prior encounters; seen in  during hospitalization for pyelonephritis with sepsis and had ARF; has been following in renal clinic  Noted to have episode of ARF in May- this year in the setting of sepsis and was scheduled to come for follow up in August  Pt has had on and off hematuria for a couple of months; recently he developed more clots; says he was still passing  urine; feeling at baseline; sometimes dizzy with movement but no real change; maybe slightly more weak and less PO intake; says he was drinking normally  No NSAIDS; no back pain; no med changes except for treatment with abx (?) for possible UTI  Presents to ER and found to have CR 5 and we are asked to see            REVIEW OF SYSTEMS:  ROS was completely reviewed and otherwise negative and non-contributory    Past Medical History:   Diagnosis Date    Colon cancer (H)     Hyperlipidemia     Hypertension     Prostate cancer (H) 2015    T1c. Silvana's 6 (3+3).  Confined to prostate.  Multifocal bilateral comprising 2% of the gland.PSA:5.8.       Social History     Socioeconomic History    Marital status:      Spouse name: Not on file    Number of children: Not on file    Years of education: Not on file    Highest education level: Not on file   Occupational History    Not on file   Tobacco Use    Smoking status: Never    Smokeless tobacco: Never   Substance and Sexual Activity    Alcohol use: Not Currently     Comment: Alcoholic Drinks/day: rarely    Drug use: No    Sexual activity: Not Currently   Other Topics Concern    Parent/sibling w/ CABG, MI or angioplasty before 65F 55M? Not Asked    Social History Narrative    Not on file     Social Drivers of Health     Financial Resource Strain: Low Risk  (6/25/2025)    Financial Resource Strain     Within the past 12 months, have you or your family members you live with been unable to get utilities (heat, electricity) when it was really needed?: No   Food Insecurity: Low Risk  (6/25/2025)    Food Insecurity     Within the past 12 months, did you worry that your food would run out before you got money to buy more?: No     Within the past 12 months, did the food you bought just not last and you didn t have money to get more?: No   Transportation Needs: Low Risk  (6/25/2025)    Transportation Needs     Within the past 12 months, has lack of transportation kept you from medical appointments, getting your medicines, non-medical meetings or appointments, work, or from getting things that you need?: No   Physical Activity: Sufficiently Active (2/23/2021)    Received from [facility]    Exercise Vital Sign     Days of Exercise per Week: 6 days     Minutes of Exercise per Session: 30 min   Stress: Stress Concern Present (2/23/2021)    Received from [facility]    [employer] - Occupational Stress Questionnaire     Feeling of Stress : Rather much   Social Connections: Moderately Integrated (2/23/2021)    Received from [facility]    Social Connection and Isolation Panel [NHANES]     Frequency of Communication with Friends and Family: More than three times a week     Frequency of Social Gatherings with Friends and Family: Twice a week     Attends Sabianist Services: 1 to 4 times per year     Active Member of Clubs or Organizations: No     Attends Club or Organization Meetings: Never     Marital Status:    Interpersonal Safety: Low Risk  (6/26/2025)    Interpersonal Safety     Do you feel physically and emotionally safe where you currently live?: Yes     Within the past 12 months, have you been hit, slapped, kicked or otherwise  "physically hurt by someone?: No     Within the past 12 months, have you been humiliated or emotionally abused in other ways by your partner or ex-partner?: No   Housing Stability: Low Risk  (6/25/2025)    Housing Stability     Do you have housing? : Yes     Are you worried about losing your housing?: No       Family History   Problem Relation Age of Onset    Breast Cancer Mother     Osteoporosis Mother     Valvular heart disease Father     Prostate Cancer Father 70.00    No Known Problems Sister     No Known Problems Son     No Known Problems Daughter        Allergies   Allergen Reactions    Bee Venom Hives       MEDICATIONS:  No current facility-administered medications for this encounter.         PHYSICAL EXAM    /59   Pulse 106   Temp 99  F (37.2  C)   Resp 16   Ht 1.702 m (5' 7\")   Wt 65.8 kg (145 lb)   SpO2 100%   BMI 22.71 kg/m      No intake or output data in the 24 hours ending 07/21/25 1302    Alert/oriented x 3; awake and NAD  HEENT NC/AT; perrla; OP clear without lesions; mmm  Neck supple without LAD, TM  CV; RRR without rub or murmur  Lung: clear and equal; no extra sounds  Ab: soft and NT; not distended; normal bs  Ext: no edema and well perfused  Skin; no rash  Neuro; grossly intact    LABORATORIES    Last Renal Panel:  Sodium   Date Value Ref Range Status   07/21/2025 136 135 - 145 mmol/L Final   12/23/2020 134 133 - 144 mmol/L Final     Potassium   Date Value Ref Range Status   07/21/2025 4.4 3.4 - 5.3 mmol/L Final   11/01/2021 4.7 3.5 - 5.0 mmol/L Final   12/23/2020 3.4 3.4 - 5.3 mmol/L Final     Chloride   Date Value Ref Range Status   07/21/2025 102 98 - 107 mmol/L Final   11/01/2021 115 (H) 98 - 107 mmol/L Final   12/23/2020 109 94 - 109 mmol/L Final     Carbon Dioxide   Date Value Ref Range Status   12/23/2020 20 20 - 32 mmol/L Final     Carbon Dioxide (CO2)   Date Value Ref Range Status   07/21/2025 24 22 - 29 mmol/L Final   11/01/2021 20 (L) 22 - 31 mmol/L Final     Anion Gap " "  Date Value Ref Range Status   07/21/2025 10 7 - 15 mmol/L Final   11/01/2021 5 5 - 18 mmol/L Final   12/23/2020 5 3 - 14 mmol/L Final     Glucose   Date Value Ref Range Status   07/21/2025 100 (H) 70 - 99 mg/dL Final   11/01/2021 98 70 - 125 mg/dL Final   12/23/2020 80 70 - 99 mg/dL Final     GLUCOSE BY METER POCT   Date Value Ref Range Status   05/31/2025 104 (H) 70 - 99 mg/dL Final     Urea Nitrogen   Date Value Ref Range Status   07/21/2025 35.7 (H) 8.0 - 23.0 mg/dL Final   11/01/2021 32 (H) 8 - 22 mg/dL Final   12/23/2020 25 7 - 30 mg/dL Final     Creatinine   Date Value Ref Range Status   07/21/2025 5.04 (H) 0.67 - 1.17 mg/dL Final   12/23/2020 1.33 (H) 0.66 - 1.25 mg/dL Final     GFR Estimate   Date Value Ref Range Status   07/21/2025 12 (L) >60 mL/min/1.73m2 Final     Comment:     eGFR calculated using 2021 CKD-EPI equation.   04/21/2021 39 (L) >60 mL/min/1.73m2 Final   12/23/2020 58 (L) >60 mL/min/[1.73_m2] Final     Comment:     Non  GFR Calc  Starting 12/18/2018, serum creatinine based estimated GFR (eGFR) will be   calculated using the Chronic Kidney Disease Epidemiology Collaboration   (CKD-EPI) equation.       Calcium   Date Value Ref Range Status   07/21/2025 8.6 (L) 8.8 - 10.4 mg/dL Final   12/23/2020 8.6 8.5 - 10.1 mg/dL Final     Phosphorus   Date Value Ref Range Status   06/04/2025 3.4 2.5 - 4.5 mg/dL Final     Albumin   Date Value Ref Range Status   06/03/2025 2.9 (L) 3.5 - 5.2 g/dL Final   10/29/2021 2.4 (L) 3.5 - 5.0 g/dL Final     No components found for: \"URINE\"   Lab Results   Component Value Date    WBC 6.2 07/21/2025    WBC 5.4 12/24/2020     Lab Results   Component Value Date    RBC 1.77 07/21/2025    RBC 2.97 12/24/2020     Lab Results   Component Value Date    HGB 5.7 07/21/2025    HGB 7.7 12/24/2020     Lab Results   Component Value Date    HCT 18.1 07/21/2025    HCT 24.8 12/24/2020     No components found for: \"MCT\"  Lab Results   Component Value Date     " 07/21/2025    MCV 84 12/24/2020     Lab Results   Component Value Date    MCH 32.2 07/21/2025    MCH 25.9 12/24/2020     Lab Results   Component Value Date    MCHC 31.5 07/21/2025    MCHC 31.0 12/24/2020     Lab Results   Component Value Date    RDW 19.3 07/21/2025    RDW 20.2 12/24/2020     Lab Results   Component Value Date     07/21/2025     12/24/2020     EXAM: CT ABDOMEN PELVIS W/O CONTRAST  LOCATION: [facility]  DATE: 7/21/2025     INDICATION: hematuria, Hgb of 5.7, CHAYO. chronic left hydronephrosis 2 2 colon CA s p left ureteral stent placement; please assess stent placement  COMPARISON: CT 6/25/2025 and 5/30/2025, CT chest 5/8/2023  TECHNIQUE: CT scan of the abdomen and pelvis was performed without IV contrast. Multiplanar reformats were obtained. Dose reduction techniques were used.  CONTRAST: None.     FINDINGS:   LOWER CHEST: Enlarging small-to-moderate pericardial effusion. Bibasilar scarring and bronchiectasis. Stable benign 4 mm lingular nodule. No follow-up is indicated.     HEPATOBILIARY: Cholelithiasis. Normal unenhanced liver and bile ducts.     PANCREAS: Normal.     SPLEEN: Normal.     ADRENAL GLANDS: Normal.     KIDNEYS/BLADDER: Interval repositioning of the left ureteral stent with the upper pigtail loop in satisfactory position within the renal pelvis and lower pigtail loop in satisfactory position within the inferior bladder lumen. Interval slightly improved   mild left-sided hydronephrosis. Increased left peripelvic and periureteral edema. No urinary tract stone. There is a new irregular 2.8 x 4.7 cm soft tissue density mass in the dependent portion of the bladder lumen. Stable atrophic right kidney without   hydronephrosis.     BOWEL: Stable right lower quadrant diverting ileostomy. Stable irregular 5.2 cm infiltrative soft tissue mass centered at the ileal cecal junction. Colonic diverticulosis without evidence of acute diverticulitis. No free air or  free fluid.     LYMPH NODES: Normal.     VASCULATURE: Mild aortic atherosclerosis.     PELVIC ORGANS: Prostatectomy. No pelvic free fluid.     MUSCULOSKELETAL: Osseous demineralization. No definite suspicious osseous lesion. Stable thoracolumbar vertebral body compression deformities.                                                                      IMPRESSION:   1.  Interval repositioning of the left-sided ureteral stent which is in satisfactory position. Slightly improved mild left-sided hydronephrosis.  2.  Progressive left peripelvic and periureteral edema which may reflect an infectious or inflammatory process. Recommend laboratory correlation.  3.  Newly visualized irregular 4.7 cm soft tissue density mass in the dependent portion of the bladder. This is likely a large blood clot. Underlying lesion is difficult to exclude. Imaging follow-up is recommended.  4.  Stable infiltrative mass centered at the ileal cecal junction.    I reviewed all labs    ASSESSMENT/PLAN:  64 year old male    ARF/CKD: has advanced CKD at a baseline (CR 2-2.7) and followed in renal clinic; now higher CR and may represent some progression vs ARF; ?related to urinary obstruction (partial/intermittent) vs hemodynamic (low bp and hgb) vs infection; empiric abx and support hemodynamics and hgb levels and follow; no indication for dialysis currently; manage expectantly  Volume status; may be slightly down; empiric IVF and follow PO  stage 4 metastatic adenocarcinoma colon: on Avastin and Lonsurf ([place] [facility]/[place])  Hx of prostate CA sp prostatectomy  H/o of hydronephrosis and s/p stenting  chronic metabolic acidosis; now some additional lactic acidosis; continue bicarb  Anemia; has been on aranesp; now severe anemia due to urinary loss (gross hematuria while on A/C); prbcs  H/o mucositis  ID; ?infection related to  tract with non specific findings; covering with abx and cultures drawn      Add; abx is bactrim which may further complicate  issue of elevated CR; holding for now    Osiris Lott MD  Nephrology

## 2025-07-21 NOTE — CONSULTS
[facility] CONSULT     Type of Consult: inpatient   Place of Service:  [facility]  Reason for Consultation: Gross hematuria  Consult Requested by: Dr. Koch    History of Present Illness:    Rich Wolff is a 64 year old male with hx of prostate CA s/p radical prostatectomy 2015, metastatic cecal cancer s/p chemotherapy and radiation therapy to the pelvis, s/p ileostomy, chronic left hydro managed with left ureteral stent (last exchange 6/26/25), hx of DVT (on Xarelto), E.coli and pseudomonas UTI 6/25/25, CKD-IV; Admitted for gross hematuria with clots, anemia, CHAYO. Urology has been consulted due to blood in the urine. History obtained through patient and chart review.     Pt reports he began to experiencing gross hematuria approximately 2 weeks ago, with clots beginning a couple of days ago. Is voiding and feels he is emptying his bladder but is passing large clots per patient report.     Labs: Hgb 5.7. Plt 172. UA+, UC pending. WBC 6.2. Creatinine 5.04 (baseline 2.41-2.83). LA 3.5.     CT Abd/Pelvis (non-contrast) 7/21/25 showed left ureteral stent in adequate position without significant hydro or significant perinephric stranding, a 2.8 x 4.7 cm soft tissue density mass in the dependent portion of the bladder (likely clot) and stable atrophic right kidney without hydronephrosis.     Pt denies abdominal and bilateral flank pain, fever, chills.     Past Medical history  Past Medical History:   Diagnosis Date    Colon cancer (H)     Hyperlipidemia     Hypertension     Prostate cancer (H) 09/01/2015    T1c. Arden's 6 (3+3).  Confined to prostate.  Multifocal bilateral comprising 2% of the gland.PSA:5.8.       Past Surgical history  Past Surgical History:   Procedure Laterality Date    BOWEL RESECTION      COLONOSCOPY W/ BIOPSIES  11/04/2020    COLOSTOMY      COMBINED CYSTOSCOPY, INSERT STENT URETER(S) Left 5/30/2025    Procedure: CYSTOSCOPY, LEFT RETROGRADE PYELOGRAM, WITH LEFT URETERAL  STENT INSERTION;  Surgeon: Parag Phillips MD;  Location: [facility]    COMBINED CYSTOSCOPY, RETROGRADES, EXCHANGE STENT URETER(S) Left 6/26/2025    Procedure: CYSTOSCOPY, WITH LEFT RETROGRADE PYELOGRAM AND LEFT URETERAL STENT REPLACEMENT;  Surgeon: Parag Phillips MD;  Location: [facility] OR    ESOPHAGOSCOPY, GASTROSCOPY, DUODENOSCOPY (EGD), COMBINED  11/04/2020    IR CHEST PORT PLACEMENT > 5 YRS OF AGE  11/24/2020    IR CHEST PORT REPLACEMENT SAME SITE RIGHT  10/27/2022    IR NEPHROSTOMY TUBE CHANGE RIGHT  4/8/2021    IR NEPHROSTOMY TUBE CHANGE RIGHT  4/8/2021    IR NEPHROSTOMY TUBE CHANGE RIGHT  8/16/2021    IR NEPHROSTOMY TUBE CHANGE RIGHT  10/29/2021    IR NEPHROSTOMY TUBE CHANGE RIGHT  10/29/2021    IR NEPHROSTOMY TUBE CHANGE RIGHT  12/27/2021    IR NEPHROSTOMY TUBE CHANGE RIGHT  2/28/2022    IR NEPHROSTOMY TUBE CHANGE RIGHT  5/9/2022    IR NEPHROSTOMY TUBE CHANGE RIGHT  7/5/2022    IR NEPHROSTOMY TUBE CHANGE RIGHT  9/26/2022    IR NEPHROSTOMY TUBE CHANGE RIGHT  10/25/2022    IR NEPHROSTOMY TUBE CHANGE RIGHT  12/28/2022    IR NEPHROSTOMY TUBE CHANGE RIGHT  2/28/2023    IR NEPHROSTOMY TUBE CHANGE RIGHT  4/27/2023    IR NEPHROSTOMY TUBE CHANGE RIGHT  6/27/2023    IR NEPHROSTOMY TUBE CHANGE RIGHT  8/30/2023    IR NEPHROSTOMY TUBE CHANGE RIGHT  10/25/2023    IR NEPHROSTOMY TUBE CHANGE RIGHT  12/27/2023    IR NEPHROSTOMY TUBE CHANGE RIGHT  2/28/2024    IR NEPHROSTOMY TUBE CHANGE RIGHT  4/24/2024    IR NEPHROSTOMY TUBE CHANGE RIGHT  6/26/2024    IR NEPHROSTOMY TUBE CHANGE RIGHT  8/28/2024    IR NEPHROSTOMY TUBE CHANGE RIGHT  10/30/2024    IR PORT PLACEMENT >5 YEARS  11/24/2020    IR SITE CHECK/EVALUATION  4/11/2022    NEPHROSTOMY      VT ESOPHAGOGASTRODUODENOSCOPY TRANSORAL DIAGNOSTIC N/A 11/4/2020    Procedure: ESOPHAGOGASTRODUODENOSCOPY (EGD);  Surgeon: Paul Masters MD;  Location: [facility];  Service: Gastroenterology    VT LAP,PROSTATECTOMY,RADICAL,W/NERVE SPARE,INCL  ROBOTIC Bilateral 09/01/2015    Procedure: DAVINCI RADICAL RETROPUBIC PROSTATECTOMY BILATERAL PELVIC LYMPH NODE DISSECTION;  Surgeon: Juan C Velazco MD;  Location: [facility];  Service: Urology    PROSTATE BIOPSY  06/08/2015    Ultrasound-guided transrectal biopsy.    SURGERY SCROTAL / TESTICULAR      for undescended testes, removed due to atrophy with vasectomy    ZZHC COLONOSCOPY W/WO BRUSH/WASH N/A 11/4/2020    Procedure: COLONOSCOPY WITH BIOPSY;  Surgeon: Paul Masters MD;  Location: [facility];  Service: Gastroenterology       Social History  Social History     Tobacco Use    Smoking status: Never    Smokeless tobacco: Never   Substance Use Topics    Alcohol use: Not Currently     Comment: Alcoholic Drinks/day: rarely    Drug use: No       Medications  Current Facility-Administered Medications   Medication Dose Route Frequency Provider Last Rate Last Admin    calcium carbonate (TUMS) chewable tablet 1,000 mg  1,000 mg Oral 4x Daily PRN Carlota Koch MD        [START ON 7/22/2025] ceFEPIme (MAXIPIME) 1 g vial to attach to  mL bag for ADULTS or NS 50 mL bag for PEDS  1 g Intravenous Q24H Carlota Koch MD        ceFEPIme (MAXIPIME) 2 g vial to attach to  mL bag for ADULTS or NS 50 mL bag for PEDS  2 g Intravenous Once China Franz PA-C        fentaNYL (DURAGESIC) 50 mcg/hr 72 hr patch 1 patch  50 mcg Transdermal Q72H Carlota Koch MD        And    fentaNYL (DURAGESIC) Patch in Place   Transdermal Q8H On license of [facility] Carlota Koch MD        gabapentin (NEURONTIN) capsule 100 mg  100 mg Oral At Bedtime Carlota Koch MD        lidocaine (LMX4) cream   Topical Q1H PRN China Franz PA-C        lidocaine (LMX4) cream   Topical Q1H PRN Carlota Koch MD        lidocaine 1 % 0.1-1 mL  0.1-1 mL Other Q1H PRN China Franz PA-C        lidocaine 1 % 0.1-1 mL  0.1-1 mL Other Q1H PRN Carlota Koch MD        melatonin tablet 5 mg  5 mg Oral At Bedtime PRN Carlota Koch MD         ondansetron (ZOFRAN ODT) ODT tab 4 mg  4 mg Oral Q6H PRN Carlota Koch MD        Or    ondansetron (ZOFRAN) injection 4 mg  4 mg Intravenous Q6H PRN Carlota Koch MD        oxyBUTYnin (DITROPAN) tablet 5 mg  5 mg Oral TID Carlota Koch MD        oxyCODONE (ROXICODONE) tablet 5 mg  5 mg Oral Q4H PRN Carlota Koch MD        polyethylene glycol (MIRALAX) Packet 17 g  17 g Oral BID PRN Carlota Koch MD        [Held by provider] rivaroxaban ANTICOAGULANT (XARELTO) tablet 20 mg  20 mg Oral Daily Carlota Koch MD        rOPINIRole (REQUIP) tablet 0.25 mg  0.25 mg Oral At Bedtime Carlota Koch MD        sentriston-docusate (SENOKOT-S/PERICOLACE) 8.6-50 MG per tablet 1 tablet  1 tablet Oral BID PRN Carlota Koch MD        Or    senna-docusate (SENOKOT-S/PERICOLACE) 8.6-50 MG per tablet 2 tablet  2 tablet Oral BID PRN Carlota Koch MD        sertraline (ZOLOFT) tablet 50 mg  50 mg Oral At Bedtime Carlota Koch MD        sodium bicarbonate tablet 1,300 mg  1,300 mg Oral BID Osiris Lott MD        sodium chloride (PF) 0.9% PF flush 3 mL  3 mL Intracatheter q1 min prn China Franz PA-C        sodium chloride (PF) 0.9% PF flush 3 mL  3 mL Intracatheter Q8H China Franz PA-C   3 mL at 07/21/25 1439    sodium chloride (PF) 0.9% PF flush 3 mL  3 mL Intracatheter Q8H LIZETT Carlota Koch MD        sodium chloride (PF) 0.9% PF flush 3 mL  3 mL Intracatheter q1 min prn Carlota Koch MD        sodium chloride 0.9 % infusion   Intravenous Continuous Carlota Koch MD        tamsulosin (FLOMAX) capsule 0.4 mg  0.4 mg Oral Daily Carlota Koch MD        traZODone (DESYREL) tablet 50 mg  50 mg Oral At Bedtime Carlota Koch MD         Current Outpatient Medications   Medication Sig Dispense Refill    acetaminophen (TYLENOL) 500 MG tablet Take 1,000 mg by mouth every 6 hours as needed for pain.      BACTRIM -160 MG tablet Take 1 tablet by mouth 2 times daily.      calcium carbonate-vitamin D (CALTRATE)  "600-10 MG-MCG per tablet Take 1 tablet by mouth daily.      fentaNYL (DURAGESIC) 50 mcg/hr 72 hr patch Place 1 patch onto the skin every 72 hours.      ferrous sulfate (FE TABS) 325 (65 Fe) MG EC tablet Take 650 mg by mouth every morning.      ferrous sulfate (FEROSUL) 325 (65 Fe) MG tablet Take 325 mg by mouth every evening.      gabapentin (NEURONTIN) 300 MG capsule Take 300 mg by mouth at bedtime.      LONSURF 15-6.14 MG tablet Take 2 tablets by mouth See Admin Instructions. Take 2 tablets by mouth twice daily on days 1-5 and 15-19 of each 28 day cycle.      Multiple Vitamin (MULTIVITAMIN PO) Multivitamin powder: take 1 scoop daily      oxyBUTYnin (DITROPAN) 5 MG tablet Take 5 mg by mouth 3 times daily.      oxyCODONE (ROXICODONE) 5 MG tablet Take 5 mg by mouth every 4 hours as needed for severe pain       rOPINIRole (REQUIP) 0.25 MG tablet Take 0.25 mg by mouth at bedtime.      sertraline (ZOLOFT) 50 MG tablet Take 50 mg by mouth At Bedtime       tamsulosin (FLOMAX) 0.4 MG capsule Take 0.4 mg by mouth daily      traZODone (DESYREL) 50 MG tablet Take 1 tablet by mouth at bedtime.      vitamin D3 (CHOLECALCIFEROL) 50 mcg (2000 units) tablet Take 1 tablet by mouth daily      XARELTO ANTICOAGULANT 20 MG TABS tablet Take 20 mg by mouth daily         Allergies  Allergies   Allergen Reactions    Bee Venom Hives       ROS:   12 point review of systems was taken and is negative aside from what is noted above in the HPI.     Physical Exam:  /66 (BP Location: Right arm)   Pulse 78   Temp 97.9  F (36.6  C) (Oral)   Resp 18   Ht 1.702 m (5' 7\")   Wt 65.8 kg (145 lb)   SpO2 100%   BMI 22.71 kg/m    General: NAD, alert, cooperative  Head: normocephalic, without abnormality / atraumatic   Abdomen: soft, non tender,  non distended. no suprapubic fullness or tenderness. No bilateral CVA tenderness   Geniturinary: Penis and scrotum without erythema or edema. 18 Fr coude tip catheter placed after failed 22 Fr 3 way, 20 " Fr 3 way and 18 Fr 3 way catheter placement attempts. Catheter draining thin cherry colored urine. Manually irrigated with aspiration of the same, no clots.    Extremities: no calf edema or tenderness  Skin: no rashes or lesions  Musculoskeletal: moves all extremities equally  Psychological: alert and oriented, answers questions appropriately      Labs:   WBC   Date Value Ref Range Status   12/24/2020 5.4 4.0 - 11.0 10e9/L Final     WBC Count   Date Value Ref Range Status   07/21/2025 6.2 4.0 - 11.0 10e3/uL Final     Hemoglobin   Date Value Ref Range Status   07/21/2025 6.7 (LL) 13.3 - 17.7 g/dL Final   07/21/2025 5.7 (LL) 13.3 - 17.7 g/dL Final   12/24/2020 7.7 (L) 13.3 - 17.7 g/dL Final   ]  Creatinine   Date Value Ref Range Status   07/21/2025 5.04 (H) 0.67 - 1.17 mg/dL Final   12/23/2020 1.33 (H) 0.66 - 1.25 mg/dL Final     INR   Date Value Ref Range Status   05/08/2023 1.31 (H) 0.85 - 1.15 Final      UA RESULTS:  Recent Labs   Lab Test 07/21/25  1047 06/25/25  0223 10/28/21  1754 11/04/20  1736   COLOR Red* Red*   < > Yellow   APPEARANCE Turbid* Bloody*   < > Clear   URINEGLC  --  Negative   < > Negative   URINEBILI  --  1.0 mg/dL*   < > Negative   URINEKETONE  --  Negative   < > 100 mg/dL*   SG 1.018 1.025   < > 1.020   UBLD  --  >1.0 mg/dL*   < > Negative   URINEPH  --  6.0   < > 6.0   PROTEIN  --  70*   < > Negative   UROBILINOGEN  --   --   --  <2.0 E.U./dL   NITRITE  --  Negative   < > Negative   LEUKEST  --  250 Marlon/uL*   < > Negative   RBCU >182* >182*   < >  --    WBCU >182* 0   < >  --     < > = values in this interval not displayed.     Cultures:  Urine culture: pending     Lab Results: personally reviewed     Imaging:  EXAM: CT ABDOMEN PELVIS W/O CONTRAST  LOCATION: [facility]  DATE: 7/21/2025     INDICATION: hematuria, Hgb of 5.7, CHAYO. chronic left hydronephrosis 2 2 colon CA s p left ureteral stent placement; please assess stent placement  COMPARISON: CT 6/25/2025 and  5/30/2025, CT chest 5/8/2023  TECHNIQUE: CT scan of the abdomen and pelvis was performed without IV contrast. Multiplanar reformats were obtained. Dose reduction techniques were used.  CONTRAST: None.     FINDINGS:   LOWER CHEST: Enlarging small-to-moderate pericardial effusion. Bibasilar scarring and bronchiectasis. Stable benign 4 mm lingular nodule. No follow-up is indicated.     HEPATOBILIARY: Cholelithiasis. Normal unenhanced liver and bile ducts.     PANCREAS: Normal.     SPLEEN: Normal.     ADRENAL GLANDS: Normal.     KIDNEYS/BLADDER: Interval repositioning of the left ureteral stent with the upper pigtail loop in satisfactory position within the renal pelvis and lower pigtail loop in satisfactory position within the inferior bladder lumen. Interval slightly improved   mild left-sided hydronephrosis. Increased left peripelvic and periureteral edema. No urinary tract stone. There is a new irregular 2.8 x 4.7 cm soft tissue density mass in the dependent portion of the bladder lumen. Stable atrophic right kidney without   hydronephrosis.     BOWEL: Stable right lower quadrant diverting ileostomy. Stable irregular 5.2 cm infiltrative soft tissue mass centered at the ileal cecal junction. Colonic diverticulosis without evidence of acute diverticulitis. No free air or free fluid.     LYMPH NODES: Normal.     VASCULATURE: Mild aortic atherosclerosis.     PELVIC ORGANS: Prostatectomy. No pelvic free fluid.     MUSCULOSKELETAL: Osseous demineralization. No definite suspicious osseous lesion. Stable thoracolumbar vertebral body compression deformities.                                                                      IMPRESSION:   1.  Interval repositioning of the left-sided ureteral stent which is in satisfactory position. Slightly improved mild left-sided hydronephrosis.  2.  Progressive left peripelvic and periureteral edema which may reflect an infectious or inflammatory process. Recommend laboratory  correlation.  3.  Newly visualized irregular 4.7 cm soft tissue density mass in the dependent portion of the bladder. This is likely a large blood clot. Underlying lesion is difficult to exclude. Imaging follow-up is recommended.  4.  Stable infiltrative mass centered at the ileal cecal junction.       I have personally reviewed the imaging reports above.     Assessment/Plan: Rihc Wolff is being seen by [facility] for gross hematuria.     - 64 yr old male with hx of prostate CA s/p prostatectomy 2015 and metastatic cancer of the cecum with chronic left hydronephrosis managed with left ureteral stent; Admitted with gross hematuria with clots, anemia, CHAYO on CKD-IV  - CT showed a 4.7 cm soft tissue density in the bladder (likely clot given not seen on recent cystoscopy 6/26), no hydro and left stent in appropriate position.   - 18 Fr coude tip catheter placed after unsuccessful 3 way catheter placement attempts. Manually irrigate every 2 hours and prn. Keep NPO for now. Notify [facility] on-call provider if difficulty maintaining catheter patency or unable to manually irrigate.   - Hgb 5.7. PRBC transfusions per primary team.    - Hold Xarelto.   - UA+, UC pending. WBC 6.2. T99. Agree with IV cefepime, adjusting as needed pending final cultures/sensitivities.   - Continue tamsulosin.   - Will continue to follow.   - Old records reviewed - EPIC, [facility], [facility]     This case was discussed with:  Dr Parag Phillips and Dr. Fall (on-call)    Thank you for consulting [facility] regarding this patient's care. Please contact us with questions or concerns.       Barrett Kumar, APRN, CNP  [facility]  [phone]

## 2025-07-21 NOTE — ED PROVIDER NOTES
"Emergency Department Encounter  [facility] EMERGENCY DEPARTMENT  Rich Wolff   AGE: 64 year old SEX: male  YOB: 1960  PCP: Maycol Worrell  MRN: [id number]      EVALUATION DATE & TIME: 7/21/2025  9:27 AM ; PCP: Maycol Worrell   ED PROVIDER: China Franz PA-C    CHIEF COMPLAINT: Hematuria      FINAL IMPRESSION       ICD-10-CM    1. Gross hematuria  R31.0       2. Anemia due to blood loss, acute  D62       3. Chronic anticoagulation  Z79.01       4. 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  Taking Bactrim as directed for UTI since 07/17. No fevers, chills, nausea, vomiting, or abdominal pain.  Voiding regularly does feel like he has to \"try harder\" to produce a stream.  No black or bloody stools. Follows with [facility] for CKD.     ED Course as of 07/21/25 1510   Mon Jul 21, 2025   1030 I met the patient and gathered initial history and performed my physical exam.  Vitals reviewed and hemodynamically stable, afebrile.  On exam, patient well-appearing and in no acute distress.  Abdomen nontender.  Colostomy present and draining adequately, no surrounding erythema.  No CVA tenderness bilaterally.  Plan for CBC, BMP, and UA. Will have RN bladder scan following voiding to access post void residual.    1122 UA with Microscopic reflex to Culture(!)  Given significant gross hematuria, infection indicators unreliable. Urine culture sent. Post void residual 41 mL, not retaining.   1204 " Hemoglobin(!!): 5.7  8.2 on 06/29/25  6.9 on 06/28/25   1204 Creatinine(!): 5.04  Acute kidney injury. Could be multifactorial due to his CKD, chemo, possible GI bleed?  2.41 on 06/29/25  3.21 on 06/26/25   1206 WBC: 6.2  Slight elevation. Given HR of 103bpm, which could be due to anemia, will add on lactic in setting of recent UTI.  3.0 on 06/29/25  2.5 on 06/28/25   1207 I have staffed the patient with Dr. Marcellus Sandoval ED MD, who has evaluated the patient and agrees with all aspects of today's care. Consult placed to [facility] and [facility].   1215 Consented for blood products. 2 units ordered. Stool from ostomy dark in color but non bloody. At baseline per patient. On iron supplements.   1248 Discussed case with Barrett Kumar from [facility].   1301 Discussed case with MD Dianna from [facility]. No additional recommendations from nephrology stand point. Team follow while patient is admitted.   1309 Lactic Acid(!): 3.5  Sepsis protocol started. IV fluids, blood cultures, and abx ordered. Will do cefipime given history of pseudomonas UTI.   1352 Discussed plan with patient. Patient in agreement with admission.   1442 CT Abdomen Pelvis w/o Contrast  Left-sided ureteral stent in satisfactory position with improved left-sided hydronephrosis.  Progressive left peripelvic and periureteral edema.  Newly visualized irregular 4.7 cm soft tissue density in dependent portion of bladder that could represent a large blood clot.  Stable mass at ileocecal junction.   1457 Discussed CT findings with Barrett Kumar. Plan to place 22 or 24 [place] 3 way garcias and manually irrigate +/- CBI. Patient to be kept NPO in case surgical intervention is required. Admission request placed.   1748 MD Zee accepts patient for admission.     Medications given today in the emergency department:  Medications - No data to display    Consults: [facility] (José), [facility] (MD Dianna).    Assessment/Plan:  Patient presenting with gross hematuria with findings of anemia, CHAYO, and elevated lactic acid in setting of recent pseudomonas UTI. Discussed case with [facility] who recommends CT imaging of the abdomen and pelvis and anticipated possible Adkins catheter irrigation depending on clot burden.  CT imaging reassuring with left-sided ureteral stent in satisfactory position with improved left-sided hydronephrosis.  Radiologist does note progressive left peripelvic and periureteral edema with a newly visualized irregular 4.7 cm soft tissue density in the dependent portion of bladder likely representing a large blood clot.  Unsure if patient is truly septic or 2/2 CHAYO with tachycardia 2/2 anemia but did start fluid bolus, collected blood cultures, and started cefepime. 2 units of pRBC given for anemia. Did consider possible GI bleed given dark stools in colostomy, although patient is on iron pills. Plan to admit for further monitoring and workup.    New prescriptions started at today's ED visit:   New Prescriptions    No medications on file       Medical Decision Making      Admit.    MIPS (CTPE, Dental pain, Adkins, Sinusitis, Asthma/COPD, Head Trauma): Not Applicable    SEPSIS: The patient has signs of sepsis   Sepsis ED evaluation   The patient has signs of sepsis as evidenced by:  1. Presence of 2 SIRS criteria, suspected infection, AND  2. Organ dysfunction: Sepsis work up in progress. Will continue to monitor for signs of organ dysfunction    Sepsis Care Initiation: Starting at 12:06 PM on 07/21/25, until specified. Prior to this documentation, sepsis, severe sepsis, or septic shock was NOT thought to be a significant cause of illness. This order represents the first time infection was seriously considered to be affecting the patient.    Lactic Acid Results:  Recent Labs   Lab Test 07/21/25  1250 06/26/25  0819 05/30/25  1404   LACT 3.5* 1.3 1.1       3 Hour Bundle 6 Hour Bundle (Reassessment)   Blood Cultures  "before IV Antibiotics: Yes  Antibiotics given: see below  Prehospital fluid volume (mL):                     Total fluids given (ED +Pre-hospital):  The patient responded to a lesser volume of IV fluids. The initial volume ordered was 1000 mL.  Did not proceed with 30ml/kg bolus given renal insufficiency and normotensive status here.   Repeat Lactic Acid Level: Ordered by reflex for 2 hours after initial lactic acid collection.  Vasopressors: MAP>65 after initial IVF bolus, will continue to monitor fluid status and vital signs.  Repeat perfusion exam: I attest to having performed a repeat sepsis exam and assessment of perfusion at 1352.   BMI Readings from Last 1 Encounters:   07/21/25 22.71 kg/m        Anti-infectives (From admission through now)      None                HISTORY OF PRESENT ILLNESS   Patient information was obtained from: patient, significant other  Use of Intrepreter: N/A     Rich BOWENS New is a 64 year old male with a pertinent history of left hydronephrosis 2/2 ureteral obstruction from metastatic colon CA (on chemotherapy) and managed with left ureteral stent placed 5/25/25, anemia, thromboembolism chronically anticoagulated on Xarelto, prostate cancer s/p prostatectomy, CKD, hemorrhagic cystitis, gastrointestinal hemorrhage, who presents to the ED by private vehicle with wife for evaluation of hematuria.  Patient reports months of intermittent gross hematuria.  Patient reports that hematuria has worsened with increase in blood clots with voiding.  Called [facility] today and was told to come into the emergency department.  Was recently started on Bactrim and has been taking this as directed for urinary tract infection.  No fevers, chills, nausea, vomiting, or abdominal pain.  Voiding regularly does feel like he has to \"try harder\" to produce a stream.  No shortness of breath but does state that baseline lightheadedness has worsened. No black or bloody stools.     Follows with " [facility] for CKD.     Per chart review,  07/18/2025 - Started on bactrim 800-160 BID x5 days on 07/18/2025.  07/16/2025 - Seen by [facility].  Noted to have UTI with jelani hematuria and hydronephrosis.  Urine cultures with 2 species of Pseudomonas, patient asymptomatic at the moment, antibiotics not prescribed.  Hydronephrosis is chronic secondary to metastatic colon cancer.  Stent last exchanged on 06/26/2025 when hospitalized for urosepsis.  Hemoglobin stable at 8.2.  06/25/2025 - Patient admitted for left flank pain and recent dx Pseudomonas urinary tract infection.  Suspect left flank pain is renal colic secondary to the stent and possibly exacerbated by complicated UTI. If recurs off pain medications, may consider options of Adkins catheter to improve renal colic versus considering percutaneous neph tube placement with stent removal.   CT ABD PELVIS:  Moderate diffuse wall thickening of a nondistended urinary bladder. Additionally, there is haziness in the perivesical fat. These findings could relate to cystitis. Recommend clinical correlation. Interval placement of a left ureteral stent since 5/30/2025. The proximal portion of the stent is curled back upon itself within the left renal pelvis with its proximal pigtail in the proximal left ureter. There is residual mild dilatation of the left intrarenal collecting system, decreased in caliber since the prior study.   05/30/2025-06/04/2025 - Patient admitted to the hospital for left hydronephrosis secondary to colon cancer with left ureteral stent placement.  Found to have E. coli bacteremia and septic shock.  Infectious disease recommended extended course of amoxicillin at discharge.    MEDICAL HISTORY     Past Medical History:   Diagnosis Date    Colon cancer (H)     Hyperlipidemia     Hypertension     Prostate cancer (H) 09/01/2015       Past Surgical History:   Procedure Laterality Date    BOWEL RESECTION      COLONOSCOPY W/ BIOPSIES   11/04/2020    COLOSTOMY      COMBINED CYSTOSCOPY, INSERT STENT URETER(S) Left 5/30/2025    Procedure: CYSTOSCOPY, LEFT RETROGRADE PYELOGRAM, WITH LEFT URETERAL STENT INSERTION;  Surgeon: Parag Phillips MD;  Location: [facility] OR    COMBINED CYSTOSCOPY, RETROGRADES, EXCHANGE STENT URETER(S) Left 6/26/2025    Procedure: CYSTOSCOPY, WITH LEFT RETROGRADE PYELOGRAM AND LEFT URETERAL STENT REPLACEMENT;  Surgeon: Parag Phillips MD;  Location: [facility] OR    ESOPHAGOSCOPY, GASTROSCOPY, DUODENOSCOPY (EGD), COMBINED  11/04/2020    IR CHEST PORT PLACEMENT > 5 YRS OF AGE  11/24/2020    IR CHEST PORT REPLACEMENT SAME SITE RIGHT  10/27/2022    IR NEPHROSTOMY TUBE CHANGE RIGHT  4/8/2021    IR NEPHROSTOMY TUBE CHANGE RIGHT  4/8/2021    IR NEPHROSTOMY TUBE CHANGE RIGHT  8/16/2021    IR NEPHROSTOMY TUBE CHANGE RIGHT  10/29/2021    IR NEPHROSTOMY TUBE CHANGE RIGHT  10/29/2021    IR NEPHROSTOMY TUBE CHANGE RIGHT  12/27/2021    IR NEPHROSTOMY TUBE CHANGE RIGHT  2/28/2022    IR NEPHROSTOMY TUBE CHANGE RIGHT  5/9/2022    IR NEPHROSTOMY TUBE CHANGE RIGHT  7/5/2022    IR NEPHROSTOMY TUBE CHANGE RIGHT  9/26/2022    IR NEPHROSTOMY TUBE CHANGE RIGHT  10/25/2022    IR NEPHROSTOMY TUBE CHANGE RIGHT  12/28/2022    IR NEPHROSTOMY TUBE CHANGE RIGHT  2/28/2023    IR NEPHROSTOMY TUBE CHANGE RIGHT  4/27/2023    IR NEPHROSTOMY TUBE CHANGE RIGHT  6/27/2023    IR NEPHROSTOMY TUBE CHANGE RIGHT  8/30/2023    IR NEPHROSTOMY TUBE CHANGE RIGHT  10/25/2023    IR NEPHROSTOMY TUBE CHANGE RIGHT  12/27/2023    IR NEPHROSTOMY TUBE CHANGE RIGHT  2/28/2024    IR NEPHROSTOMY TUBE CHANGE RIGHT  4/24/2024    IR NEPHROSTOMY TUBE CHANGE RIGHT  6/26/2024    IR NEPHROSTOMY TUBE CHANGE RIGHT  8/28/2024    IR NEPHROSTOMY TUBE CHANGE RIGHT  10/30/2024    IR PORT PLACEMENT >5 YEARS  11/24/2020    IR SITE CHECK/EVALUATION  4/11/2022    NEPHROSTOMY      AR ESOPHAGOGASTRODUODENOSCOPY TRANSORAL DIAGNOSTIC N/A 11/4/2020    Procedure:  ESOPHAGOGASTRODUODENOSCOPY (EGD);  Surgeon: Paul Masters MD;  Location: [facility] GI;  Service: Gastroenterology    SC LAP,PROSTATECTOMY,RADICAL,W/NERVE SPARE,INCL ROBOTIC Bilateral 09/01/2015    Procedure: DAVINCI RADICAL RETROPUBIC PROSTATECTOMY BILATERAL PELVIC LYMPH NODE DISSECTION;  Surgeon: Juan C Velazco MD;  Location: [facility];  Service: Urology    PROSTATE BIOPSY  06/08/2015    Ultrasound-guided transrectal biopsy.    SURGERY SCROTAL / TESTICULAR      for undescended testes, removed due to atrophy with vasectomy    ZZHC COLONOSCOPY W/WO BRUSH/WASH N/A 11/4/2020    Procedure: COLONOSCOPY WITH BIOPSY;  Surgeon: Paul Masters MD;  Location: [facility];  Service: Gastroenterology       Family History   Problem Relation Age of Onset    Breast Cancer Mother     Osteoporosis Mother     Valvular heart disease Father     Prostate Cancer Father 70.00    No Known Problems Sister     No Known Problems Son     No Known Problems Daughter        Social History     Tobacco Use    Smoking status: Never    Smokeless tobacco: Never   Substance Use Topics    Alcohol use: Not Currently     Comment: Alcoholic Drinks/day: rarely    Drug use: No       Most Recent Immunizations   Administered Date(s) Administered    COVID-19 12+ (Pfizer) 10/31/2023    COVID-19 Bivalent 12+ (Pfizer) 12/23/2022    COVID-19 MONOVALENT 12+ (Pfizer) 09/03/2021    COVID-19 Monovalent 12+ (Pfizer 2022) 04/26/2022    Influenza (H1N1) 10/06/2021    Influenza (IIV3) PF 10/22/2020        acetaminophen (TYLENOL) 500 MG tablet  calcium carbonate-vitamin D (CALTRATE) 600-10 MG-MCG per tablet  fentaNYL (DURAGESIC) 50 mcg/hr 72 hr patch  ferrous sulfate (FE TABS) 325 (65 Fe) MG EC tablet  ferrous sulfate (FEROSUL) 325 (65 Fe) MG tablet  gabapentin (NEURONTIN) 300 MG capsule  LONSURF 15-6.14 MG tablet  Multiple Vitamin (MULTIVITAMIN PO)  oxyCODONE (ROXICODONE) 5 MG tablet  rOPINIRole (REQUIP) 0.25 MG tablet  sertraline  "(ZOLOFT) 50 MG tablet  tamsulosin (FLOMAX) 0.4 MG capsule  traZODone (DESYREL) 50 MG tablet  vitamin D3 (CHOLECALCIFEROL) 50 mcg (2000 units) tablet  XARELTO ANTICOAGULANT 20 MG TABS tablet        PHYSICAL EXAM   VITALS:  Patient Vitals for the past 24 hrs:   BP Temp Pulse Resp SpO2 Height Weight   07/21/25 1243 111/59 -- 106 -- 100 % -- --   07/21/25 0927 105/59 -- 103 16 98 % -- --   07/21/25 0924 -- 99  F (37.2  C) -- 16 -- 1.702 m (5' 7\") 65.8 kg (145 lb)     Body mass index is 22.71 kg/m .     Vitals reviewed. Nursing notes reviewed.  CONSITUTIONAL: Generally well appearing male in no acute distress. Well developed and nourished.   EYES: Conjunctivae clear without exudates or hemorrhage. Sclera non-icteric. EOM grossly intact.   HENT: Normocephalic, atraumatic.  Moist mucous membranes.   NECK: Supple, gross ROM intact.   RESPIRATORY: Unlabored respiratory effort, speaks in full sentences.  Lungs clear to auscultation bilaterally without rhonchi, wheezes, rales.   CARDIO: Normal heart rate, regular rhythm, no murmurs. No pedal edema.   GI: Soft, nondistended. Abdomen is non tender to palpation without rebound tenderness or guarding. Colostomy present and draining dark stool, no surrounding erythema. No CVA tenderness.  MSK: Moving all 4 extremities intentionally and without pain. No joint swelling, redness, or obvious deformity.  SKIN: Warm, dry. No rashes or lesions.  NEURO:  AxOx4. CN II-XII grossly intact, but not individually tested. No focal neurological deficits.   PSYCH: Thoughts linear and responses appropriate. Cooperative. Appropriate mood and affect.     LABS & IMAGING   All pertinent labs and imaging reviewed and interpreted.  Results for orders placed or performed during the hospital encounter of 07/21/25   UA with Microscopic reflex to Culture    Specimen: Urine, Midstream   Result Value Ref Range    Color Urine Red (A) Colorless, Straw, Light Yellow, Yellow    Appearance Urine Turbid (A) Clear    " Glucose Urine      Bilirubin Urine      Ketones Urine      Specific Gravity Urine 1.018 1.001 - 1.030    Blood Urine      pH Urine      Protein Albumin Urine      Urobilinogen Urine      Nitrite Urine      Leukocyte Esterase Urine      Bacteria Urine Few (A) None Seen /HPF    WBC Clumps Urine Present (A) None Seen /HPF    RBC Urine >182 (H) <=2 /HPF    WBC Urine >182 (H) <=5 /HPF   Basic metabolic panel   Result Value Ref Range    Sodium 136 135 - 145 mmol/L    Potassium 4.4 3.4 - 5.3 mmol/L    Chloride 102 98 - 107 mmol/L    Carbon Dioxide (CO2) 24 22 - 29 mmol/L    Anion Gap 10 7 - 15 mmol/L    Urea Nitrogen 35.7 (H) 8.0 - 23.0 mg/dL    Creatinine 5.04 (H) 0.67 - 1.17 mg/dL    GFR Estimate 12 (L) >60 mL/min/1.73m2    Calcium 8.6 (L) 8.8 - 10.4 mg/dL    Glucose 100 (H) 70 - 99 mg/dL   CBC with platelets and differential   Result Value Ref Range    WBC Count 6.2 4.0 - 11.0 10e3/uL    RBC Count 1.77 (L) 4.40 - 5.90 10e6/uL    Hemoglobin 5.7 (LL) 13.3 - 17.7 g/dL    Hematocrit 18.1 (L) 40.0 - 53.0 %     (H) 78 - 100 fL    MCH 32.2 26.5 - 33.0 pg    MCHC 31.5 31.5 - 36.5 g/dL    RDW 19.3 (H) 10.0 - 15.0 %    Platelet Count 172 150 - 450 10e3/uL    % Neutrophils 79 %    % Lymphocytes 6 %    % Monocytes 11 %    % Eosinophils 2 %    % Basophils 0 %    % Immature Granulocytes 1 %    NRBCs per 100 WBC 0 <1 /100    Absolute Neutrophils 4.9 1.6 - 8.3 10e3/uL    Absolute Lymphocytes 0.4 (L) 0.8 - 5.3 10e3/uL    Absolute Monocytes 0.7 0.0 - 1.3 10e3/uL    Absolute Eosinophils 0.1 0.0 - 0.7 10e3/uL    Absolute Basophils 0.0 0.0 - 0.2 10e3/uL    Absolute Immature Granulocytes 0.1 <=0.4 10e3/uL    Absolute NRBCs 0.0 10e3/uL   Lactic Acid Whole Blood with 1X Repeat in 2 HR when >2   Result Value Ref Range    Lactic Acid, Initial 3.5 (H) 0.7 - 2.0 mmol/L   Adult Type and Screen   Result Value Ref Range    ABO/RH(D) A POS     Antibody Screen Negative Negative    SPECIMEN EXPIRATION DATE 7/24/2025 11:59:00 PM CDT    Prepare red  blood cells (unit)   Result Value Ref Range    Blood Component Type Red Blood Cells     Product Code F7314S30     Unit Status Ready for issue     Unit Number [id number]     CROSSMATCH Compatible     CODING SYSTEM [id number]    Prepare red blood cells (unit)   Result Value Ref Range    Blood Component Type Red Blood Cells     Product Code W0418X04     Unit Status Ready for issue     Unit Number [id number]     CROSSMATCH Compatible     CODING SYSTEM [id number]      CRITICAL CARE TIME   Performed by: China Franz PA-C  Authorized by: Marcellus Sandoval MD  Total critical care time: 70 minutes  Critical care was necessary to treat or prevent imminent or life-threatening deterioration of the following conditions: anemia requiring blood transfusion, sepsis  Critical care was time spent personally by me on the following activities: development of treatment plan with patient or surrogate, discussions with consultants, examination of patient, evaluation of patient's response to treatment, obtaining history from patient or surrogate, ordering and performing treatments and interventions, ordering and review of laboratory studies, ordering and review of radiographic studies, re-evaluation of patient's condition and monitoring for potential decompensation.  Critical care time was exclusive of separately billable procedures and treating other patients.     China Franz PA-C   Emergency Medicine   [facility] EMERGENCY DEPARTMENT  [facility] [facility] [id number] [phone]  Dept: [phone]     China Franz PA-C  07/21/25 1518

## 2025-07-21 NOTE — ED TRIAGE NOTES
Patient is a patient with [facility] and had tested positive for a UTI and started on antibiotics Friday and since then, he has had blood clots every time he voids. Patient called [facility] and was told to come in.      Triage Assessment (Adult)       Row Name 07/21/25 0925          Triage Assessment    Airway WDL WDL        Respiratory WDL    Respiratory WDL WDL        Skin Circulation/Temperature WDL    Skin Circulation/Temperature WDL WDL        Cardiac WDL    Cardiac WDL WDL        Peripheral/Neurovascular WDL    Peripheral Neurovascular WDL WDL        Cognitive/Neuro/Behavioral WDL    Cognitive/Neuro/Behavioral WDL WDL

## 2025-07-21 NOTE — ED PROVIDER NOTES
"Emergency Department Staff Physician Note     I had a face to face encounter with this patient seen by the Advanced Practice Provider (RIAN).  I have seen, examined, and discussed the patient with the RIAN and agree with their assessment and plan of management.    Relevant HPI:     Rich Wolff is a 64 year old male who presents to the Emergency Department for evaluation of hematuria. He has been experiencing acut worsening of months of intermittent hematuria now with blood clots. Has been taking Bactrim for UTI since 07/17. No fever, chills, nausea, vomiting, or abdominal pain.     I, Omari Sexton, am serving as a scribe to document services personally performed by Marcellus Sandoval D.O., based on my observations and the provider's statements to me.   I, Marcellus Sandoval D.O., attest that Omari Sexton was acting in a scribe capacity, has observed my performance of the services and has documented them in accordance with my direction.    ED Course:  12:05 PM I received the patient report from the RIAN, China Franz PA-C. I agree with their assessment and plan of management, and I will see the patient.  12:12 PM I met with the patient to introduce myself, gather additional history, perform my initial exam, and discuss the plan.     Brief Physical Exam:  VITAL SIGNS: /59   Pulse 103   Temp 99  F (37.2  C)   Resp 16   Ht 1.702 m (5' 7\")   Wt 65.8 kg (145 lb)   SpO2 98%   BMI 22.71 kg/m      General Appearance: Well-appearing, well-nourished, no acute distress  Head:  Normocephalic, without obvious abnormality, atraumatic  Eyes:  PERRL, conjunctiva/corneas clear, EOM's intact,  ENT:  Lips, mucosa, and tongue normal, membranes are moist without pallor  Neck:  Normal ROM, symmetrical, trachea midline    Cardio:  Regular rate and rhythm, no murmur, rub or gallop, 2+ pulses symmetric in all extremities  Pulm:  Clear to auscultation bilaterally, respirations unlabored,   Abdomen:  Soft, non-tender, no rebound or " guarding. Colostomy bag present in the right lower quadrant  Musculoskeletal: Full ROM, no edema, no cyanosis, good ROM of major joints  Integument:  Warm, Dry, No erythema, No rash.    Neurologic:  Alert & oriented.  No focal deficits appreciated.  Ambulatory.  Psychiatric:  Affect normal, Judgment normal, Mood normal.        LABS  Recent Results (from the past 24 hours)   UA with Microscopic reflex to Culture    Specimen: Urine, Midstream   Result Value Ref Range    Color Urine Red (A) Colorless, Straw, Light Yellow, Yellow    Appearance Urine Turbid (A) Clear    Glucose Urine      Bilirubin Urine      Ketones Urine      Specific Gravity Urine 1.018 1.001 - 1.030    Blood Urine      pH Urine      Protein Albumin Urine      Urobilinogen Urine      Nitrite Urine      Leukocyte Esterase Urine      Bacteria Urine Few (A) None Seen /HPF    WBC Clumps Urine Present (A) None Seen /HPF    RBC Urine >182 (H) <=2 /HPF    WBC Urine >182 (H) <=5 /HPF    Narrative    Urine Culture ordered based on laboratory criteria   CBC with platelets + differential    Narrative    The following orders were created for panel order CBC with platelets + differential.  Procedure                               Abnormality         Status                     ---------                               -----------         ------                     CBC with platelets and ...[[id number]]  Abnormal            Final result                 Please view results for these tests on the individual orders.   Basic metabolic panel   Result Value Ref Range    Sodium 136 135 - 145 mmol/L    Potassium 4.4 3.4 - 5.3 mmol/L    Chloride 102 98 - 107 mmol/L    Carbon Dioxide (CO2) 24 22 - 29 mmol/L    Anion Gap 10 7 - 15 mmol/L    Urea Nitrogen 35.7 (H) 8.0 - 23.0 mg/dL    Creatinine 5.04 (H) 0.67 - 1.17 mg/dL    GFR Estimate 12 (L) >60 mL/min/1.73m2    Calcium 8.6 (L) 8.8 - 10.4 mg/dL    Glucose 100 (H) 70 - 99 mg/dL   CBC with platelets and differential   Result Value  Ref Range    WBC Count 6.2 4.0 - 11.0 10e3/uL    RBC Count 1.77 (L) 4.40 - 5.90 10e6/uL    Hemoglobin 5.7 (LL) 13.3 - 17.7 g/dL    Hematocrit 18.1 (L) 40.0 - 53.0 %     (H) 78 - 100 fL    MCH 32.2 26.5 - 33.0 pg    MCHC 31.5 31.5 - 36.5 g/dL    RDW 19.3 (H) 10.0 - 15.0 %    Platelet Count 172 150 - 450 10e3/uL    % Neutrophils 79 %    % Lymphocytes 6 %    % Monocytes 11 %    % Eosinophils 2 %    % Basophils 0 %    % Immature Granulocytes 1 %    NRBCs per 100 WBC 0 <1 /100    Absolute Neutrophils 4.9 1.6 - 8.3 10e3/uL    Absolute Lymphocytes 0.4 (L) 0.8 - 5.3 10e3/uL    Absolute Monocytes 0.7 0.0 - 1.3 10e3/uL    Absolute Eosinophils 0.1 0.0 - 0.7 10e3/uL    Absolute Basophils 0.0 0.0 - 0.2 10e3/uL    Absolute Immature Granulocytes 0.1 <=0.4 10e3/uL    Absolute NRBCs 0.0 10e3/uL   ABO/Rh type and screen    Narrative    The following orders were created for panel order ABO/Rh type and screen.  Procedure                               Abnormality         Status                     ---------                               -----------         ------                     Adult Type and Screen[[id number]]                           Preliminary result           Please view results for these tests on the individual orders.   Adult Type and Screen   Result Value Ref Range    ABO/RH(D) A POS     SPECIMEN EXPIRATION DATE 7/24/2025 11:59:00 PM CDT    [place] [facility]    Narrative    The following orders were created for panel order [place] [facility].  Procedure                               Abnormality         Status                     ---------                               -----------         ------                     Extra Blue Top Tube[[id number]]                                                        Extra Red Top Tube[[id number]]                                                           Please view results for these tests on the individual orders.         RADIOLOGY  No orders to display            Impression / ED Plan:  I had  a face to face encounter with this patient seen by the Advanced Practice Provider (RIAN). I personally made/approved the management plan and take responsibility for the patient management. I personally saw patient and performed a substantive portion of the visit including all aspects of the medical decision making.     Rich Wolff is a 64 year old male presents to the ED for evaluation of hematuria.  Patient is currently on antibiotics with concern for UTI/hemorrhagic cystitis resulting in his significant gross hematuria.  He has had no black or bloody stools.  Has also had issues with anemia in the past, and is quite anemic today requiring transfusion.  His lab testing returned, his lactate was quite elevated, and we do have concern for infection of the urine, therefore did cover with antibiotics for likely sepsis.  At this time I am uncertain if the hematuria is the cause of the patient's drop in his hemoglobin, especially with his known previous history, but as he does have a significant CHAYO today on top of his evidence of sepsis and concern for infectious hemorrhagic cystitis, patient will require admission for transfusion, IV antibiotics, and further management with their evaluation with urology and likely nephrology.  Patient was comfortable with this plan..     1. Gross hematuria    2. Anemia due to blood loss, acute    3. Chronic anticoagulation    4. Acute kidney injury          SEPSIS: The patient has signs of sepsis   Sepsis ED evaluation   The patient has signs of sepsis as evidenced by:  1. Presence of 2 SIRS criteria, suspected infection, AND  2. Organ dysfunction: CHAYO with Cr >2 or Urine output <0.5/kg/hr for more than 2 hours despite fluid resuscitation due to sepsis    Sepsis Care Initiation: Starting at 1:30 PM on 07/21/25, until specified. Prior to this documentation, sepsis, severe sepsis, or septic shock was NOT thought to be a significant cause of illness. This order represents the first  time infection was seriously considered to be affecting the patient.    Lactic Acid Results:  Recent Labs   Lab Test 07/21/25  1250 06/26/25  0819 05/30/25  1404   LACT 3.5* 1.3 1.1       3 Hour Bundle 6 Hour Bundle (Reassessment)   Blood Cultures before IV Antibiotics: Yes  Antibiotics given: see below  Prehospital fluid volume (mL):                     Total fluids given (ED +Pre-hospital):  Full 30 mL/kg bolus intentionally NOT administered to this patient due to ESRD. The target volume to infuse in this patient is 1000cc.   Repeat Lactic Acid Level: Ordered by reflex for 2 hours after initial lactic acid collection.  Vasopressors: MAP>65 after initial IVF bolus, will continue to monitor fluid status and vital signs.  Repeat perfusion exam: I attest to having performed a repeat sepsis exam and assessment of perfusion at 2:10 PM .   BMI Readings from Last 1 Encounters:   07/21/25 22.71 kg/m        Anti-infectives (From admission through now)      Start     Dose/Rate Route Frequency Ordered Stop    07/21/25 1330  ceFEPIme (MAXIPIME) 2 g vial to attach to  mL bag for ADULTS or NS 50 mL bag for PEDS         2 g  over 30 Minutes Intravenous ONCE 07/21/25 1324                    Marcellus Sandoval D.O.  Emergency Medicine  [facility] EMERGENCY DEPARTMENT  [facility] [facility] [address] [phone]  Dept: [phone]       Marcellus Sandoval,   07/21/25 1392

## 2025-07-22 ENCOUNTER — ANESTHESIA EVENT (OUTPATIENT)
Dept: SURGERY | Facility: HOSPITAL | Age: 65
End: 2025-07-22
Payer: COMMERCIAL

## 2025-07-22 ENCOUNTER — ANESTHESIA (OUTPATIENT)
Dept: SURGERY | Facility: HOSPITAL | Age: 65
End: 2025-07-22
Payer: COMMERCIAL

## 2025-07-22 LAB
ANION GAP SERPL CALCULATED.3IONS-SCNC: 11 MMOL/L (ref 7–15)
ANION GAP SERPL CALCULATED.3IONS-SCNC: 14 MMOL/L (ref 7–15)
BACTERIA UR CULT: NORMAL
BASOPHILS # BLD AUTO: 0 10E3/UL (ref 0–0.2)
BASOPHILS NFR BLD AUTO: 1 %
BLD PROD TYP BPU: NORMAL
BLOOD COMPONENT TYPE: NORMAL
BUN SERPL-MCNC: 36.2 MG/DL (ref 8–23)
BUN SERPL-MCNC: 40.1 MG/DL (ref 8–23)
CALCIUM SERPL-MCNC: 8.3 MG/DL (ref 8.8–10.4)
CALCIUM SERPL-MCNC: 8.6 MG/DL (ref 8.8–10.4)
CHLORIDE SERPL-SCNC: 104 MMOL/L (ref 98–107)
CHLORIDE SERPL-SCNC: 105 MMOL/L (ref 98–107)
CODING SYSTEM: NORMAL
CREAT SERPL-MCNC: 4.52 MG/DL (ref 0.67–1.17)
CREAT SERPL-MCNC: 4.9 MG/DL (ref 0.67–1.17)
CROSSMATCH: NORMAL
EGFRCR SERPLBLD CKD-EPI 2021: 12 ML/MIN/1.73M2
EGFRCR SERPLBLD CKD-EPI 2021: 14 ML/MIN/1.73M2
EOSINOPHIL # BLD AUTO: 0.1 10E3/UL (ref 0–0.7)
EOSINOPHIL NFR BLD AUTO: 2 %
ERYTHROCYTE [DISTWIDTH] IN BLOOD BY AUTOMATED COUNT: 21 % (ref 10–15)
ERYTHROCYTE [DISTWIDTH] IN BLOOD BY AUTOMATED COUNT: 21.5 % (ref 10–15)
GLUCOSE BLDC GLUCOMTR-MCNC: 73 MG/DL (ref 70–99)
GLUCOSE SERPL-MCNC: 70 MG/DL (ref 70–99)
GLUCOSE SERPL-MCNC: 79 MG/DL (ref 70–99)
HCO3 SERPL-SCNC: 21 MMOL/L (ref 22–29)
HCO3 SERPL-SCNC: 21 MMOL/L (ref 22–29)
HCT VFR BLD AUTO: 20.6 % (ref 40–53)
HCT VFR BLD AUTO: 25.2 % (ref 40–53)
HGB BLD-MCNC: 6.9 G/DL (ref 13.3–17.7)
HGB BLD-MCNC: 8.4 G/DL (ref 13.3–17.7)
IMM GRANULOCYTES # BLD: 0.1 10E3/UL
IMM GRANULOCYTES NFR BLD: 1 %
ISSUE DATE AND TIME: NORMAL
LYMPHOCYTES # BLD AUTO: 0.4 10E3/UL (ref 0.8–5.3)
LYMPHOCYTES NFR BLD AUTO: 6 %
MCH RBC QN AUTO: 31 PG (ref 26.5–33)
MCH RBC QN AUTO: 32.2 PG (ref 26.5–33)
MCHC RBC AUTO-ENTMCNC: 33.3 G/DL (ref 31.5–36.5)
MCHC RBC AUTO-ENTMCNC: 33.5 G/DL (ref 31.5–36.5)
MCV RBC AUTO: 93 FL (ref 78–100)
MCV RBC AUTO: 96 FL (ref 78–100)
MONOCYTES # BLD AUTO: 0.5 10E3/UL (ref 0–1.3)
MONOCYTES NFR BLD AUTO: 8 %
NEUTROPHILS # BLD AUTO: 5.2 10E3/UL (ref 1.6–8.3)
NEUTROPHILS NFR BLD AUTO: 83 %
NRBC # BLD AUTO: 0 10E3/UL
NRBC BLD AUTO-RTO: 0 /100
PLATELET # BLD AUTO: 135 10E3/UL (ref 150–450)
PLATELET # BLD AUTO: 142 10E3/UL (ref 150–450)
POTASSIUM SERPL-SCNC: 4.5 MMOL/L (ref 3.4–5.3)
POTASSIUM SERPL-SCNC: 4.7 MMOL/L (ref 3.4–5.3)
RBC # BLD AUTO: 2.14 10E6/UL (ref 4.4–5.9)
RBC # BLD AUTO: 2.71 10E6/UL (ref 4.4–5.9)
SODIUM SERPL-SCNC: 136 MMOL/L (ref 135–145)
SODIUM SERPL-SCNC: 140 MMOL/L (ref 135–145)
UNIT ABO/RH: NORMAL
UNIT NUMBER: NORMAL
UNIT STATUS: NORMAL
UNIT TYPE ISBT: 6200
WBC # BLD AUTO: 5.4 10E3/UL (ref 4–11)
WBC # BLD AUTO: 6.3 10E3/UL (ref 4–11)

## 2025-07-22 PROCEDURE — 258N000001 HC RX 258: Performed by: UROLOGY

## 2025-07-22 PROCEDURE — 0W3R8ZZ CONTROL BLEEDING IN GENITOURINARY TRACT, VIA NATURAL OR ARTIFICIAL OPENING ENDOSCOPIC: ICD-10-PCS | Performed by: UROLOGY

## 2025-07-22 PROCEDURE — 99232 SBSQ HOSP IP/OBS MODERATE 35: CPT | Performed by: INTERNAL MEDICINE

## 2025-07-22 PROCEDURE — 250N000025 HC SEVOFLURANE, PER MIN: Performed by: UROLOGY

## 2025-07-22 PROCEDURE — 250N000013 HC RX MED GY IP 250 OP 250 PS 637: Performed by: HOSPITALIST

## 2025-07-22 PROCEDURE — 250N000011 HC RX IP 250 OP 636: Performed by: NURSE PRACTITIONER

## 2025-07-22 PROCEDURE — 258N000003 HC RX IP 258 OP 636: Performed by: HOSPITALIST

## 2025-07-22 PROCEDURE — 36415 COLL VENOUS BLD VENIPUNCTURE: CPT | Performed by: UROLOGY

## 2025-07-22 PROCEDURE — 710N000009 HC RECOVERY PHASE 1, LEVEL 1, PER MIN: Performed by: UROLOGY

## 2025-07-22 PROCEDURE — 120N000001 HC R&B MED SURG/OB

## 2025-07-22 PROCEDURE — P9016 RBC LEUKOCYTES REDUCED: HCPCS | Performed by: HOSPITALIST

## 2025-07-22 PROCEDURE — 250N000013 HC RX MED GY IP 250 OP 250 PS 637: Performed by: INTERNAL MEDICINE

## 2025-07-22 PROCEDURE — 250N000013 HC RX MED GY IP 250 OP 250 PS 637: Performed by: UROLOGY

## 2025-07-22 PROCEDURE — 999N000141 HC STATISTIC PRE-PROCEDURE NURSING ASSESSMENT: Performed by: UROLOGY

## 2025-07-22 PROCEDURE — 85041 AUTOMATED RBC COUNT: CPT | Performed by: HOSPITALIST

## 2025-07-22 PROCEDURE — 258N000003 HC RX IP 258 OP 636: Performed by: NURSE ANESTHETIST, CERTIFIED REGISTERED

## 2025-07-22 PROCEDURE — 82374 ASSAY BLOOD CARBON DIOXIDE: CPT | Performed by: HOSPITALIST

## 2025-07-22 PROCEDURE — 85048 AUTOMATED LEUKOCYTE COUNT: CPT | Performed by: UROLOGY

## 2025-07-22 PROCEDURE — 82565 ASSAY OF CREATININE: CPT | Performed by: UROLOGY

## 2025-07-22 PROCEDURE — 99233 SBSQ HOSP IP/OBS HIGH 50: CPT | Performed by: HOSPITALIST

## 2025-07-22 PROCEDURE — 0T9B80Z DRAINAGE OF BLADDER WITH DRAINAGE DEVICE, VIA NATURAL OR ARTIFICIAL OPENING ENDOSCOPIC: ICD-10-PCS | Performed by: UROLOGY

## 2025-07-22 PROCEDURE — 250N000011 HC RX IP 250 OP 636: Performed by: NURSE ANESTHETIST, CERTIFIED REGISTERED

## 2025-07-22 PROCEDURE — 250N000009 HC RX 250: Performed by: NURSE ANESTHETIST, CERTIFIED REGISTERED

## 2025-07-22 PROCEDURE — 36591 DRAW BLOOD OFF VENOUS DEVICE: CPT | Performed by: HOSPITALIST

## 2025-07-22 PROCEDURE — 272N000001 HC OR GENERAL SUPPLY STERILE: Performed by: UROLOGY

## 2025-07-22 PROCEDURE — 250N000011 HC RX IP 250 OP 636: Performed by: ANESTHESIOLOGY

## 2025-07-22 PROCEDURE — 370N000017 HC ANESTHESIA TECHNICAL FEE, PER MIN: Performed by: UROLOGY

## 2025-07-22 PROCEDURE — 360N000075 HC SURGERY LEVEL 2, PER MIN: Performed by: UROLOGY

## 2025-07-22 RX ORDER — HYDROMORPHONE HCL IN WATER/PF 6 MG/30 ML
0.2 PATIENT CONTROLLED ANALGESIA SYRINGE INTRAVENOUS EVERY 5 MIN PRN
Status: DISCONTINUED | OUTPATIENT
Start: 2025-07-22 | End: 2025-07-22 | Stop reason: HOSPADM

## 2025-07-22 RX ORDER — ONDANSETRON 2 MG/ML
INJECTION INTRAMUSCULAR; INTRAVENOUS PRN
Status: DISCONTINUED | OUTPATIENT
Start: 2025-07-22 | End: 2025-07-22

## 2025-07-22 RX ORDER — MAGNESIUM HYDROXIDE 1200 MG/15ML
LIQUID ORAL CONTINUOUS
Status: DISCONTINUED | OUTPATIENT
Start: 2025-07-22 | End: 2025-07-24

## 2025-07-22 RX ORDER — DEXAMETHASONE SODIUM PHOSPHATE 4 MG/ML
4 INJECTION, SOLUTION INTRA-ARTICULAR; INTRALESIONAL; INTRAMUSCULAR; INTRAVENOUS; SOFT TISSUE
Status: DISCONTINUED | OUTPATIENT
Start: 2025-07-22 | End: 2025-07-22 | Stop reason: HOSPADM

## 2025-07-22 RX ORDER — ONDANSETRON 2 MG/ML
4 INJECTION INTRAMUSCULAR; INTRAVENOUS EVERY 30 MIN PRN
Status: DISCONTINUED | OUTPATIENT
Start: 2025-07-22 | End: 2025-07-22 | Stop reason: HOSPADM

## 2025-07-22 RX ORDER — SODIUM CHLORIDE, SODIUM LACTATE, POTASSIUM CHLORIDE, CALCIUM CHLORIDE 600; 310; 30; 20 MG/100ML; MG/100ML; MG/100ML; MG/100ML
INJECTION, SOLUTION INTRAVENOUS CONTINUOUS PRN
Status: DISCONTINUED | OUTPATIENT
Start: 2025-07-22 | End: 2025-07-22

## 2025-07-22 RX ORDER — SODIUM CHLORIDE, SODIUM LACTATE, POTASSIUM CHLORIDE, CALCIUM CHLORIDE 600; 310; 30; 20 MG/100ML; MG/100ML; MG/100ML; MG/100ML
INJECTION, SOLUTION INTRAVENOUS CONTINUOUS
Status: DISCONTINUED | OUTPATIENT
Start: 2025-07-22 | End: 2025-07-22 | Stop reason: HOSPADM

## 2025-07-22 RX ORDER — FENTANYL CITRATE 50 UG/ML
INJECTION, SOLUTION INTRAMUSCULAR; INTRAVENOUS PRN
Status: DISCONTINUED | OUTPATIENT
Start: 2025-07-22 | End: 2025-07-22

## 2025-07-22 RX ORDER — CEFAZOLIN SODIUM/WATER 2 G/20 ML
2 SYRINGE (ML) INTRAVENOUS
Status: COMPLETED | OUTPATIENT
Start: 2025-07-22 | End: 2025-07-22

## 2025-07-22 RX ORDER — FENTANYL CITRATE 50 UG/ML
25 INJECTION, SOLUTION INTRAMUSCULAR; INTRAVENOUS EVERY 5 MIN PRN
Status: DISCONTINUED | OUTPATIENT
Start: 2025-07-22 | End: 2025-07-22 | Stop reason: HOSPADM

## 2025-07-22 RX ORDER — ONDANSETRON 4 MG/1
4 TABLET, ORALLY DISINTEGRATING ORAL EVERY 30 MIN PRN
Status: DISCONTINUED | OUTPATIENT
Start: 2025-07-22 | End: 2025-07-22 | Stop reason: HOSPADM

## 2025-07-22 RX ORDER — NALOXONE HYDROCHLORIDE 0.4 MG/ML
0.1 INJECTION, SOLUTION INTRAMUSCULAR; INTRAVENOUS; SUBCUTANEOUS
Status: DISCONTINUED | OUTPATIENT
Start: 2025-07-22 | End: 2025-07-22 | Stop reason: HOSPADM

## 2025-07-22 RX ORDER — PROPOFOL 10 MG/ML
INJECTION, EMULSION INTRAVENOUS PRN
Status: DISCONTINUED | OUTPATIENT
Start: 2025-07-22 | End: 2025-07-22

## 2025-07-22 RX ORDER — HYDROMORPHONE HCL IN WATER/PF 6 MG/30 ML
0.4 PATIENT CONTROLLED ANALGESIA SYRINGE INTRAVENOUS EVERY 5 MIN PRN
Status: DISCONTINUED | OUTPATIENT
Start: 2025-07-22 | End: 2025-07-22 | Stop reason: HOSPADM

## 2025-07-22 RX ORDER — LIDOCAINE HYDROCHLORIDE 10 MG/ML
INJECTION, SOLUTION INFILTRATION; PERINEURAL PRN
Status: DISCONTINUED | OUTPATIENT
Start: 2025-07-22 | End: 2025-07-22

## 2025-07-22 RX ORDER — CEFAZOLIN SODIUM/WATER 2 G/20 ML
2 SYRINGE (ML) INTRAVENOUS SEE ADMIN INSTRUCTIONS
Status: DISCONTINUED | OUTPATIENT
Start: 2025-07-22 | End: 2025-07-23

## 2025-07-22 RX ORDER — FENTANYL CITRATE 50 UG/ML
50 INJECTION, SOLUTION INTRAMUSCULAR; INTRAVENOUS EVERY 5 MIN PRN
Status: DISCONTINUED | OUTPATIENT
Start: 2025-07-22 | End: 2025-07-22 | Stop reason: HOSPADM

## 2025-07-22 RX ORDER — DEXAMETHASONE SODIUM PHOSPHATE 10 MG/ML
INJECTION, SOLUTION INTRAMUSCULAR; INTRAVENOUS PRN
Status: DISCONTINUED | OUTPATIENT
Start: 2025-07-22 | End: 2025-07-22

## 2025-07-22 RX ADMIN — SODIUM BICARBONATE 650 MG TABLET 1300 MG: at 21:01

## 2025-07-22 RX ADMIN — OXYBUTYNIN CHLORIDE 5 MG: 5 TABLET ORAL at 21:04

## 2025-07-22 RX ADMIN — SERTRALINE HYDROCHLORIDE 50 MG: 50 TABLET ORAL at 21:04

## 2025-07-22 RX ADMIN — ONDANSETRON 4 MG: 2 INJECTION INTRAMUSCULAR; INTRAVENOUS at 17:51

## 2025-07-22 RX ADMIN — TRAZODONE HYDROCHLORIDE 50 MG: 50 TABLET ORAL at 21:04

## 2025-07-22 RX ADMIN — MIDAZOLAM HYDROCHLORIDE 2 MG: 1 INJECTION, SOLUTION INTRAMUSCULAR; INTRAVENOUS at 17:14

## 2025-07-22 RX ADMIN — PROPOFOL 150 MG: 10 INJECTION, EMULSION INTRAVENOUS at 17:21

## 2025-07-22 RX ADMIN — FENTANYL CITRATE 25 MCG: 50 INJECTION, SOLUTION INTRAMUSCULAR; INTRAVENOUS at 18:13

## 2025-07-22 RX ADMIN — FENTANYL CITRATE 50 MCG: 50 INJECTION, SOLUTION INTRAMUSCULAR; INTRAVENOUS at 17:14

## 2025-07-22 RX ADMIN — Medication 2 G: at 17:22

## 2025-07-22 RX ADMIN — ACETAMINOPHEN 650 MG: 325 TABLET ORAL at 06:12

## 2025-07-22 RX ADMIN — SODIUM CHLORIDE, SODIUM LACTATE, POTASSIUM CHLORIDE, AND CALCIUM CHLORIDE: .6; .31; .03; .02 INJECTION, SOLUTION INTRAVENOUS at 17:13

## 2025-07-22 RX ADMIN — SODIUM BICARBONATE 650 MG TABLET 1300 MG: at 08:49

## 2025-07-22 RX ADMIN — FENTANYL CITRATE 50 MCG: 50 INJECTION, SOLUTION INTRAMUSCULAR; INTRAVENOUS at 18:05

## 2025-07-22 RX ADMIN — LIDOCAINE HYDROCHLORIDE 3 ML: 10 INJECTION, SOLUTION INFILTRATION; PERINEURAL at 17:21

## 2025-07-22 RX ADMIN — OXYBUTYNIN CHLORIDE 5 MG: 5 TABLET ORAL at 08:49

## 2025-07-22 RX ADMIN — SODIUM CHLORIDE, PRESERVATIVE FREE: 5 INJECTION INTRAVENOUS at 14:46

## 2025-07-22 RX ADMIN — TAMSULOSIN HYDROCHLORIDE 0.4 MG: 0.4 CAPSULE ORAL at 21:04

## 2025-07-22 RX ADMIN — DEXAMETHASONE SODIUM PHOSPHATE 10 MG: 10 INJECTION, SOLUTION INTRAMUSCULAR; INTRAVENOUS at 17:25

## 2025-07-22 RX ADMIN — PHENYLEPHRINE HYDROCHLORIDE 100 MCG: 10 INJECTION INTRAVENOUS at 17:37

## 2025-07-22 RX ADMIN — ROPINIROLE HYDROCHLORIDE 0.25 MG: 0.25 TABLET, FILM COATED ORAL at 21:12

## 2025-07-22 RX ADMIN — GABAPENTIN 100 MG: 100 CAPSULE ORAL at 21:12

## 2025-07-22 RX ADMIN — PHENYLEPHRINE HYDROCHLORIDE 100 MCG: 10 INJECTION INTRAVENOUS at 17:34

## 2025-07-22 ASSESSMENT — ACTIVITIES OF DAILY LIVING (ADL)
ADLS_ACUITY_SCORE: 60
ADLS_ACUITY_SCORE: 40
ADLS_ACUITY_SCORE: 60
ADLS_ACUITY_SCORE: 40
ADLS_ACUITY_SCORE: 60
ADLS_ACUITY_SCORE: 60
ADLS_ACUITY_SCORE: 63
ADLS_ACUITY_SCORE: 60
DEPENDENT_IADLS:: INDEPENDENT
ADLS_ACUITY_SCORE: 60
ADLS_ACUITY_SCORE: 63
ADLS_ACUITY_SCORE: 63
ADLS_ACUITY_SCORE: 60

## 2025-07-22 NOTE — ANESTHESIA CARE TRANSFER NOTE
Patient: Rich Wolff    Procedure: Procedure(s):  CYSTOSCOPY, WITH FULGURATION OF HEMORRHAGING BLOOD VESSEL AND THROMBUS REMOVAL       Diagnosis: Gross hematuria [R31.0]  Diagnosis Additional Information: No value filed.    Anesthesia Type:   General     Note:    Oropharynx: oropharynx clear of all foreign objects  Level of Consciousness: awake  Oxygen Supplementation: face mask    Independent Airway: airway patency satisfactory and stable  Dentition: dentition unchanged  Vital Signs Stable: post-procedure vital signs reviewed and stable  Report to RN Given: handoff report given  Patient transferred to: PACU    Handoff Report: Identifed the Patient, Identified the Reponsible Provider, Reviewed the pertinent medical history, Discussed the surgical course, Reviewed Intra-OP anesthesia mangement and issues during anesthesia, Set expectations for post-procedure period and Allowed opportunity for questions and acknowledgement of understanding      Vitals:  Vitals Value Taken Time   /75 07/22/25 18:02   Temp     Pulse 84 07/22/25 18:05   Resp 15 07/22/25 18:05   SpO2 100 % 07/22/25 18:05   Vitals shown include unfiled device data.    Electronically Signed By: GRACE Diana CRNA  July 22, 2025  6:06 PM

## 2025-07-22 NOTE — PLAN OF CARE
Problem: Anemia  Goal: Anemia Symptom Improvement  Outcome: Progressing  Intervention: Monitor and Manage Anemia  Recent Flowsheet Documentation  Taken 7/21/2025 1747 by Asuncion Mary RN  Safety Promotion/Fall Prevention:   nonskid shoes/slippers when out of bed   patient and family education   room door open   room near nurse's station   safety round/check completed     Problem: Comorbidity Management  Goal: Blood Pressure in Desired Range  Outcome: Progressing  Intervention: Maintain Blood Pressure Management  Recent Flowsheet Documentation  Taken 7/21/2025 1747 by Asuncion Mary RN  Medication Review/Management: medications reviewed   Goal Outcome Evaluation:         18F urinary catheter placed by Urology this shift. Urine is dark red, manually irrigated with 50mL. Small clots noted.   A&Ox4, independent with ambulation, SBA with IV pole. Denied pain. Fentanyl patch to right abdomen. NPO except meds. 2 units of blood given for Hgb 5.7, recheck Hgb was 6.7. Next Hgb in AM. IVF running at 50mL/hr. VSS on RA. Patient has a chronic colostomy bag and performs self cares/emptying.

## 2025-07-22 NOTE — PROGRESS NOTES
Place of Service:  [facility]     Reason for follow up: Gross hematuria    SUBJECTIVE:  Events: no acute events overnight    Patient reports garcias remained patent overnight with every 2-3 hour manual irrigation. Denies abdominal and bilateral flank pain, fever, chills. Tolerating garcias. Remains NPO.    OBJECTIVE:  PHYSICAL EXAM:  Temp: 97.9  F (36.6  C) Temp src: Oral BP: 116/60 Pulse: 63   Resp: 18 SpO2: 98 % O2 Device: None (Room air)    General: NAD, alert, cooperative  Head: normocephalic, without abnormality / atraumatic  Abdomen: soft, non tender, non distended. no suprapubic fullness, no suprapubic tenderness. No bilateral CVA tenderness.   Genitourinary: Penis and scrotum without erythema or edema. Garcias draining thin burgundy (a little lighter than on 7/21) colored urine without clots. Manually irrigated with aspiration of the same.   Skin: No rashes or lesions  Musculoskeletal: moves all four extremities equally.   Psychological: alert and oriented, answers questions appropriately.    LABS:  Creatinine   Date Value Ref Range Status   07/22/2025 4.90 (H) 0.67 - 1.17 mg/dL Final   12/23/2020 1.33 (H) 0.66 - 1.25 mg/dL Final     WBC   Date Value Ref Range Status   12/24/2020 5.4 4.0 - 11.0 10e9/L Final     WBC Count   Date Value Ref Range Status   07/22/2025 5.4 4.0 - 11.0 10e3/uL Final     Hemoglobin   Date Value Ref Range Status   07/22/2025 6.9 (LL) 13.3 - 17.7 g/dL Final   12/24/2020 7.7 (L) 13.3 - 17.7 g/dL Final     Platelet Count   Date Value Ref Range Status   07/22/2025 142 (L) 150 - 450 10e3/uL Final   12/24/2020 424 150 - 450 10e9/L Final       UA:  UA RESULTS:  Recent Labs   Lab Test 07/21/25  1047 06/25/25  0223 10/28/21  1754 11/04/20  1736   COLOR Red* Red*   < > Yellow   APPEARANCE Turbid* Bloody*   < > Clear   URINEGLC  --  Negative   < > Negative   URINEBILI  --  1.0 mg/dL*   < > Negative   URINEKETONE  --  Negative   < > 100 mg/dL*   SG 1.018 1.025   < > 1.020   UBLD  --  >1.0  mg/dL*   < > Negative   URINEPH  --  6.0   < > 6.0   PROTEIN  --  70*   < > Negative   UROBILINOGEN  --   --   --  <2.0 E.U./dL   NITRITE  --  Negative   < > Negative   LEUKEST  --  250 Marlon/uL*   < > Negative   RBCU >182* >182*   < >  --    WBCU >182* 0   < >  --     < > = values in this interval not displayed.     Cultures:  Urine 7/21: <10k mix of urogenital rah     Blood 7/21: NGTD    Lab Results: personally reviewed.     ASSESSMENT/PLAN:  Rich Wolff is being seen by [facility] for gross hematuria     - 64 yr old male with hx of prostate CA s/p prostatectomy 2015 and metastatic cancer of the cecum with chronic left hydronephrosis managed with left ureteral stent; Admitted with gross hematuria with clots, anemia, CHAYO on CKD-IV  - CT showed a 4.7 cm soft tissue density in the bladder (likely clot given not seen on recent cystoscopy 6/26), no significant hydro and left stent in appropriate position.   - 18 Fr coude tip catheter placed 7/21 after unsuccessful 3 way catheter placement attempts. Continue manual irrigation every 2-3 hours and prn. Also planning for cysto/clot evacuation, possible fulguration this afternoon with Dr. Koehler. Keep NPO for surgery.   - Hgb 6.9 this AM after PRBC transfusion yesterday. Received another unit this AM after the lab. Repeat Hgb pending.   - Agree with Xarelto hold.   - Recent (6/25) E.coli and pseudomonas UIT. UC 7/21: neg. BC's pending. Receiving IV cefepime.   - Creatinine improving, 4.9 today from 5.04 on admission (baseline 2.4-2.83). No flank pain. Fluids per primary team. Monitor.   - Continue tamsulosin.     This case was discussed with:  Dr Parag Phillips, Dr. Koehler (on call) and Dr. Ramirez (to confirm ok for anesthesia).       Barrett Kumar, APRN, CNP  [facility]   [phone]

## 2025-07-22 NOTE — CARE PLAN
Patient transferring to P2, room 218.  Report called to ANGELIA Chew.  Belongings sent with patient.  Adkins catheter irrigated by urology prior to transfer.

## 2025-07-22 NOTE — PLAN OF CARE
Alert and oriented. Mild chronic back pain. Garcias in place output bloody. Urology did irrigate garcias according to ER report just before coming up here. Ostomy bag has a watery dark brown output, and is in place with no leakage. Ready for surgery. NPO since midnight.

## 2025-07-22 NOTE — ANESTHESIA PREPROCEDURE EVALUATION
Anesthesia Pre-Procedure Evaluation    Patient: Rich Wolff   MRN: [id number] : 1960          Procedure : Procedure(s):  CYSTOSCOPY, WITH FULGURATION OF HEMORRHAGING BLOOD VESSEL AND THROMBUS REMOVAL         Past Medical History:   Diagnosis Date    Colon cancer (H)     Hyperlipidemia     Hypertension     Prostate cancer (H) 2015    T1c. Charlotte's 6 (3+3).  Confined to prostate.  Multifocal bilateral comprising 2% of the gland.PSA:5.8.      Past Surgical History:   Procedure Laterality Date    BOWEL RESECTION      COLONOSCOPY W/ BIOPSIES  2020    COLOSTOMY      COMBINED CYSTOSCOPY, INSERT STENT URETER(S) Left 2025    Procedure: CYSTOSCOPY, LEFT RETROGRADE PYELOGRAM, WITH LEFT URETERAL STENT INSERTION;  Surgeon: Parag Phillips MD;  [place] [facility] [place]    COMBINED CYSTOSCOPY, RETROGRADES, EXCHANGE STENT URETER(S) Left 2025    Procedure: CYSTOSCOPY, WITH LEFT RETROGRADE PYELOGRAM AND LEFT URETERAL STENT REPLACEMENT;  Surgeon: Parag Phillips MD;  [place] [facility] [place]    ESOPHAGOSCOPY, GASTROSCOPY, DUODENOSCOPY (EGD), COMBINED  2020    IR CHEST PORT PLACEMENT > 5 YRS OF AGE  2020    IR CHEST PORT REPLACEMENT SAME SITE RIGHT  10/27/2022    IR NEPHROSTOMY TUBE CHANGE RIGHT  2021    IR NEPHROSTOMY TUBE CHANGE RIGHT  2021    IR NEPHROSTOMY TUBE CHANGE RIGHT  2021    IR NEPHROSTOMY TUBE CHANGE RIGHT  10/29/2021    IR NEPHROSTOMY TUBE CHANGE RIGHT  10/29/2021    IR NEPHROSTOMY TUBE CHANGE RIGHT  2021    IR NEPHROSTOMY TUBE CHANGE RIGHT  2022    IR NEPHROSTOMY TUBE CHANGE RIGHT  2022    IR NEPHROSTOMY TUBE CHANGE RIGHT  2022    IR NEPHROSTOMY TUBE CHANGE RIGHT  2022    IR NEPHROSTOMY TUBE CHANGE RIGHT  10/25/2022    IR NEPHROSTOMY TUBE CHANGE RIGHT  2022    IR NEPHROSTOMY TUBE CHANGE RIGHT  2023    IR NEPHROSTOMY TUBE CHANGE RIGHT  2023    IR NEPHROSTOMY TUBE CHANGE RIGHT  2023    IR  NEPHROSTOMY TUBE CHANGE RIGHT  8/30/2023    IR NEPHROSTOMY TUBE CHANGE RIGHT  10/25/2023    IR NEPHROSTOMY TUBE CHANGE RIGHT  12/27/2023    IR NEPHROSTOMY TUBE CHANGE RIGHT  2/28/2024    IR NEPHROSTOMY TUBE CHANGE RIGHT  4/24/2024    IR NEPHROSTOMY TUBE CHANGE RIGHT  6/26/2024    IR NEPHROSTOMY TUBE CHANGE RIGHT  8/28/2024    IR NEPHROSTOMY TUBE CHANGE RIGHT  10/30/2024    IR PORT PLACEMENT >5 YEARS  11/24/2020    IR SITE CHECK/EVALUATION  4/11/2022    NEPHROSTOMY      CO ESOPHAGOGASTRODUODENOSCOPY TRANSORAL DIAGNOSTIC N/A 11/4/2020    Procedure: ESOPHAGOGASTRODUODENOSCOPY (EGD);  Surgeon: Paul Masters MD;  Location: [facility];  Service: Gastroenterology    CO LAP,PROSTATECTOMY,RADICAL,W/NERVE SPARE,INCL ROBOTIC Bilateral 09/01/2015    Procedure: DAVINCI RADICAL RETROPUBIC PROSTATECTOMY BILATERAL PELVIC LYMPH NODE DISSECTION;  Surgeon: Juan C Velazco MD;  [place] [facility] [place]  Service: Urology    PROSTATE BIOPSY  06/08/2015    Ultrasound-guided transrectal biopsy.    SURGERY SCROTAL / TESTICULAR      for undescended testes, removed due to atrophy with vasectomy    ZZHC COLONOSCOPY W/WO BRUSH/WASH N/A 11/4/2020    Procedure: COLONOSCOPY WITH BIOPSY;  Surgeon: Paul Masters MD;  Location: [facility];  Service: Gastroenterology      Allergies   Allergen Reactions    Bee Venom Hives      Social History     Tobacco Use    Smoking status: Never    Smokeless tobacco: Never   Substance Use Topics    Alcohol use: Not Currently     Comment: Alcoholic Drinks/day: rarely      Wt Readings from Last 1 Encounters:   07/21/25 65.8 kg (145 lb)        Anesthesia Evaluation   Pt has had prior anesthetic.     No history of anesthetic complications       ROS/MED HX  ENT/Pulmonary:  - neg pulmonary ROS     Neurologic:  - neg neurologic ROS     Cardiovascular: Comment: Echo 5/21/2025     1. BEDSIDE ECHOCARDIOGRAM (in Emergency Dept):   2. No intracardiac mass or thrombus, within the limitations  of transthoracic echocardiography.   3. Normal left ventricular size; estimated ejection fraction 50%.   4. Borderline generalized left ventricular hypokinesis.   5. Mildly thickened aortic valve; trivial aortic valve regurgitation.   6. Mild-moderate mitral valve regurgitation; normal left atrial size.   7. Normal right ventricular size; mildly reduced systolic function.   8. Mild tricuspid valve regurgitation; normal right atrial size.   9. Tip of central venous catheter noted in right atrium; no thrombus on catheter tip.   10. No abnormal structure identified in right atrium to account for increased FDG uptake noted on PET scan.   11. Inferior vena cava not well visualized.       (+) Dyslipidemia hypertension- -   -  - -                                      METS/Exercise Tolerance:     Hematologic:     (+)      anemia,          Musculoskeletal:       GI/Hepatic:     (+)             liver disease,       Renal/Genitourinary:     (+) renal disease, type: ARF and CRI, Pt does not require dialysis,           Endo:       Psychiatric/Substance Use:     (+) psychiatric history anxiety and depression       Infectious Disease:       Malignancy: Comment: Stage IV colon CA    (+) Malignancy, History of Prostate.    Other:              Physical Exam  Airway  Mallampati: II  TM distance: >3 FB  Neck ROM: full  Mouth opening: >= 4 cm    Cardiovascular   Rhythm: regular  Rate: normal rate   Comments: Echo 5/21/2025     1. BEDSIDE ECHOCARDIOGRAM (in Emergency Dept):   2. No intracardiac mass or thrombus, within the limitations of transthoracic echocardiography.   3. Normal left ventricular size; estimated ejection fraction 50%.   4. Borderline generalized left ventricular hypokinesis.   5. Mildly thickened aortic valve; trivial aortic valve regurgitation.   6. Mild-moderate mitral valve regurgitation; normal left atrial size.   7. Normal right ventricular size; mildly reduced systolic function.   8. Mild tricuspid valve  "regurgitation; normal right atrial size.   9. Tip of central venous catheter noted in right atrium; no thrombus on catheter tip.   10. No abnormal structure identified in right atrium to account for increased FDG uptake noted on PET scan.   11. Inferior vena cava not well visualized.     Dental   (+) Edentulous      Pulmonary Breath sounds clear to auscultation        Neurological   He appears awake, alert and oriented x3.    Other Findings       OUTSIDE LABS:  CBC:   Lab Results   Component Value Date    WBC 6.3 07/22/2025    WBC 5.4 07/22/2025    HGB 8.4 (L) 07/22/2025    HGB 6.9 (LL) 07/22/2025    HCT 25.2 (L) 07/22/2025    HCT 20.6 (L) 07/22/2025     (L) 07/22/2025     (L) 07/22/2025     BMP:   Lab Results   Component Value Date     07/22/2025     07/21/2025    POTASSIUM 4.5 07/22/2025    POTASSIUM 4.4 07/21/2025    CHLORIDE 104 07/22/2025    CHLORIDE 102 07/21/2025    CO2 21 (L) 07/22/2025    CO2 24 07/21/2025    BUN 36.2 (H) 07/22/2025    BUN 35.7 (H) 07/21/2025    CR 4.90 (H) 07/22/2025    CR 5.04 (H) 07/21/2025    GLC 79 07/22/2025     (H) 07/21/2025     COAGS:   Lab Results   Component Value Date    PTT 33 05/08/2023    INR 1.31 (H) 05/08/2023     POC: No results found for: \"BGM\", \"HCG\", \"HCGS\"  HEPATIC:   Lab Results   Component Value Date    ALBUMIN 2.9 (L) 06/03/2025    PROTTOTAL 4.9 (L) 06/01/2025    ALT 16 06/01/2025    AST 34 06/01/2025    ALKPHOS 57 06/01/2025    BILITOTAL 0.6 06/01/2025     OTHER:   Lab Results   Component Value Date    LACT 1.2 07/21/2025    A1C 4.8 05/30/2025    AARON 8.3 (L) 07/22/2025    PHOS 3.4 06/04/2025    MAG 2.1 06/04/2025    LIPASE 23 05/30/2025       Anesthesia Plan    ASA Status:  4      NPO Status: NPO Appropriate   Anesthesia Type: General.  Airway: supraglottic airway.  Induction: intravenous.  Maintenance: Balanced.   Techniques and Equipment:     - Airway:  Planned airway equipment includes supraglottic airway.     - Monitoring Plan: " standard ASA monitoring     Consents    Anesthesia Plan(s) and associated risks, benefits, and realistic alternatives discussed. Questions answered and patient/representative(s) expressed understanding.     - Discussed: CRNA     - Discussed with:  Patient        - Pt is DNR/DNI Status: no DNR     Blood Consent:      - Discussed with: patient.     - Consented: consented to blood products     Postoperative Care    Pain management: non-narcotic analgesics.     Comments:    Other Comments: 2u PRBC crossmatch ordered                 Sarah Wachter, MD    I have reviewed the pertinent notes and labs in the chart from the past 30 days and (re)examined the patient.  Any updates or changes from those notes are reflected in this note.    Clinically Significant Risk Factors                  # Acute Kidney Injury, unspecified: based on a >150% or 0.3 mg/dL increase in last creatinine compared to past 90 day average, will monitor renal function                 # Financial/Environmental Concerns:

## 2025-07-22 NOTE — ANESTHESIA POSTPROCEDURE EVALUATION
Patient: Rich Wolff    Procedure: Procedure(s):  CYSTOSCOPY, WITH FULGURATION OF HEMORRHAGING BLOOD VESSEL AND THROMBUS REMOVAL       Anesthesia Type:  General    Note:  Disposition: Outpatient   Postop Pain Control: Uneventful            Sign Out: Well controlled pain   PONV: No   Neuro/Psych: Uneventful            Sign Out: Acceptable/Baseline neuro status   Airway/Respiratory: Uneventful            Sign Out: Acceptable/Baseline resp. status   CV/Hemodynamics: Uneventful            Sign Out: Acceptable CV status; No obvious hypovolemia; No obvious fluid overload   Other NRE: NONE   DID A NON-ROUTINE EVENT OCCUR? No           Last vitals:  Vitals Value Taken Time   /73 07/22/25 18:45   Temp 36.6  C (97.8  F) 07/22/25 18:30   Pulse 79 07/22/25 18:51   Resp 21 07/22/25 18:50   SpO2 98 % 07/22/25 18:51   Vitals shown include unfiled device data.    Electronically Signed By: Ralph Fragoso MD  July 22, 2025  6:52 PM

## 2025-07-22 NOTE — PROGRESS NOTES
[facility]    Medicine Progress Note - Hospitalist Service    Date of Admission:  7/21/2025    Assessment & Plan   64-year-old male with locally advanced colon cancer, previous DVT, malignant hydronephrosis with ureteral stent presented with gross hematuria.    #Gross hematuria  #Acute blood loss anemia  #Anemia of chronic disease  #Malignant hydronephrosis s/p ureteral stent  #History of prostate cancer  UA shows few bacteria, Ucx grew low level  rah  BCx neg thus far, follow  Continue empiric cefepime for now with low threshold to stop if okay with Urology  Ddx: radiation cystitis, new metastasis  s/p 2u PRBC, give another unit today  s/p 18Fr 3-way garcias unsuccessful  Urology: Continue manual irrigation Q2h and PRN, keep NPO for now, notify on-call urology if unable to manually irrigate  PTA flomax as BP will allow  Touched base with NP José, Urology planning for cysto/clot evac in OR   *Low risk El and Gwyn (revised) scoring for perioperative complications    #CHAYO on CKD stage IV  #Nonanion gap metabolic acidosis  Likely multifactorial- prerenal from severe anemia, bactrim and post-renal from retention/clots  Cr 5 on adm, down to 4.9  Stopped Bactrim  Nephrology consulted  Gentle IV fluids    #Frequent, recurrent UTIs  UCxs last month grew e coli & Pseudomonas  Continue Cefepime for now    #Advanced colon cancer, locally invasive  Initially diagnosed 2020  S/p surgery with ileostomy, radiation, chemo  Last chemo about 1 month ago, on hold due to urinary issues  Follows with [facility] and locally with Dr. Kern  PTA fentanyl patch, oxycodone, gabapentin (with renal dose)    #History of DVT  PTA Xarelto on hold due to gross hematuria and severe anemia      DVT ppx: PCDs         Diet: NPO for Medical/Clinical Reasons Except for: Meds    Garcias Catheter: PRESENT, indication: Gross hematuria  Lines: PRESENT      Port a Cath Single Lumen Left Chest wall-Site Assessment: WDL      Cardiac  Monitoring: None  Code Status: Full Code      Clinically Significant Risk Factors Present on Admission                # Drug Induced Coagulation Defect: home medication list includes an anticoagulant medication   # Acute Kidney Injury, unspecified: based on a >150% or 0.3 mg/dL increase in last creatinine compared to past 90 day average, will monitor renal function       # Anemia: based on hgb <11  # Anemia: based on hgb <11           # Financial/Environmental Concerns:           Social Drivers of Health    Stress: Stress Concern Present (2/23/2021)    Received from [facility] [place] [facility] - Occupational Stress Questionnaire     Feeling of Stress : Rather much          Disposition Plan     Medically Ready for Discharge: Anticipated in 2-4 Days             Cirilo Raimrez DO  Hospitalist Service  [facility]  Securely message with [employer] (more info)  Text page via [employer] Paging/Directory   ______________________________________________________________________    Interval History   Continues to have dark blood per garcias, not much pain    Physical Exam   Vital Signs: Temp: 98  F (36.7  C) Temp src: Oral BP: 100/66 Pulse: 84   Resp: 16 SpO2: 98 % O2 Device: None (Room air)    Weight: 145 lbs 0 oz    General Appearance:  No acute distress  Respiratory: Clear to auscultation bilaterally  Cardiovascular: Regular rate and rhythm  GI: RLQ ileostomy draining green liquid  : Dark red cloudy fluid per Garcias catheter  Extremities: No peripheral edema or cyanosis  Neuro: Alert and oriented x 3, normal speech    Medical Decision Making       50 MINUTES SPENT BY ME on the date of service doing chart review, history, exam, documentation & further activities per the note.      Data

## 2025-07-22 NOTE — CONSULTS
Care Management Initial Consult    General Information  Assessment completed with: Patient,    Type of CM/SW Visit: Initial Assessment    Primary Care Provider verified and updated as needed: Yes   Readmission within the last 30 days: other (see comments)   Return Category: New Diagnosis     Advance Care Planning: Advance Care Planning Reviewed:  (not on file)          Communication Assessment  Patient's communication style: spoken language (English or Bilingual)             Cognitive  Cognitive/Neuro/Behavioral: WDL                      Living Environment:   People in home: spouse     Current living Arrangements: house      Able to return to prior arrangements: yes       Family/Social Support:  Care provided by: self  Provides care for: no one  Marital Status:   Support system: Wife          Description of Support System: Supportive, Involved         Current Resources:   Patient receiving home care services: No        Community Resources:  (oncology outpatient infusion)  Equipment currently used at home: none  Supplies currently used at home: ostomy supplies    Employment/Financial:  Employment Status:          Financial Concerns:             Does the patient's insurance plan have a 3 day qualifying hospital stay waiver?  Yes     Which insurance plan 3 day waiver is available? Alternative insurance waiver    Will the waiver be used for post-acute placement? No    Lifestyle & Psychosocial Needs:  Social Drivers of Health     Food Insecurity: Low Risk  (6/25/2025)    Food Insecurity     Within the past 12 months, did you worry that your food would run out before you got money to buy more?: No     Within the past 12 months, did the food you bought just not last and you didn t have money to get more?: No   Depression: Not on file   Housing Stability: Low Risk  (6/25/2025)    Housing Stability     Do you have housing? : Yes     Are you worried about losing your housing?: No   Tobacco Use: Low Risk  (6/26/2025)     Patient History     Smoking Tobacco Use: Never     Smokeless Tobacco Use: Never     Passive Exposure: Not on file   Financial Resource Strain: Low Risk  (6/25/2025)    Financial Resource Strain     Within the past 12 months, have you or your family members you live with been unable to get utilities (heat, electricity) when it was really needed?: No   Alcohol Use: Not At Risk (11/9/2020)    Received from [facility]    AUDIT-C     Frequency of Alcohol Consumption: Never     Average Number of Drinks: Not on file     Frequency of Binge Drinking: Never   Transportation Needs: Low Risk  (6/25/2025)    Transportation Needs     Within the past 12 months, has lack of transportation kept you from medical appointments, getting your medicines, non-medical meetings or appointments, work, or from getting things that you need?: No   Physical Activity: Sufficiently Active (2/23/2021)    Received from [facility]    Exercise Vital Sign     Days of Exercise per Week: 6 days     Minutes of Exercise per Session: 30 min   Interpersonal Safety: Low Risk  (6/26/2025)    Interpersonal Safety     Do you feel physically and emotionally safe where you currently live?: Yes     Within the past 12 months, have you been hit, slapped, kicked or otherwise physically hurt by someone?: No     Within the past 12 months, have you been humiliated or emotionally abused in other ways by your partner or ex-partner?: No   Stress: Stress Concern Present (2/23/2021)    Received from [facility]    [place] [employer] - Occupational Stress Questionnaire     Feeling of Stress : Rather much   Social Connections: Moderately Integrated (2/23/2021)    Received from [facility]    Social Connection and Isolation Panel [NHANES]     Frequency of Communication with Friends and Family: More than three times a week     Frequency of Social Gatherings with Friends and Family: Twice a week     Attends [employer] Services: 1 to 4 times per year      Active Member of Clubs or Organizations: No     Attends Club or Organization Meetings: Never     Marital Status:    Health Literacy: Not on file       Functional Status:  Prior to admission patient needed assistance:   Dependent ADLs:: Independent  Dependent IADLs:: Independent       Mental Health Status:          Chemical Dependency Status:                Values/Beliefs:  Spiritual, Cultural Beliefs, Catholic Practices, Values that affect care:                 Discussed  Partnership in Safe Discharge Planning  document with patient/family: No    Additional Information:    Assessment completed with patient. Patient reports he lives in his house with spouse. He is independent with ADLs/IADLs, ambulates without devices and attends outpatient oncology infusion center. Spouse is primary family contact and willing to transport at discharge.    Care Coordination hand off completed.    Next Steps:     Follow for progression and recommendations.      Jillian Smith RN

## 2025-07-22 NOTE — PROGRESS NOTES
[facility]  [facility]   Follow up    Rich Wolff   MRN: [id number]  : 1960   DOA: 2025   CC: ARF/CKD      Assessment and Plan:  64 year old male     ARF/CKD: has advanced CKD at a baseline (CR 2-2.7); now higher CR and may represent some progression vs ARF; no urinary obstruction evident at this point so suspect ARF related to hemodynamics (lower bp and hgb and on vasodilatory meds--gabapentin and narcotics) and possible contribution from infection and bactrim use; Cr stable today and no indication for dialysis currently; continue to manage expectantly  Volume status; may be slightly down; on IVF--continue while pt is NPO as MNT  stage 4 metastatic adenocarcinoma colon: on Avastin and Lonsurf ([place] [facility]/[place])  Hx of prostate CA sp prostatectomy; on flomax and ditropan for bladder dysfunction; has garcias now  H/o of hydronephrosis and s/p stenting; system appears patent on imaging  chronic metabolic acidosis; now some additional lactic acidosis; continue bicarb orally  Anemia; has been on aranesp; now severe anemia due to urinary loss (gross hematuria while on A/C); prbc support; additional unit today and following hgb  H/o mucositis  ID; ?infection related to  tract with non specific findings; covering with abx and cultures drawn--negative so far  Chronic pain; on chronic narcotics  Chronic A/C; on hold with  bleeding       Subjective  Unable to have 3 way cath placed; has garcias and manually irrigating; still dark red urine; getting blood today for 3rd unit; not dizzy; feels hungry and no nausea  Objective    Vital signs in last 24 hours  Temp:  [97.8  F (36.6  C)-99.1  F (37.3  C)] 98  F (36.7  C)  Pulse:  [] 84  Resp:  [16-18] 16  BP: ()/(52-67) 100/66  SpO2:  [97 %-100 %] 98 %      Intake/Output last 3 shifts  I/O last 3 completed shifts:  In: 2250 [IV Piggyback:1000]  Out: 725 [Urine:725]  Intake/Output this  shift:  I/O this shift:  In: 740.83 [I.V.:740.83]  Out: -     Physical Exam  Alert/oriented x 3, awake, NAD  CV: RRR without murmur or rub  Lung: clear and equal; no extra sounds  Ab: soft and NT; not distended; normal bs  Ext: no edema and well perfused  Skin; no rash  Adkins in place and merlot colored urine in bag    Pertinent Labs     Last Renal Panel:  Sodium   Date Value Ref Range Status   07/22/2025 136 135 - 145 mmol/L Final   12/23/2020 134 133 - 144 mmol/L Final     Potassium   Date Value Ref Range Status   07/22/2025 4.5 3.4 - 5.3 mmol/L Final   11/01/2021 4.7 3.5 - 5.0 mmol/L Final   12/23/2020 3.4 3.4 - 5.3 mmol/L Final     Chloride   Date Value Ref Range Status   07/22/2025 104 98 - 107 mmol/L Final   11/01/2021 115 (H) 98 - 107 mmol/L Final   12/23/2020 109 94 - 109 mmol/L Final     Carbon Dioxide   Date Value Ref Range Status   12/23/2020 20 20 - 32 mmol/L Final     Carbon Dioxide (CO2)   Date Value Ref Range Status   07/22/2025 21 (L) 22 - 29 mmol/L Final   11/01/2021 20 (L) 22 - 31 mmol/L Final     Anion Gap   Date Value Ref Range Status   07/22/2025 11 7 - 15 mmol/L Final   11/01/2021 5 5 - 18 mmol/L Final   12/23/2020 5 3 - 14 mmol/L Final     Glucose   Date Value Ref Range Status   07/22/2025 79 70 - 99 mg/dL Final   11/01/2021 98 70 - 125 mg/dL Final   12/23/2020 80 70 - 99 mg/dL Final     GLUCOSE BY METER POCT   Date Value Ref Range Status   05/31/2025 104 (H) 70 - 99 mg/dL Final     Urea Nitrogen   Date Value Ref Range Status   07/22/2025 36.2 (H) 8.0 - 23.0 mg/dL Final   11/01/2021 32 (H) 8 - 22 mg/dL Final   12/23/2020 25 7 - 30 mg/dL Final     Creatinine   Date Value Ref Range Status   07/22/2025 4.90 (H) 0.67 - 1.17 mg/dL Final   12/23/2020 1.33 (H) 0.66 - 1.25 mg/dL Final     GFR Estimate   Date Value Ref Range Status   07/22/2025 12 (L) >60 mL/min/1.73m2 Final     Comment:     eGFR calculated using 2021 CKD-EPI equation.   04/21/2021 39 (L) >60 mL/min/1.73m2 Final   12/23/2020 58 (L) >60  mL/min/[1.73_m2] Final     Comment:     Non  GFR Calc  Starting 12/18/2018, serum creatinine based estimated GFR (eGFR) will be   calculated using the Chronic Kidney Disease Epidemiology Collaboration   (CKD-EPI) equation.       Calcium   Date Value Ref Range Status   07/22/2025 8.3 (L) 8.8 - 10.4 mg/dL Final   12/23/2020 8.6 8.5 - 10.1 mg/dL Final     Phosphorus   Date Value Ref Range Status   06/04/2025 3.4 2.5 - 4.5 mg/dL Final     Albumin   Date Value Ref Range Status   06/03/2025 2.9 (L) 3.5 - 5.2 g/dL Final   10/29/2021 2.4 (L) 3.5 - 5.0 g/dL Final     Recent Labs   Lab 07/22/25  0620 07/21/25  1544 07/21/25  1123   HGB 6.9* 6.7* 5.7*          I reviewed all lab results  Osiris Lott MD

## 2025-07-22 NOTE — OP NOTE
OPERATIVE REPORT    Pre-operative Diagnosis:   1.)  Left hydronephrosis due to metastatic cecal cancer obstructing the left ureter  2.)  Clot retention  3.)  History of radical prostatectomy in 2015    Post-operative Diagnosis: Same    Operative Date: 7/22/2025    Place of Service:  [facility]    Procedure:   1.) cystoscopy  2.)  Urethral dilation  3.)  Clot evacuation  4.)  Fulguration of bleeding vessel  5.)  Difficult Adkins catheter placement    Surgeon: John Koehler    Anesthesia: General    Estimated Blood Loss:  <5 ml    Complications: None    Findings:   1.)  No stenosis in the urethra whatsoever.  26 Mohawk resectoscope placed into the bladder without the need for any dilation  2.)  Left ureteral stent is in position.  There is clot adherent to the distal portion of the stent.  There is blood oozing from the left ureteral orifice around the stent and through the stent.  3.)  Small amount of clot in the bladder.  This is irrigated and removed.  4.)  There are few small bleeding vessels on the posterior wall.  The bladder mucosa has a slightly abnormal appearance in this location.  There is the appearance of radiation cystitis.  It is unclear if there is simply radiation cystitis or invasive colorectal cancer on the posterior bladder wall.  The bleeding vessels are cauterized.  Excellent hemostasis.  However the vast majority of the oozing is seem to be coming from the left upper tract.    Summary of Procedure:    After informed consent was obtained the patient was brought back to the operating room.  They were prepped and draped in standard sterile fashion.  IV antibiotics were administered for prophylaxis.  A time out was performed.    The 20Fr rigid cystoscope was introduced through the urethral meatus.  This was advanced to the bladder without difficulty.  There is no stenosis.    Visibility is difficult in the bladder due to ongoing bleeding.  A 26 Mohawk resectoscope is easily placed.   This allows for good visibility.  The findings are noted above.    All of the clot is removed from the bladder.  A few vessels at the posterior bladder wall where there is abnormal bladder mucosal appearance are cauterized.  There is excellent hemostasis.  There continues to be ongoing oozing from the left ureteral orifice which cannot be controlled with this procedure.    Disposition: We will continue to need to hold his anticoagulation as this is the only measure that will allow control of the left upper tract hematuria.    John Koehler

## 2025-07-22 NOTE — PLAN OF CARE
Problem: Anemia  Goal: Anemia Symptom Improvement  Outcome: Progressing   Goal Outcome Evaluation:  Patient hgb 6.9 this morning.  1 unit PRBC infused.  Adkins catheter irrigated every 2 hours.  Red urine noted with no clots.  IV fluids infusing.

## 2025-07-22 NOTE — PLAN OF CARE
Problem: Adult Inpatient Plan of Care  Goal: Absence of Hospital-Acquired Illness or Injury  Outcome: Progressing  Intervention: Identify and Manage Fall Risk  Recent Flowsheet Documentation  Taken 7/22/2025 0000 by Rebeca Stewart, RN  Safety Promotion/Fall Prevention:   nonskid shoes/slippers when out of bed   patient and family education   room door open   room near nurse's station   safety round/check completed  Goal: Optimal Comfort and Wellbeing  Outcome: Progressing     Problem: Comorbidity Management  Goal: Blood Pressure in Desired Range  Outcome: Progressing   Goal Outcome Evaluation:  AOX4. Tylenol given x1 for pain to back. Adkins patent, red urine with few clots. Manually irrigated x3. BP soft, map remains over 65.

## 2025-07-22 NOTE — PROGRESS NOTES
Called Dr. Wachter and Dr. Koehler about the Hemoglobin=8.4 post transfusion and also platelets of 135. Dr. Koehler aware and no futher orders as well as Dr. Wachter.Aware of Xarelto last dose taken on 7/21/2025.

## 2025-07-23 LAB
ANION GAP SERPL CALCULATED.3IONS-SCNC: 13 MMOL/L (ref 7–15)
BUN SERPL-MCNC: 41.7 MG/DL (ref 8–23)
CALCIUM SERPL-MCNC: 8.1 MG/DL (ref 8.8–10.4)
CHLORIDE SERPL-SCNC: 103 MMOL/L (ref 98–107)
CREAT SERPL-MCNC: 3.76 MG/DL (ref 0.67–1.17)
EGFRCR SERPLBLD CKD-EPI 2021: 17 ML/MIN/1.73M2
GLUCOSE SERPL-MCNC: 169 MG/DL (ref 70–99)
HCO3 SERPL-SCNC: 18 MMOL/L (ref 22–29)
HGB BLD-MCNC: 7.8 G/DL (ref 13.3–17.7)
MCV RBC AUTO: 94 FL (ref 78–100)
MCV RBC AUTO: 94 FL (ref 78–100)
PLATELET # BLD AUTO: 147 10E3/UL (ref 150–450)
POTASSIUM SERPL-SCNC: 4.7 MMOL/L (ref 3.4–5.3)
SODIUM SERPL-SCNC: 134 MMOL/L (ref 135–145)

## 2025-07-23 PROCEDURE — 250N000011 HC RX IP 250 OP 636: Performed by: UROLOGY

## 2025-07-23 PROCEDURE — 85049 AUTOMATED PLATELET COUNT: CPT | Performed by: INTERNAL MEDICINE

## 2025-07-23 PROCEDURE — 99232 SBSQ HOSP IP/OBS MODERATE 35: CPT | Performed by: INTERNAL MEDICINE

## 2025-07-23 PROCEDURE — 258N000003 HC RX IP 258 OP 636: Performed by: UROLOGY

## 2025-07-23 PROCEDURE — 120N000001 HC R&B MED SURG/OB

## 2025-07-23 PROCEDURE — 85018 HEMOGLOBIN: CPT | Performed by: INTERNAL MEDICINE

## 2025-07-23 PROCEDURE — 80048 BASIC METABOLIC PNL TOTAL CA: CPT | Performed by: INTERNAL MEDICINE

## 2025-07-23 PROCEDURE — 250N000013 HC RX MED GY IP 250 OP 250 PS 637: Performed by: UROLOGY

## 2025-07-23 PROCEDURE — 258N000003 HC RX IP 258 OP 636: Performed by: INTERNAL MEDICINE

## 2025-07-23 RX ORDER — SODIUM CHLORIDE 9 MG/ML
INJECTION, SOLUTION INTRAVENOUS CONTINUOUS
Status: DISCONTINUED | OUTPATIENT
Start: 2025-07-23 | End: 2025-07-24

## 2025-07-23 RX ADMIN — SERTRALINE HYDROCHLORIDE 50 MG: 50 TABLET ORAL at 21:10

## 2025-07-23 RX ADMIN — OXYBUTYNIN CHLORIDE 5 MG: 5 TABLET ORAL at 13:10

## 2025-07-23 RX ADMIN — SODIUM BICARBONATE 650 MG TABLET 1300 MG: at 21:10

## 2025-07-23 RX ADMIN — SODIUM CHLORIDE, PRESERVATIVE FREE: 5 INJECTION INTRAVENOUS at 05:26

## 2025-07-23 RX ADMIN — SODIUM BICARBONATE 650 MG TABLET 1300 MG: at 09:48

## 2025-07-23 RX ADMIN — GABAPENTIN 100 MG: 100 CAPSULE ORAL at 21:09

## 2025-07-23 RX ADMIN — OXYBUTYNIN CHLORIDE 5 MG: 5 TABLET ORAL at 21:09

## 2025-07-23 RX ADMIN — ROPINIROLE HYDROCHLORIDE 0.25 MG: 0.25 TABLET, FILM COATED ORAL at 21:09

## 2025-07-23 RX ADMIN — TAMSULOSIN HYDROCHLORIDE 0.4 MG: 0.4 CAPSULE ORAL at 21:10

## 2025-07-23 RX ADMIN — CEFEPIME HYDROCHLORIDE 1 G: 1 INJECTION, POWDER, FOR SOLUTION INTRAMUSCULAR; INTRAVENOUS at 18:02

## 2025-07-23 RX ADMIN — SODIUM CHLORIDE: 0.9 INJECTION, SOLUTION INTRAVENOUS at 16:35

## 2025-07-23 RX ADMIN — OXYBUTYNIN CHLORIDE 5 MG: 5 TABLET ORAL at 09:48

## 2025-07-23 RX ADMIN — TRAZODONE HYDROCHLORIDE 50 MG: 50 TABLET ORAL at 21:09

## 2025-07-23 ASSESSMENT — ACTIVITIES OF DAILY LIVING (ADL)
ADLS_ACUITY_SCORE: 40
ADLS_ACUITY_SCORE: 38
ADLS_ACUITY_SCORE: 40
ADLS_ACUITY_SCORE: 38
ADLS_ACUITY_SCORE: 38
ADLS_ACUITY_SCORE: 40
ADLS_ACUITY_SCORE: 38
ADLS_ACUITY_SCORE: 40
ADLS_ACUITY_SCORE: 38
ADLS_ACUITY_SCORE: 40
ADLS_ACUITY_SCORE: 38
ADLS_ACUITY_SCORE: 38
ADLS_ACUITY_SCORE: 40

## 2025-07-23 NOTE — PLAN OF CARE
Feeling good this morning. Urine is clear light yellow, CBI running at slow drip. Output from garcias seemed to have stopped and clear fluid was bypassing the garcias. Irrigated and returned clear and then was flowing freely, slow drip irrigation continuing. Pt ypassed again and garcias irrigated a second time. Irritation easy and returned clear. Urology provider notified verbally and they said okay to leave drip bag clamped and irrigation stopped. Garcias removed.

## 2025-07-23 NOTE — PLAN OF CARE
Problem: Adult Inpatient Plan of Care  Goal: Optimal Comfort and Wellbeing  Outcome: Progressing     Problem: Anemia  Goal: Anemia Symptom Improvement  Intervention: Monitor and Manage Anemia  Recent Flowsheet Documentation  Taken 7/22/2025 2215 by Marleen Leach, RN  Safety Promotion/Fall Prevention:   nonskid shoes/slippers when out of bed   patient and family education   room door open   room near nurse's station   safety round/check completed   Goal Outcome Evaluation:       Pt returned from surgery this shift.  C/o some discomfort with catheter.  CBI running at moderated rate to keep light pink.  Pt tolerated regular diet.  Port dressing and needle changed.    Marleen Leach, RN

## 2025-07-23 NOTE — PROGRESS NOTES
"  [facility] - POSTOP PROGRESS NOTE    PLACE OF SERVICE:  [facility]     SURGERY: POD # 1 after cystourethroscopy with clot evacuation, fulguration of bleeding vessel, difficult garcias catheter placement by Dr John Koehler for left hydronephrosis due to metastatic cecal cancer obstructing left ureter, clot retention, hx of radical prostatectomy 2015     SUBJECTIVE:   Events: No acute events overnight. Some bypassing of urine around garcias today, urine clear with CBI off late AM.     Pt denies abdominal and bilateral flank pain, fever, chills.     OBJECTIVE:  PHYSICAL EXAM  Vital signs:  Temp: 97.5  F (36.4  C) Temp src: Oral BP: 111/65 Pulse: 51   Resp: 16 SpO2: 98 % O2 Device: None (Room air) Oxygen Delivery: 5 LPM Height: 170.2 cm (5' 7\") Weight: 65.8 kg (145 lb)  Estimated body mass index is 22.71 kg/m  as calculated from the following:    Height as of this encounter: 1.702 m (5' 7\").    Weight as of this encounter: 65.8 kg (145 lb).    General: NAD, alert, cooperative  Abdomen: Soft, non-tender, non-distended. No SP fullness or tenderness. No bilateral CVA tenderness.   : Penis and scrotum without erythema or edema. Garcias draining clear light yellow urine, good output.     LABS:  INR   Date Value Ref Range Status   05/08/2023 1.31 (H) 0.85 - 1.15 Final      Lab Results   Component Value Date    WBC 6.3 07/22/2025    WBC 5.4 12/24/2020     Lab Results   Component Value Date    HGB 7.8 07/23/2025    HGB 7.7 12/24/2020     Lab Results   Component Value Date     07/23/2025     12/24/2020     Creatinine   Date Value Ref Range Status   07/23/2025 3.76 (H) 0.67 - 1.17 mg/dL Final   12/23/2020 1.33 (H) 0.66 - 1.25 mg/dL Final     Sodium   Date Value Ref Range Status   07/23/2025 134 (L) 135 - 145 mmol/L Final   12/23/2020 134 133 - 144 mmol/L Final     Potassium   Date Value Ref Range Status   07/23/2025 4.7 3.4 - 5.3 mmol/L Final   11/01/2021 4.7 3.5 - 5.0 mmol/L Final   12/23/2020 3.4 " 3.4 - 5.3 mmol/L Final     Magnesium   Date Value Ref Range Status   06/04/2025 2.1 1.7 - 2.3 mg/dL Final       Lab Results: personally reviewed.     ASSESSMENT/PLAN:  POD # 1 after cystourethroscopy with clot evacuation, fulguration of bleeding vessel, difficult garcias catheter placement by Dr John Koehler for left hydronephrosis due to metastatic cecal cancer obstructing left ureter, clot retention, hx of radical prostatectomy 2015     - Urine now light yellow with CBI off for approx 30 min. Monitor. If remains clear/yellow at 1430, proceed with TOV with PVR check.   - Creatinine improving, 3.76 today. Nephrology following.   - Hgb 7.8 today, 8.4 on 7/22 after transfusion. Monitor.   - Per Dr. Koehler, recommend Xarelto hold x 1 week.   - Left ureteral stent exchange with [facility] per routine.   - Will discuss follow up with Dr. Phillips (patient's primary Urologist) and arrange as needed. Maintain appt with Dr. Chen on 8/6/25 for cysto/stent exchange.     Discussed patient with Dr John Kumar, APRN, CNP  [facility]   [phone]

## 2025-07-23 NOTE — PLAN OF CARE
Problem: Comorbidity Management  Goal: Blood Pressure in Desired Range  Outcome: Progressing     Problem: Anemia  Goal: Anemia Symptom Improvement  Outcome: Progressing  Intervention: Monitor and Manage Anemia  Recent Flowsheet Documentation  Taken 7/23/2025 0112 by Tara Christopher, RN  Safety Promotion/Fall Prevention:   nonskid shoes/slippers when out of bed   assistive device/personal items within reach   Goal Outcome Evaluation:  Pt is A0x4, denies pain.   CBI running at slow rate with light pink output, now clear.   NS running at 50 ml/ hr.  Fentanyl patch in place, on RUQ of abdomen.  Call light within reach  Plan of care ongoing

## 2025-07-23 NOTE — PROGRESS NOTES
[facility]  [facility]   Follow up    Rich Wolff   MRN: [id number]  : 1960   DOA: 2025   CC: ARF/CKD      Assessment and Plan:  64 year old male     ARF/CKD: has advanced CKD at a baseline (CR 2-2.7); now higher CR and may represent some progression vs ARF; no urinary obstruction evident at this point so suspect ARF related to hemodynamics (lower bp and hgb and on vasodilatory meds--gabapentin and narcotics) and possible contribution from infection and bactrim use; Cr improved some today; has follow up appt in August with Dr. Murphy  Volume status; may be slightly down; on IVF; taking PO now  stage 4 metastatic adenocarcinoma colon: on Avastin and Lonsurf ([facility])  Hx of prostate CA sp prostatectomy; on flomax and ditropan for bladder dysfunction; has garcias now an urine cleared  H/o of hydronephrosis and s/p stenting; system appears patent on imaging and now s/p surgery with clot evacuation and cautery of bleeding vessels  chronic metabolic acidosis; now some additional lactic acidosis; continue bicarb orally  Anemia; has been on aranesp; now severe anemia due to urinary loss (gross hematuria while on A/C); prbc support as needed  H/o mucositis  ID; ?infection related to  tract with non specific findings; covering with abx and cultures drawn--negative so far  Chronic pain; on chronic narcotics  Chronic A/C; on hold with  bleeding  Dispo; no objection to discharge from a renal perspective       Subjective  Went for surgery last noc; urine is now clear; no pain; no CP or SOB  Objective    Vital signs in last 24 hours  Temp:  [97.5  F (36.4  C)-98.6  F (37  C)] 97.5  F (36.4  C)  Pulse:  [] 51  Resp:  [16-18] 16  BP: (100-160)/(60-81) 111/65  SpO2:  [97 %-100 %] 98 %      Intake/Output last 3 shifts  I/O last 3 completed shifts:  In: 1580.83 [P.O.:500; I.V.:740.83]  Out: 7361 [Urine:7109; Stool:250; Blood:2]  Intake/Output  this shift:  No intake/output data recorded.    Physical Exam  Alert/oriented x 3, awake, NAD  CV: RRR without murmur or rub  Lung: clear and equal; no extra sounds  Ab: soft and NT; not distended; normal bs  Ext: no edema and well perfused  Skin; no rash  Adkins in place and merlot colored urine in bag    Pertinent Labs     Last Renal Panel:  Sodium   Date Value Ref Range Status   07/22/2025 140 135 - 145 mmol/L Final   12/23/2020 134 133 - 144 mmol/L Final     Potassium   Date Value Ref Range Status   07/22/2025 4.7 3.4 - 5.3 mmol/L Final   11/01/2021 4.7 3.5 - 5.0 mmol/L Final   12/23/2020 3.4 3.4 - 5.3 mmol/L Final     Chloride   Date Value Ref Range Status   07/22/2025 105 98 - 107 mmol/L Final   11/01/2021 115 (H) 98 - 107 mmol/L Final   12/23/2020 109 94 - 109 mmol/L Final     Carbon Dioxide   Date Value Ref Range Status   12/23/2020 20 20 - 32 mmol/L Final     Carbon Dioxide (CO2)   Date Value Ref Range Status   07/22/2025 21 (L) 22 - 29 mmol/L Final   11/01/2021 20 (L) 22 - 31 mmol/L Final     Anion Gap   Date Value Ref Range Status   07/22/2025 14 7 - 15 mmol/L Final   11/01/2021 5 5 - 18 mmol/L Final   12/23/2020 5 3 - 14 mmol/L Final     Glucose   Date Value Ref Range Status   07/22/2025 70 70 - 99 mg/dL Final   11/01/2021 98 70 - 125 mg/dL Final   12/23/2020 80 70 - 99 mg/dL Final     GLUCOSE BY METER POCT   Date Value Ref Range Status   07/22/2025 73 70 - 99 mg/dL Final     Urea Nitrogen   Date Value Ref Range Status   07/22/2025 40.1 (H) 8.0 - 23.0 mg/dL Final   11/01/2021 32 (H) 8 - 22 mg/dL Final   12/23/2020 25 7 - 30 mg/dL Final     Creatinine   Date Value Ref Range Status   07/22/2025 4.52 (H) 0.67 - 1.17 mg/dL Final   12/23/2020 1.33 (H) 0.66 - 1.25 mg/dL Final     GFR Estimate   Date Value Ref Range Status   07/22/2025 14 (L) >60 mL/min/1.73m2 Final     Comment:     eGFR calculated using 2021 CKD-EPI equation.   04/21/2021 39 (L) >60 mL/min/1.73m2 Final   12/23/2020 58 (L) >60 mL/min/[1.73_m2]  Final     Comment:     Non  GFR Calc  Starting 12/18/2018, serum creatinine based estimated GFR (eGFR) will be   calculated using the Chronic Kidney Disease Epidemiology Collaboration   (CKD-EPI) equation.       Calcium   Date Value Ref Range Status   07/22/2025 8.6 (L) 8.8 - 10.4 mg/dL Final   12/23/2020 8.6 8.5 - 10.1 mg/dL Final     Phosphorus   Date Value Ref Range Status   06/04/2025 3.4 2.5 - 4.5 mg/dL Final     Albumin   Date Value Ref Range Status   06/03/2025 2.9 (L) 3.5 - 5.2 g/dL Final   10/29/2021 2.4 (L) 3.5 - 5.0 g/dL Final     Recent Labs   Lab 07/22/25  1521 07/22/25  0620 07/21/25  1544 07/21/25  1123   HGB 8.4* 6.9* 6.7* 5.7*          I reviewed all lab results  Osiris Lott MD

## 2025-07-23 NOTE — PROGRESS NOTES
[facility]    Medicine Progress Note - Hospitalist Service    Date of Admission:  7/21/2025    Assessment & Plan   64-year-old male with locally advanced colon cancer, previous DVT, malignant hydronephrosis with ureteral stent presented with gross hematuria.    #Gross hematuria in the setting of anticoag  #Acute blood loss anemia  #Anemia of chronic disease  #Malignant hydronephrosis s/p ureteral stent  #History of prostate cancer  UA shows few bacteria, Ucx grew low level  rah  BCx neg thus far, follow  Continue empiric cefepime for now with low threshold to stop if okay with Urology  Ddx: radiation cystitis, new metastasis  s/p 3u PRBC.  Recheck hemoglobin in a.m.  Status post cystourethroscopy with clot evacuation, fulguration of bleeding vessel and difficult Adkins catheter placement on 7/22.  Personally discussed with urology team    #CHAYO on CKD stage IV  #Nonanion gap metabolic acidosis  Likely multifactorial- prerenal from severe anemia, bactrim and post-renal from retention/clots  Cr 5 on adm, trending down.  Stopped Bactrim  Gentle IV fluids.  BMP in AM.  Appreciate nephrology input.    #Advanced colon cancer, locally invasive  Initially diagnosed 2020  S/p surgery with ileostomy, radiation, chemo  Last chemo about 1 month ago, on hold due to urinary issues  Follows with [place] and locally with Dr. Erlin STEVE fentanyl patch, oxycodone, gabapentin (with renal dose)    #History of 1 episode of DVT;   PTA Xarelto on hold due to gross hematuria and severe anemia requiring blood transfusion currently urology recommending to hold anticoagulation for a week.  Will consult his oncology team to eval for anticoagulation management         Diet: Regular Diet Adult    DVT Prophylaxis: Pneumatic Compression Devices and ambulate as able  Adkins Catheter: PRESENT, indication: Gross hematuria, Gross hematuria  Lines: PRESENT      Port a Cath Single Lumen Left Chest wall-Site Assessment: WDL       Cardiac Monitoring: None  Code Status: Full Code      Clinically Significant Risk Factors         # Hyponatremia: Lowest Na = 134 mmol/L in last 2 days, will monitor as appropriate          # Acute Kidney Injury, unspecified: based on a >150% or 0.3 mg/dL increase in last creatinine compared to past 90 day average, will monitor renal function                 # Financial/Environmental Concerns:           Social Drivers of Health    Stress: Stress Concern Present (2/23/2021)    Received from [facility] - Occupational Stress Questionnaire     Feeling of Stress : Rather much          Disposition Plan     Medically Ready for Discharge: Anticipated Tomorrow             Brad KEE MD  Hospitalist Service  [facility]  Securely message with [employer] (more info)  Text page via [facility] Paging/Directory   ______________________________________________________________________    Interval History   Patient is new to me.  Patient seen and examined.  Notes, labs, imaging report personally reviewed.  Patient blood culture complaints.  Hematuria improved.  Personally discussed with urology team, reported likely Adkins catheter come out as soon as this evening.  No concern for infection and if blood culture remains negative today then plan to discontinue antibiotics.  They are recommending to hold anticoagulation for a week.  Patient reported just 1 episode of DVT when he was diagnosed with cancer, now CHAYO/CKD, will request oncology team to eval for anticoagulation management wondering if able to decrease the dose or does not need altogether.  This was discussed with patient.  Discussed with nursing staffs.    Physical Exam   Vital Signs: Temp: 97.5  F (36.4  C) Temp src: Oral BP: 111/65 Pulse: 51   Resp: 16 SpO2: 98 % O2 Device: None (Room air) Oxygen Delivery: 5 LPM  Weight: 145 lbs 0 oz      General: Not in obvious distress.  HEENT: Normocephalic, supple  neck  Genitourinary; Adkins catheter draining clear urine  Neuro: alert and awake, follows simple commands appropriately, moves all extremity        Medical Decision Making             Data     I have personally reviewed the following data over the past 24 hrs:    6.3  \   7.8 (L)   / 147 (L)     134 (L) 103 41.7 (H) /  169 (H)   4.7 18 (L) 3.76 (H) \       Imaging results reviewed over the past 24 hrs:   No results found for this or any previous visit (from the past 24 hours).

## 2025-07-23 NOTE — CONSULTS
Consultation    Rich Wolff MRN# [id number]   YOB: 1960 Age: 64 year old   Date of Admission: 7/21/2025  Requesting physician: Dr. KEE  Reason for consult: Hematuria while on anticoagulation with known colorectal cancer           Assessment and Plan:     1. 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 S/p irrigation, cystoscopy with cautery of bleeding vessels and clot evacuation no biopsy of mucosa was performed at that time.     - Ok to hold Xarelto until improves resolves and HGB stable    2. History of DVT December 2020 at time of cancer diagnosis: Has tolerated Xarelto 20mg daily.     - Ok to hold Xarelto until hematuria improves and HGB stable  - We will continue to follow for further anticoagulation rec's depending on response post cystoscopy/cautery now off anticoagulation.     3. Invasive adenocarcinoma of the bladder: Prior treatment includes FOLFOXIRI, FORFIRI and currently bevacizumab + Lonsurf.  Treatment was held 7/1/25 due to hematuria. PET imaging 5/20/25 showed increased FDG uptake of cecal mass since 3/17/25. CT imaging showed known infiltrative soft tissue mass to be stable in sizensince last imaging done on 5/30 and 6/25/25.  Plan was to undergo restaging PET at [facility] on 7/21/25, however patient admitted to ED due to ongoing bleeding and new formation of clots.     - Full evaluation of disease will be better defined with use of PET imaging. At this time, we will hold on proceeding with this until outpatient. May consider looking into completing inpatient if ongoing bleeding or concern for disease progression is affecting hospital stay.    Case reviewed with Dr. Kern.         Vi Choe  [facility]  Office: [phone]  Cell: [phone] Monday through Friday, 8AM-5PM. After hours and weekends, please use answering  service.         Chief Complaint:   Hematuria           History of Present Illness:   This patient is a 64 year old male with a history of stage [id number] moderately differentiated invasive adenocarcinoma of the cecum:  The patient completed 10 cycles of FOLFOXIRI from 11/30/20 - 4/12/21 followed by IMRT to the pelvis from 5/12/21 - 6/15/21 with concurrent Xeloda.     The patient underwent Ex Lap, duodenal fistula takedown, primary parastomal hernia repair with DLI revision on 7/30/21. The patient was taken to surgery for potential resection of malignant neoplasm of the colon. Cancer was found to be very locally invasive and the surgery was terminated following duodenal fistula takedown and parastomal hernia repair.    The patient reinitiated FOLFIRI on 11/15/22 and completed 21 cycles of treatment on 9/12/23.        - Due to disease progression per PET/CT on 9/6/23, he was transitioned to Lonsurf + Avastin on 10/3/23. Avastin is given every 2 weeks. Per Merna recommendation, his Lonsurf dose was reduced to 3 pills BID and now reduced to 2 pills BID on days 1-5 and 15-19 of each 28-day cycle secondary to neutropenia.     PET/CT scan on 5/20/25 showed slightly increased size and FDG uptake within the cecal mass since 3/17/2025. No findings for new FDG avid metastatic disease. New mild to moderate left hydroureteronephrosis. New FDG activity within the right atrium. *Patient went to the ED on 5/21/25, and CTA chest at that time showed no acute PE. Echo showed normal left ventricular EF of 50%.    Patient was hospitalized from 5/30/25 - 6/4/25 with septic shock secondary to bacteremia, UTI and pyelonephritis. Had left- sided stent placed for hydroureteronephrosis with improvement in kidney function. CHAYO slowly improved. Was evaluated by ID who recommended extended course of antibiotics with amoxicillin at discharge. Was discharged with improved symptoms.     He had most recently received C22D15 of Avastin on 6/17/25. He  "had been on Lonsurf 2 pills BID on days 1-5 and 15-19 of each 28-day cycle.    Treatment was held on 25 due to hematuria and clots starting a few days ago.     On 25, Rich presented to the ED from ongoing, gross hematuria.   Urology proceeded with manual bladder irrigation. CT AP showed left ureteral stent in adequate position without significant hydro or significant perinephric stranding, a 2.8 x 4.7 cm soft tissue density mass in the dependent portion of the bladder (likely clot) and stable atrophic right kidney without hydronephrosis.      On 25, urology proceeded with a cystoscopy, clot evacuation, fulguration of bleeding vessels.         Physical Exam:   Vitals were reviewed  Blood pressure 96/52, pulse 55, temperature 97.7  F (36.5  C), temperature source Oral, resp. rate 16, height 1.702 m (5' 7\"), weight 65.8 kg (145 lb), SpO2 97%.  Temperatures:  Current - Temp: 97.7  F (36.5  C); Max - Temp  Av.8  F (36.6  C)  Min: 97.5  F (36.4  C)  Max: 98.6  F (37  C)  Respiration range: Resp  Av.9  Min: 16  Max: 18  Pulse range: Pulse  Av  Min: 51  Max: 130  Blood pressure range: Systolic (24hrs), Av , Min:96 , Max:160   ; Diastolic (24hrs), Av, Min:52, Max:81    Pulse oximetry range: SpO2  Av %  Min: 97 %  Max: 99 %    Intake/Output Summary (Last 24 hours) at 2025 1529  Last data filed at 2025 1500  Gross per 24 hour   Intake 1774 ml   Output 7661 ml   Net -5887 ml                 Past Medical History:   I have reviewed this patient's past medical history  Past Medical History:   Diagnosis Date    Colon cancer (H)     Hyperlipidemia     Hypertension     Prostate cancer (H) 2015    T1c. [place]'s 6 (3+3).  Confined to prostate.  Multifocal bilateral comprising 2% of the gland.PSA:5.8.             Past Surgical History:   I have reviewed this patient's past surgical history  Past Surgical History:   Procedure Laterality Date    BOWEL RESECTION      COLONOSCOPY " W/ BIOPSIES  11/04/2020    COLOSTOMY      COMBINED CYSTOSCOPY, INSERT STENT URETER(S) Left 5/30/2025    Procedure: CYSTOSCOPY, LEFT RETROGRADE PYELOGRAM, WITH LEFT URETERAL STENT INSERTION;  Surgeon: Parag Phillips MD;  Location: [facility] OR    COMBINED CYSTOSCOPY, RETROGRADES, EXCHANGE STENT URETER(S) Left 6/26/2025    Procedure: CYSTOSCOPY, WITH LEFT RETROGRADE PYELOGRAM AND LEFT URETERAL STENT REPLACEMENT;  Surgeon: Parag Phillips MD;  Location: [facility] OR    CYSTOSCOPY, FULGURATE BLEEDERS, EVACUATE CLOT(S), COMBINED N/A 7/22/2025    Procedure: CYSTOSCOPY, WITH FULGURATION OF HEMORRHAGING BLOOD VESSEL AND THROMBUS REMOVAL;  Surgeon: John Koehler MD;  Location: [facility] OR    ESOPHAGOSCOPY, GASTROSCOPY, DUODENOSCOPY (EGD), COMBINED  11/04/2020    IR CHEST PORT PLACEMENT > 5 YRS OF AGE  11/24/2020    IR CHEST PORT REPLACEMENT SAME SITE RIGHT  10/27/2022    IR NEPHROSTOMY TUBE CHANGE RIGHT  4/8/2021    IR NEPHROSTOMY TUBE CHANGE RIGHT  4/8/2021    IR NEPHROSTOMY TUBE CHANGE RIGHT  8/16/2021    IR NEPHROSTOMY TUBE CHANGE RIGHT  10/29/2021    IR NEPHROSTOMY TUBE CHANGE RIGHT  10/29/2021    IR NEPHROSTOMY TUBE CHANGE RIGHT  12/27/2021    IR NEPHROSTOMY TUBE CHANGE RIGHT  2/28/2022    IR NEPHROSTOMY TUBE CHANGE RIGHT  5/9/2022    IR NEPHROSTOMY TUBE CHANGE RIGHT  7/5/2022    IR NEPHROSTOMY TUBE CHANGE RIGHT  9/26/2022    IR NEPHROSTOMY TUBE CHANGE RIGHT  10/25/2022    IR NEPHROSTOMY TUBE CHANGE RIGHT  12/28/2022    IR NEPHROSTOMY TUBE CHANGE RIGHT  2/28/2023    IR NEPHROSTOMY TUBE CHANGE RIGHT  4/27/2023    IR NEPHROSTOMY TUBE CHANGE RIGHT  6/27/2023    IR NEPHROSTOMY TUBE CHANGE RIGHT  8/30/2023    IR NEPHROSTOMY TUBE CHANGE RIGHT  10/25/2023    IR NEPHROSTOMY TUBE CHANGE RIGHT  12/27/2023    IR NEPHROSTOMY TUBE CHANGE RIGHT  2/28/2024    IR NEPHROSTOMY TUBE CHANGE RIGHT  4/24/2024    IR NEPHROSTOMY TUBE CHANGE RIGHT  6/26/2024    IR NEPHROSTOMY TUBE CHANGE RIGHT  8/28/2024     IR NEPHROSTOMY TUBE CHANGE RIGHT  10/30/2024    IR PORT PLACEMENT >5 YEARS  11/24/2020    IR SITE CHECK/EVALUATION  4/11/2022    NEPHROSTOMY      AK ESOPHAGOGASTRODUODENOSCOPY TRANSORAL DIAGNOSTIC N/A 11/4/2020    Procedure: ESOPHAGOGASTRODUODENOSCOPY (EGD);  Surgeon: Paul Masters MD;  Location: [facility] GI;  Service: Gastroenterology    AK LAP,PROSTATECTOMY,RADICAL,W/NERVE SPARE,INCL ROBOTIC Bilateral 09/01/2015    Procedure: DAVINCI RADICAL RETROPUBIC PROSTATECTOMY BILATERAL PELVIC LYMPH NODE DISSECTION;  Surgeon: Juan C Velazco MD;  Location: [facility];  Service: Urology    PROSTATE BIOPSY  06/08/2015    Ultrasound-guided transrectal biopsy.    SURGERY SCROTAL / TESTICULAR      for undescended testes, removed due to atrophy with vasectomy    ZZHC COLONOSCOPY W/WO BRUSH/WASH N/A 11/4/2020    Procedure: COLONOSCOPY WITH BIOPSY;  Surgeon: Paul Masters MD;  Location: [facility];  Service: Gastroenterology               Social History:   I have reviewed this patient's social history  Social History     Tobacco Use    Smoking status: Never    Smokeless tobacco: Never   Substance Use Topics    Alcohol use: Not Currently     Comment: Alcoholic Drinks/day: rarely             Family History:   I have reviewed this patient's family history  Family History   Problem Relation Age of Onset    Breast Cancer Mother     Osteoporosis Mother     Valvular heart disease Father     Prostate Cancer Father 70.00    No Known Problems Sister     No Known Problems Son     No Known Problems Daughter              Allergies:     Allergies   Allergen Reactions    Bee Venom Hives             Medications:   I have reviewed this patient's current medications  Medications Prior to Admission   Medication Sig Dispense Refill Last Dose/Taking    acetaminophen (TYLENOL) 500 MG tablet Take 1,000 mg by mouth every 6 hours as needed for pain.   Taking As Needed    BACTRIM -160 MG tablet Take 1 tablet by  mouth 2 times daily.   7/21/2025 Morning    calcium carbonate-vitamin D (CALTRATE) 600-10 MG-MCG per tablet Take 1 tablet by mouth daily.   7/21/2025 Morning    fentaNYL (DURAGESIC) 50 mcg/hr 72 hr patch Place 1 patch onto the skin every 72 hours.   Past Week    ferrous sulfate (FE TABS) 325 (65 Fe) MG EC tablet Take 650 mg by mouth every morning.   7/21/2025 Morning    ferrous sulfate (FEROSUL) 325 (65 Fe) MG tablet Take 325 mg by mouth every evening.   7/20/2025 Evening    gabapentin (NEURONTIN) 300 MG capsule Take 300 mg by mouth at bedtime.   7/20/2025 Bedtime    LONSURF 15-6.14 MG tablet Take 2 tablets by mouth See Admin Instructions. Take 2 tablets by mouth twice daily on days 1-5 and 15-19 of each 28 day cycle.   Taking    Multiple Vitamin (MULTIVITAMIN PO) Multivitamin powder: take 1 scoop daily   7/20/2025 Evening    oxyBUTYnin (DITROPAN) 5 MG tablet Take 5 mg by mouth 3 times daily.   7/21/2025 Morning    oxyCODONE (ROXICODONE) 5 MG tablet Take 5 mg by mouth every 4 hours as needed for severe pain    Taking As Needed    rOPINIRole (REQUIP) 0.25 MG tablet Take 0.25 mg by mouth at bedtime.   7/20/2025 Bedtime    sertraline (ZOLOFT) 50 MG tablet Take 50 mg by mouth At Bedtime    7/20/2025 Bedtime    tamsulosin (FLOMAX) 0.4 MG capsule Take 0.4 mg by mouth daily   7/20/2025 Bedtime    traZODone (DESYREL) 50 MG tablet Take 1 tablet by mouth at bedtime.   7/20/2025 Bedtime    vitamin D3 (CHOLECALCIFEROL) 50 mcg (2000 units) tablet Take 1 tablet by mouth daily   7/21/2025 Morning    XARELTO ANTICOAGULANT 20 MG TABS tablet Take 20 mg by mouth daily   7/21/2025 Morning     Current Facility-Administered Medications   Medication Dose Route Frequency Provider Last Rate Last Admin    acetaminophen (TYLENOL) tablet 650 mg  650 mg Oral Q4H PRN John Koehler MD   650 mg at 07/22/25 0612    calcium carbonate (TUMS) chewable tablet 1,000 mg  1,000 mg Oral 4x Daily PRN John Koehler MD        ceFEPIme  (MAXIPIME) 1 g vial to attach to  mL bag for ADULTS or NS 50 mL bag for PEDS  1 g Intravenous Q24H John Koehler MD        fentaNYL (DURAGESIC) 50 mcg/hr 72 hr patch 1 patch  50 mcg Transdermal Q72H John Koehler MD   1 patch at 07/21/25 1826    And    fentaNYL (DURAGESIC) Patch in Place   Transdermal Q8H LIZETT John Koehler MD        gabapentin (NEURONTIN) capsule 100 mg  100 mg Oral At Bedtime John Koehler MD   100 mg at 07/22/25 2112    lidocaine (LMX4) cream   Topical Q1H PRN John Koehler MD        lidocaine (LMX4) cream   Topical Q1H PRN John Koehler MD        lidocaine 1 % 0.1-1 mL  0.1-1 mL Other Q1H PRN John Koehler MD        melatonin tablet 5 mg  5 mg Oral At Bedtime PRN John Koehler MD        ondansetron (ZOFRAN ODT) ODT tab 4 mg  4 mg Oral Q6H PRN John Koehler MD        Or    ondansetron (ZOFRAN) injection 4 mg  4 mg Intravenous Q6H PRN John Koehler MD        oxyBUTYnin (DITROPAN) tablet 5 mg  5 mg Oral TID John Koehler MD   5 mg at 07/23/25 1310    oxyCODONE (ROXICODONE) tablet 5 mg  5 mg Oral Q4H PRN John Koehler MD        polyethylene glycol (MIRALAX) Packet 17 g  17 g Oral BID PRN John Koehler MD        [Held by provider] rivaroxaban ANTICOAGULANT (XARELTO) tablet 20 mg  20 mg Oral Daily Carlota Koch MD        rOPINIRole (REQUIP) tablet 0.25 mg  0.25 mg Oral At Bedtime John Koehler MD   0.25 mg at 07/22/25 2112    senna-docusate (SENOKOT-S/PERICOLACE) 8.6-50 MG per tablet 1 tablet  1 tablet Oral BID PRN John Koehler MD        Or    senna-docusate (SENOKOT-S/PERICOLACE) 8.6-50 MG per tablet 2 tablet  2 tablet Oral BID PRN John Koehler MD        sertraline (ZOLOFT) tablet 50 mg  50 mg Oral At Bedtime John Koehler MD   50 mg at 07/22/25 2104    sodium bicarbonate tablet 1,300 mg  1,300 mg Oral BID John Koehler MD   1,300 mg  at 07/23/25 0948    sodium chloride (PF) 0.9% PF flush 3 mL  3 mL Intracatheter q1 min prn John Koehler MD        sodium chloride (PF) 0.9% PF flush 3 mL  3 mL Intracatheter Q8H John Koehler MD   3 mL at 07/23/25 1315    sodium chloride (PF) 0.9% PF flush 3 mL  3 mL Intracatheter Q8H LIZETT John Koehler MD   3 mL at 07/23/25 1310    sodium chloride (PF) 0.9% PF flush 3 mL  3 mL Intracatheter q1 min prn John Koehler MD        sodium chloride 0.9 % infusion   Intravenous Continuous Brad KEE MD        sodium chloride 0.9% (bottle) irrigation   Irrigation Continuous John Koehler MD   Rate Verify at 07/22/25 1943    tamsulosin (FLOMAX) capsule 0.4 mg  0.4 mg Oral At Bedtime John Koehler MD   0.4 mg at 07/22/25 2104    traZODone (DESYREL) tablet 50 mg  50 mg Oral At Bedtime John Koehler MD   50 mg at 07/22/25 2104             Review of Systems:     The 10 point Review of Systems is negative other than noted in the HPI.            Data:   Data   Recent Results (from the past 24 hours)   Prepare red blood cells (unit)   Result Value Ref Range    Blood Component Type Red Blood Cells     Product Code V6078L06     Unit Status Ready for issue     Unit Number [id number]     CROSSMATCH Compatible     CODING SYSTEM [id number]    Prepare red blood cells (unit)   Result Value Ref Range    Blood Component Type Red Blood Cells     Product Code C4910F63     Unit Status Ready for issue     Unit Number [id number]     CROSSMATCH Compatible     CODING SYSTEM [id number]    Glucose by meter   Result Value Ref Range    GLUCOSE BY METER POCT 73 70 - 99 mg/dL   Basic Metabolic Panel (Limited Occurrences)   Result Value Ref Range    Sodium 134 (L) 135 - 145 mmol/L    Potassium 4.7 3.4 - 5.3 mmol/L    Chloride 103 98 - 107 mmol/L    Carbon Dioxide (CO2) 18 (L) 22 - 29 mmol/L    Anion Gap 13 7 - 15 mmol/L    Urea Nitrogen 41.7 (H) 8.0 - 23.0 mg/dL    Creatinine 3.76 (H) 0.67 -  1.17 mg/dL    GFR Estimate 17 (L) >60 mL/min/1.73m2    Calcium 8.1 (L) 8.8 - 10.4 mg/dL    Glucose 169 (H) 70 - 99 mg/dL   Hemoglobin   Result Value Ref Range    Hemoglobin 7.8 (L) 13.3 - 17.7 g/dL    MCV 94 78 - 100 fL   Platelet count   Result Value Ref Range    Platelet Count 147 (L) 150 - 450 10e3/uL    MCV 94 78 - 100 fL

## 2025-07-24 VITALS
OXYGEN SATURATION: 100 % | HEIGHT: 67 IN | BODY MASS INDEX: 22.76 KG/M2 | WEIGHT: 145 LBS | DIASTOLIC BLOOD PRESSURE: 66 MMHG | RESPIRATION RATE: 18 BRPM | TEMPERATURE: 97.8 F | HEART RATE: 66 BPM | SYSTOLIC BLOOD PRESSURE: 115 MMHG

## 2025-07-24 LAB
ANION GAP SERPL CALCULATED.3IONS-SCNC: 8 MMOL/L (ref 7–15)
BACTERIA SPEC CULT: NORMAL
BACTERIA SPEC CULT: NORMAL
BUN SERPL-MCNC: 43 MG/DL (ref 8–23)
CALCIUM SERPL-MCNC: 8.2 MG/DL (ref 8.8–10.4)
CHLORIDE SERPL-SCNC: 107 MMOL/L (ref 98–107)
CREAT SERPL-MCNC: 3.6 MG/DL (ref 0.67–1.17)
EGFRCR SERPLBLD CKD-EPI 2021: 18 ML/MIN/1.73M2
GLUCOSE SERPL-MCNC: 99 MG/DL (ref 70–99)
HCO3 SERPL-SCNC: 20 MMOL/L (ref 22–29)
HGB BLD-MCNC: 8.1 G/DL (ref 13.3–17.7)
MCV RBC AUTO: 97 FL (ref 78–100)
POTASSIUM SERPL-SCNC: 5.1 MMOL/L (ref 3.4–5.3)
SODIUM SERPL-SCNC: 135 MMOL/L (ref 135–145)

## 2025-07-24 PROCEDURE — 250N000011 HC RX IP 250 OP 636: Performed by: INTERNAL MEDICINE

## 2025-07-24 PROCEDURE — 99239 HOSP IP/OBS DSCHRG MGMT >30: CPT | Performed by: INTERNAL MEDICINE

## 2025-07-24 PROCEDURE — 250N000013 HC RX MED GY IP 250 OP 250 PS 637: Performed by: UROLOGY

## 2025-07-24 PROCEDURE — 99232 SBSQ HOSP IP/OBS MODERATE 35: CPT | Performed by: INTERNAL MEDICINE

## 2025-07-24 PROCEDURE — 85018 HEMOGLOBIN: CPT | Performed by: INTERNAL MEDICINE

## 2025-07-24 PROCEDURE — 82374 ASSAY BLOOD CARBON DIOXIDE: CPT | Performed by: INTERNAL MEDICINE

## 2025-07-24 RX ORDER — HEPARIN SODIUM (PORCINE) LOCK FLUSH IV SOLN 100 UNIT/ML 100 UNIT/ML
5-10 SOLUTION INTRAVENOUS
Status: DISCONTINUED | OUTPATIENT
Start: 2025-07-24 | End: 2025-07-24 | Stop reason: HOSPADM

## 2025-07-24 RX ORDER — GABAPENTIN 300 MG/1
300 CAPSULE ORAL AT BEDTIME
Status: DISCONTINUED | OUTPATIENT
Start: 2025-07-24 | End: 2025-07-24 | Stop reason: HOSPADM

## 2025-07-24 RX ORDER — RIVAROXABAN 20 MG/1
20 TABLET, FILM COATED ORAL DAILY
Status: SHIPPED
Start: 2025-07-31

## 2025-07-24 RX ORDER — HEPARIN SODIUM,PORCINE 10 UNIT/ML
5-10 VIAL (ML) INTRAVENOUS EVERY 24 HOURS
Status: DISCONTINUED | OUTPATIENT
Start: 2025-07-24 | End: 2025-07-24 | Stop reason: HOSPADM

## 2025-07-24 RX ORDER — RIVAROXABAN 20 MG/1
20 TABLET, FILM COATED ORAL DAILY
Status: SHIPPED
Start: 2025-07-30 | End: 2025-07-24

## 2025-07-24 RX ORDER — HEPARIN SODIUM,PORCINE 10 UNIT/ML
5-10 VIAL (ML) INTRAVENOUS
Status: DISCONTINUED | OUTPATIENT
Start: 2025-07-24 | End: 2025-07-24 | Stop reason: HOSPADM

## 2025-07-24 RX ORDER — SODIUM BICARBONATE 650 MG/1
1300 TABLET ORAL 2 TIMES DAILY
Qty: 120 TABLET | Refills: 0 | Status: SHIPPED | OUTPATIENT
Start: 2025-07-24

## 2025-07-24 RX ADMIN — OXYBUTYNIN CHLORIDE 5 MG: 5 TABLET ORAL at 07:47

## 2025-07-24 RX ADMIN — SODIUM BICARBONATE 650 MG TABLET 1300 MG: at 07:46

## 2025-07-24 RX ADMIN — HEPARIN, PORCINE (PF) 10 UNIT/ML INTRAVENOUS SYRINGE 10 ML: at 13:31

## 2025-07-24 RX ADMIN — OXYBUTYNIN CHLORIDE 5 MG: 5 TABLET ORAL at 13:30

## 2025-07-24 RX ADMIN — ACETAMINOPHEN 650 MG: 325 TABLET ORAL at 00:20

## 2025-07-24 RX ADMIN — ACETAMINOPHEN 650 MG: 325 TABLET ORAL at 07:46

## 2025-07-24 ASSESSMENT — ACTIVITIES OF DAILY LIVING (ADL)
ADLS_ACUITY_SCORE: 37
ADLS_ACUITY_SCORE: 38
ADLS_ACUITY_SCORE: 37

## 2025-07-24 NOTE — PLAN OF CARE
Goal Outcome Evaluation:       No acute issues overnight. Tylenol PO given for chronic back pain for moderate relief. Pt urinated dark urine with no blood noted. Vitals stable.

## 2025-07-24 NOTE — DISCHARGE SUMMARY
[facility] MEDICINE  DISCHARGE SUMMARY     Primary Care Physician: Maycol Worrell  Admission Date: 7/21/2025   Discharge Provider: Brad KEE MD Discharge Date: 7/24/2025   Diet:   Active Diet and Nourishment Order   Procedures    Renal Diet (non-dialysis)    Diet       Code Status: Full Code   Activity: DCACTIVITY: Activity as tolerated        Condition at Discharge: Stable     REASON FOR PRESENTATION(See Admission Note for Details)   Gross hematuria.  Please refer to H&P for details    PRINCIPAL & ACTIVE DISCHARGE DIAGNOSES     Principal Problem:    Gross hematuria  Active Problems:    Acute kidney injury    Anemia due to blood loss, acute    Malignant neoplasm of cecum (H)    Acute kidney injury superimposed on CKD    Chronic anticoagulation  History of DVT on December 2020 at the time of cancer diagnosis      PENDING LABS     Unresulted Labs Ordered in the Past 30 Days of this Admission       Date and Time Order Name Status Description    7/22/2025  4:17 PM Prepare red blood cells (unit) Preliminary     7/22/2025  4:17 PM Prepare red blood cells (unit) Preliminary     7/21/2025  1:09 PM Blood Culture Peripheral blood (BC) Arm, Left Preliminary     7/21/2025  1:09 PM Blood Culture Peripheral blood (BC) Portacath Preliminary               PROCEDURES ( this hospitalization only)      Procedure(s):  CYSTOSCOPY, WITH FULGURATION OF HEMORRHAGING BLOOD VESSEL AND THROMBUS REMOVAL  Difficult Adkins catheter placement    RECOMMENDATIONS TO OUTPATIENT PROVIDER FOR F/U VISIT     Follow-up Appointments       Follow Up      Follow up with urologist as scheduled.  Follow-up with nephrologist as scheduled.  Follow-up with oncologist as recommended.  Patient reported oncology clinic will call him with appointment next week        Hospital Follow-up with Existing Primary Care Provider (PCP)          Schedule Primary Care visit within: 7 Days   Recommended labs and Imaging (to be ordered by  Primary Care Provider): CBC, RFP                   DISPOSITION     Home    SUMMARY OF HOSPITAL COURSE:      64-year-old male with locally advanced colon cancer, previous DVT, malignant hydronephrosis with ureteral stent presented with gross hematuria.    Patient received 2 unit packed RBC transfusion and hemoglobin stable around 8 range.  Patient had cystoscopy with clot evacuation, progression of bleeding vessels and difficult Adkins catheter placement on 7/22.  Hematuria improved, Adkins catheter removed and patient voiding.  Kidney function gradually improving, nephrology recommended to continue bicarb for metabolic acidosis on discharge and they will arrange outpatient follow-up.  Oncology recommended continue to hold Xarelto until he follows with them next week.  Patient started on antibiotics on admission, urine culture no growth, blood cultures so far no growth, discussed with urology team and no indication for antibiotics at this time.    Please refer to progress note dated 7/23/2025 for details.      Discharge Medications with Med changes:     Current Discharge Medication List        START taking these medications    Details   sodium bicarbonate 650 MG tablet Take 2 tablets (1,300 mg) by mouth 2 times daily.  Qty: 120 tablet, Refills: 0    Associated Diagnoses: Metabolic acidosis           CONTINUE these medications which have CHANGED    Details   XARELTO ANTICOAGULANT 20 MG TABS tablet Take 1 tablet (20 mg) by mouth daily.    Associated Diagnoses: History of deep venous thrombosis           CONTINUE these medications which have NOT CHANGED    Details   acetaminophen (TYLENOL) 500 MG tablet Take 1,000 mg by mouth every 6 hours as needed for pain.      calcium carbonate-vitamin D (CALTRATE) 600-10 MG-MCG per tablet Take 1 tablet by mouth daily.      fentaNYL (DURAGESIC) 50 mcg/hr 72 hr patch Place 1 patch onto the skin every 72 hours.      ferrous sulfate (FE TABS) 325 (65 Fe) MG EC tablet Take 650 mg by mouth  "every morning.      ferrous sulfate (FEROSUL) 325 (65 Fe) MG tablet Take 325 mg by mouth every evening.      gabapentin (NEURONTIN) 300 MG capsule Take 300 mg by mouth at bedtime.      LONSURF 15-6.14 MG tablet Take 2 tablets by mouth See Admin Instructions. Take 2 tablets by mouth twice daily on days 1-5 and 15-19 of each 28 day cycle.      Multiple Vitamin (MULTIVITAMIN PO) Multivitamin powder: take 1 scoop daily      oxyBUTYnin (DITROPAN) 5 MG tablet Take 5 mg by mouth 3 times daily.      oxyCODONE (ROXICODONE) 5 MG tablet Take 5 mg by mouth every 4 hours as needed for severe pain       rOPINIRole (REQUIP) 0.25 MG tablet Take 0.25 mg by mouth at bedtime.      sertraline (ZOLOFT) 50 MG tablet Take 50 mg by mouth At Bedtime       tamsulosin (FLOMAX) 0.4 MG capsule Take 0.4 mg by mouth daily      traZODone (DESYREL) 50 MG tablet Take 1 tablet by mouth at bedtime.      vitamin D3 (CHOLECALCIFEROL) 50 mcg (2000 units) tablet Take 1 tablet by mouth daily           STOP taking these medications       BACTRIM -160 MG tablet Comments:   Reason for Stopping:                     Rationale for medication changes:      Patient completed antibiotic course.        Consults       UROLOGY IP CONSULT  CARE MANAGEMENT / SOCIAL WORK IP CONSULT  HEMATOLOGY & ONCOLOGY IP CONSULT    Immunizations given this encounter     Most Recent Immunizations   Administered Date(s) Administered    COVID-19 12+ (Pfizer) 10/31/2023    COVID-19 Bivalent 12+ (Pfizer) 12/23/2022    COVID-19 MONOVALENT 12+ (Pfizer) 09/03/2021    COVID-19 Monovalent 12+ (Pfizer 2022) 04/26/2022    Influenza (H1N1) 10/06/2021    Influenza (IIV3) PF 10/22/2020           Anticoagulation Information      Recent INR results: No results for input(s): \"INR\" in the last 168 hours.        SIGNIFICANT IMAGING FINDINGS     Results for orders placed or performed during the hospital encounter of 07/21/25   CT Abdomen Pelvis w/o Contrast    Impression    IMPRESSION:   1.  " Interval repositioning of the left-sided ureteral stent which is in satisfactory position. Slightly improved mild left-sided hydronephrosis.  2.  Progressive left peripelvic and periureteral edema which may reflect an infectious or inflammatory process. Recommend laboratory correlation.  3.  Newly visualized irregular 4.7 cm soft tissue density mass in the dependent portion of the bladder. This is likely a large blood clot. Underlying lesion is difficult to exclude. Imaging follow-up is recommended.  4.  Stable infiltrative mass centered at the ileal cecal junction.         SIGNIFICANT LABORATORY FINDINGS     Most Recent 3 CBC's:  Recent Labs   Lab Test 07/24/25  0605 07/23/25  1315 07/22/25  1521 07/22/25  0620 07/21/25  1544 07/21/25  1123   WBC  --   --  6.3 5.4  --  6.2   HGB 8.1* 7.8* 8.4* 6.9*   < > 5.7*   MCV 97 94  94 93 96  --  102*   PLT  --  147* 135* 142*  --  172    < > = values in this interval not displayed.     Most Recent 3 BMP's:  Recent Labs   Lab Test 07/24/25  0605 07/23/25  1315 07/22/25  1819 07/22/25  1521    134*  --  140   POTASSIUM 5.1 4.7  --  4.7   CHLORIDE 107 103  --  105   CO2 20* 18*  --  21*   BUN 43.0* 41.7*  --  40.1*   CR 3.60* 3.76*  --  4.52*   ANIONGAP 8 13  --  14   AARON 8.2* 8.1*  --  8.6*   GLC 99 169* 73 70     Most Recent 2 LFT's:  Recent Labs   Lab Test 06/01/25  1020 05/30/25  1832   AST 34 37   ALT 16 27   ALKPHOS 57 67   BILITOTAL 0.6 0.9       Discharge Orders        Primary Care - Care Coordination Referral      Reason for your hospital stay    Patient admitted for gross hematuria in the setting of anticoagulation, improved     Activity    Your activity upon discharge: activity as tolerated     Follow Up    Follow up with urologist as scheduled.  Follow-up with nephrologist as scheduled.  Follow-up with oncologist as recommended.  Patient reported oncology clinic will call him with appointment next week     Discharge Instructions    Patient advised to hold  home Xarelto until he discussed with oncology team as an outpatient next week.     Diet    Follow this diet upon discharge: Current Diet:Orders Placed This Encounter      Renal Diet (non-dialysis)     Hospital Follow-up with Existing Primary Care Provider (PCP)            Examination   Physical Exam   Temp:  [97.7  F (36.5  C)-98  F (36.7  C)] 97.8  F (36.6  C)  Pulse:  [55-66] 66  Resp:  [16-18] 18  BP: ()/(52-66) 115/66  SpO2:  [97 %-100 %] 100 %  Wt Readings from Last 1 Encounters:   07/21/25 65.8 kg (145 lb)       Patient seen and examined.  Notes, labs, imaging report personally reviewed.  Patient denied new concern or complaints.  Patient eager to go home.  Discussed with nursing staffs.  Discussed with nephrologist.  Detail plan of care after discharge discussed with patient.        General: Not in obvious distress.  HEENT: Normocephalic, supple neck  Chest: Clear to auscultation bilateral anteriorly, no wheezing  Heart: S1S2 normal, regular  Abdomen: Soft.  Colostomy bag in place  Neuro: alert and awake, follows simple commands appropriately, moves all extremity          Please see EMR for more detailed significant labs, imaging, consultant notes etc.    IBrad MD, personally saw the patient today and spent greater than 30 minutes discharging this patient.    Brad KEE MD  [facility]    CC:Maycol Worrell

## 2025-07-24 NOTE — PROGRESS NOTES
[facility]  [facility]   Follow up    Rich Wolff   MRN: [id number]  : 1960   DOA: 2025   CC: ARF/CKD      Assessment and Plan:  64 year old male     ARF/CKD: has advanced CKD at a baseline (CR 2-2.7); now higher CR and may represent some progression vs ARF; no urinary obstruction evident at this point so suspect ARF related to hemodynamics (lower bp and hgb and on vasodilatory meds--gabapentin and narcotics) and possible contribution from infection and bactrim use; Cr improving; has follow up appt in August with Dr. Murphy  Volume status; seems ok now; taking PO well  stage 4 metastatic adenocarcinoma colon: on Avastin and Lonsurf ([facility]/[place])  Hx of prostate CA sp prostatectomy; on flomax and ditropan for bladder dysfunction; garcias out and pt voiding ok  H/o of hydronephrosis and s/p stenting; system appears patent on imaging and now s/p surgery with clot evacuation and cautery of bleeding vessels  chronic metabolic acidosis; now some additional lactic acidosis; continue bicarb orally  Anemia; has been on aranesp as OP; now severe anemia due to urinary loss (gross hematuria while on A/C); prbc support as needed  H/o mucositis  ID; ?infection related to  tract with non specific findings; covering with abx and cultures drawn--negative so far  Chronic pain; on chronic narcotics  Chronic A/C; on hold with  bleeding  Dispo; no objection to discharge from a renal perspective       Subjective  Garcias out; urine clear; feels ready to go  home  No SOB; not dizzy; eating ok  Objective    Vital signs in last 24 hours  Temp:  [97.7  F (36.5  C)-98  F (36.7  C)] 97.8  F (36.6  C)  Pulse:  [55-66] 66  Resp:  [16-18] 18  BP: ()/(52-66) 115/66  SpO2:  [97 %-100 %] 100 %      Intake/Output last 3 shifts  I/O last 3 completed shifts:  In: 2507.33 [P.O.:1400; I.V.:1107.33]  Out: 1050 [Urine:1050]  Intake/Output this shift:  No intake/output  data recorded.    Physical Exam  Alert/oriented x 3, awake, NAD  CV: RRR without murmur or rub  Lung: clear and equal; no extra sounds  Ab: soft and NT; not distended; normal bs  Ext: no edema and well perfused  Skin; no rash        Pertinent Labs     Last Renal Panel:  Sodium   Date Value Ref Range Status   07/24/2025 135 135 - 145 mmol/L Final   12/23/2020 134 133 - 144 mmol/L Final     Potassium   Date Value Ref Range Status   07/24/2025 5.1 3.4 - 5.3 mmol/L Final   11/01/2021 4.7 3.5 - 5.0 mmol/L Final   12/23/2020 3.4 3.4 - 5.3 mmol/L Final     Chloride   Date Value Ref Range Status   07/24/2025 107 98 - 107 mmol/L Final   11/01/2021 115 (H) 98 - 107 mmol/L Final   12/23/2020 109 94 - 109 mmol/L Final     Carbon Dioxide   Date Value Ref Range Status   12/23/2020 20 20 - 32 mmol/L Final     Carbon Dioxide (CO2)   Date Value Ref Range Status   07/24/2025 20 (L) 22 - 29 mmol/L Final   11/01/2021 20 (L) 22 - 31 mmol/L Final     Anion Gap   Date Value Ref Range Status   07/24/2025 8 7 - 15 mmol/L Final   11/01/2021 5 5 - 18 mmol/L Final   12/23/2020 5 3 - 14 mmol/L Final     Glucose   Date Value Ref Range Status   07/24/2025 99 70 - 99 mg/dL Final   11/01/2021 98 70 - 125 mg/dL Final   12/23/2020 80 70 - 99 mg/dL Final     GLUCOSE BY METER POCT   Date Value Ref Range Status   07/22/2025 73 70 - 99 mg/dL Final     Urea Nitrogen   Date Value Ref Range Status   07/24/2025 43.0 (H) 8.0 - 23.0 mg/dL Final   11/01/2021 32 (H) 8 - 22 mg/dL Final   12/23/2020 25 7 - 30 mg/dL Final     Creatinine   Date Value Ref Range Status   07/24/2025 3.60 (H) 0.67 - 1.17 mg/dL Final   12/23/2020 1.33 (H) 0.66 - 1.25 mg/dL Final     GFR Estimate   Date Value Ref Range Status   07/24/2025 18 (L) >60 mL/min/1.73m2 Final     Comment:     eGFR calculated using 2021 CKD-EPI equation.   04/21/2021 39 (L) >60 mL/min/1.73m2 Final   12/23/2020 58 (L) >60 mL/min/[1.73_m2] Final     Comment:     Non  GFR Calc  Starting 12/18/2018,  serum creatinine based estimated GFR (eGFR) will be   calculated using the Chronic Kidney Disease Epidemiology Collaboration   (CKD-EPI) equation.       Calcium   Date Value Ref Range Status   07/24/2025 8.2 (L) 8.8 - 10.4 mg/dL Final   12/23/2020 8.6 8.5 - 10.1 mg/dL Final     Phosphorus   Date Value Ref Range Status   06/04/2025 3.4 2.5 - 4.5 mg/dL Final     Albumin   Date Value Ref Range Status   06/03/2025 2.9 (L) 3.5 - 5.2 g/dL Final   10/29/2021 2.4 (L) 3.5 - 5.0 g/dL Final     Recent Labs   Lab 07/24/25  0605 07/23/25  1315 07/22/25  1521 07/22/25  0620 07/21/25  1544   HGB 8.1* 7.8* 8.4* 6.9* 6.7*          I reviewed all lab results  Osiris Lott MD

## 2025-07-24 NOTE — PLAN OF CARE
Problem: Anemia  Goal: Anemia Symptom Improvement  Outcome: Not Progressing     Problem: Adult Inpatient Plan of Care  Goal: Optimal Comfort and Wellbeing  Outcome: Progressing     Problem: Comorbidity Management  Goal: Blood Pressure in Desired Range  Outcome: Progressing   Goal Outcome Evaluation:      Hemoglobin 7.8 today, no symptoms of anemia, denies pain or discomfort, bp soft, other vitals stable, voided 250ml of dark colored urine after garcias was discontinued at about 1500, was bladder scan for zero, no hematuria, alert and oriented, vitals stable.

## 2025-07-24 NOTE — PLAN OF CARE
Problem: Adult Inpatient Plan of Care  Goal: Plan of Care Review  Description: The Plan of Care Review/Shift note should be completed every shift.  The Outcome Evaluation is a brief statement about your assessment that the patient is improving, declining, or no change.  This information will be displayed automatically on your shift  note.  Outcome: Met   Goal Outcome Evaluation:       All goals for discharge have been met. Instructions reviewed and given to patient. Family ride home. [place].                       Message noted.

## 2025-07-24 NOTE — PROGRESS NOTES
;Care Management Discharge Note    Discharge Date: 07/24/2025       Discharge Disposition:  home    Discharge Services:  none    Discharge DME:  none    Discharge Transportation:  family    Private pay costs discussed: Not applicable    Does the patient's insurance plan have a 3 day qualifying hospital stay waiver?  No    Handoff Referral Completed: Yes, [facility] PCP: Internal handoff referral completed    Additional Information:  Patient discharged home with spouse. No needs    Nidia Echeverria, SHIRACC

## 2025-07-26 LAB
BACTERIA SPEC CULT: NO GROWTH
BACTERIA SPEC CULT: NO GROWTH

## (undated) DEVICE — BAG URINARY DRAIN 2000ML LF 154002

## (undated) DEVICE — SUCTION MANIFOLD NEPTUNE 2 SYS 1 PORT 702-025-000

## (undated) DEVICE — MAT FLOOR WATERPROOF BACKSHEET FMBP30

## (undated) DEVICE — GOWN IMPERVIOUS BREATHABLE SMART XLG 89045

## (undated) DEVICE — KIT TURNOVER FAIRVIEW SOUTHDALE HALF SP3890

## (undated) DEVICE — SOLUTION WATER 1000ML BOTTLE R5000-01

## (undated) DEVICE — SOL NACL 0.9% IRRIG 3000ML BAG 2B7127

## (undated) DEVICE — GLOVE BIOGEL PI ORTHOPRO SZ 7.5 47675

## (undated) DEVICE — TUBING SUCTION MEDI-VAC 1/4"X20' N620A

## (undated) DEVICE — CUSTOM PACK CYSTO PREFERRED SOT5BCYHEA

## (undated) DEVICE — SOLUTION IV IRRIGATION 0.9% NACL 3L R8206

## (undated) DEVICE — SOL WATER IRRIG 3000ML BAG 2B7117

## (undated) DEVICE — GLOVE SURG PI ULTRA TOUCH M SZ 7 LF 42670

## (undated) DEVICE — SOL WATER IRRIG 1000ML BOTTLE 2F7114

## (undated) DEVICE — STRAP PSTN 60X4IN BD KN NCDTV REUSE KBS 02

## (undated) DEVICE — ESU ELEC BUTTON RESECTOSCOP 24FR 12-30DEG WA22657S

## (undated) DEVICE — BAG URINARY DRAIN 4000ML LF 153509

## (undated) DEVICE — GUIDEWIRE BENTSON FLEX TIP 0.035"X150CM M0066201250

## (undated) DEVICE — CATH FOLEY 3WAY 22FR 30ML LUBRICATH LATEX 0167L22

## (undated) DEVICE — DILATOR RENAL RDS MFG# 260-100

## (undated) DEVICE — TUBING IRR LG BORE TUBE DRIP CHMBR 2 BG 94IN 313003

## (undated) DEVICE — CATH URETERAL OPEN END 5FRX70CM M0064002010

## (undated) DEVICE — PREP DYNA-HEX 4% CHG SCRUB 4OZ BOTTLE MDS098710

## (undated) DEVICE — GOWN XLG DISP 9545

## (undated) DEVICE — CATH FOLEY 18FR 5ML LUBRICATH LATEX 0165L18

## (undated) DEVICE — TUBING SET THERMEDX UROLOGY SGL USE LL0006

## (undated) DEVICE — KIT ENDO FIRST STEP DISINFECTANT 200ML W/POUCH EP-4

## (undated) RX ORDER — LIDOCAINE HYDROCHLORIDE 10 MG/ML
INJECTION, SOLUTION INFILTRATION; PERINEURAL
Status: DISPENSED
Start: 2022-02-28

## (undated) RX ORDER — LIDOCAINE HYDROCHLORIDE 10 MG/ML
INJECTION, SOLUTION INFILTRATION; PERINEURAL
Status: DISPENSED
Start: 2023-10-25

## (undated) RX ORDER — LIDOCAINE HYDROCHLORIDE 10 MG/ML
INJECTION, SOLUTION INFILTRATION; PERINEURAL
Status: DISPENSED
Start: 2022-05-09

## (undated) RX ORDER — LIDOCAINE HYDROCHLORIDE 10 MG/ML
INJECTION, SOLUTION EPIDURAL; INFILTRATION; INTRACAUDAL; PERINEURAL
Status: DISPENSED
Start: 2022-07-05

## (undated) RX ORDER — HEPARIN SODIUM (PORCINE) LOCK FLUSH IV SOLN 100 UNIT/ML 100 UNIT/ML
SOLUTION INTRAVENOUS
Status: DISPENSED
Start: 2021-08-16

## (undated) RX ORDER — FENTANYL CITRATE 50 UG/ML
INJECTION, SOLUTION INTRAMUSCULAR; INTRAVENOUS
Status: DISPENSED
Start: 2021-08-16

## (undated) RX ORDER — LIDOCAINE HYDROCHLORIDE 10 MG/ML
INJECTION, SOLUTION INFILTRATION; PERINEURAL
Status: DISPENSED
Start: 2021-08-16

## (undated) RX ORDER — LIDOCAINE HYDROCHLORIDE 10 MG/ML
INJECTION, SOLUTION INFILTRATION; PERINEURAL
Status: DISPENSED
Start: 2021-10-29

## (undated) RX ORDER — LIDOCAINE HYDROCHLORIDE 10 MG/ML
INJECTION, SOLUTION EPIDURAL; INFILTRATION; INTRACAUDAL; PERINEURAL
Status: DISPENSED
Start: 2022-12-28

## (undated) RX ORDER — FENTANYL CITRATE 50 UG/ML
INJECTION, SOLUTION INTRAMUSCULAR; INTRAVENOUS
Status: DISPENSED
Start: 2021-10-29

## (undated) RX ORDER — HEPARIN SODIUM (PORCINE) LOCK FLUSH IV SOLN 100 UNIT/ML 100 UNIT/ML
SOLUTION INTRAVENOUS
Status: DISPENSED
Start: 2022-10-27

## (undated) RX ORDER — LIDOCAINE HYDROCHLORIDE 10 MG/ML
INJECTION, SOLUTION INFILTRATION; PERINEURAL
Status: DISPENSED
Start: 2022-12-28

## (undated) RX ORDER — FENTANYL CITRATE 50 UG/ML
INJECTION, SOLUTION INTRAMUSCULAR; INTRAVENOUS
Status: DISPENSED
Start: 2022-10-27

## (undated) RX ORDER — LIDOCAINE HYDROCHLORIDE 10 MG/ML
INJECTION, SOLUTION INFILTRATION; PERINEURAL
Status: DISPENSED
Start: 2024-06-26

## (undated) RX ORDER — LIDOCAINE HYDROCHLORIDE 10 MG/ML
INJECTION, SOLUTION INFILTRATION; PERINEURAL
Status: DISPENSED
Start: 2022-10-25

## (undated) RX ORDER — LIDOCAINE HYDROCHLORIDE 10 MG/ML
INJECTION, SOLUTION INFILTRATION; PERINEURAL
Status: DISPENSED
Start: 2022-09-26

## (undated) RX ORDER — LIDOCAINE HYDROCHLORIDE AND EPINEPHRINE 10; 10 MG/ML; UG/ML
INJECTION, SOLUTION INFILTRATION; PERINEURAL
Status: DISPENSED
Start: 2022-10-27

## (undated) RX ORDER — LIDOCAINE HYDROCHLORIDE 10 MG/ML
INJECTION, SOLUTION INFILTRATION; PERINEURAL
Status: DISPENSED
Start: 2024-04-24

## (undated) RX ORDER — FENTANYL CITRATE 50 UG/ML
INJECTION, SOLUTION INTRAMUSCULAR; INTRAVENOUS
Status: DISPENSED
Start: 2025-06-26

## (undated) RX ORDER — LIDOCAINE HYDROCHLORIDE 10 MG/ML
INJECTION, SOLUTION INFILTRATION; PERINEURAL
Status: DISPENSED
Start: 2023-06-27

## (undated) RX ORDER — LIDOCAINE HYDROCHLORIDE 10 MG/ML
INJECTION, SOLUTION INFILTRATION; PERINEURAL
Status: DISPENSED
Start: 2021-12-27

## (undated) RX ORDER — HEPARIN SODIUM 1000 [USP'U]/ML
INJECTION, SOLUTION INTRAVENOUS; SUBCUTANEOUS
Status: DISPENSED
Start: 2024-11-01

## (undated) RX ORDER — LIDOCAINE HYDROCHLORIDE 10 MG/ML
INJECTION, SOLUTION INFILTRATION; PERINEURAL
Status: DISPENSED
Start: 2024-10-30

## (undated) RX ORDER — FENTANYL CITRATE-0.9 % NACL/PF 10 MCG/ML
PLASTIC BAG, INJECTION (ML) INTRAVENOUS
Status: DISPENSED
Start: 2025-07-22

## (undated) RX ORDER — LIDOCAINE HYDROCHLORIDE 10 MG/ML
INJECTION, SOLUTION INFILTRATION; PERINEURAL
Status: DISPENSED
Start: 2022-03-01

## (undated) RX ORDER — HEPARIN SODIUM 200 [USP'U]/100ML
INJECTION, SOLUTION INTRAVENOUS
Status: DISPENSED
Start: 2022-10-27

## (undated) RX ORDER — EPHEDRINE SULFATE 50 MG/ML
INJECTION, SOLUTION INTRAMUSCULAR; INTRAVENOUS; SUBCUTANEOUS
Status: DISPENSED
Start: 2025-06-26

## (undated) RX ORDER — FENTANYL CITRATE 50 UG/ML
INJECTION, SOLUTION INTRAMUSCULAR; INTRAVENOUS
Status: DISPENSED
Start: 2025-07-22